# Patient Record
Sex: FEMALE | Race: ASIAN | NOT HISPANIC OR LATINO | Employment: OTHER | URBAN - METROPOLITAN AREA
[De-identification: names, ages, dates, MRNs, and addresses within clinical notes are randomized per-mention and may not be internally consistent; named-entity substitution may affect disease eponyms.]

---

## 2021-03-23 ENCOUNTER — IMMUNIZATION (OUTPATIENT)
Dept: OBSTETRICS AND GYNECOLOGY | Facility: CLINIC | Age: 61
End: 2021-03-23
Payer: MEDICARE

## 2021-03-23 DIAGNOSIS — Z23 NEED FOR VACCINATION: Primary | ICD-10-CM

## 2021-03-23 PROCEDURE — 91300 COVID-19, MRNA, LNP-S, PF, 30 MCG/0.3 ML DOSE VACCINE: CPT | Mod: PBBFAC | Performed by: NURSE PRACTITIONER

## 2021-04-13 ENCOUNTER — IMMUNIZATION (OUTPATIENT)
Dept: OBSTETRICS AND GYNECOLOGY | Facility: CLINIC | Age: 61
End: 2021-04-13
Payer: MEDICARE

## 2021-04-13 DIAGNOSIS — Z23 NEED FOR VACCINATION: Primary | ICD-10-CM

## 2021-04-13 PROCEDURE — 0002A COVID-19, MRNA, LNP-S, PF, 30 MCG/0.3 ML DOSE VACCINE: CPT | Mod: CV19,S$GLB,, | Performed by: FAMILY MEDICINE

## 2021-04-13 PROCEDURE — 91300 COVID-19, MRNA, LNP-S, PF, 30 MCG/0.3 ML DOSE VACCINE: ICD-10-PCS | Mod: S$GLB,,, | Performed by: FAMILY MEDICINE

## 2021-04-13 PROCEDURE — 0002A COVID-19, MRNA, LNP-S, PF, 30 MCG/0.3 ML DOSE VACCINE: ICD-10-PCS | Mod: CV19,S$GLB,, | Performed by: FAMILY MEDICINE

## 2021-04-13 PROCEDURE — 91300 COVID-19, MRNA, LNP-S, PF, 30 MCG/0.3 ML DOSE VACCINE: CPT | Mod: S$GLB,,, | Performed by: FAMILY MEDICINE

## 2021-04-29 ENCOUNTER — HOSPITAL ENCOUNTER (INPATIENT)
Facility: HOSPITAL | Age: 61
LOS: 2 days | Discharge: HOME | DRG: 638 | End: 2021-05-01
Attending: EMERGENCY MEDICINE | Admitting: HOSPITALIST
Payer: MEDICARE

## 2021-04-29 DIAGNOSIS — R50.9 FEVER: ICD-10-CM

## 2021-04-29 DIAGNOSIS — R00.0 TACHYCARDIA: ICD-10-CM

## 2021-04-29 DIAGNOSIS — R07.9 CHEST PAIN: ICD-10-CM

## 2021-04-29 PROBLEM — N17.9 ACUTE RENAL FAILURE: Status: ACTIVE | Noted: 2021-04-29

## 2021-04-29 PROBLEM — E16.2 HYPOGLYCEMIA: Status: ACTIVE | Noted: 2021-04-29

## 2021-04-29 PROBLEM — T38.3X5A: Status: ACTIVE | Noted: 2021-04-29

## 2021-04-29 PROBLEM — I10 ESSENTIAL HYPERTENSION: Status: ACTIVE | Noted: 2021-04-29

## 2021-04-29 LAB
ALBUMIN SERPL BCP-MCNC: 2.8 G/DL (ref 3.5–5.2)
ALP SERPL-CCNC: 80 U/L (ref 55–135)
ALT SERPL W/O P-5'-P-CCNC: 31 U/L (ref 10–44)
ANION GAP SERPL CALC-SCNC: 11 MMOL/L (ref 8–16)
AST SERPL-CCNC: 95 U/L (ref 10–40)
BACTERIA #/AREA URNS HPF: ABNORMAL /HPF
BASOPHILS # BLD AUTO: 0.03 K/UL (ref 0–0.2)
BASOPHILS NFR BLD: 0.3 % (ref 0–1.9)
BILIRUB SERPL-MCNC: 0.4 MG/DL (ref 0.1–1)
BILIRUB UR QL STRIP: NEGATIVE
BUN SERPL-MCNC: 31 MG/DL (ref 8–23)
CALCIUM SERPL-MCNC: 8.9 MG/DL (ref 8.7–10.5)
CHLORIDE SERPL-SCNC: 101 MMOL/L (ref 95–110)
CLARITY UR: ABNORMAL
CO2 SERPL-SCNC: 22 MMOL/L (ref 23–29)
COLOR UR: YELLOW
CREAT SERPL-MCNC: 2.8 MG/DL (ref 0.5–1.4)
CTP QC/QA: YES
DIFFERENTIAL METHOD: ABNORMAL
EOSINOPHIL # BLD AUTO: 0 K/UL (ref 0–0.5)
EOSINOPHIL NFR BLD: 0.1 % (ref 0–8)
ERYTHROCYTE [DISTWIDTH] IN BLOOD BY AUTOMATED COUNT: 14.7 % (ref 11.5–14.5)
EST. GFR  (AFRICAN AMERICAN): 20 ML/MIN/1.73 M^2
EST. GFR  (NON AFRICAN AMERICAN): 17 ML/MIN/1.73 M^2
GLUCOSE SERPL-MCNC: 59 MG/DL (ref 70–110)
GLUCOSE UR QL STRIP: NEGATIVE
HCT VFR BLD AUTO: 24.1 % (ref 37–48.5)
HGB BLD-MCNC: 8.2 G/DL (ref 12–16)
HGB UR QL STRIP: ABNORMAL
HYALINE CASTS #/AREA URNS LPF: 1 /LPF
IMM GRANULOCYTES # BLD AUTO: 0.07 K/UL (ref 0–0.04)
IMM GRANULOCYTES NFR BLD AUTO: 0.7 % (ref 0–0.5)
KETONES UR QL STRIP: NEGATIVE
LACTATE SERPL-SCNC: 1.8 MMOL/L (ref 0.5–2.2)
LEUKOCYTE ESTERASE UR QL STRIP: ABNORMAL
LYMPHOCYTES # BLD AUTO: 0.6 K/UL (ref 1–4.8)
LYMPHOCYTES NFR BLD: 6.2 % (ref 18–48)
MCH RBC QN AUTO: 30.4 PG (ref 27–31)
MCHC RBC AUTO-ENTMCNC: 34 G/DL (ref 32–36)
MCV RBC AUTO: 89 FL (ref 82–98)
MICROSCOPIC COMMENT: ABNORMAL
MONOCYTES # BLD AUTO: 1.2 K/UL (ref 0.3–1)
MONOCYTES NFR BLD: 12.3 % (ref 4–15)
NEUTROPHILS # BLD AUTO: 8.1 K/UL (ref 1.8–7.7)
NEUTROPHILS NFR BLD: 80.4 % (ref 38–73)
NITRITE UR QL STRIP: NEGATIVE
NRBC BLD-RTO: 0 /100 WBC
PH UR STRIP: 7 [PH] (ref 5–8)
PLATELET # BLD AUTO: 404 K/UL (ref 150–450)
PMV BLD AUTO: 8.7 FL (ref 9.2–12.9)
POCT GLUCOSE: 112 MG/DL (ref 70–110)
POCT GLUCOSE: 138 MG/DL (ref 70–110)
POCT GLUCOSE: 233 MG/DL (ref 70–110)
POCT GLUCOSE: 46 MG/DL (ref 70–110)
POCT GLUCOSE: 52 MG/DL (ref 70–110)
POCT GLUCOSE: 59 MG/DL (ref 70–110)
POCT GLUCOSE: 59 MG/DL (ref 70–110)
POCT GLUCOSE: 64 MG/DL (ref 70–110)
POTASSIUM SERPL-SCNC: 3.9 MMOL/L (ref 3.5–5.1)
PROCALCITONIN SERPL IA-MCNC: 1.04 NG/ML
PROT SERPL-MCNC: 7.4 G/DL (ref 6–8.4)
PROT UR QL STRIP: ABNORMAL
RBC # BLD AUTO: 2.7 M/UL (ref 4–5.4)
RBC #/AREA URNS HPF: 0 /HPF (ref 0–4)
SARS-COV-2 RDRP RESP QL NAA+PROBE: NEGATIVE
SODIUM SERPL-SCNC: 134 MMOL/L (ref 136–145)
SP GR UR STRIP: 1 (ref 1–1.03)
SQUAMOUS #/AREA URNS HPF: 10 /HPF
TROPONIN I SERPL DL<=0.01 NG/ML-MCNC: 0.01 NG/ML (ref 0–0.03)
URN SPEC COLLECT METH UR: ABNORMAL
UROBILINOGEN UR STRIP-ACNC: NEGATIVE EU/DL
WBC # BLD AUTO: 10.01 K/UL (ref 3.9–12.7)
WBC #/AREA URNS HPF: 5 /HPF (ref 0–5)

## 2021-04-29 PROCEDURE — U0002 COVID-19 LAB TEST NON-CDC: HCPCS | Performed by: EMERGENCY MEDICINE

## 2021-04-29 PROCEDURE — 83605 ASSAY OF LACTIC ACID: CPT | Performed by: EMERGENCY MEDICINE

## 2021-04-29 PROCEDURE — 96365 THER/PROPH/DIAG IV INF INIT: CPT

## 2021-04-29 PROCEDURE — 63600175 PHARM REV CODE 636 W HCPCS: Performed by: EMERGENCY MEDICINE

## 2021-04-29 PROCEDURE — 96367 TX/PROPH/DG ADDL SEQ IV INF: CPT

## 2021-04-29 PROCEDURE — 85025 COMPLETE CBC W/AUTO DIFF WBC: CPT | Performed by: EMERGENCY MEDICINE

## 2021-04-29 PROCEDURE — 63600175 PHARM REV CODE 636 W HCPCS: Performed by: HOSPITALIST

## 2021-04-29 PROCEDURE — A4216 STERILE WATER/SALINE, 10 ML: HCPCS | Performed by: HOSPITALIST

## 2021-04-29 PROCEDURE — 93010 ELECTROCARDIOGRAM REPORT: CPT | Mod: ,,, | Performed by: INTERNAL MEDICINE

## 2021-04-29 PROCEDURE — 82962 GLUCOSE BLOOD TEST: CPT

## 2021-04-29 PROCEDURE — 96368 THER/DIAG CONCURRENT INF: CPT

## 2021-04-29 PROCEDURE — 84484 ASSAY OF TROPONIN QUANT: CPT | Performed by: EMERGENCY MEDICINE

## 2021-04-29 PROCEDURE — 87040 BLOOD CULTURE FOR BACTERIA: CPT | Mod: 59 | Performed by: EMERGENCY MEDICINE

## 2021-04-29 PROCEDURE — 25000242 PHARM REV CODE 250 ALT 637 W/ HCPCS: Performed by: HOSPITALIST

## 2021-04-29 PROCEDURE — 81000 URINALYSIS NONAUTO W/SCOPE: CPT | Performed by: EMERGENCY MEDICINE

## 2021-04-29 PROCEDURE — 80053 COMPREHEN METABOLIC PANEL: CPT | Performed by: EMERGENCY MEDICINE

## 2021-04-29 PROCEDURE — 93010 EKG 12-LEAD: ICD-10-PCS | Mod: ,,, | Performed by: INTERNAL MEDICINE

## 2021-04-29 PROCEDURE — 21400001 HC TELEMETRY ROOM

## 2021-04-29 PROCEDURE — 84145 PROCALCITONIN (PCT): CPT | Performed by: EMERGENCY MEDICINE

## 2021-04-29 PROCEDURE — 25000003 PHARM REV CODE 250: Performed by: EMERGENCY MEDICINE

## 2021-04-29 PROCEDURE — 96366 THER/PROPH/DIAG IV INF ADDON: CPT

## 2021-04-29 PROCEDURE — 96375 TX/PRO/DX INJ NEW DRUG ADDON: CPT

## 2021-04-29 PROCEDURE — 99285 EMERGENCY DEPT VISIT HI MDM: CPT | Mod: 25

## 2021-04-29 PROCEDURE — 93005 ELECTROCARDIOGRAM TRACING: CPT

## 2021-04-29 PROCEDURE — 25000003 PHARM REV CODE 250: Performed by: HOSPITALIST

## 2021-04-29 RX ORDER — LANOLIN ALCOHOL/MO/W.PET/CERES
800 CREAM (GRAM) TOPICAL
Status: DISCONTINUED | OUTPATIENT
Start: 2021-04-29 | End: 2021-05-01 | Stop reason: HOSPADM

## 2021-04-29 RX ORDER — GLUCAGON 1 MG
1 KIT INJECTION
Status: DISCONTINUED | OUTPATIENT
Start: 2021-04-29 | End: 2021-04-29

## 2021-04-29 RX ORDER — INSULIN ASPART 100 [IU]/ML
0-5 INJECTION, SOLUTION INTRAVENOUS; SUBCUTANEOUS
Status: DISCONTINUED | OUTPATIENT
Start: 2021-04-29 | End: 2021-05-01 | Stop reason: HOSPADM

## 2021-04-29 RX ORDER — ACETAMINOPHEN 325 MG/1
650 TABLET ORAL EVERY 8 HOURS PRN
Status: DISCONTINUED | OUTPATIENT
Start: 2021-04-29 | End: 2021-05-01 | Stop reason: HOSPADM

## 2021-04-29 RX ORDER — FERROUS SULFATE 325(65) MG
325 TABLET, DELAYED RELEASE (ENTERIC COATED) ORAL
Status: ON HOLD | COMMUNITY
End: 2021-07-07 | Stop reason: HOSPADM

## 2021-04-29 RX ORDER — ALLOPURINOL 100 MG/1
100 TABLET ORAL DAILY
COMMUNITY

## 2021-04-29 RX ORDER — TALC
6 POWDER (GRAM) TOPICAL NIGHTLY PRN
Status: DISCONTINUED | OUTPATIENT
Start: 2021-04-29 | End: 2021-05-01 | Stop reason: HOSPADM

## 2021-04-29 RX ORDER — SODIUM CHLORIDE 0.9 % (FLUSH) 0.9 %
3 SYRINGE (ML) INJECTION EVERY 8 HOURS
Status: DISCONTINUED | OUTPATIENT
Start: 2021-04-29 | End: 2021-05-01 | Stop reason: HOSPADM

## 2021-04-29 RX ORDER — SODIUM,POTASSIUM PHOSPHATES 280-250MG
2 POWDER IN PACKET (EA) ORAL
Status: DISCONTINUED | OUTPATIENT
Start: 2021-04-29 | End: 2021-05-01 | Stop reason: HOSPADM

## 2021-04-29 RX ORDER — CLOPIDOGREL BISULFATE 75 MG/1
75 TABLET ORAL DAILY
Status: DISCONTINUED | OUTPATIENT
Start: 2021-04-29 | End: 2021-05-01 | Stop reason: HOSPADM

## 2021-04-29 RX ORDER — IBUPROFEN 200 MG
24 TABLET ORAL
Status: DISCONTINUED | OUTPATIENT
Start: 2021-04-29 | End: 2021-05-01 | Stop reason: HOSPADM

## 2021-04-29 RX ORDER — IBUPROFEN 200 MG
24 TABLET ORAL
Status: DISCONTINUED | OUTPATIENT
Start: 2021-04-29 | End: 2021-04-29

## 2021-04-29 RX ORDER — ATORVASTATIN CALCIUM 80 MG/1
80 TABLET, FILM COATED ORAL DAILY
Status: ON HOLD | COMMUNITY
End: 2021-07-07 | Stop reason: HOSPADM

## 2021-04-29 RX ORDER — METOPROLOL TARTRATE 50 MG/1
50 TABLET ORAL 2 TIMES DAILY
Status: DISCONTINUED | OUTPATIENT
Start: 2021-04-29 | End: 2021-05-01 | Stop reason: HOSPADM

## 2021-04-29 RX ORDER — HYDRALAZINE HYDROCHLORIDE 25 MG/1
50 TABLET, FILM COATED ORAL EVERY 8 HOURS
Status: DISCONTINUED | OUTPATIENT
Start: 2021-04-29 | End: 2021-04-30

## 2021-04-29 RX ORDER — CLONIDINE HYDROCHLORIDE 0.1 MG/1
0.1 TABLET ORAL EVERY 4 HOURS PRN
Status: DISCONTINUED | OUTPATIENT
Start: 2021-04-29 | End: 2021-05-01 | Stop reason: HOSPADM

## 2021-04-29 RX ORDER — FENOFIBRATE 145 MG/1
145 TABLET, FILM COATED ORAL DAILY
Status: ON HOLD | COMMUNITY
End: 2021-07-07 | Stop reason: HOSPADM

## 2021-04-29 RX ORDER — FENOFIBRATE 160 MG/1
160 TABLET ORAL DAILY
Status: DISCONTINUED | OUTPATIENT
Start: 2021-04-29 | End: 2021-05-01 | Stop reason: HOSPADM

## 2021-04-29 RX ORDER — PANTOPRAZOLE SODIUM 40 MG/1
40 TABLET, DELAYED RELEASE ORAL DAILY
Status: DISCONTINUED | OUTPATIENT
Start: 2021-04-29 | End: 2021-05-01 | Stop reason: HOSPADM

## 2021-04-29 RX ORDER — ONDANSETRON 2 MG/ML
8 INJECTION INTRAMUSCULAR; INTRAVENOUS EVERY 6 HOURS PRN
Status: DISCONTINUED | OUTPATIENT
Start: 2021-04-29 | End: 2021-05-01 | Stop reason: HOSPADM

## 2021-04-29 RX ORDER — POLYETHYLENE GLYCOL 3350 17 G/17G
17 POWDER, FOR SOLUTION ORAL DAILY
Status: DISCONTINUED | OUTPATIENT
Start: 2021-04-29 | End: 2021-05-01 | Stop reason: HOSPADM

## 2021-04-29 RX ORDER — VENLAFAXINE 75 MG/1
75 TABLET ORAL 2 TIMES DAILY
COMMUNITY

## 2021-04-29 RX ORDER — ACETAMINOPHEN 325 MG/1
650 TABLET ORAL EVERY 4 HOURS PRN
Status: DISCONTINUED | OUTPATIENT
Start: 2021-04-29 | End: 2021-05-01 | Stop reason: HOSPADM

## 2021-04-29 RX ORDER — LOSARTAN POTASSIUM 50 MG/1
50 TABLET ORAL DAILY
Status: ON HOLD | COMMUNITY
End: 2022-09-27 | Stop reason: HOSPADM

## 2021-04-29 RX ORDER — HEPARIN SODIUM 5000 [USP'U]/ML
5000 INJECTION, SOLUTION INTRAVENOUS; SUBCUTANEOUS EVERY 8 HOURS
Status: DISCONTINUED | OUTPATIENT
Start: 2021-04-29 | End: 2021-05-01 | Stop reason: HOSPADM

## 2021-04-29 RX ORDER — ATORVASTATIN CALCIUM 40 MG/1
80 TABLET, FILM COATED ORAL NIGHTLY
Status: DISCONTINUED | OUTPATIENT
Start: 2021-04-29 | End: 2021-05-01 | Stop reason: HOSPADM

## 2021-04-29 RX ORDER — GLIPIZIDE 5 MG/1
5 TABLET, FILM COATED, EXTENDED RELEASE ORAL
Status: ON HOLD | COMMUNITY
End: 2021-05-01 | Stop reason: HOSPADM

## 2021-04-29 RX ORDER — CLOPIDOGREL BISULFATE 75 MG/1
75 TABLET ORAL DAILY
COMMUNITY

## 2021-04-29 RX ORDER — IBUPROFEN 200 MG
16 TABLET ORAL
Status: DISCONTINUED | OUTPATIENT
Start: 2021-04-29 | End: 2021-04-29

## 2021-04-29 RX ORDER — DEXTROSE 50 % IN WATER (D50W) INTRAVENOUS SYRINGE
25
Status: COMPLETED | OUTPATIENT
Start: 2021-04-29 | End: 2021-04-29

## 2021-04-29 RX ORDER — GLUCAGON 1 MG
1 KIT INJECTION
Status: DISCONTINUED | OUTPATIENT
Start: 2021-04-29 | End: 2021-05-01 | Stop reason: HOSPADM

## 2021-04-29 RX ORDER — VENLAFAXINE 25 MG/1
75 TABLET ORAL 2 TIMES DAILY
Status: DISCONTINUED | OUTPATIENT
Start: 2021-04-29 | End: 2021-04-30

## 2021-04-29 RX ORDER — CHOLECALCIFEROL (VITAMIN D3) 25 MCG
1000 TABLET ORAL DAILY
Status: ON HOLD | COMMUNITY
End: 2022-09-27 | Stop reason: HOSPADM

## 2021-04-29 RX ORDER — DEXTROSE, SODIUM CHLORIDE, SODIUM LACTATE, POTASSIUM CHLORIDE, AND CALCIUM CHLORIDE 5; .6; .31; .03; .02 G/100ML; G/100ML; G/100ML; G/100ML; G/100ML
INJECTION, SOLUTION INTRAVENOUS
Status: COMPLETED | OUTPATIENT
Start: 2021-04-29 | End: 2021-04-29

## 2021-04-29 RX ORDER — AMLODIPINE BESYLATE 5 MG/1
5 TABLET ORAL DAILY
Status: ON HOLD | COMMUNITY
End: 2023-02-05 | Stop reason: HOSPADM

## 2021-04-29 RX ORDER — DEXTROSE MONOHYDRATE AND SODIUM CHLORIDE 5; .9 G/100ML; G/100ML
INJECTION, SOLUTION INTRAVENOUS CONTINUOUS
Status: DISCONTINUED | OUTPATIENT
Start: 2021-04-29 | End: 2021-04-30

## 2021-04-29 RX ORDER — IBUPROFEN 200 MG
16 TABLET ORAL
Status: DISCONTINUED | OUTPATIENT
Start: 2021-04-29 | End: 2021-05-01 | Stop reason: HOSPADM

## 2021-04-29 RX ORDER — DULAGLUTIDE 0.75 MG/.5ML
0.75 INJECTION, SOLUTION SUBCUTANEOUS
Status: ON HOLD | COMMUNITY
End: 2023-02-05 | Stop reason: HOSPADM

## 2021-04-29 RX ORDER — PANTOPRAZOLE SODIUM 20 MG/1
20 TABLET, DELAYED RELEASE ORAL DAILY
COMMUNITY

## 2021-04-29 RX ORDER — METOPROLOL TARTRATE 50 MG/1
50 TABLET ORAL 2 TIMES DAILY
Status: ON HOLD | COMMUNITY
End: 2022-09-27 | Stop reason: HOSPADM

## 2021-04-29 RX ORDER — AMLODIPINE BESYLATE 5 MG/1
10 TABLET ORAL DAILY
Status: DISCONTINUED | OUTPATIENT
Start: 2021-04-29 | End: 2021-04-30

## 2021-04-29 RX ADMIN — DEXTROSE, SODIUM CHLORIDE, SODIUM LACTATE, POTASSIUM CHLORIDE, AND CALCIUM CHLORIDE: 5; .6; .31; .03; .02 INJECTION, SOLUTION INTRAVENOUS at 08:04

## 2021-04-29 RX ADMIN — Medication 3 ML: at 10:04

## 2021-04-29 RX ADMIN — HYDRALAZINE HYDROCHLORIDE 50 MG: 25 TABLET, FILM COATED ORAL at 02:04

## 2021-04-29 RX ADMIN — PIPERACILLIN AND TAZOBACTAM 4.5 G: 4; .5 INJECTION, POWDER, LYOPHILIZED, FOR SOLUTION INTRAVENOUS; PARENTERAL at 09:04

## 2021-04-29 RX ADMIN — HEPARIN SODIUM 5000 UNITS: 5000 INJECTION INTRAVENOUS; SUBCUTANEOUS at 02:04

## 2021-04-29 RX ADMIN — ATORVASTATIN CALCIUM 80 MG: 40 TABLET, FILM COATED ORAL at 09:04

## 2021-04-29 RX ADMIN — DEXTROSE AND SODIUM CHLORIDE: 5; .9 INJECTION, SOLUTION INTRAVENOUS at 11:04

## 2021-04-29 RX ADMIN — HEPARIN SODIUM 5000 UNITS: 5000 INJECTION INTRAVENOUS; SUBCUTANEOUS at 09:04

## 2021-04-29 RX ADMIN — AMLODIPINE BESYLATE 10 MG: 5 TABLET ORAL at 02:04

## 2021-04-29 RX ADMIN — METOPROLOL TARTRATE 50 MG: 50 TABLET, FILM COATED ORAL at 02:04

## 2021-04-29 RX ADMIN — FENOFIBRATE 160 MG: 160 TABLET ORAL at 02:04

## 2021-04-29 RX ADMIN — Medication 16 G: at 04:04

## 2021-04-29 RX ADMIN — Medication 3 ML: at 02:04

## 2021-04-29 RX ADMIN — VANCOMYCIN HYDROCHLORIDE 1250 MG: 1.25 INJECTION, POWDER, LYOPHILIZED, FOR SOLUTION INTRAVENOUS at 09:04

## 2021-04-29 RX ADMIN — POLYETHYLENE GLYCOL 3350 17 G: 17 POWDER, FOR SOLUTION ORAL at 12:04

## 2021-04-29 RX ADMIN — CLOPIDOGREL 75 MG: 75 TABLET, FILM COATED ORAL at 02:04

## 2021-04-29 RX ADMIN — CEFTRIAXONE 1 G: 1 INJECTION, SOLUTION INTRAVENOUS at 12:04

## 2021-04-29 RX ADMIN — SODIUM CHLORIDE 1000 ML: 0.9 INJECTION, SOLUTION INTRAVENOUS at 08:04

## 2021-04-29 RX ADMIN — PANTOPRAZOLE SODIUM 40 MG: 40 TABLET, DELAYED RELEASE ORAL at 02:04

## 2021-04-29 RX ADMIN — METOPROLOL TARTRATE 50 MG: 50 TABLET, FILM COATED ORAL at 09:04

## 2021-04-29 RX ADMIN — HYDRALAZINE HYDROCHLORIDE 50 MG: 25 TABLET, FILM COATED ORAL at 09:04

## 2021-04-29 RX ADMIN — DEXTROSE MONOHYDRATE 25 G: 25 INJECTION, SOLUTION INTRAVENOUS at 10:04

## 2021-04-30 LAB
ANION GAP SERPL CALC-SCNC: 9 MMOL/L (ref 8–16)
BASOPHILS # BLD AUTO: 0.03 K/UL (ref 0–0.2)
BASOPHILS NFR BLD: 0.4 % (ref 0–1.9)
BUN SERPL-MCNC: 28 MG/DL (ref 8–23)
CALCIUM SERPL-MCNC: 7.9 MG/DL (ref 8.7–10.5)
CHLORIDE SERPL-SCNC: 109 MMOL/L (ref 95–110)
CO2 SERPL-SCNC: 20 MMOL/L (ref 23–29)
CREAT SERPL-MCNC: 2.5 MG/DL (ref 0.5–1.4)
DIFFERENTIAL METHOD: ABNORMAL
EOSINOPHIL # BLD AUTO: 0.1 K/UL (ref 0–0.5)
EOSINOPHIL NFR BLD: 1.1 % (ref 0–8)
ERYTHROCYTE [DISTWIDTH] IN BLOOD BY AUTOMATED COUNT: 14.6 % (ref 11.5–14.5)
EST. GFR  (AFRICAN AMERICAN): 23 ML/MIN/1.73 M^2
EST. GFR  (NON AFRICAN AMERICAN): 20 ML/MIN/1.73 M^2
ESTIMATED AVG GLUCOSE: 131 MG/DL (ref 68–131)
GLUCOSE SERPL-MCNC: 110 MG/DL (ref 70–110)
HBA1C MFR BLD: 6.2 % (ref 4–5.6)
HCT VFR BLD AUTO: 21.1 % (ref 37–48.5)
HGB BLD-MCNC: 6.8 G/DL (ref 12–16)
IMM GRANULOCYTES # BLD AUTO: 0.06 K/UL (ref 0–0.04)
IMM GRANULOCYTES NFR BLD AUTO: 0.9 % (ref 0–0.5)
LYMPHOCYTES # BLD AUTO: 0.8 K/UL (ref 1–4.8)
LYMPHOCYTES NFR BLD: 11 % (ref 18–48)
MCH RBC QN AUTO: 29.4 PG (ref 27–31)
MCHC RBC AUTO-ENTMCNC: 32.2 G/DL (ref 32–36)
MCV RBC AUTO: 91 FL (ref 82–98)
MONOCYTES # BLD AUTO: 0.7 K/UL (ref 0.3–1)
MONOCYTES NFR BLD: 9.7 % (ref 4–15)
NEUTROPHILS # BLD AUTO: 5.4 K/UL (ref 1.8–7.7)
NEUTROPHILS NFR BLD: 76.9 % (ref 38–73)
NRBC BLD-RTO: 0 /100 WBC
PLATELET # BLD AUTO: 385 K/UL (ref 150–450)
PMV BLD AUTO: 9 FL (ref 9.2–12.9)
POCT GLUCOSE: 111 MG/DL (ref 70–110)
POCT GLUCOSE: 113 MG/DL (ref 70–110)
POCT GLUCOSE: 189 MG/DL (ref 70–110)
POCT GLUCOSE: 280 MG/DL (ref 70–110)
POCT GLUCOSE: 303 MG/DL (ref 70–110)
POCT GLUCOSE: 92 MG/DL (ref 70–110)
POTASSIUM SERPL-SCNC: 4.3 MMOL/L (ref 3.5–5.1)
RBC # BLD AUTO: 2.31 M/UL (ref 4–5.4)
SODIUM SERPL-SCNC: 138 MMOL/L (ref 136–145)
WBC # BLD AUTO: 7.03 K/UL (ref 3.9–12.7)

## 2021-04-30 PROCEDURE — A4216 STERILE WATER/SALINE, 10 ML: HCPCS | Performed by: HOSPITALIST

## 2021-04-30 PROCEDURE — 97161 PT EVAL LOW COMPLEX 20 MIN: CPT

## 2021-04-30 PROCEDURE — 21400001 HC TELEMETRY ROOM

## 2021-04-30 PROCEDURE — 63600175 PHARM REV CODE 636 W HCPCS: Performed by: HOSPITALIST

## 2021-04-30 PROCEDURE — 85025 COMPLETE CBC W/AUTO DIFF WBC: CPT | Performed by: HOSPITALIST

## 2021-04-30 PROCEDURE — 87086 URINE CULTURE/COLONY COUNT: CPT | Performed by: INTERNAL MEDICINE

## 2021-04-30 PROCEDURE — 36415 COLL VENOUS BLD VENIPUNCTURE: CPT | Performed by: HOSPITALIST

## 2021-04-30 PROCEDURE — 25000003 PHARM REV CODE 250: Performed by: INTERNAL MEDICINE

## 2021-04-30 PROCEDURE — 80048 BASIC METABOLIC PNL TOTAL CA: CPT | Performed by: HOSPITALIST

## 2021-04-30 PROCEDURE — 83036 HEMOGLOBIN GLYCOSYLATED A1C: CPT | Performed by: HOSPITALIST

## 2021-04-30 PROCEDURE — 25000242 PHARM REV CODE 250 ALT 637 W/ HCPCS: Performed by: HOSPITALIST

## 2021-04-30 PROCEDURE — 97165 OT EVAL LOW COMPLEX 30 MIN: CPT

## 2021-04-30 PROCEDURE — 25000003 PHARM REV CODE 250: Performed by: HOSPITALIST

## 2021-04-30 RX ORDER — SODIUM BICARBONATE 325 MG/1
650 TABLET ORAL 2 TIMES DAILY
Status: DISCONTINUED | OUTPATIENT
Start: 2021-04-30 | End: 2021-05-01 | Stop reason: HOSPADM

## 2021-04-30 RX ORDER — VENLAFAXINE 25 MG/1
75 TABLET ORAL DAILY
Status: DISCONTINUED | OUTPATIENT
Start: 2021-04-30 | End: 2021-05-01 | Stop reason: HOSPADM

## 2021-04-30 RX ORDER — VENLAFAXINE 25 MG/1
75 TABLET ORAL DAILY
Status: DISCONTINUED | OUTPATIENT
Start: 2021-05-01 | End: 2021-04-30

## 2021-04-30 RX ORDER — SODIUM CHLORIDE 9 MG/ML
INJECTION, SOLUTION INTRAVENOUS CONTINUOUS
Status: DISCONTINUED | OUTPATIENT
Start: 2021-04-30 | End: 2021-04-30

## 2021-04-30 RX ADMIN — HEPARIN SODIUM 5000 UNITS: 5000 INJECTION INTRAVENOUS; SUBCUTANEOUS at 09:04

## 2021-04-30 RX ADMIN — FENOFIBRATE 160 MG: 160 TABLET ORAL at 11:04

## 2021-04-30 RX ADMIN — ATORVASTATIN CALCIUM 80 MG: 40 TABLET, FILM COATED ORAL at 09:04

## 2021-04-30 RX ADMIN — SODIUM BICARBONATE TAB 325 MG 650 MG: 325 TAB at 09:04

## 2021-04-30 RX ADMIN — SODIUM CHLORIDE: 0.9 INJECTION, SOLUTION INTRAVENOUS at 11:04

## 2021-04-30 RX ADMIN — HYDRALAZINE HYDROCHLORIDE 50 MG: 25 TABLET, FILM COATED ORAL at 06:04

## 2021-04-30 RX ADMIN — CLOPIDOGREL 75 MG: 75 TABLET, FILM COATED ORAL at 11:04

## 2021-04-30 RX ADMIN — Medication 3 ML: at 06:04

## 2021-04-30 RX ADMIN — POLYETHYLENE GLYCOL 3350 17 G: 17 POWDER, FOR SOLUTION ORAL at 11:04

## 2021-04-30 RX ADMIN — HEPARIN SODIUM 5000 UNITS: 5000 INJECTION INTRAVENOUS; SUBCUTANEOUS at 03:04

## 2021-04-30 RX ADMIN — METOPROLOL TARTRATE 50 MG: 50 TABLET, FILM COATED ORAL at 11:04

## 2021-04-30 RX ADMIN — METOPROLOL TARTRATE 50 MG: 50 TABLET, FILM COATED ORAL at 09:04

## 2021-04-30 RX ADMIN — PANTOPRAZOLE SODIUM 40 MG: 40 TABLET, DELAYED RELEASE ORAL at 11:04

## 2021-04-30 RX ADMIN — HEPARIN SODIUM 5000 UNITS: 5000 INJECTION INTRAVENOUS; SUBCUTANEOUS at 06:04

## 2021-04-30 RX ADMIN — DEXTROSE AND SODIUM CHLORIDE: 5; .9 INJECTION, SOLUTION INTRAVENOUS at 02:04

## 2021-04-30 RX ADMIN — Medication 3 ML: at 09:04

## 2021-04-30 RX ADMIN — CEFTRIAXONE 1 G: 1 INJECTION, SOLUTION INTRAVENOUS at 12:04

## 2021-04-30 RX ADMIN — INSULIN ASPART 3 UNITS: 100 INJECTION, SOLUTION INTRAVENOUS; SUBCUTANEOUS at 04:04

## 2021-04-30 RX ADMIN — VENLAFAXINE 75 MG: 25 TABLET ORAL at 11:04

## 2021-04-30 RX ADMIN — Medication 3 ML: at 03:04

## 2021-05-01 VITALS
RESPIRATION RATE: 17 BRPM | WEIGHT: 116 LBS | BODY MASS INDEX: 21.9 KG/M2 | SYSTOLIC BLOOD PRESSURE: 135 MMHG | OXYGEN SATURATION: 96 % | HEART RATE: 118 BPM | TEMPERATURE: 98 F | HEIGHT: 61 IN | DIASTOLIC BLOOD PRESSURE: 63 MMHG

## 2021-05-01 PROBLEM — E16.2 HYPOGLYCEMIA: Status: RESOLVED | Noted: 2021-04-29 | Resolved: 2021-05-01

## 2021-05-01 PROBLEM — R50.9 FEVER: Status: RESOLVED | Noted: 2021-04-29 | Resolved: 2021-05-01

## 2021-05-01 PROBLEM — T38.3X5A: Status: RESOLVED | Noted: 2021-04-29 | Resolved: 2021-05-01

## 2021-05-01 LAB
ANION GAP SERPL CALC-SCNC: 10 MMOL/L (ref 8–16)
BASOPHILS # BLD AUTO: 0.03 K/UL (ref 0–0.2)
BASOPHILS NFR BLD: 0.5 % (ref 0–1.9)
BUN SERPL-MCNC: 32 MG/DL (ref 8–23)
CALCIUM SERPL-MCNC: 8.2 MG/DL (ref 8.7–10.5)
CHLORIDE SERPL-SCNC: 109 MMOL/L (ref 95–110)
CO2 SERPL-SCNC: 19 MMOL/L (ref 23–29)
CREAT SERPL-MCNC: 2.7 MG/DL (ref 0.5–1.4)
DIFFERENTIAL METHOD: ABNORMAL
EOSINOPHIL # BLD AUTO: 0.1 K/UL (ref 0–0.5)
EOSINOPHIL NFR BLD: 1.5 % (ref 0–8)
ERYTHROCYTE [DISTWIDTH] IN BLOOD BY AUTOMATED COUNT: 14.3 % (ref 11.5–14.5)
EST. GFR  (AFRICAN AMERICAN): 21 ML/MIN/1.73 M^2
EST. GFR  (NON AFRICAN AMERICAN): 18 ML/MIN/1.73 M^2
FERRITIN SERPL-MCNC: 596 NG/ML (ref 20–300)
GLUCOSE SERPL-MCNC: 144 MG/DL (ref 70–110)
HCT VFR BLD AUTO: 21.3 % (ref 37–48.5)
HGB BLD-MCNC: 6.8 G/DL (ref 12–16)
IMM GRANULOCYTES # BLD AUTO: 0.06 K/UL (ref 0–0.04)
IMM GRANULOCYTES NFR BLD AUTO: 0.9 % (ref 0–0.5)
IRON SERPL-MCNC: 76 UG/DL (ref 30–160)
LYMPHOCYTES # BLD AUTO: 1 K/UL (ref 1–4.8)
LYMPHOCYTES NFR BLD: 14.6 % (ref 18–48)
MCH RBC QN AUTO: 29.2 PG (ref 27–31)
MCHC RBC AUTO-ENTMCNC: 31.9 G/DL (ref 32–36)
MCV RBC AUTO: 91 FL (ref 82–98)
MONOCYTES # BLD AUTO: 0.6 K/UL (ref 0.3–1)
MONOCYTES NFR BLD: 8.9 % (ref 4–15)
NEUTROPHILS # BLD AUTO: 4.9 K/UL (ref 1.8–7.7)
NEUTROPHILS NFR BLD: 73.6 % (ref 38–73)
NRBC BLD-RTO: 0 /100 WBC
PLATELET # BLD AUTO: 459 K/UL (ref 150–450)
PMV BLD AUTO: 9.1 FL (ref 9.2–12.9)
POCT GLUCOSE: 139 MG/DL (ref 70–110)
POCT GLUCOSE: 215 MG/DL (ref 70–110)
POTASSIUM SERPL-SCNC: 4.1 MMOL/L (ref 3.5–5.1)
RBC # BLD AUTO: 2.33 M/UL (ref 4–5.4)
SATURATED IRON: 23 % (ref 20–50)
SODIUM SERPL-SCNC: 138 MMOL/L (ref 136–145)
TOTAL IRON BINDING CAPACITY: 333 UG/DL (ref 250–450)
TRANSFERRIN SERPL-MCNC: 225 MG/DL (ref 200–375)
WBC # BLD AUTO: 6.65 K/UL (ref 3.9–12.7)

## 2021-05-01 PROCEDURE — 86334 IMMUNOFIX E-PHORESIS SERUM: CPT | Performed by: INTERNAL MEDICINE

## 2021-05-01 PROCEDURE — 83540 ASSAY OF IRON: CPT | Performed by: INTERNAL MEDICINE

## 2021-05-01 PROCEDURE — 25000242 PHARM REV CODE 250 ALT 637 W/ HCPCS: Performed by: HOSPITALIST

## 2021-05-01 PROCEDURE — 82728 ASSAY OF FERRITIN: CPT | Performed by: INTERNAL MEDICINE

## 2021-05-01 PROCEDURE — 80048 BASIC METABOLIC PNL TOTAL CA: CPT | Performed by: HOSPITALIST

## 2021-05-01 PROCEDURE — 84165 PROTEIN E-PHORESIS SERUM: CPT | Performed by: INTERNAL MEDICINE

## 2021-05-01 PROCEDURE — 84165 PROTEIN E-PHORESIS SERUM: CPT | Mod: 26,,, | Performed by: PATHOLOGY

## 2021-05-01 PROCEDURE — 85025 COMPLETE CBC W/AUTO DIFF WBC: CPT | Performed by: HOSPITALIST

## 2021-05-01 PROCEDURE — 25000003 PHARM REV CODE 250: Performed by: HOSPITALIST

## 2021-05-01 PROCEDURE — 84165 PATHOLOGIST INTERPRETATION SPE: ICD-10-PCS | Mod: 26,,, | Performed by: PATHOLOGY

## 2021-05-01 PROCEDURE — 25000003 PHARM REV CODE 250: Performed by: INTERNAL MEDICINE

## 2021-05-01 PROCEDURE — 63600175 PHARM REV CODE 636 W HCPCS: Performed by: HOSPITALIST

## 2021-05-01 PROCEDURE — A4216 STERILE WATER/SALINE, 10 ML: HCPCS | Performed by: HOSPITALIST

## 2021-05-01 PROCEDURE — 86334 PATHOLOGIST INTERPRETATION IFE: ICD-10-PCS | Mod: 26,,, | Performed by: PATHOLOGY

## 2021-05-01 PROCEDURE — 36415 COLL VENOUS BLD VENIPUNCTURE: CPT | Performed by: INTERNAL MEDICINE

## 2021-05-01 PROCEDURE — 86334 IMMUNOFIX E-PHORESIS SERUM: CPT | Mod: 26,,, | Performed by: PATHOLOGY

## 2021-05-01 RX ADMIN — CEFTRIAXONE 1 G: 1 INJECTION, SOLUTION INTRAVENOUS at 12:05

## 2021-05-01 RX ADMIN — INSULIN ASPART 2 UNITS: 100 INJECTION, SOLUTION INTRAVENOUS; SUBCUTANEOUS at 12:05

## 2021-05-01 RX ADMIN — METOPROLOL TARTRATE 50 MG: 50 TABLET, FILM COATED ORAL at 09:05

## 2021-05-01 RX ADMIN — HEPARIN SODIUM 5000 UNITS: 5000 INJECTION INTRAVENOUS; SUBCUTANEOUS at 01:05

## 2021-05-01 RX ADMIN — HEPARIN SODIUM 5000 UNITS: 5000 INJECTION INTRAVENOUS; SUBCUTANEOUS at 06:05

## 2021-05-01 RX ADMIN — FENOFIBRATE 160 MG: 160 TABLET ORAL at 09:05

## 2021-05-01 RX ADMIN — Medication 3 ML: at 01:05

## 2021-05-01 RX ADMIN — CLONIDINE HYDROCHLORIDE 0.1 MG: 0.1 TABLET ORAL at 01:05

## 2021-05-01 RX ADMIN — VENLAFAXINE 75 MG: 25 TABLET ORAL at 09:05

## 2021-05-01 RX ADMIN — PANTOPRAZOLE SODIUM 40 MG: 40 TABLET, DELAYED RELEASE ORAL at 09:05

## 2021-05-01 RX ADMIN — SODIUM BICARBONATE TAB 325 MG 650 MG: 325 TAB at 09:05

## 2021-05-01 RX ADMIN — CLOPIDOGREL 75 MG: 75 TABLET, FILM COATED ORAL at 09:05

## 2021-05-02 LAB — BACTERIA UR CULT: NO GROWTH

## 2021-05-03 ENCOUNTER — PATIENT OUTREACH (OUTPATIENT)
Dept: ADMINISTRATIVE | Facility: CLINIC | Age: 61
End: 2021-05-03

## 2021-05-03 LAB
ALBUMIN SERPL ELPH-MCNC: 2.63 G/DL (ref 3.35–5.55)
ALPHA1 GLOB SERPL ELPH-MCNC: 0.5 G/DL (ref 0.17–0.41)
ALPHA2 GLOB SERPL ELPH-MCNC: 0.81 G/DL (ref 0.43–0.99)
B-GLOBULIN SERPL ELPH-MCNC: 0.87 G/DL (ref 0.5–1.1)
GAMMA GLOB SERPL ELPH-MCNC: 1.1 G/DL (ref 0.67–1.58)
INTERPRETATION SERPL IFE-IMP: NORMAL
PROT SERPL-MCNC: 5.9 G/DL (ref 6–8.4)

## 2021-05-04 LAB
BACTERIA BLD CULT: NORMAL
BACTERIA BLD CULT: NORMAL
PATHOLOGIST INTERPRETATION IFE: NORMAL
PATHOLOGIST INTERPRETATION SPE: NORMAL

## 2021-06-30 ENCOUNTER — HOSPITAL ENCOUNTER (INPATIENT)
Facility: HOSPITAL | Age: 61
LOS: 4 days | Discharge: HOME HEALTH | DRG: 754 | End: 2021-07-07
Attending: EMERGENCY MEDICINE | Admitting: HOSPITALIST
Payer: MEDICARE

## 2021-06-30 DIAGNOSIS — R74.8 ELEVATED LIVER ENZYMES: ICD-10-CM

## 2021-06-30 DIAGNOSIS — R18.8 OTHER ASCITES: Primary | ICD-10-CM

## 2021-06-30 DIAGNOSIS — R53.1 GENERALIZED WEAKNESS: ICD-10-CM

## 2021-06-30 DIAGNOSIS — R19.00 PELVIC MASS: ICD-10-CM

## 2021-06-30 DIAGNOSIS — R74.8 ELEVATED LIPASE: ICD-10-CM

## 2021-06-30 DIAGNOSIS — R79.89 ELEVATED LFTS: ICD-10-CM

## 2021-06-30 DIAGNOSIS — G93.40 ACUTE ENCEPHALOPATHY: ICD-10-CM

## 2021-06-30 DIAGNOSIS — I63.9 CVA (CEREBRAL VASCULAR ACCIDENT): ICD-10-CM

## 2021-06-30 DIAGNOSIS — D64.9 ANEMIA: ICD-10-CM

## 2021-06-30 PROBLEM — N18.4 STAGE 4 CHRONIC KIDNEY DISEASE: Status: ACTIVE | Noted: 2021-06-30

## 2021-06-30 LAB
ALBUMIN SERPL BCP-MCNC: 2.7 G/DL (ref 3.5–5.2)
ALP SERPL-CCNC: 214 U/L (ref 55–135)
ALT SERPL W/O P-5'-P-CCNC: 173 U/L (ref 10–44)
AMMONIA PLAS-SCNC: 33 UMOL/L (ref 10–50)
ANION GAP SERPL CALC-SCNC: 10 MMOL/L (ref 8–16)
APAP SERPL-MCNC: <3 UG/ML (ref 10–20)
AST SERPL-CCNC: 424 U/L (ref 10–40)
BACTERIA #/AREA URNS HPF: NORMAL /HPF
BASOPHILS # BLD AUTO: 0.02 K/UL (ref 0–0.2)
BASOPHILS NFR BLD: 0.3 % (ref 0–1.9)
BILIRUB SERPL-MCNC: 0.7 MG/DL (ref 0.1–1)
BILIRUB UR QL STRIP: NEGATIVE
BNP SERPL-MCNC: 79 PG/ML (ref 0–99)
BUN SERPL-MCNC: 51 MG/DL (ref 8–23)
CALCIUM SERPL-MCNC: 9.1 MG/DL (ref 8.7–10.5)
CHLORIDE SERPL-SCNC: 108 MMOL/L (ref 95–110)
CLARITY UR: ABNORMAL
CO2 SERPL-SCNC: 17 MMOL/L (ref 23–29)
COLOR UR: YELLOW
CREAT SERPL-MCNC: 2.6 MG/DL (ref 0.5–1.4)
CRP SERPL-MCNC: 37 MG/L (ref 0–8.2)
CTP QC/QA: YES
DIFFERENTIAL METHOD: ABNORMAL
EOSINOPHIL # BLD AUTO: 0.1 K/UL (ref 0–0.5)
EOSINOPHIL NFR BLD: 0.6 % (ref 0–8)
ERYTHROCYTE [DISTWIDTH] IN BLOOD BY AUTOMATED COUNT: 15 % (ref 11.5–14.5)
ERYTHROCYTE [SEDIMENTATION RATE] IN BLOOD BY WESTERGREN METHOD: 92 MM/HR (ref 0–20)
EST. GFR  (AFRICAN AMERICAN): 22 ML/MIN/1.73 M^2
EST. GFR  (NON AFRICAN AMERICAN): 19 ML/MIN/1.73 M^2
FERRITIN SERPL-MCNC: 1691 NG/ML (ref 20–300)
GLUCOSE SERPL-MCNC: 162 MG/DL (ref 70–110)
GLUCOSE UR QL STRIP: ABNORMAL
HCT VFR BLD AUTO: 26.9 % (ref 37–48.5)
HGB BLD-MCNC: 8.7 G/DL (ref 12–16)
HGB UR QL STRIP: ABNORMAL
HYALINE CASTS #/AREA URNS LPF: 1 /LPF
IMM GRANULOCYTES # BLD AUTO: 0.06 K/UL (ref 0–0.04)
IMM GRANULOCYTES NFR BLD AUTO: 0.8 % (ref 0–0.5)
IRON SERPL-MCNC: 65 UG/DL (ref 30–160)
KETONES UR QL STRIP: NEGATIVE
LEUKOCYTE ESTERASE UR QL STRIP: NEGATIVE
LIPASE SERPL-CCNC: 505 U/L (ref 4–60)
LYMPHOCYTES # BLD AUTO: 0.7 K/UL (ref 1–4.8)
LYMPHOCYTES NFR BLD: 8.5 % (ref 18–48)
MCH RBC QN AUTO: 29.1 PG (ref 27–31)
MCHC RBC AUTO-ENTMCNC: 32.3 G/DL (ref 32–36)
MCV RBC AUTO: 90 FL (ref 82–98)
MICROSCOPIC COMMENT: NORMAL
MONOCYTES # BLD AUTO: 0.6 K/UL (ref 0.3–1)
MONOCYTES NFR BLD: 8 % (ref 4–15)
NEUTROPHILS # BLD AUTO: 6.4 K/UL (ref 1.8–7.7)
NEUTROPHILS NFR BLD: 81.8 % (ref 38–73)
NITRITE UR QL STRIP: NEGATIVE
NRBC BLD-RTO: 0 /100 WBC
PH UR STRIP: 6 [PH] (ref 5–8)
PLATELET # BLD AUTO: 312 K/UL (ref 150–450)
PMV BLD AUTO: 9.3 FL (ref 9.2–12.9)
POCT GLUCOSE: 138 MG/DL (ref 70–110)
POCT GLUCOSE: 143 MG/DL (ref 70–110)
POCT GLUCOSE: 169 MG/DL (ref 70–110)
POTASSIUM SERPL-SCNC: 4.6 MMOL/L (ref 3.5–5.1)
PROT SERPL-MCNC: 7.6 G/DL (ref 6–8.4)
PROT UR QL STRIP: ABNORMAL
RBC # BLD AUTO: 2.99 M/UL (ref 4–5.4)
RBC #/AREA URNS HPF: 2 /HPF (ref 0–4)
SARS-COV-2 RDRP RESP QL NAA+PROBE: NEGATIVE
SATURATED IRON: 20 % (ref 20–50)
SODIUM SERPL-SCNC: 135 MMOL/L (ref 136–145)
SP GR UR STRIP: 1.01 (ref 1–1.03)
SQUAMOUS #/AREA URNS HPF: 2 /HPF
TOTAL IRON BINDING CAPACITY: 333 UG/DL (ref 250–450)
TRANSFERRIN SERPL-MCNC: 225 MG/DL (ref 200–375)
TROPONIN I SERPL DL<=0.01 NG/ML-MCNC: 0.06 NG/ML (ref 0–0.03)
TSH SERPL DL<=0.005 MIU/L-ACNC: 0.5 UIU/ML (ref 0.4–4)
URN SPEC COLLECT METH UR: ABNORMAL
UROBILINOGEN UR STRIP-ACNC: NEGATIVE EU/DL
WBC # BLD AUTO: 7.78 K/UL (ref 3.9–12.7)
WBC #/AREA URNS HPF: 4 /HPF (ref 0–5)

## 2021-06-30 PROCEDURE — 25000003 PHARM REV CODE 250: Performed by: EMERGENCY MEDICINE

## 2021-06-30 PROCEDURE — 93010 EKG 12-LEAD: ICD-10-PCS | Mod: ,,, | Performed by: INTERNAL MEDICINE

## 2021-06-30 PROCEDURE — 86235 NUCLEAR ANTIGEN ANTIBODY: CPT | Mod: 91 | Performed by: NURSE PRACTITIONER

## 2021-06-30 PROCEDURE — 99291 CRITICAL CARE FIRST HOUR: CPT | Mod: 25

## 2021-06-30 PROCEDURE — 86256 FLUORESCENT ANTIBODY TITER: CPT | Performed by: NURSE PRACTITIONER

## 2021-06-30 PROCEDURE — 96375 TX/PRO/DX INJ NEW DRUG ADDON: CPT

## 2021-06-30 PROCEDURE — 99220 PR INITIAL OBSERVATION CARE,LEVL III: ICD-10-PCS | Mod: ,,, | Performed by: PHYSICIAN ASSISTANT

## 2021-06-30 PROCEDURE — 25000003 PHARM REV CODE 250: Performed by: PHYSICIAN ASSISTANT

## 2021-06-30 PROCEDURE — 93010 ELECTROCARDIOGRAM REPORT: CPT | Mod: ,,, | Performed by: INTERNAL MEDICINE

## 2021-06-30 PROCEDURE — 84443 ASSAY THYROID STIM HORMONE: CPT | Performed by: EMERGENCY MEDICINE

## 2021-06-30 PROCEDURE — 85652 RBC SED RATE AUTOMATED: CPT | Performed by: NURSE PRACTITIONER

## 2021-06-30 PROCEDURE — 82140 ASSAY OF AMMONIA: CPT | Performed by: EMERGENCY MEDICINE

## 2021-06-30 PROCEDURE — 93005 ELECTROCARDIOGRAM TRACING: CPT

## 2021-06-30 PROCEDURE — 96361 HYDRATE IV INFUSION ADD-ON: CPT

## 2021-06-30 PROCEDURE — 82728 ASSAY OF FERRITIN: CPT | Performed by: NURSE PRACTITIONER

## 2021-06-30 PROCEDURE — U0002 COVID-19 LAB TEST NON-CDC: HCPCS | Performed by: EMERGENCY MEDICINE

## 2021-06-30 PROCEDURE — 86038 ANTINUCLEAR ANTIBODIES: CPT | Performed by: NURSE PRACTITIONER

## 2021-06-30 PROCEDURE — 83880 ASSAY OF NATRIURETIC PEPTIDE: CPT | Performed by: EMERGENCY MEDICINE

## 2021-06-30 PROCEDURE — 99223 1ST HOSP IP/OBS HIGH 75: CPT | Mod: ,,, | Performed by: NURSE PRACTITIONER

## 2021-06-30 PROCEDURE — 99204 PR OFFICE/OUTPT VISIT, NEW, LEVL IV, 45-59 MIN: ICD-10-PCS | Mod: ,,, | Performed by: PSYCHIATRY & NEUROLOGY

## 2021-06-30 PROCEDURE — 97165 OT EVAL LOW COMPLEX 30 MIN: CPT

## 2021-06-30 PROCEDURE — 99220 PR INITIAL OBSERVATION CARE,LEVL III: CPT | Mod: ,,, | Performed by: PHYSICIAN ASSISTANT

## 2021-06-30 PROCEDURE — 83690 ASSAY OF LIPASE: CPT | Performed by: EMERGENCY MEDICINE

## 2021-06-30 PROCEDURE — 85025 COMPLETE CBC W/AUTO DIFF WBC: CPT | Performed by: EMERGENCY MEDICINE

## 2021-06-30 PROCEDURE — 83540 ASSAY OF IRON: CPT | Performed by: NURSE PRACTITIONER

## 2021-06-30 PROCEDURE — 86140 C-REACTIVE PROTEIN: CPT | Performed by: NURSE PRACTITIONER

## 2021-06-30 PROCEDURE — 63600175 PHARM REV CODE 636 W HCPCS: Performed by: EMERGENCY MEDICINE

## 2021-06-30 PROCEDURE — G0378 HOSPITAL OBSERVATION PER HR: HCPCS

## 2021-06-30 PROCEDURE — 99204 OFFICE O/P NEW MOD 45 MIN: CPT | Mod: ,,, | Performed by: PSYCHIATRY & NEUROLOGY

## 2021-06-30 PROCEDURE — 80143 DRUG ASSAY ACETAMINOPHEN: CPT | Performed by: EMERGENCY MEDICINE

## 2021-06-30 PROCEDURE — 80074 ACUTE HEPATITIS PANEL: CPT | Performed by: NURSE PRACTITIONER

## 2021-06-30 PROCEDURE — 80053 COMPREHEN METABOLIC PANEL: CPT | Performed by: EMERGENCY MEDICINE

## 2021-06-30 PROCEDURE — C9113 INJ PANTOPRAZOLE SODIUM, VIA: HCPCS | Performed by: EMERGENCY MEDICINE

## 2021-06-30 PROCEDURE — 82784 ASSAY IGA/IGD/IGG/IGM EACH: CPT | Mod: 59 | Performed by: NURSE PRACTITIONER

## 2021-06-30 PROCEDURE — 84484 ASSAY OF TROPONIN QUANT: CPT | Performed by: EMERGENCY MEDICINE

## 2021-06-30 PROCEDURE — 81000 URINALYSIS NONAUTO W/SCOPE: CPT | Performed by: EMERGENCY MEDICINE

## 2021-06-30 PROCEDURE — 92610 EVALUATE SWALLOWING FUNCTION: CPT

## 2021-06-30 PROCEDURE — 97162 PT EVAL MOD COMPLEX 30 MIN: CPT

## 2021-06-30 PROCEDURE — 99223 PR INITIAL HOSPITAL CARE,LEVL III: ICD-10-PCS | Mod: ,,, | Performed by: NURSE PRACTITIONER

## 2021-06-30 RX ORDER — FENOFIBRATE 145 MG/1
145 TABLET, FILM COATED ORAL DAILY
Status: DISCONTINUED | OUTPATIENT
Start: 2021-07-01 | End: 2021-07-01

## 2021-06-30 RX ORDER — VENLAFAXINE 37.5 MG/1
75 TABLET ORAL 2 TIMES DAILY
Status: DISCONTINUED | OUTPATIENT
Start: 2021-06-30 | End: 2021-07-07 | Stop reason: HOSPADM

## 2021-06-30 RX ORDER — CLOPIDOGREL BISULFATE 75 MG/1
75 TABLET ORAL DAILY
Status: DISCONTINUED | OUTPATIENT
Start: 2021-07-01 | End: 2021-07-07 | Stop reason: HOSPADM

## 2021-06-30 RX ORDER — ATORVASTATIN CALCIUM 40 MG/1
80 TABLET, FILM COATED ORAL DAILY
Status: DISCONTINUED | OUTPATIENT
Start: 2021-07-01 | End: 2021-07-01

## 2021-06-30 RX ORDER — LOSARTAN POTASSIUM 25 MG/1
50 TABLET ORAL DAILY
Status: DISCONTINUED | OUTPATIENT
Start: 2021-07-01 | End: 2021-07-06

## 2021-06-30 RX ORDER — GLUCAGON 1 MG
1 KIT INJECTION
Status: DISCONTINUED | OUTPATIENT
Start: 2021-06-30 | End: 2021-07-07 | Stop reason: HOSPADM

## 2021-06-30 RX ORDER — INSULIN ASPART 100 [IU]/ML
0-5 INJECTION, SOLUTION INTRAVENOUS; SUBCUTANEOUS EVERY 6 HOURS PRN
Status: DISCONTINUED | OUTPATIENT
Start: 2021-06-30 | End: 2021-07-07 | Stop reason: HOSPADM

## 2021-06-30 RX ORDER — PANTOPRAZOLE SODIUM 40 MG/10ML
80 INJECTION, POWDER, LYOPHILIZED, FOR SOLUTION INTRAVENOUS
Status: COMPLETED | OUTPATIENT
Start: 2021-06-30 | End: 2021-06-30

## 2021-06-30 RX ORDER — AMLODIPINE BESYLATE 5 MG/1
5 TABLET ORAL DAILY
Status: DISCONTINUED | OUTPATIENT
Start: 2021-07-01 | End: 2021-07-06

## 2021-06-30 RX ORDER — METOPROLOL TARTRATE 50 MG/1
50 TABLET ORAL 2 TIMES DAILY
Status: DISCONTINUED | OUTPATIENT
Start: 2021-06-30 | End: 2021-07-07 | Stop reason: HOSPADM

## 2021-06-30 RX ORDER — ALLOPURINOL 100 MG/1
100 TABLET ORAL DAILY
Status: DISCONTINUED | OUTPATIENT
Start: 2021-07-01 | End: 2021-07-07 | Stop reason: HOSPADM

## 2021-06-30 RX ADMIN — PANTOPRAZOLE SODIUM 80 MG: 40 INJECTION, POWDER, FOR SOLUTION INTRAVENOUS at 10:06

## 2021-06-30 RX ADMIN — METOPROLOL TARTRATE 50 MG: 50 TABLET, FILM COATED ORAL at 08:06

## 2021-06-30 RX ADMIN — SODIUM CHLORIDE 500 ML: 0.9 INJECTION, SOLUTION INTRAVENOUS at 09:06

## 2021-06-30 RX ADMIN — VENLAFAXINE 75 MG: 37.5 TABLET ORAL at 08:06

## 2021-07-01 ENCOUNTER — HOSPITAL ENCOUNTER (OUTPATIENT)
Dept: INTERVENTIONAL RADIOLOGY/VASCULAR | Facility: HOSPITAL | Age: 61
Discharge: HOME | DRG: 754 | End: 2021-07-01
Attending: RADIOLOGY
Payer: MEDICARE

## 2021-07-01 PROBLEM — N18.4 STAGE 4 CHRONIC KIDNEY DISEASE: Chronic | Status: ACTIVE | Noted: 2021-06-30

## 2021-07-01 PROBLEM — N17.9 ACUTE RENAL FAILURE: Status: RESOLVED | Noted: 2021-04-29 | Resolved: 2021-07-01

## 2021-07-01 PROBLEM — R19.00 PELVIC MASS: Status: ACTIVE | Noted: 2021-07-01

## 2021-07-01 LAB
ALBUMIN SERPL BCP-MCNC: 2.4 G/DL (ref 3.5–5.2)
ALP SERPL-CCNC: 203 U/L (ref 55–135)
ALT SERPL W/O P-5'-P-CCNC: 173 U/L (ref 10–44)
ANION GAP SERPL CALC-SCNC: 8 MMOL/L (ref 8–16)
APPEARANCE FLD: NORMAL
AST SERPL-CCNC: 436 U/L (ref 10–40)
BASOPHILS # BLD AUTO: 0.02 K/UL (ref 0–0.2)
BASOPHILS NFR BLD: 0.2 % (ref 0–1.9)
BILIRUB SERPL-MCNC: 0.8 MG/DL (ref 0.1–1)
BODY FLD TYPE: NORMAL
BUN SERPL-MCNC: 49 MG/DL (ref 8–23)
CALCIUM SERPL-MCNC: 9 MG/DL (ref 8.7–10.5)
CHLORIDE SERPL-SCNC: 111 MMOL/L (ref 95–110)
CHOLEST SERPL-MCNC: 97 MG/DL (ref 120–199)
CHOLEST/HDLC SERPL: 5.7 {RATIO} (ref 2–5)
CO2 SERPL-SCNC: 17 MMOL/L (ref 23–29)
COLOR FLD: NORMAL
CREAT SERPL-MCNC: 2.5 MG/DL (ref 0.5–1.4)
DIFFERENTIAL METHOD: ABNORMAL
EOSINOPHIL # BLD AUTO: 0 K/UL (ref 0–0.5)
EOSINOPHIL NFR BLD: 0.5 % (ref 0–8)
ERYTHROCYTE [DISTWIDTH] IN BLOOD BY AUTOMATED COUNT: 15 % (ref 11.5–14.5)
EST. GFR  (AFRICAN AMERICAN): 23 ML/MIN/1.73 M^2
EST. GFR  (NON AFRICAN AMERICAN): 20 ML/MIN/1.73 M^2
ESTIMATED AVG GLUCOSE: 154 MG/DL (ref 68–131)
GLUCOSE SERPL-MCNC: 133 MG/DL (ref 70–110)
GRAM STN SPEC: NORMAL
HAV IGM SERPL QL IA: NEGATIVE
HBA1C MFR BLD: 7 % (ref 4–5.6)
HBV CORE IGM SERPL QL IA: NEGATIVE
HBV SURFACE AG SERPL QL IA: NEGATIVE
HCT VFR BLD AUTO: 26 % (ref 37–48.5)
HCV AB SERPL QL IA: NEGATIVE
HDLC SERPL-MCNC: 17 MG/DL (ref 40–75)
HDLC SERPL: 17.5 % (ref 20–50)
HGB BLD-MCNC: 8.1 G/DL (ref 12–16)
IGA SERPL-MCNC: 430 MG/DL (ref 40–350)
IGG SERPL-MCNC: 1624 MG/DL (ref 650–1600)
IGM SERPL-MCNC: 102 MG/DL (ref 50–300)
IMM GRANULOCYTES # BLD AUTO: 0.06 K/UL (ref 0–0.04)
IMM GRANULOCYTES NFR BLD AUTO: 0.7 % (ref 0–0.5)
LDLC SERPL CALC-MCNC: 43.2 MG/DL (ref 63–159)
LYMPHOCYTES # BLD AUTO: 0.6 K/UL (ref 1–4.8)
LYMPHOCYTES NFR BLD: 7.7 % (ref 18–48)
LYMPHOCYTES NFR FLD MANUAL: 31 %
MAGNESIUM SERPL-MCNC: 2.2 MG/DL (ref 1.6–2.6)
MCH RBC QN AUTO: 29 PG (ref 27–31)
MCHC RBC AUTO-ENTMCNC: 31.2 G/DL (ref 32–36)
MCV RBC AUTO: 93 FL (ref 82–98)
MITOCHONDRIA AB TITR SER IF: NORMAL {TITER}
MONOCYTES # BLD AUTO: 0.8 K/UL (ref 0.3–1)
MONOCYTES NFR BLD: 9.1 % (ref 4–15)
MONOS+MACROS NFR FLD MANUAL: 8 %
NEUTROPHILS # BLD AUTO: 6.8 K/UL (ref 1.8–7.7)
NEUTROPHILS NFR BLD: 81.8 % (ref 38–73)
NEUTROPHILS NFR FLD MANUAL: 61 %
NONHDLC SERPL-MCNC: 80 MG/DL
NRBC BLD-RTO: 0 /100 WBC
PHOSPHATE SERPL-MCNC: 3.5 MG/DL (ref 2.7–4.5)
PLATELET # BLD AUTO: 308 K/UL (ref 150–450)
PMV BLD AUTO: 9.4 FL (ref 9.2–12.9)
POCT GLUCOSE: 142 MG/DL (ref 70–110)
POCT GLUCOSE: 159 MG/DL (ref 70–110)
POCT GLUCOSE: 230 MG/DL (ref 70–110)
POCT GLUCOSE: 239 MG/DL (ref 70–110)
POTASSIUM SERPL-SCNC: 5.3 MMOL/L (ref 3.5–5.1)
PROT SERPL-MCNC: 7.1 G/DL (ref 6–8.4)
RBC # BLD AUTO: 2.79 M/UL (ref 4–5.4)
SMOOTH MUSCLE AB TITR SER IF: NORMAL {TITER}
SODIUM SERPL-SCNC: 136 MMOL/L (ref 136–145)
TRIGL SERPL-MCNC: 184 MG/DL (ref 30–150)
WBC # BLD AUTO: 8.34 K/UL (ref 3.9–12.7)
WBC # FLD: 1972 /CU MM

## 2021-07-01 PROCEDURE — 88305 TISSUE EXAM BY PATHOLOGIST: CPT | Performed by: PATHOLOGY

## 2021-07-01 PROCEDURE — 99233 SBSQ HOSP IP/OBS HIGH 50: CPT | Mod: ,,, | Performed by: NURSE PRACTITIONER

## 2021-07-01 PROCEDURE — 89051 BODY FLUID CELL COUNT: CPT | Performed by: RADIOLOGY

## 2021-07-01 PROCEDURE — 84100 ASSAY OF PHOSPHORUS: CPT | Performed by: PHYSICIAN ASSISTANT

## 2021-07-01 PROCEDURE — 80053 COMPREHEN METABOLIC PANEL: CPT | Performed by: PHYSICIAN ASSISTANT

## 2021-07-01 PROCEDURE — 82607 VITAMIN B-12: CPT | Performed by: PSYCHIATRY & NEUROLOGY

## 2021-07-01 PROCEDURE — 84157 ASSAY OF PROTEIN OTHER: CPT | Performed by: RADIOLOGY

## 2021-07-01 PROCEDURE — 36415 COLL VENOUS BLD VENIPUNCTURE: CPT | Performed by: PHYSICIAN ASSISTANT

## 2021-07-01 PROCEDURE — 49083 IR PARACENTESIS WITH IMAGING: ICD-10-PCS | Mod: ,,, | Performed by: RADIOLOGY

## 2021-07-01 PROCEDURE — 36415 COLL VENOUS BLD VENIPUNCTURE: CPT | Performed by: PSYCHIATRY & NEUROLOGY

## 2021-07-01 PROCEDURE — 88341 PR IHC OR ICC EACH ADD'L SINGLE ANTIBODY  STAINPR: ICD-10-PCS | Mod: 26,,, | Performed by: PATHOLOGY

## 2021-07-01 PROCEDURE — 87075 CULTR BACTERIA EXCEPT BLOOD: CPT | Performed by: RADIOLOGY

## 2021-07-01 PROCEDURE — 88305 TISSUE EXAM BY PATHOLOGIST: CPT | Mod: 26,,, | Performed by: PATHOLOGY

## 2021-07-01 PROCEDURE — 87070 CULTURE OTHR SPECIMN AEROBIC: CPT | Performed by: RADIOLOGY

## 2021-07-01 PROCEDURE — 88112 CYTOPATH CELL ENHANCE TECH: CPT | Performed by: PATHOLOGY

## 2021-07-01 PROCEDURE — 96372 THER/PROPH/DIAG INJ SC/IM: CPT | Mod: 59

## 2021-07-01 PROCEDURE — 84425 ASSAY OF VITAMIN B-1: CPT | Performed by: PSYCHIATRY & NEUROLOGY

## 2021-07-01 PROCEDURE — G0378 HOSPITAL OBSERVATION PER HR: HCPCS

## 2021-07-01 PROCEDURE — 63600175 PHARM REV CODE 636 W HCPCS: Performed by: PHYSICIAN ASSISTANT

## 2021-07-01 PROCEDURE — 87206 SMEAR FLUORESCENT/ACID STAI: CPT | Performed by: RADIOLOGY

## 2021-07-01 PROCEDURE — 85025 COMPLETE CBC W/AUTO DIFF WBC: CPT | Performed by: PHYSICIAN ASSISTANT

## 2021-07-01 PROCEDURE — 82042 OTHER SOURCE ALBUMIN QUAN EA: CPT | Performed by: RADIOLOGY

## 2021-07-01 PROCEDURE — 88342 CHG IMMUNOCYTOCHEMISTRY: ICD-10-PCS | Mod: 26,,, | Performed by: PATHOLOGY

## 2021-07-01 PROCEDURE — 25000003 PHARM REV CODE 250: Performed by: PHYSICIAN ASSISTANT

## 2021-07-01 PROCEDURE — 88305 TISSUE EXAM BY PATHOLOGIST: ICD-10-PCS | Mod: 26,,, | Performed by: PATHOLOGY

## 2021-07-01 PROCEDURE — A7048 VACUUM DRAIN BOTTLE/TUBE KIT: HCPCS

## 2021-07-01 PROCEDURE — 49083 ABD PARACENTESIS W/IMAGING: CPT | Performed by: RADIOLOGY

## 2021-07-01 PROCEDURE — 96361 HYDRATE IV INFUSION ADD-ON: CPT

## 2021-07-01 PROCEDURE — 87116 MYCOBACTERIA CULTURE: CPT | Performed by: RADIOLOGY

## 2021-07-01 PROCEDURE — C1729 CATH, DRAINAGE: HCPCS

## 2021-07-01 PROCEDURE — 97535 SELF CARE MNGMENT TRAINING: CPT

## 2021-07-01 PROCEDURE — 99233 PR SUBSEQUENT HOSPITAL CARE,LEVL III: ICD-10-PCS | Mod: ,,, | Performed by: NURSE PRACTITIONER

## 2021-07-01 PROCEDURE — 88112 CYTOPATH CELL ENHANCE TECH: CPT | Mod: 26,,, | Performed by: PATHOLOGY

## 2021-07-01 PROCEDURE — 83036 HEMOGLOBIN GLYCOSYLATED A1C: CPT | Performed by: PHYSICIAN ASSISTANT

## 2021-07-01 PROCEDURE — 88342 IMHCHEM/IMCYTCHM 1ST ANTB: CPT | Mod: 26,,, | Performed by: PATHOLOGY

## 2021-07-01 PROCEDURE — 87205 SMEAR GRAM STAIN: CPT | Performed by: RADIOLOGY

## 2021-07-01 PROCEDURE — 88342 IMHCHEM/IMCYTCHM 1ST ANTB: CPT | Performed by: PATHOLOGY

## 2021-07-01 PROCEDURE — 80061 LIPID PANEL: CPT | Performed by: PHYSICIAN ASSISTANT

## 2021-07-01 PROCEDURE — 88341 IMHCHEM/IMCYTCHM EA ADD ANTB: CPT | Mod: 26,,, | Performed by: PATHOLOGY

## 2021-07-01 PROCEDURE — 88112 PR  CYTOPATH, CELL ENHANCE TECH: ICD-10-PCS | Mod: 26,,, | Performed by: PATHOLOGY

## 2021-07-01 PROCEDURE — 97116 GAIT TRAINING THERAPY: CPT

## 2021-07-01 PROCEDURE — 83735 ASSAY OF MAGNESIUM: CPT | Performed by: PHYSICIAN ASSISTANT

## 2021-07-01 PROCEDURE — 25000003 PHARM REV CODE 250: Performed by: HOSPITALIST

## 2021-07-01 PROCEDURE — 88341 IMHCHEM/IMCYTCHM EA ADD ANTB: CPT | Mod: 59 | Performed by: PATHOLOGY

## 2021-07-01 RX ORDER — SODIUM CHLORIDE 9 MG/ML
INJECTION, SOLUTION INTRAVENOUS CONTINUOUS
Status: DISCONTINUED | OUTPATIENT
Start: 2021-07-01 | End: 2021-07-04

## 2021-07-01 RX ORDER — TALC
6 POWDER (GRAM) TOPICAL NIGHTLY PRN
Status: DISCONTINUED | OUTPATIENT
Start: 2021-07-01 | End: 2021-07-07 | Stop reason: HOSPADM

## 2021-07-01 RX ADMIN — VENLAFAXINE 75 MG: 37.5 TABLET ORAL at 09:07

## 2021-07-01 RX ADMIN — INSULIN ASPART 2 UNITS: 100 INJECTION, SOLUTION INTRAVENOUS; SUBCUTANEOUS at 05:07

## 2021-07-01 RX ADMIN — LOSARTAN POTASSIUM 50 MG: 25 TABLET, FILM COATED ORAL at 08:07

## 2021-07-01 RX ADMIN — AMLODIPINE BESYLATE 5 MG: 5 TABLET ORAL at 08:07

## 2021-07-01 RX ADMIN — SODIUM CHLORIDE: 0.9 INJECTION, SOLUTION INTRAVENOUS at 02:07

## 2021-07-01 RX ADMIN — VENLAFAXINE 75 MG: 37.5 TABLET ORAL at 08:07

## 2021-07-01 RX ADMIN — CLOPIDOGREL 75 MG: 75 TABLET, FILM COATED ORAL at 08:07

## 2021-07-01 RX ADMIN — ALLOPURINOL 100 MG: 100 TABLET ORAL at 08:07

## 2021-07-01 RX ADMIN — INSULIN ASPART 1 UNITS: 100 INJECTION, SOLUTION INTRAVENOUS; SUBCUTANEOUS at 09:07

## 2021-07-01 RX ADMIN — METOPROLOL TARTRATE 50 MG: 50 TABLET, FILM COATED ORAL at 08:07

## 2021-07-01 RX ADMIN — METOPROLOL TARTRATE 50 MG: 50 TABLET, FILM COATED ORAL at 09:07

## 2021-07-02 LAB
ALBUMIN FLD-MCNC: 2.2 G/DL
ALBUMIN SERPL BCP-MCNC: 2.3 G/DL (ref 3.5–5.2)
ALP SERPL-CCNC: 198 U/L (ref 55–135)
ALT SERPL W/O P-5'-P-CCNC: 166 U/L (ref 10–44)
ANA SER QL IF: NORMAL
ANION GAP SERPL CALC-SCNC: 9 MMOL/L (ref 8–16)
AST SERPL-CCNC: 386 U/L (ref 10–40)
BASOPHILS # BLD AUTO: 0.01 K/UL (ref 0–0.2)
BASOPHILS NFR BLD: 0.1 % (ref 0–1.9)
BILIRUB SERPL-MCNC: 1 MG/DL (ref 0.1–1)
BUN SERPL-MCNC: 49 MG/DL (ref 8–23)
CALCIUM SERPL-MCNC: 8.1 MG/DL (ref 8.7–10.5)
CHLORIDE SERPL-SCNC: 112 MMOL/L (ref 95–110)
CO2 SERPL-SCNC: 13 MMOL/L (ref 23–29)
CREAT SERPL-MCNC: 2.4 MG/DL (ref 0.5–1.4)
DIFFERENTIAL METHOD: ABNORMAL
EOSINOPHIL # BLD AUTO: 0 K/UL (ref 0–0.5)
EOSINOPHIL NFR BLD: 0.4 % (ref 0–8)
ERYTHROCYTE [DISTWIDTH] IN BLOOD BY AUTOMATED COUNT: 15.1 % (ref 11.5–14.5)
EST. GFR  (AFRICAN AMERICAN): 24 ML/MIN/1.73 M^2
EST. GFR  (NON AFRICAN AMERICAN): 21 ML/MIN/1.73 M^2
GLUCOSE SERPL-MCNC: 158 MG/DL (ref 70–110)
HCT VFR BLD AUTO: 25.6 % (ref 37–48.5)
HGB BLD-MCNC: 8 G/DL (ref 12–16)
IMM GRANULOCYTES # BLD AUTO: 0.09 K/UL (ref 0–0.04)
IMM GRANULOCYTES NFR BLD AUTO: 0.8 % (ref 0–0.5)
LYMPHOCYTES # BLD AUTO: 1.1 K/UL (ref 1–4.8)
LYMPHOCYTES NFR BLD: 10.2 % (ref 18–48)
MCH RBC QN AUTO: 29.2 PG (ref 27–31)
MCHC RBC AUTO-ENTMCNC: 31.3 G/DL (ref 32–36)
MCV RBC AUTO: 93 FL (ref 82–98)
MONOCYTES # BLD AUTO: 0.9 K/UL (ref 0.3–1)
MONOCYTES NFR BLD: 8.3 % (ref 4–15)
NEUTROPHILS # BLD AUTO: 8.6 K/UL (ref 1.8–7.7)
NEUTROPHILS NFR BLD: 80.2 % (ref 38–73)
NRBC BLD-RTO: 0 /100 WBC
PLATELET # BLD AUTO: 312 K/UL (ref 150–450)
PMV BLD AUTO: 9.6 FL (ref 9.2–12.9)
POCT GLUCOSE: 153 MG/DL (ref 70–110)
POCT GLUCOSE: 168 MG/DL (ref 70–110)
POCT GLUCOSE: 172 MG/DL (ref 70–110)
POCT GLUCOSE: 172 MG/DL (ref 70–110)
POTASSIUM SERPL-SCNC: 4.7 MMOL/L (ref 3.5–5.1)
PROT FLD-MCNC: 5.4 G/DL
PROT SERPL-MCNC: 6.9 G/DL (ref 6–8.4)
RBC # BLD AUTO: 2.74 M/UL (ref 4–5.4)
SODIUM SERPL-SCNC: 134 MMOL/L (ref 136–145)
SPECIMEN SOURCE: NORMAL
SPECIMEN SOURCE: NORMAL
VIT B12 SERPL-MCNC: 996 PG/ML (ref 210–950)
WBC # BLD AUTO: 10.69 K/UL (ref 3.9–12.7)

## 2021-07-02 PROCEDURE — 99232 PR SUBSEQUENT HOSPITAL CARE,LEVL II: ICD-10-PCS | Mod: ,,, | Performed by: NURSE PRACTITIONER

## 2021-07-02 PROCEDURE — 36415 COLL VENOUS BLD VENIPUNCTURE: CPT | Performed by: PHYSICIAN ASSISTANT

## 2021-07-02 PROCEDURE — 99232 SBSQ HOSP IP/OBS MODERATE 35: CPT | Mod: ,,, | Performed by: NURSE PRACTITIONER

## 2021-07-02 PROCEDURE — 25000003 PHARM REV CODE 250: Performed by: NURSE PRACTITIONER

## 2021-07-02 PROCEDURE — 25000003 PHARM REV CODE 250: Performed by: PHYSICIAN ASSISTANT

## 2021-07-02 PROCEDURE — 80053 COMPREHEN METABOLIC PANEL: CPT | Performed by: PHYSICIAN ASSISTANT

## 2021-07-02 PROCEDURE — 92526 ORAL FUNCTION THERAPY: CPT

## 2021-07-02 PROCEDURE — 25000003 PHARM REV CODE 250: Performed by: HOSPITALIST

## 2021-07-02 PROCEDURE — 97110 THERAPEUTIC EXERCISES: CPT | Mod: CQ

## 2021-07-02 PROCEDURE — G0378 HOSPITAL OBSERVATION PER HR: HCPCS

## 2021-07-02 PROCEDURE — 97530 THERAPEUTIC ACTIVITIES: CPT

## 2021-07-02 PROCEDURE — 85025 COMPLETE CBC W/AUTO DIFF WBC: CPT | Performed by: PHYSICIAN ASSISTANT

## 2021-07-02 PROCEDURE — 63600175 PHARM REV CODE 636 W HCPCS: Performed by: NURSE PRACTITIONER

## 2021-07-02 PROCEDURE — 96365 THER/PROPH/DIAG IV INF INIT: CPT

## 2021-07-02 PROCEDURE — 96361 HYDRATE IV INFUSION ADD-ON: CPT

## 2021-07-02 RX ORDER — POLYETHYLENE GLYCOL 3350 17 G/17G
17 POWDER, FOR SOLUTION ORAL DAILY
Status: DISCONTINUED | OUTPATIENT
Start: 2021-07-02 | End: 2021-07-06

## 2021-07-02 RX ADMIN — VENLAFAXINE 75 MG: 37.5 TABLET ORAL at 09:07

## 2021-07-02 RX ADMIN — LOSARTAN POTASSIUM 50 MG: 25 TABLET, FILM COATED ORAL at 10:07

## 2021-07-02 RX ADMIN — VENLAFAXINE 75 MG: 37.5 TABLET ORAL at 10:07

## 2021-07-02 RX ADMIN — CLOPIDOGREL 75 MG: 75 TABLET, FILM COATED ORAL at 10:07

## 2021-07-02 RX ADMIN — CEFTRIAXONE 2 G: 2 INJECTION, SOLUTION INTRAVENOUS at 10:07

## 2021-07-02 RX ADMIN — METOPROLOL TARTRATE 50 MG: 50 TABLET, FILM COATED ORAL at 09:07

## 2021-07-02 RX ADMIN — SODIUM CHLORIDE: 0.9 INJECTION, SOLUTION INTRAVENOUS at 04:07

## 2021-07-02 RX ADMIN — SODIUM CHLORIDE: 0.9 INJECTION, SOLUTION INTRAVENOUS at 05:07

## 2021-07-02 RX ADMIN — ALLOPURINOL 100 MG: 100 TABLET ORAL at 10:07

## 2021-07-02 RX ADMIN — AMLODIPINE BESYLATE 5 MG: 5 TABLET ORAL at 10:07

## 2021-07-02 RX ADMIN — POLYETHYLENE GLYCOL 3350 17 G: 17 POWDER, FOR SOLUTION ORAL at 10:07

## 2021-07-02 RX ADMIN — METOPROLOL TARTRATE 50 MG: 50 TABLET, FILM COATED ORAL at 10:07

## 2021-07-03 PROBLEM — R74.8 ELEVATED LIVER ENZYMES: Status: ACTIVE | Noted: 2021-07-03

## 2021-07-03 LAB
POCT GLUCOSE: 163 MG/DL (ref 70–110)
POCT GLUCOSE: 185 MG/DL (ref 70–110)
POCT GLUCOSE: 198 MG/DL (ref 70–110)
POCT GLUCOSE: 212 MG/DL (ref 70–110)

## 2021-07-03 PROCEDURE — 96376 TX/PRO/DX INJ SAME DRUG ADON: CPT

## 2021-07-03 PROCEDURE — 63600175 PHARM REV CODE 636 W HCPCS: Performed by: NURSE PRACTITIONER

## 2021-07-03 PROCEDURE — 63600175 PHARM REV CODE 636 W HCPCS: Performed by: PHYSICIAN ASSISTANT

## 2021-07-03 PROCEDURE — 99222 1ST HOSP IP/OBS MODERATE 55: CPT | Mod: ,,, | Performed by: OBSTETRICS & GYNECOLOGY

## 2021-07-03 PROCEDURE — 97110 THERAPEUTIC EXERCISES: CPT | Mod: CQ

## 2021-07-03 PROCEDURE — 99222 PR INITIAL HOSPITAL CARE,LEVL II: ICD-10-PCS | Mod: ,,, | Performed by: OBSTETRICS & GYNECOLOGY

## 2021-07-03 PROCEDURE — 96361 HYDRATE IV INFUSION ADD-ON: CPT

## 2021-07-03 PROCEDURE — 25000003 PHARM REV CODE 250: Performed by: PHYSICIAN ASSISTANT

## 2021-07-03 PROCEDURE — 21400001 HC TELEMETRY ROOM

## 2021-07-03 PROCEDURE — 97530 THERAPEUTIC ACTIVITIES: CPT | Mod: CQ

## 2021-07-03 PROCEDURE — 25000003 PHARM REV CODE 250: Performed by: HOSPITALIST

## 2021-07-03 PROCEDURE — 25000003 PHARM REV CODE 250: Performed by: NURSE PRACTITIONER

## 2021-07-03 RX ADMIN — SODIUM CHLORIDE: 0.9 INJECTION, SOLUTION INTRAVENOUS at 04:07

## 2021-07-03 RX ADMIN — VENLAFAXINE 75 MG: 37.5 TABLET ORAL at 09:07

## 2021-07-03 RX ADMIN — METOPROLOL TARTRATE 50 MG: 50 TABLET, FILM COATED ORAL at 09:07

## 2021-07-03 RX ADMIN — AMLODIPINE BESYLATE 5 MG: 5 TABLET ORAL at 09:07

## 2021-07-03 RX ADMIN — CEFTRIAXONE 2 G: 2 INJECTION, SOLUTION INTRAVENOUS at 09:07

## 2021-07-03 RX ADMIN — POLYETHYLENE GLYCOL 3350 17 G: 17 POWDER, FOR SOLUTION ORAL at 09:07

## 2021-07-03 RX ADMIN — ALLOPURINOL 100 MG: 100 TABLET ORAL at 09:07

## 2021-07-03 RX ADMIN — CLOPIDOGREL 75 MG: 75 TABLET, FILM COATED ORAL at 09:07

## 2021-07-03 RX ADMIN — LOSARTAN POTASSIUM 50 MG: 25 TABLET, FILM COATED ORAL at 09:07

## 2021-07-03 RX ADMIN — INSULIN ASPART 2 UNITS: 100 INJECTION, SOLUTION INTRAVENOUS; SUBCUTANEOUS at 09:07

## 2021-07-04 LAB
ALBUMIN SERPL BCP-MCNC: 2 G/DL (ref 3.5–5.2)
ALP SERPL-CCNC: 172 U/L (ref 55–135)
ALT SERPL W/O P-5'-P-CCNC: 185 U/L (ref 10–44)
ANION GAP SERPL CALC-SCNC: 8 MMOL/L (ref 8–16)
AST SERPL-CCNC: 394 U/L (ref 10–40)
BASOPHILS # BLD AUTO: 0.01 K/UL (ref 0–0.2)
BASOPHILS NFR BLD: 0.1 % (ref 0–1.9)
BILIRUB SERPL-MCNC: 0.6 MG/DL (ref 0.1–1)
BUN SERPL-MCNC: 36 MG/DL (ref 8–23)
CALCIUM SERPL-MCNC: 8 MG/DL (ref 8.7–10.5)
CHLORIDE SERPL-SCNC: 116 MMOL/L (ref 95–110)
CO2 SERPL-SCNC: 13 MMOL/L (ref 23–29)
CREAT SERPL-MCNC: 1.6 MG/DL (ref 0.5–1.4)
DIFFERENTIAL METHOD: ABNORMAL
EOSINOPHIL # BLD AUTO: 0.1 K/UL (ref 0–0.5)
EOSINOPHIL NFR BLD: 1 % (ref 0–8)
ERYTHROCYTE [DISTWIDTH] IN BLOOD BY AUTOMATED COUNT: 15.3 % (ref 11.5–14.5)
EST. GFR  (AFRICAN AMERICAN): 40 ML/MIN/1.73 M^2
EST. GFR  (NON AFRICAN AMERICAN): 34 ML/MIN/1.73 M^2
GLUCOSE SERPL-MCNC: 104 MG/DL (ref 70–110)
HCT VFR BLD AUTO: 21.6 % (ref 37–48.5)
HGB BLD-MCNC: 6.9 G/DL (ref 12–16)
IMM GRANULOCYTES # BLD AUTO: 0.08 K/UL (ref 0–0.04)
IMM GRANULOCYTES NFR BLD AUTO: 1 % (ref 0–0.5)
LYMPHOCYTES # BLD AUTO: 0.6 K/UL (ref 1–4.8)
LYMPHOCYTES NFR BLD: 7.9 % (ref 18–48)
MAGNESIUM SERPL-MCNC: 1.7 MG/DL (ref 1.6–2.6)
MCH RBC QN AUTO: 28.9 PG (ref 27–31)
MCHC RBC AUTO-ENTMCNC: 31.9 G/DL (ref 32–36)
MCV RBC AUTO: 90 FL (ref 82–98)
MONOCYTES # BLD AUTO: 0.5 K/UL (ref 0.3–1)
MONOCYTES NFR BLD: 6.3 % (ref 4–15)
NEUTROPHILS # BLD AUTO: 6.6 K/UL (ref 1.8–7.7)
NEUTROPHILS NFR BLD: 83.7 % (ref 38–73)
NRBC BLD-RTO: 0 /100 WBC
PLATELET # BLD AUTO: 291 K/UL (ref 150–450)
PMV BLD AUTO: 9.3 FL (ref 9.2–12.9)
POCT GLUCOSE: 116 MG/DL (ref 70–110)
POCT GLUCOSE: 134 MG/DL (ref 70–110)
POCT GLUCOSE: 154 MG/DL (ref 70–110)
POCT GLUCOSE: 204 MG/DL (ref 70–110)
POTASSIUM SERPL-SCNC: 3.9 MMOL/L (ref 3.5–5.1)
PROT SERPL-MCNC: 5.9 G/DL (ref 6–8.4)
RBC # BLD AUTO: 2.39 M/UL (ref 4–5.4)
SODIUM SERPL-SCNC: 137 MMOL/L (ref 136–145)
WBC # BLD AUTO: 7.94 K/UL (ref 3.9–12.7)

## 2021-07-04 PROCEDURE — 63600175 PHARM REV CODE 636 W HCPCS: Performed by: NURSE PRACTITIONER

## 2021-07-04 PROCEDURE — 97530 THERAPEUTIC ACTIVITIES: CPT

## 2021-07-04 PROCEDURE — 83735 ASSAY OF MAGNESIUM: CPT | Performed by: NURSE PRACTITIONER

## 2021-07-04 PROCEDURE — 25000003 PHARM REV CODE 250: Performed by: HOSPITALIST

## 2021-07-04 PROCEDURE — 85025 COMPLETE CBC W/AUTO DIFF WBC: CPT | Performed by: NURSE PRACTITIONER

## 2021-07-04 PROCEDURE — 36415 COLL VENOUS BLD VENIPUNCTURE: CPT | Performed by: NURSE PRACTITIONER

## 2021-07-04 PROCEDURE — 25000003 PHARM REV CODE 250: Performed by: INTERNAL MEDICINE

## 2021-07-04 PROCEDURE — 97530 THERAPEUTIC ACTIVITIES: CPT | Mod: CQ

## 2021-07-04 PROCEDURE — 21400001 HC TELEMETRY ROOM

## 2021-07-04 PROCEDURE — 25000003 PHARM REV CODE 250: Performed by: NURSE PRACTITIONER

## 2021-07-04 PROCEDURE — 25000003 PHARM REV CODE 250: Performed by: PHYSICIAN ASSISTANT

## 2021-07-04 PROCEDURE — 80053 COMPREHEN METABOLIC PANEL: CPT | Performed by: NURSE PRACTITIONER

## 2021-07-04 RX ADMIN — SODIUM CHLORIDE: 0.9 INJECTION, SOLUTION INTRAVENOUS at 02:07

## 2021-07-04 RX ADMIN — SODIUM BICARBONATE: 84 INJECTION, SOLUTION INTRAVENOUS at 12:07

## 2021-07-04 RX ADMIN — ALLOPURINOL 100 MG: 100 TABLET ORAL at 09:07

## 2021-07-04 RX ADMIN — CEFTRIAXONE 2 G: 2 INJECTION, SOLUTION INTRAVENOUS at 09:07

## 2021-07-04 RX ADMIN — LOSARTAN POTASSIUM 50 MG: 25 TABLET, FILM COATED ORAL at 09:07

## 2021-07-04 RX ADMIN — VENLAFAXINE 75 MG: 37.5 TABLET ORAL at 09:07

## 2021-07-04 RX ADMIN — INSULIN ASPART 1 UNITS: 100 INJECTION, SOLUTION INTRAVENOUS; SUBCUTANEOUS at 09:07

## 2021-07-04 RX ADMIN — AMLODIPINE BESYLATE 5 MG: 5 TABLET ORAL at 09:07

## 2021-07-04 RX ADMIN — CLOPIDOGREL 75 MG: 75 TABLET, FILM COATED ORAL at 09:07

## 2021-07-04 RX ADMIN — POLYETHYLENE GLYCOL 3350 17 G: 17 POWDER, FOR SOLUTION ORAL at 09:07

## 2021-07-04 RX ADMIN — METOPROLOL TARTRATE 50 MG: 50 TABLET, FILM COATED ORAL at 09:07

## 2021-07-05 LAB
ALBUMIN SERPL BCP-MCNC: 1.8 G/DL (ref 3.5–5.2)
ALP SERPL-CCNC: 178 U/L (ref 55–135)
ALT SERPL W/O P-5'-P-CCNC: 195 U/L (ref 10–44)
ANION GAP SERPL CALC-SCNC: 8 MMOL/L (ref 8–16)
AST SERPL-CCNC: 378 U/L (ref 10–40)
BACTERIA SPEC AEROBE CULT: NO GROWTH
BACTERIA SPEC ANAEROBE CULT: NORMAL
BASOPHILS # BLD AUTO: 0.01 K/UL (ref 0–0.2)
BASOPHILS NFR BLD: 0.1 % (ref 0–1.9)
BILIRUB SERPL-MCNC: 0.5 MG/DL (ref 0.1–1)
BUN SERPL-MCNC: 30 MG/DL (ref 8–23)
CALCIUM SERPL-MCNC: 7.9 MG/DL (ref 8.7–10.5)
CHLORIDE SERPL-SCNC: 111 MMOL/L (ref 95–110)
CO2 SERPL-SCNC: 17 MMOL/L (ref 23–29)
CREAT SERPL-MCNC: 1.5 MG/DL (ref 0.5–1.4)
DIFFERENTIAL METHOD: ABNORMAL
EOSINOPHIL # BLD AUTO: 0.1 K/UL (ref 0–0.5)
EOSINOPHIL NFR BLD: 0.6 % (ref 0–8)
ERYTHROCYTE [DISTWIDTH] IN BLOOD BY AUTOMATED COUNT: 15.6 % (ref 11.5–14.5)
EST. GFR  (AFRICAN AMERICAN): 43 ML/MIN/1.73 M^2
EST. GFR  (NON AFRICAN AMERICAN): 37 ML/MIN/1.73 M^2
GLUCOSE SERPL-MCNC: 187 MG/DL (ref 70–110)
HCT VFR BLD AUTO: 21.7 % (ref 37–48.5)
HGB BLD-MCNC: 6.8 G/DL (ref 12–16)
IMM GRANULOCYTES # BLD AUTO: 0.08 K/UL (ref 0–0.04)
IMM GRANULOCYTES NFR BLD AUTO: 1 % (ref 0–0.5)
LYMPHOCYTES # BLD AUTO: 0.5 K/UL (ref 1–4.8)
LYMPHOCYTES NFR BLD: 6.8 % (ref 18–48)
MAGNESIUM SERPL-MCNC: 1.6 MG/DL (ref 1.6–2.6)
MCH RBC QN AUTO: 28.9 PG (ref 27–31)
MCHC RBC AUTO-ENTMCNC: 31.3 G/DL (ref 32–36)
MCV RBC AUTO: 92 FL (ref 82–98)
MONOCYTES # BLD AUTO: 0.6 K/UL (ref 0.3–1)
MONOCYTES NFR BLD: 8.2 % (ref 4–15)
NEUTROPHILS # BLD AUTO: 6.5 K/UL (ref 1.8–7.7)
NEUTROPHILS NFR BLD: 83.3 % (ref 38–73)
NRBC BLD-RTO: 0 /100 WBC
PLATELET # BLD AUTO: 285 K/UL (ref 150–450)
PMV BLD AUTO: 9.2 FL (ref 9.2–12.9)
POCT GLUCOSE: 212 MG/DL (ref 70–110)
POCT GLUCOSE: 213 MG/DL (ref 70–110)
POCT GLUCOSE: 263 MG/DL (ref 70–110)
POCT GLUCOSE: 291 MG/DL (ref 70–110)
POTASSIUM SERPL-SCNC: 3.6 MMOL/L (ref 3.5–5.1)
PROT SERPL-MCNC: 5.4 G/DL (ref 6–8.4)
RBC # BLD AUTO: 2.35 M/UL (ref 4–5.4)
SODIUM SERPL-SCNC: 136 MMOL/L (ref 136–145)
WBC # BLD AUTO: 7.85 K/UL (ref 3.9–12.7)

## 2021-07-05 PROCEDURE — 85025 COMPLETE CBC W/AUTO DIFF WBC: CPT | Performed by: NURSE PRACTITIONER

## 2021-07-05 PROCEDURE — 80053 COMPREHEN METABOLIC PANEL: CPT | Performed by: NURSE PRACTITIONER

## 2021-07-05 PROCEDURE — 25000003 PHARM REV CODE 250: Performed by: PHYSICIAN ASSISTANT

## 2021-07-05 PROCEDURE — 21400001 HC TELEMETRY ROOM

## 2021-07-05 PROCEDURE — 36415 COLL VENOUS BLD VENIPUNCTURE: CPT | Performed by: NURSE PRACTITIONER

## 2021-07-05 PROCEDURE — 25000003 PHARM REV CODE 250: Performed by: INTERNAL MEDICINE

## 2021-07-05 PROCEDURE — 63600175 PHARM REV CODE 636 W HCPCS: Performed by: NURSE PRACTITIONER

## 2021-07-05 PROCEDURE — 83735 ASSAY OF MAGNESIUM: CPT | Performed by: NURSE PRACTITIONER

## 2021-07-05 RX ADMIN — VENLAFAXINE 75 MG: 37.5 TABLET ORAL at 09:07

## 2021-07-05 RX ADMIN — CEFTRIAXONE 2 G: 2 INJECTION, SOLUTION INTRAVENOUS at 10:07

## 2021-07-05 RX ADMIN — SODIUM BICARBONATE: 84 INJECTION, SOLUTION INTRAVENOUS at 08:07

## 2021-07-05 RX ADMIN — LOSARTAN POTASSIUM 50 MG: 25 TABLET, FILM COATED ORAL at 10:07

## 2021-07-05 RX ADMIN — SODIUM BICARBONATE: 84 INJECTION, SOLUTION INTRAVENOUS at 03:07

## 2021-07-05 RX ADMIN — CLOPIDOGREL 75 MG: 75 TABLET, FILM COATED ORAL at 10:07

## 2021-07-05 RX ADMIN — AMLODIPINE BESYLATE 5 MG: 5 TABLET ORAL at 10:07

## 2021-07-05 RX ADMIN — VENLAFAXINE 75 MG: 37.5 TABLET ORAL at 10:07

## 2021-07-05 RX ADMIN — ALLOPURINOL 100 MG: 100 TABLET ORAL at 10:07

## 2021-07-05 RX ADMIN — INSULIN ASPART 3 UNITS: 100 INJECTION, SOLUTION INTRAVENOUS; SUBCUTANEOUS at 06:07

## 2021-07-05 RX ADMIN — METOPROLOL TARTRATE 50 MG: 50 TABLET, FILM COATED ORAL at 09:07

## 2021-07-05 RX ADMIN — METOPROLOL TARTRATE 50 MG: 50 TABLET, FILM COATED ORAL at 10:07

## 2021-07-06 PROBLEM — R74.8 ELEVATED LIVER ENZYMES: Status: RESOLVED | Noted: 2021-07-03 | Resolved: 2021-07-06

## 2021-07-06 PROBLEM — K65.2 SBP (SPONTANEOUS BACTERIAL PERITONITIS): Status: ACTIVE | Noted: 2021-07-06

## 2021-07-06 PROBLEM — E11.65 TYPE 2 DIABETES MELLITUS WITH HYPERGLYCEMIA: Status: ACTIVE | Noted: 2021-07-06

## 2021-07-06 PROBLEM — R18.8 ASCITES: Status: ACTIVE | Noted: 2021-07-06

## 2021-07-06 PROBLEM — D63.8 ANEMIA OF CHRONIC DISEASE: Status: ACTIVE | Noted: 2021-06-30

## 2021-07-06 LAB
ABO + RH BLD: NORMAL
ALBUMIN SERPL BCP-MCNC: 1.9 G/DL (ref 3.5–5.2)
ALP SERPL-CCNC: 195 U/L (ref 55–135)
ALT SERPL W/O P-5'-P-CCNC: 219 U/L (ref 10–44)
ANION GAP SERPL CALC-SCNC: 10 MMOL/L (ref 8–16)
APPEARANCE FLD: NORMAL
AST SERPL-CCNC: 391 U/L (ref 10–40)
BASOPHILS # BLD AUTO: 0.02 K/UL (ref 0–0.2)
BASOPHILS NFR BLD: 0.3 % (ref 0–1.9)
BILIRUB SERPL-MCNC: 0.5 MG/DL (ref 0.1–1)
BLD GP AB SCN CELLS X3 SERPL QL: NORMAL
BLD PROD TYP BPU: NORMAL
BLOOD UNIT EXPIRATION DATE: NORMAL
BLOOD UNIT TYPE CODE: 6200
BLOOD UNIT TYPE: NORMAL
BODY FLD TYPE: NORMAL
BUN SERPL-MCNC: 28 MG/DL (ref 8–23)
CALCIUM SERPL-MCNC: 8 MG/DL (ref 8.7–10.5)
CHLORIDE SERPL-SCNC: 104 MMOL/L (ref 95–110)
CO2 SERPL-SCNC: 21 MMOL/L (ref 23–29)
CODING SYSTEM: NORMAL
COLOR FLD: NORMAL
CREAT SERPL-MCNC: 1.7 MG/DL (ref 0.5–1.4)
DIFFERENTIAL METHOD: ABNORMAL
DISPENSE STATUS: NORMAL
EOSINOPHIL # BLD AUTO: 0.1 K/UL (ref 0–0.5)
EOSINOPHIL NFR BLD: 1 % (ref 0–8)
ERYTHROCYTE [DISTWIDTH] IN BLOOD BY AUTOMATED COUNT: 15.9 % (ref 11.5–14.5)
EST. GFR  (AFRICAN AMERICAN): 37 ML/MIN/1.73 M^2
EST. GFR  (NON AFRICAN AMERICAN): 32 ML/MIN/1.73 M^2
GLUCOSE SERPL-MCNC: 182 MG/DL (ref 70–110)
HCT VFR BLD AUTO: 21.3 % (ref 37–48.5)
HGB BLD-MCNC: 6.8 G/DL (ref 12–16)
IMM GRANULOCYTES # BLD AUTO: 0.09 K/UL (ref 0–0.04)
IMM GRANULOCYTES NFR BLD AUTO: 1.1 % (ref 0–0.5)
LYMPHOCYTES # BLD AUTO: 0.8 K/UL (ref 1–4.8)
LYMPHOCYTES NFR BLD: 9.5 % (ref 18–48)
LYMPHOCYTES NFR FLD MANUAL: 25 %
MAGNESIUM SERPL-MCNC: 1.6 MG/DL (ref 1.6–2.6)
MCH RBC QN AUTO: 29.2 PG (ref 27–31)
MCHC RBC AUTO-ENTMCNC: 31.9 G/DL (ref 32–36)
MCV RBC AUTO: 91 FL (ref 82–98)
MONOCYTES # BLD AUTO: 0.6 K/UL (ref 0.3–1)
MONOCYTES NFR BLD: 8 % (ref 4–15)
MONOS+MACROS NFR FLD MANUAL: 25 %
NEUTROPHILS # BLD AUTO: 6.4 K/UL (ref 1.8–7.7)
NEUTROPHILS NFR BLD: 80.1 % (ref 38–73)
NEUTROPHILS NFR FLD MANUAL: 50 %
NRBC BLD-RTO: 0 /100 WBC
PLATELET # BLD AUTO: 301 K/UL (ref 150–450)
PMV BLD AUTO: 9.2 FL (ref 9.2–12.9)
POCT GLUCOSE: 181 MG/DL (ref 70–110)
POCT GLUCOSE: 194 MG/DL (ref 70–110)
POCT GLUCOSE: 209 MG/DL (ref 70–110)
POCT GLUCOSE: 221 MG/DL (ref 70–110)
POTASSIUM SERPL-SCNC: 3.6 MMOL/L (ref 3.5–5.1)
PROT SERPL-MCNC: 5.8 G/DL (ref 6–8.4)
RBC # BLD AUTO: 2.33 M/UL (ref 4–5.4)
SODIUM SERPL-SCNC: 135 MMOL/L (ref 136–145)
TRANS ERYTHROCYTES VOL PATIENT: NORMAL ML
WBC # BLD AUTO: 7.92 K/UL (ref 3.9–12.7)
WBC # FLD: 1002 /CU MM

## 2021-07-06 PROCEDURE — C9399 UNCLASSIFIED DRUGS OR BIOLOG: HCPCS | Performed by: HOSPITALIST

## 2021-07-06 PROCEDURE — 63600175 PHARM REV CODE 636 W HCPCS: Performed by: NURSE PRACTITIONER

## 2021-07-06 PROCEDURE — 99232 PR SUBSEQUENT HOSPITAL CARE,LEVL II: ICD-10-PCS | Mod: ,,, | Performed by: NURSE PRACTITIONER

## 2021-07-06 PROCEDURE — 83735 ASSAY OF MAGNESIUM: CPT | Performed by: NURSE PRACTITIONER

## 2021-07-06 PROCEDURE — 36430 TRANSFUSION BLD/BLD COMPNT: CPT

## 2021-07-06 PROCEDURE — 84157 ASSAY OF PROTEIN OTHER: CPT | Performed by: HOSPITALIST

## 2021-07-06 PROCEDURE — 82042 OTHER SOURCE ALBUMIN QUAN EA: CPT | Performed by: HOSPITALIST

## 2021-07-06 PROCEDURE — 99232 SBSQ HOSP IP/OBS MODERATE 35: CPT | Mod: ,,, | Performed by: NURSE PRACTITIONER

## 2021-07-06 PROCEDURE — 21400001 HC TELEMETRY ROOM

## 2021-07-06 PROCEDURE — P9021 RED BLOOD CELLS UNIT: HCPCS | Performed by: HOSPITALIST

## 2021-07-06 PROCEDURE — 88112 CYTOPATH CELL ENHANCE TECH: CPT | Performed by: PATHOLOGY

## 2021-07-06 PROCEDURE — 88112 CYTOPATH CELL ENHANCE TECH: CPT | Mod: 26,,, | Performed by: PATHOLOGY

## 2021-07-06 PROCEDURE — 87075 CULTR BACTERIA EXCEPT BLOOD: CPT | Performed by: NURSE PRACTITIONER

## 2021-07-06 PROCEDURE — 86920 COMPATIBILITY TEST SPIN: CPT | Performed by: HOSPITALIST

## 2021-07-06 PROCEDURE — 85025 COMPLETE CBC W/AUTO DIFF WBC: CPT | Performed by: NURSE PRACTITIONER

## 2021-07-06 PROCEDURE — 88305 TISSUE EXAM BY PATHOLOGIST: CPT | Performed by: PATHOLOGY

## 2021-07-06 PROCEDURE — 86900 BLOOD TYPING SEROLOGIC ABO: CPT | Performed by: HOSPITALIST

## 2021-07-06 PROCEDURE — 36415 COLL VENOUS BLD VENIPUNCTURE: CPT | Performed by: NURSE PRACTITIONER

## 2021-07-06 PROCEDURE — 87070 CULTURE OTHR SPECIMN AEROBIC: CPT | Performed by: NURSE PRACTITIONER

## 2021-07-06 PROCEDURE — 88305 TISSUE EXAM BY PATHOLOGIST: ICD-10-PCS | Mod: 26,,, | Performed by: PATHOLOGY

## 2021-07-06 PROCEDURE — 89051 BODY FLUID CELL COUNT: CPT | Performed by: NURSE PRACTITIONER

## 2021-07-06 PROCEDURE — 25000003 PHARM REV CODE 250: Performed by: PHYSICIAN ASSISTANT

## 2021-07-06 PROCEDURE — 88112 PR  CYTOPATH, CELL ENHANCE TECH: ICD-10-PCS | Mod: 26,,, | Performed by: PATHOLOGY

## 2021-07-06 PROCEDURE — 88305 TISSUE EXAM BY PATHOLOGIST: CPT | Mod: 26,,, | Performed by: PATHOLOGY

## 2021-07-06 PROCEDURE — 80053 COMPREHEN METABOLIC PANEL: CPT | Performed by: NURSE PRACTITIONER

## 2021-07-06 PROCEDURE — 25000003 PHARM REV CODE 250: Performed by: HOSPITALIST

## 2021-07-06 PROCEDURE — 36415 COLL VENOUS BLD VENIPUNCTURE: CPT | Performed by: HOSPITALIST

## 2021-07-06 RX ORDER — HYDROCODONE BITARTRATE AND ACETAMINOPHEN 500; 5 MG/1; MG/1
TABLET ORAL
Status: DISCONTINUED | OUTPATIENT
Start: 2021-07-06 | End: 2021-07-07 | Stop reason: HOSPADM

## 2021-07-06 RX ORDER — AMLODIPINE BESYLATE 5 MG/1
10 TABLET ORAL DAILY
Status: DISCONTINUED | OUTPATIENT
Start: 2021-07-06 | End: 2021-07-07 | Stop reason: HOSPADM

## 2021-07-06 RX ORDER — LOSARTAN POTASSIUM 25 MG/1
50 TABLET ORAL DAILY
Status: DISCONTINUED | OUTPATIENT
Start: 2021-07-07 | End: 2021-07-07 | Stop reason: HOSPADM

## 2021-07-06 RX ORDER — POLYETHYLENE GLYCOL 3350 17 G/17G
17 POWDER, FOR SOLUTION ORAL 2 TIMES DAILY PRN
Status: DISCONTINUED | OUTPATIENT
Start: 2021-07-06 | End: 2021-07-07 | Stop reason: HOSPADM

## 2021-07-06 RX ADMIN — VENLAFAXINE 75 MG: 37.5 TABLET ORAL at 09:07

## 2021-07-06 RX ADMIN — METOPROLOL TARTRATE 50 MG: 50 TABLET, FILM COATED ORAL at 09:07

## 2021-07-06 RX ADMIN — INSULIN DETEMIR 5 UNITS: 100 INJECTION, SOLUTION SUBCUTANEOUS at 09:07

## 2021-07-06 RX ADMIN — AMLODIPINE BESYLATE 10 MG: 5 TABLET ORAL at 09:07

## 2021-07-06 RX ADMIN — CEFTRIAXONE 2 G: 2 INJECTION, SOLUTION INTRAVENOUS at 09:07

## 2021-07-06 RX ADMIN — ALLOPURINOL 100 MG: 100 TABLET ORAL at 09:07

## 2021-07-07 VITALS
WEIGHT: 112.44 LBS | HEIGHT: 59 IN | SYSTOLIC BLOOD PRESSURE: 160 MMHG | DIASTOLIC BLOOD PRESSURE: 84 MMHG | BODY MASS INDEX: 22.67 KG/M2 | TEMPERATURE: 98 F | RESPIRATION RATE: 18 BRPM | HEART RATE: 114 BPM | OXYGEN SATURATION: 96 %

## 2021-07-07 PROBLEM — C80.1 ADENOCARCINOMA: Status: ACTIVE | Noted: 2021-07-07

## 2021-07-07 PROBLEM — R18.0 MALIGNANT ASCITES: Status: ACTIVE | Noted: 2021-07-06

## 2021-07-07 LAB
ALBUMIN FLD-MCNC: 1.6 G/DL
ALBUMIN SERPL BCP-MCNC: 1.8 G/DL (ref 3.5–5.2)
ALP SERPL-CCNC: 169 U/L (ref 55–135)
ALT SERPL W/O P-5'-P-CCNC: 231 U/L (ref 10–44)
ANION GAP SERPL CALC-SCNC: 9 MMOL/L (ref 8–16)
AST SERPL-CCNC: 385 U/L (ref 10–40)
BASOPHILS # BLD AUTO: 0.03 K/UL (ref 0–0.2)
BASOPHILS NFR BLD: 0.3 % (ref 0–1.9)
BILIRUB SERPL-MCNC: 0.7 MG/DL (ref 0.1–1)
BUN SERPL-MCNC: 30 MG/DL (ref 8–23)
CALCIUM SERPL-MCNC: 8.1 MG/DL (ref 8.7–10.5)
CHLORIDE SERPL-SCNC: 106 MMOL/L (ref 95–110)
CO2 SERPL-SCNC: 21 MMOL/L (ref 23–29)
CREAT SERPL-MCNC: 1.6 MG/DL (ref 0.5–1.4)
DIFFERENTIAL METHOD: ABNORMAL
EOSINOPHIL # BLD AUTO: 0.1 K/UL (ref 0–0.5)
EOSINOPHIL NFR BLD: 0.6 % (ref 0–8)
ERYTHROCYTE [DISTWIDTH] IN BLOOD BY AUTOMATED COUNT: 15.3 % (ref 11.5–14.5)
EST. GFR  (AFRICAN AMERICAN): 40 ML/MIN/1.73 M^2
EST. GFR  (NON AFRICAN AMERICAN): 34 ML/MIN/1.73 M^2
FINAL PATHOLOGIC DIAGNOSIS: ABNORMAL
GLUCOSE SERPL-MCNC: 106 MG/DL (ref 70–110)
HCT VFR BLD AUTO: 30.6 % (ref 37–48.5)
HGB BLD-MCNC: 10.1 G/DL (ref 12–16)
IMM GRANULOCYTES # BLD AUTO: 0.11 K/UL (ref 0–0.04)
IMM GRANULOCYTES NFR BLD AUTO: 1.2 % (ref 0–0.5)
LYMPHOCYTES # BLD AUTO: 0.8 K/UL (ref 1–4.8)
LYMPHOCYTES NFR BLD: 8.6 % (ref 18–48)
Lab: ABNORMAL
MAGNESIUM SERPL-MCNC: 1.7 MG/DL (ref 1.6–2.6)
MCH RBC QN AUTO: 29.3 PG (ref 27–31)
MCHC RBC AUTO-ENTMCNC: 33 G/DL (ref 32–36)
MCV RBC AUTO: 89 FL (ref 82–98)
MONOCYTES # BLD AUTO: 0.6 K/UL (ref 0.3–1)
MONOCYTES NFR BLD: 7.2 % (ref 4–15)
NEUTROPHILS # BLD AUTO: 7.3 K/UL (ref 1.8–7.7)
NEUTROPHILS NFR BLD: 82.1 % (ref 38–73)
NRBC BLD-RTO: 0 /100 WBC
PLATELET # BLD AUTO: 267 K/UL (ref 150–450)
PMV BLD AUTO: 9.1 FL (ref 9.2–12.9)
POCT GLUCOSE: 150 MG/DL (ref 70–110)
POCT GLUCOSE: 228 MG/DL (ref 70–110)
POCT GLUCOSE: 73 MG/DL (ref 70–110)
POTASSIUM SERPL-SCNC: 3.6 MMOL/L (ref 3.5–5.1)
PROT FLD-MCNC: 3.9 G/DL
PROT SERPL-MCNC: 5.6 G/DL (ref 6–8.4)
RBC # BLD AUTO: 3.45 M/UL (ref 4–5.4)
SODIUM SERPL-SCNC: 136 MMOL/L (ref 136–145)
SPECIMEN SOURCE: NORMAL
SPECIMEN SOURCE: NORMAL
VIT B1 BLD-MCNC: 31 UG/L (ref 38–122)
WBC # BLD AUTO: 8.91 K/UL (ref 3.9–12.7)

## 2021-07-07 PROCEDURE — 97535 SELF CARE MNGMENT TRAINING: CPT

## 2021-07-07 PROCEDURE — 97110 THERAPEUTIC EXERCISES: CPT | Mod: CQ

## 2021-07-07 PROCEDURE — 36415 COLL VENOUS BLD VENIPUNCTURE: CPT | Performed by: NURSE PRACTITIONER

## 2021-07-07 PROCEDURE — 83735 ASSAY OF MAGNESIUM: CPT | Performed by: NURSE PRACTITIONER

## 2021-07-07 PROCEDURE — 80053 COMPREHEN METABOLIC PANEL: CPT | Performed by: NURSE PRACTITIONER

## 2021-07-07 PROCEDURE — 25000003 PHARM REV CODE 250: Performed by: HOSPITALIST

## 2021-07-07 PROCEDURE — 63600175 PHARM REV CODE 636 W HCPCS: Performed by: NURSE PRACTITIONER

## 2021-07-07 PROCEDURE — 99232 PR SUBSEQUENT HOSPITAL CARE,LEVL II: ICD-10-PCS | Mod: ,,, | Performed by: NURSE PRACTITIONER

## 2021-07-07 PROCEDURE — 25000003 PHARM REV CODE 250: Performed by: PHYSICIAN ASSISTANT

## 2021-07-07 PROCEDURE — 85025 COMPLETE CBC W/AUTO DIFF WBC: CPT | Performed by: NURSE PRACTITIONER

## 2021-07-07 PROCEDURE — 97530 THERAPEUTIC ACTIVITIES: CPT | Mod: CQ

## 2021-07-07 PROCEDURE — 99232 SBSQ HOSP IP/OBS MODERATE 35: CPT | Mod: ,,, | Performed by: NURSE PRACTITIONER

## 2021-07-07 RX ORDER — CIPROFLOXACIN 500 MG/1
500 TABLET ORAL DAILY
Qty: 30 TABLET | Refills: 2 | Status: SHIPPED | OUTPATIENT
Start: 2021-07-08 | End: 2021-10-06

## 2021-07-07 RX ORDER — CIPROFLOXACIN 500 MG/1
500 TABLET ORAL EVERY 24 HOURS
Status: DISCONTINUED | OUTPATIENT
Start: 2021-07-08 | End: 2021-07-07 | Stop reason: HOSPADM

## 2021-07-07 RX ADMIN — AMLODIPINE BESYLATE 10 MG: 5 TABLET ORAL at 08:07

## 2021-07-07 RX ADMIN — METOPROLOL TARTRATE 50 MG: 50 TABLET, FILM COATED ORAL at 08:07

## 2021-07-07 RX ADMIN — CLOPIDOGREL 75 MG: 75 TABLET, FILM COATED ORAL at 08:07

## 2021-07-07 RX ADMIN — CEFTRIAXONE 2 G: 2 INJECTION, SOLUTION INTRAVENOUS at 08:07

## 2021-07-07 RX ADMIN — ALLOPURINOL 100 MG: 100 TABLET ORAL at 08:07

## 2021-07-07 RX ADMIN — INSULIN ASPART 2 UNITS: 100 INJECTION, SOLUTION INTRAVENOUS; SUBCUTANEOUS at 04:07

## 2021-07-07 RX ADMIN — VENLAFAXINE 75 MG: 37.5 TABLET ORAL at 08:07

## 2021-07-07 RX ADMIN — LOSARTAN POTASSIUM 50 MG: 25 TABLET, FILM COATED ORAL at 08:07

## 2021-07-10 LAB
BACTERIA SPEC AEROBE CULT: NO GROWTH
BACTERIA SPEC ANAEROBE CULT: NORMAL

## 2021-07-13 LAB
FINAL PATHOLOGIC DIAGNOSIS: NORMAL
Lab: NORMAL

## 2021-07-27 ENCOUNTER — TELEPHONE (OUTPATIENT)
Dept: GYNECOLOGIC ONCOLOGY | Facility: CLINIC | Age: 61
End: 2021-07-27

## 2021-07-28 ENCOUNTER — OFFICE VISIT (OUTPATIENT)
Dept: OBSTETRICS AND GYNECOLOGY | Facility: CLINIC | Age: 61
End: 2021-07-28
Payer: MEDICARE

## 2021-07-28 VITALS
WEIGHT: 116.94 LBS | DIASTOLIC BLOOD PRESSURE: 76 MMHG | HEIGHT: 59 IN | SYSTOLIC BLOOD PRESSURE: 148 MMHG | BODY MASS INDEX: 23.57 KG/M2

## 2021-07-28 DIAGNOSIS — Z12.4 CERVICAL CANCER SCREENING: ICD-10-CM

## 2021-07-28 DIAGNOSIS — Z91.89 GYN EXAM FOR HIGH-RISK MEDICARE PATIENT: Primary | ICD-10-CM

## 2021-07-28 DIAGNOSIS — Z12.31 SCREENING MAMMOGRAM FOR HIGH-RISK PATIENT: ICD-10-CM

## 2021-07-28 DIAGNOSIS — R19.00 PELVIC MASS: ICD-10-CM

## 2021-07-28 PROCEDURE — 99999 PR PBB SHADOW E&M-EST. PATIENT-LVL III: ICD-10-PCS | Mod: PBBFAC,,, | Performed by: OBSTETRICS & GYNECOLOGY

## 2021-07-28 PROCEDURE — 88175 CYTOPATH C/V AUTO FLUID REDO: CPT | Performed by: OBSTETRICS & GYNECOLOGY

## 2021-07-28 PROCEDURE — G0101 PR CA SCREEN;PELVIC/BREAST EXAM: ICD-10-PCS | Mod: S$PBB,,, | Performed by: OBSTETRICS & GYNECOLOGY

## 2021-07-28 PROCEDURE — 99213 OFFICE O/P EST LOW 20 MIN: CPT | Mod: PBBFAC,25 | Performed by: OBSTETRICS & GYNECOLOGY

## 2021-07-28 PROCEDURE — 87624 HPV HI-RISK TYP POOLED RSLT: CPT | Performed by: OBSTETRICS & GYNECOLOGY

## 2021-07-28 PROCEDURE — G0101 CA SCREEN;PELVIC/BREAST EXAM: HCPCS | Mod: PBBFAC | Performed by: OBSTETRICS & GYNECOLOGY

## 2021-07-28 PROCEDURE — G0101 CA SCREEN;PELVIC/BREAST EXAM: HCPCS | Mod: S$PBB,,, | Performed by: OBSTETRICS & GYNECOLOGY

## 2021-07-28 PROCEDURE — 99999 PR PBB SHADOW E&M-EST. PATIENT-LVL III: CPT | Mod: PBBFAC,,, | Performed by: OBSTETRICS & GYNECOLOGY

## 2021-08-06 ENCOUNTER — HOSPITAL ENCOUNTER (OUTPATIENT)
Dept: RADIOLOGY | Facility: HOSPITAL | Age: 61
Discharge: HOME | End: 2021-08-06
Attending: OBSTETRICS & GYNECOLOGY
Payer: MEDICARE

## 2021-08-06 ENCOUNTER — PATIENT MESSAGE (OUTPATIENT)
Dept: GYNECOLOGIC ONCOLOGY | Facility: CLINIC | Age: 61
End: 2021-08-06

## 2021-08-06 ENCOUNTER — OFFICE VISIT (OUTPATIENT)
Dept: GYNECOLOGIC ONCOLOGY | Facility: CLINIC | Age: 61
End: 2021-08-06
Payer: MEDICARE

## 2021-08-06 VITALS
WEIGHT: 120 LBS | SYSTOLIC BLOOD PRESSURE: 151 MMHG | DIASTOLIC BLOOD PRESSURE: 70 MMHG | HEART RATE: 101 BPM | BODY MASS INDEX: 24.24 KG/M2

## 2021-08-06 DIAGNOSIS — N18.4 STAGE 4 CHRONIC KIDNEY DISEASE: Chronic | ICD-10-CM

## 2021-08-06 DIAGNOSIS — Z79.4 TYPE 2 DIABETES MELLITUS WITH HYPERGLYCEMIA, WITH LONG-TERM CURRENT USE OF INSULIN: ICD-10-CM

## 2021-08-06 DIAGNOSIS — Z12.31 SCREENING MAMMOGRAM FOR HIGH-RISK PATIENT: ICD-10-CM

## 2021-08-06 DIAGNOSIS — R18.0 MALIGNANT ASCITES: Primary | ICD-10-CM

## 2021-08-06 DIAGNOSIS — C54.1 ENDOMETRIAL CANCER: ICD-10-CM

## 2021-08-06 DIAGNOSIS — I10 ESSENTIAL HYPERTENSION: ICD-10-CM

## 2021-08-06 DIAGNOSIS — E11.65 TYPE 2 DIABETES MELLITUS WITH HYPERGLYCEMIA, WITH LONG-TERM CURRENT USE OF INSULIN: ICD-10-CM

## 2021-08-06 LAB
FINAL PATHOLOGIC DIAGNOSIS: NORMAL
Lab: NORMAL

## 2021-08-06 PROCEDURE — 99999 PR PBB SHADOW E&M-EST. PATIENT-LVL III: ICD-10-PCS | Mod: PBBFAC,,, | Performed by: OBSTETRICS & GYNECOLOGY

## 2021-08-06 PROCEDURE — 77067 MAMMO DIGITAL SCREENING BILAT WITH TOMO: ICD-10-PCS | Mod: 26,,, | Performed by: RADIOLOGY

## 2021-08-06 PROCEDURE — 99213 OFFICE O/P EST LOW 20 MIN: CPT | Mod: PBBFAC | Performed by: OBSTETRICS & GYNECOLOGY

## 2021-08-06 PROCEDURE — 77063 MAMMO DIGITAL SCREENING BILAT WITH TOMO: ICD-10-PCS | Mod: 26,,, | Performed by: RADIOLOGY

## 2021-08-06 PROCEDURE — 77063 BREAST TOMOSYNTHESIS BI: CPT | Mod: 26,,, | Performed by: RADIOLOGY

## 2021-08-06 PROCEDURE — 99205 PR OFFICE/OUTPT VISIT, NEW, LEVL V, 60-74 MIN: ICD-10-PCS | Mod: S$PBB,,, | Performed by: OBSTETRICS & GYNECOLOGY

## 2021-08-06 PROCEDURE — 77067 SCR MAMMO BI INCL CAD: CPT | Mod: 26,,, | Performed by: RADIOLOGY

## 2021-08-06 PROCEDURE — 99205 OFFICE O/P NEW HI 60 MIN: CPT | Mod: S$PBB,,, | Performed by: OBSTETRICS & GYNECOLOGY

## 2021-08-06 PROCEDURE — 99999 PR PBB SHADOW E&M-EST. PATIENT-LVL III: CPT | Mod: PBBFAC,,, | Performed by: OBSTETRICS & GYNECOLOGY

## 2021-08-06 PROCEDURE — 77067 SCR MAMMO BI INCL CAD: CPT | Mod: TC

## 2021-08-13 ENCOUNTER — TELEPHONE (OUTPATIENT)
Dept: INTERVENTIONAL RADIOLOGY/VASCULAR | Facility: HOSPITAL | Age: 61
End: 2021-08-13

## 2021-08-16 ENCOUNTER — PATIENT MESSAGE (OUTPATIENT)
Dept: GYNECOLOGIC ONCOLOGY | Facility: CLINIC | Age: 61
End: 2021-08-16

## 2021-08-16 ENCOUNTER — HOSPITAL ENCOUNTER (OUTPATIENT)
Dept: INTERVENTIONAL RADIOLOGY/VASCULAR | Facility: HOSPITAL | Age: 61
Discharge: HOME | End: 2021-08-16
Attending: STUDENT IN AN ORGANIZED HEALTH CARE EDUCATION/TRAINING PROGRAM
Payer: MEDICARE

## 2021-08-16 ENCOUNTER — TELEPHONE (OUTPATIENT)
Dept: GYNECOLOGIC ONCOLOGY | Facility: CLINIC | Age: 61
End: 2021-08-16

## 2021-08-16 VITALS
RESPIRATION RATE: 16 BRPM | TEMPERATURE: 97 F | OXYGEN SATURATION: 100 % | DIASTOLIC BLOOD PRESSURE: 70 MMHG | SYSTOLIC BLOOD PRESSURE: 136 MMHG | HEART RATE: 98 BPM

## 2021-08-16 DIAGNOSIS — C54.1 ENDOMETRIAL CANCER: ICD-10-CM

## 2021-08-16 PROCEDURE — A4550 SURGICAL TRAYS: HCPCS

## 2021-08-16 PROCEDURE — 49083 IR PARACENTESIS WITH IMAGING: ICD-10-PCS | Mod: ,,, | Performed by: RADIOLOGY

## 2021-08-16 PROCEDURE — 25000003 PHARM REV CODE 250: Performed by: RADIOLOGY

## 2021-08-16 PROCEDURE — 49083 ABD PARACENTESIS W/IMAGING: CPT | Performed by: RADIOLOGY

## 2021-08-16 RX ORDER — LIDOCAINE HYDROCHLORIDE 10 MG/ML
INJECTION INFILTRATION; PERINEURAL CODE/TRAUMA/SEDATION MEDICATION
Status: COMPLETED | OUTPATIENT
Start: 2021-08-16 | End: 2021-08-16

## 2021-08-16 RX ADMIN — LIDOCAINE HYDROCHLORIDE 10 ML: 10 INJECTION, SOLUTION INFILTRATION; PERINEURAL at 01:08

## 2021-08-20 LAB
ACID FAST MOD KINY STN SPEC: NORMAL
HPV HR 12 DNA SPEC QL NAA+PROBE: NORMAL
HPV16 AG SPEC QL: NORMAL
HPV18 DNA SPEC QL NAA+PROBE: NORMAL
MYCOBACTERIUM SPEC QL CULT: NORMAL

## 2021-08-23 ENCOUNTER — DOCUMENT SCAN (OUTPATIENT)
Dept: HOME HEALTH SERVICES | Facility: HOSPITAL | Age: 61
End: 2021-08-23
Payer: MEDICARE

## 2021-08-26 ENCOUNTER — LAB VISIT (OUTPATIENT)
Dept: PRIMARY CARE CLINIC | Facility: OTHER | Age: 61
End: 2021-08-26
Attending: INTERNAL MEDICINE
Payer: MEDICARE

## 2021-08-26 DIAGNOSIS — Z20.822 ENCOUNTER FOR LABORATORY TESTING FOR COVID-19 VIRUS: ICD-10-CM

## 2021-08-26 PROCEDURE — U0003 INFECTIOUS AGENT DETECTION BY NUCLEIC ACID (DNA OR RNA); SEVERE ACUTE RESPIRATORY SYNDROME CORONAVIRUS 2 (SARS-COV-2) (CORONAVIRUS DISEASE [COVID-19]), AMPLIFIED PROBE TECHNIQUE, MAKING USE OF HIGH THROUGHPUT TECHNOLOGIES AS DESCRIBED BY CMS-2020-01-R: HCPCS | Performed by: INTERNAL MEDICINE

## 2021-08-27 ENCOUNTER — LAB VISIT (OUTPATIENT)
Dept: LAB | Facility: HOSPITAL | Age: 61
End: 2021-08-27
Attending: STUDENT IN AN ORGANIZED HEALTH CARE EDUCATION/TRAINING PROGRAM
Payer: MEDICARE

## 2021-08-27 ENCOUNTER — PATIENT MESSAGE (OUTPATIENT)
Dept: INFUSION THERAPY | Facility: OTHER | Age: 61
End: 2021-08-27

## 2021-08-27 DIAGNOSIS — C54.1 ENDOMETRIAL CANCER: ICD-10-CM

## 2021-08-27 LAB
ALBUMIN SERPL BCP-MCNC: 2.3 G/DL (ref 3.5–5.2)
ALP SERPL-CCNC: 144 U/L (ref 55–135)
ALT SERPL W/O P-5'-P-CCNC: 5 U/L (ref 10–44)
ANION GAP SERPL CALC-SCNC: 8 MMOL/L (ref 8–16)
AST SERPL-CCNC: 11 U/L (ref 10–40)
BILIRUB SERPL-MCNC: 0.4 MG/DL (ref 0.1–1)
BUN SERPL-MCNC: 36 MG/DL (ref 8–23)
CALCIUM SERPL-MCNC: 8.5 MG/DL (ref 8.7–10.5)
CHLORIDE SERPL-SCNC: 108 MMOL/L (ref 95–110)
CO2 SERPL-SCNC: 17 MMOL/L (ref 23–29)
CREAT SERPL-MCNC: 2.1 MG/DL (ref 0.5–1.4)
ERYTHROCYTE [DISTWIDTH] IN BLOOD BY AUTOMATED COUNT: 15.2 % (ref 11.5–14.5)
EST. GFR  (AFRICAN AMERICAN): 28 ML/MIN/1.73 M^2
EST. GFR  (NON AFRICAN AMERICAN): 25 ML/MIN/1.73 M^2
GLUCOSE SERPL-MCNC: 141 MG/DL (ref 70–110)
HCT VFR BLD AUTO: 23.9 % (ref 37–48.5)
HGB BLD-MCNC: 7.9 G/DL (ref 12–16)
IMM GRANULOCYTES # BLD AUTO: 0.04 K/UL (ref 0–0.04)
MCH RBC QN AUTO: 30.3 PG (ref 27–31)
MCHC RBC AUTO-ENTMCNC: 33.1 G/DL (ref 32–36)
MCV RBC AUTO: 92 FL (ref 82–98)
NEUTROPHILS # BLD AUTO: 4.9 K/UL (ref 1.8–7.7)
PLATELET # BLD AUTO: 299 K/UL (ref 150–450)
PMV BLD AUTO: 8.6 FL (ref 9.2–12.9)
POTASSIUM SERPL-SCNC: 4.9 MMOL/L (ref 3.5–5.1)
PROT SERPL-MCNC: 6.3 G/DL (ref 6–8.4)
RBC # BLD AUTO: 2.61 M/UL (ref 4–5.4)
SODIUM SERPL-SCNC: 133 MMOL/L (ref 136–145)
WBC # BLD AUTO: 6.45 K/UL (ref 3.9–12.7)

## 2021-08-27 PROCEDURE — 85027 COMPLETE CBC AUTOMATED: CPT | Performed by: STUDENT IN AN ORGANIZED HEALTH CARE EDUCATION/TRAINING PROGRAM

## 2021-08-27 PROCEDURE — 36415 COLL VENOUS BLD VENIPUNCTURE: CPT | Performed by: STUDENT IN AN ORGANIZED HEALTH CARE EDUCATION/TRAINING PROGRAM

## 2021-08-27 PROCEDURE — 80053 COMPREHEN METABOLIC PANEL: CPT | Performed by: STUDENT IN AN ORGANIZED HEALTH CARE EDUCATION/TRAINING PROGRAM

## 2021-08-28 LAB
SARS-COV-2 RNA RESP QL NAA+PROBE: NOT DETECTED
SARS-COV-2- CYCLE NUMBER: NORMAL

## 2021-09-06 ENCOUNTER — PATIENT MESSAGE (OUTPATIENT)
Dept: GYNECOLOGIC ONCOLOGY | Facility: CLINIC | Age: 61
End: 2021-09-06

## 2021-09-10 ENCOUNTER — HOSPITAL ENCOUNTER (OUTPATIENT)
Dept: INTERVENTIONAL RADIOLOGY/VASCULAR | Facility: HOSPITAL | Age: 61
Discharge: HOME | End: 2021-09-10
Attending: STUDENT IN AN ORGANIZED HEALTH CARE EDUCATION/TRAINING PROGRAM
Payer: MEDICARE

## 2021-09-10 ENCOUNTER — HOSPITAL ENCOUNTER (OUTPATIENT)
Dept: PREADMISSION TESTING | Facility: HOSPITAL | Age: 61
Discharge: HOME | End: 2021-09-10
Attending: STUDENT IN AN ORGANIZED HEALTH CARE EDUCATION/TRAINING PROGRAM
Payer: MEDICARE

## 2021-09-10 VITALS — SYSTOLIC BLOOD PRESSURE: 115 MMHG | DIASTOLIC BLOOD PRESSURE: 58 MMHG

## 2021-09-10 DIAGNOSIS — C54.1 ENDOMETRIAL CANCER: ICD-10-CM

## 2021-09-10 DIAGNOSIS — R18.0 MALIGNANT ASCITES: Primary | ICD-10-CM

## 2021-09-10 LAB — SARS-COV-2 RDRP RESP QL NAA+PROBE: NEGATIVE

## 2021-09-10 PROCEDURE — C1729 CATH, DRAINAGE: HCPCS

## 2021-09-10 PROCEDURE — U0002 COVID-19 LAB TEST NON-CDC: HCPCS | Performed by: RADIOLOGY

## 2021-09-10 PROCEDURE — 49083 IR PARACENTESIS WITH IMAGING: ICD-10-PCS | Mod: ,,, | Performed by: RADIOLOGY

## 2021-09-10 PROCEDURE — 25000003 PHARM REV CODE 250: Performed by: RADIOLOGY

## 2021-09-10 PROCEDURE — A7048 VACUUM DRAIN BOTTLE/TUBE KIT: HCPCS

## 2021-09-10 PROCEDURE — 49083 ABD PARACENTESIS W/IMAGING: CPT | Performed by: RADIOLOGY

## 2021-09-10 RX ORDER — LIDOCAINE HYDROCHLORIDE 20 MG/ML
INJECTION, SOLUTION INFILTRATION; PERINEURAL CODE/TRAUMA/SEDATION MEDICATION
Status: COMPLETED | OUTPATIENT
Start: 2021-09-10 | End: 2021-09-10

## 2021-09-10 RX ADMIN — LIDOCAINE HYDROCHLORIDE 8 ML: 20 INJECTION, SOLUTION INFILTRATION; PERINEURAL at 02:09

## 2021-09-13 ENCOUNTER — INFUSION (OUTPATIENT)
Dept: INFUSION THERAPY | Facility: OTHER | Age: 61
End: 2021-09-13
Attending: STUDENT IN AN ORGANIZED HEALTH CARE EDUCATION/TRAINING PROGRAM
Payer: MEDICARE

## 2021-09-13 ENCOUNTER — OFFICE VISIT (OUTPATIENT)
Dept: GYNECOLOGIC ONCOLOGY | Facility: CLINIC | Age: 61
End: 2021-09-13
Payer: MEDICARE

## 2021-09-13 VITALS
HEART RATE: 102 BPM | WEIGHT: 104.06 LBS | HEIGHT: 59 IN | TEMPERATURE: 98 F | BODY MASS INDEX: 20.98 KG/M2 | OXYGEN SATURATION: 100 %

## 2021-09-13 VITALS
DIASTOLIC BLOOD PRESSURE: 63 MMHG | BODY MASS INDEX: 21.02 KG/M2 | WEIGHT: 104.06 LBS | SYSTOLIC BLOOD PRESSURE: 121 MMHG | HEART RATE: 107 BPM

## 2021-09-13 DIAGNOSIS — Z51.11 ENCOUNTER FOR ANTINEOPLASTIC CHEMOTHERAPY: ICD-10-CM

## 2021-09-13 DIAGNOSIS — C54.1 ENDOMETRIAL CANCER: Primary | ICD-10-CM

## 2021-09-13 DIAGNOSIS — I10 ESSENTIAL HYPERTENSION: ICD-10-CM

## 2021-09-13 DIAGNOSIS — R18.0 MALIGNANT ASCITES: ICD-10-CM

## 2021-09-13 PROCEDURE — 96413 CHEMO IV INFUSION 1 HR: CPT

## 2021-09-13 PROCEDURE — 96367 TX/PROPH/DG ADDL SEQ IV INF: CPT

## 2021-09-13 PROCEDURE — 96375 TX/PRO/DX INJ NEW DRUG ADDON: CPT

## 2021-09-13 PROCEDURE — 99999 PR PBB SHADOW E&M-EST. PATIENT-LVL III: CPT | Mod: PBBFAC,,, | Performed by: OBSTETRICS & GYNECOLOGY

## 2021-09-13 PROCEDURE — 96361 HYDRATE IV INFUSION ADD-ON: CPT

## 2021-09-13 PROCEDURE — 25000003 PHARM REV CODE 250: Performed by: OBSTETRICS & GYNECOLOGY

## 2021-09-13 PROCEDURE — 99215 PR OFFICE/OUTPT VISIT, EST, LEVL V, 40-54 MIN: ICD-10-PCS | Mod: S$PBB,,, | Performed by: OBSTETRICS & GYNECOLOGY

## 2021-09-13 PROCEDURE — 63600175 PHARM REV CODE 636 W HCPCS: Performed by: OBSTETRICS & GYNECOLOGY

## 2021-09-13 PROCEDURE — 99213 OFFICE O/P EST LOW 20 MIN: CPT | Mod: PBBFAC,25 | Performed by: OBSTETRICS & GYNECOLOGY

## 2021-09-13 PROCEDURE — 99999 PR PBB SHADOW E&M-EST. PATIENT-LVL III: ICD-10-PCS | Mod: PBBFAC,,, | Performed by: OBSTETRICS & GYNECOLOGY

## 2021-09-13 PROCEDURE — 99215 OFFICE O/P EST HI 40 MIN: CPT | Mod: S$PBB,,, | Performed by: OBSTETRICS & GYNECOLOGY

## 2021-09-13 RX ORDER — FAMOTIDINE 10 MG/ML
20 INJECTION INTRAVENOUS
Status: CANCELLED | OUTPATIENT
Start: 2021-09-13

## 2021-09-13 RX ORDER — SODIUM CHLORIDE 0.9 % (FLUSH) 0.9 %
10 SYRINGE (ML) INJECTION
Status: CANCELLED | OUTPATIENT
Start: 2021-09-13

## 2021-09-13 RX ORDER — HEPARIN 100 UNIT/ML
500 SYRINGE INTRAVENOUS
Status: CANCELLED | OUTPATIENT
Start: 2021-09-13

## 2021-09-13 RX ORDER — EPINEPHRINE 0.3 MG/.3ML
0.3 INJECTION SUBCUTANEOUS ONCE AS NEEDED
Status: DISCONTINUED | OUTPATIENT
Start: 2021-09-13 | End: 2021-09-13 | Stop reason: HOSPADM

## 2021-09-13 RX ORDER — SODIUM CHLORIDE 0.9 % (FLUSH) 0.9 %
10 SYRINGE (ML) INJECTION
Status: DISCONTINUED | OUTPATIENT
Start: 2021-09-13 | End: 2021-09-13 | Stop reason: HOSPADM

## 2021-09-13 RX ORDER — FAMOTIDINE 10 MG/ML
20 INJECTION INTRAVENOUS
Status: COMPLETED | OUTPATIENT
Start: 2021-09-13 | End: 2021-09-13

## 2021-09-13 RX ORDER — DIPHENHYDRAMINE HYDROCHLORIDE 50 MG/ML
50 INJECTION INTRAMUSCULAR; INTRAVENOUS ONCE AS NEEDED
Status: DISCONTINUED | OUTPATIENT
Start: 2021-09-13 | End: 2021-09-13 | Stop reason: HOSPADM

## 2021-09-13 RX ORDER — EPINEPHRINE 0.3 MG/.3ML
0.3 INJECTION SUBCUTANEOUS ONCE AS NEEDED
Status: CANCELLED | OUTPATIENT
Start: 2021-09-13

## 2021-09-13 RX ORDER — DIPHENHYDRAMINE HYDROCHLORIDE 50 MG/ML
50 INJECTION INTRAMUSCULAR; INTRAVENOUS ONCE AS NEEDED
Status: CANCELLED | OUTPATIENT
Start: 2021-09-13

## 2021-09-13 RX ORDER — HEPARIN 100 UNIT/ML
500 SYRINGE INTRAVENOUS
Status: DISCONTINUED | OUTPATIENT
Start: 2021-09-13 | End: 2021-09-13 | Stop reason: HOSPADM

## 2021-09-13 RX ADMIN — PALONOSETRON HYDROCHLORIDE: 0.25 INJECTION, SOLUTION INTRAVENOUS at 10:09

## 2021-09-13 RX ADMIN — APREPITANT 130 MG: 130 INJECTION, EMULSION INTRAVENOUS at 10:09

## 2021-09-13 RX ADMIN — FAMOTIDINE 20 MG: 10 INJECTION INTRAVENOUS at 10:09

## 2021-09-13 RX ADMIN — DIPHENHYDRAMINE HYDROCHLORIDE 50 MG: 50 INJECTION, SOLUTION INTRAMUSCULAR; INTRAVENOUS at 10:09

## 2021-09-13 RX ADMIN — SODIUM CHLORIDE 235 MG: 9 INJECTION, SOLUTION INTRAVENOUS at 10:09

## 2021-09-13 RX ADMIN — SODIUM CHLORIDE 500 ML: 0.9 INJECTION, SOLUTION INTRAVENOUS at 11:09

## 2021-09-22 ENCOUNTER — PATIENT MESSAGE (OUTPATIENT)
Dept: GYNECOLOGIC ONCOLOGY | Facility: CLINIC | Age: 61
End: 2021-09-22

## 2021-09-23 ENCOUNTER — EXTERNAL HOME HEALTH (OUTPATIENT)
Dept: HOME HEALTH SERVICES | Facility: HOSPITAL | Age: 61
End: 2021-09-23
Payer: MEDICARE

## 2021-10-01 ENCOUNTER — LAB VISIT (OUTPATIENT)
Dept: LAB | Facility: HOSPITAL | Age: 61
End: 2021-10-01
Attending: OBSTETRICS & GYNECOLOGY
Payer: MEDICARE

## 2021-10-01 DIAGNOSIS — C54.1 ENDOMETRIAL CANCER: ICD-10-CM

## 2021-10-01 LAB
ALBUMIN SERPL BCP-MCNC: 3.2 G/DL (ref 3.5–5.2)
ALP SERPL-CCNC: 309 U/L (ref 55–135)
ALT SERPL W/O P-5'-P-CCNC: 7 U/L (ref 10–44)
ANION GAP SERPL CALC-SCNC: 7 MMOL/L (ref 8–16)
AST SERPL-CCNC: 19 U/L (ref 10–40)
BILIRUB SERPL-MCNC: 0.3 MG/DL (ref 0.1–1)
BUN SERPL-MCNC: 39 MG/DL (ref 8–23)
CALCIUM SERPL-MCNC: 9.7 MG/DL (ref 8.7–10.5)
CHLORIDE SERPL-SCNC: 115 MMOL/L (ref 95–110)
CO2 SERPL-SCNC: 17 MMOL/L (ref 23–29)
CREAT SERPL-MCNC: 1.6 MG/DL (ref 0.5–1.4)
ERYTHROCYTE [DISTWIDTH] IN BLOOD BY AUTOMATED COUNT: 14.6 % (ref 11.5–14.5)
EST. GFR  (AFRICAN AMERICAN): 40 ML/MIN/1.73 M^2
EST. GFR  (NON AFRICAN AMERICAN): 34 ML/MIN/1.73 M^2
GLUCOSE SERPL-MCNC: 140 MG/DL (ref 70–110)
HCT VFR BLD AUTO: 25.8 % (ref 37–48.5)
HGB BLD-MCNC: 8.5 G/DL (ref 12–16)
IMM GRANULOCYTES # BLD AUTO: 0.03 K/UL (ref 0–0.04)
MCH RBC QN AUTO: 30.7 PG (ref 27–31)
MCHC RBC AUTO-ENTMCNC: 32.9 G/DL (ref 32–36)
MCV RBC AUTO: 93 FL (ref 82–98)
NEUTROPHILS # BLD AUTO: 3 K/UL (ref 1.8–7.7)
PLATELET # BLD AUTO: 202 K/UL (ref 150–450)
PMV BLD AUTO: 9.3 FL (ref 9.2–12.9)
POTASSIUM SERPL-SCNC: 5.8 MMOL/L (ref 3.5–5.1)
PROT SERPL-MCNC: 7.5 G/DL (ref 6–8.4)
RBC # BLD AUTO: 2.77 M/UL (ref 4–5.4)
SODIUM SERPL-SCNC: 139 MMOL/L (ref 136–145)
WBC # BLD AUTO: 4.21 K/UL (ref 3.9–12.7)

## 2021-10-01 PROCEDURE — 36415 COLL VENOUS BLD VENIPUNCTURE: CPT | Performed by: STUDENT IN AN ORGANIZED HEALTH CARE EDUCATION/TRAINING PROGRAM

## 2021-10-01 PROCEDURE — 85027 COMPLETE CBC AUTOMATED: CPT | Performed by: STUDENT IN AN ORGANIZED HEALTH CARE EDUCATION/TRAINING PROGRAM

## 2021-10-01 PROCEDURE — 80053 COMPREHEN METABOLIC PANEL: CPT | Performed by: STUDENT IN AN ORGANIZED HEALTH CARE EDUCATION/TRAINING PROGRAM

## 2021-10-04 ENCOUNTER — OFFICE VISIT (OUTPATIENT)
Dept: GYNECOLOGIC ONCOLOGY | Facility: CLINIC | Age: 61
End: 2021-10-04
Payer: MEDICARE

## 2021-10-04 ENCOUNTER — INFUSION (OUTPATIENT)
Dept: INFUSION THERAPY | Facility: OTHER | Age: 61
End: 2021-10-04
Attending: OBSTETRICS & GYNECOLOGY
Payer: MEDICARE

## 2021-10-04 VITALS
HEART RATE: 98 BPM | BODY MASS INDEX: 20.17 KG/M2 | BODY MASS INDEX: 20.22 KG/M2 | WEIGHT: 100.06 LBS | TEMPERATURE: 98 F | HEIGHT: 59 IN | HEART RATE: 97 BPM | OXYGEN SATURATION: 100 % | SYSTOLIC BLOOD PRESSURE: 142 MMHG | RESPIRATION RATE: 20 BRPM | DIASTOLIC BLOOD PRESSURE: 66 MMHG | WEIGHT: 100.06 LBS

## 2021-10-04 DIAGNOSIS — C54.1 ENDOMETRIAL CANCER: Primary | ICD-10-CM

## 2021-10-04 DIAGNOSIS — N18.4 STAGE 4 CHRONIC KIDNEY DISEASE: Chronic | ICD-10-CM

## 2021-10-04 DIAGNOSIS — I10 ESSENTIAL HYPERTENSION: ICD-10-CM

## 2021-10-04 DIAGNOSIS — Z51.11 ENCOUNTER FOR ANTINEOPLASTIC CHEMOTHERAPY: ICD-10-CM

## 2021-10-04 PROCEDURE — 99215 OFFICE O/P EST HI 40 MIN: CPT | Mod: S$PBB,,, | Performed by: OBSTETRICS & GYNECOLOGY

## 2021-10-04 PROCEDURE — 63600175 PHARM REV CODE 636 W HCPCS: Performed by: OBSTETRICS & GYNECOLOGY

## 2021-10-04 PROCEDURE — 99215 PR OFFICE/OUTPT VISIT, EST, LEVL V, 40-54 MIN: ICD-10-PCS | Mod: S$PBB,,, | Performed by: OBSTETRICS & GYNECOLOGY

## 2021-10-04 PROCEDURE — 96361 HYDRATE IV INFUSION ADD-ON: CPT

## 2021-10-04 PROCEDURE — 25000003 PHARM REV CODE 250: Performed by: OBSTETRICS & GYNECOLOGY

## 2021-10-04 PROCEDURE — 99213 OFFICE O/P EST LOW 20 MIN: CPT | Mod: PBBFAC | Performed by: OBSTETRICS & GYNECOLOGY

## 2021-10-04 PROCEDURE — 96375 TX/PRO/DX INJ NEW DRUG ADDON: CPT

## 2021-10-04 PROCEDURE — 96367 TX/PROPH/DG ADDL SEQ IV INF: CPT

## 2021-10-04 PROCEDURE — 99999 PR PBB SHADOW E&M-EST. PATIENT-LVL III: CPT | Mod: PBBFAC,,, | Performed by: OBSTETRICS & GYNECOLOGY

## 2021-10-04 PROCEDURE — 96413 CHEMO IV INFUSION 1 HR: CPT

## 2021-10-04 PROCEDURE — 99999 PR PBB SHADOW E&M-EST. PATIENT-LVL III: ICD-10-PCS | Mod: PBBFAC,,, | Performed by: OBSTETRICS & GYNECOLOGY

## 2021-10-04 RX ORDER — DIPHENHYDRAMINE HYDROCHLORIDE 50 MG/ML
50 INJECTION INTRAMUSCULAR; INTRAVENOUS ONCE AS NEEDED
Status: CANCELLED | OUTPATIENT
Start: 2021-10-04

## 2021-10-04 RX ORDER — FAMOTIDINE 10 MG/ML
20 INJECTION INTRAVENOUS
Status: COMPLETED | OUTPATIENT
Start: 2021-10-04 | End: 2021-10-04

## 2021-10-04 RX ORDER — HEPARIN 100 UNIT/ML
500 SYRINGE INTRAVENOUS
Status: DISCONTINUED | OUTPATIENT
Start: 2021-10-04 | End: 2021-10-04 | Stop reason: HOSPADM

## 2021-10-04 RX ORDER — EPINEPHRINE 0.3 MG/.3ML
0.3 INJECTION SUBCUTANEOUS ONCE AS NEEDED
Status: CANCELLED | OUTPATIENT
Start: 2021-10-04

## 2021-10-04 RX ORDER — SODIUM CHLORIDE 0.9 % (FLUSH) 0.9 %
10 SYRINGE (ML) INJECTION
Status: DISCONTINUED | OUTPATIENT
Start: 2021-10-04 | End: 2021-10-04 | Stop reason: HOSPADM

## 2021-10-04 RX ORDER — HEPARIN 100 UNIT/ML
500 SYRINGE INTRAVENOUS
Status: CANCELLED | OUTPATIENT
Start: 2021-10-04

## 2021-10-04 RX ORDER — SODIUM CHLORIDE 0.9 % (FLUSH) 0.9 %
10 SYRINGE (ML) INJECTION
Status: CANCELLED | OUTPATIENT
Start: 2021-10-04

## 2021-10-04 RX ORDER — EPINEPHRINE 0.3 MG/.3ML
0.3 INJECTION SUBCUTANEOUS ONCE AS NEEDED
Status: DISCONTINUED | OUTPATIENT
Start: 2021-10-04 | End: 2021-10-04 | Stop reason: HOSPADM

## 2021-10-04 RX ORDER — DIPHENHYDRAMINE HYDROCHLORIDE 50 MG/ML
50 INJECTION INTRAMUSCULAR; INTRAVENOUS ONCE AS NEEDED
Status: DISCONTINUED | OUTPATIENT
Start: 2021-10-04 | End: 2021-10-04 | Stop reason: HOSPADM

## 2021-10-04 RX ORDER — FAMOTIDINE 10 MG/ML
20 INJECTION INTRAVENOUS
Status: CANCELLED | OUTPATIENT
Start: 2021-10-04

## 2021-10-04 RX ADMIN — DIPHENHYDRAMINE HYDROCHLORIDE 50 MG: 50 INJECTION, SOLUTION INTRAMUSCULAR; INTRAVENOUS at 10:10

## 2021-10-04 RX ADMIN — FAMOTIDINE 20 MG: 10 INJECTION INTRAVENOUS at 09:10

## 2021-10-04 RX ADMIN — SODIUM CHLORIDE 255 MG: 9 INJECTION, SOLUTION INTRAVENOUS at 10:10

## 2021-10-04 RX ADMIN — APREPITANT 130 MG: 130 INJECTION, EMULSION INTRAVENOUS at 09:10

## 2021-10-04 RX ADMIN — SODIUM CHLORIDE 500 ML: 0.9 INJECTION, SOLUTION INTRAVENOUS at 11:10

## 2021-10-04 RX ADMIN — PALONOSETRON HYDROCHLORIDE 0.25 MG: 0.25 INJECTION, SOLUTION INTRAVENOUS at 10:10

## 2021-10-08 ENCOUNTER — PATIENT MESSAGE (OUTPATIENT)
Dept: GYNECOLOGIC ONCOLOGY | Facility: CLINIC | Age: 61
End: 2021-10-08

## 2021-10-22 ENCOUNTER — DOCUMENTATION ONLY (OUTPATIENT)
Dept: GYNECOLOGIC ONCOLOGY | Facility: CLINIC | Age: 61
End: 2021-10-22

## 2021-10-22 ENCOUNTER — LAB VISIT (OUTPATIENT)
Dept: LAB | Facility: HOSPITAL | Age: 61
End: 2021-10-22
Attending: OBSTETRICS & GYNECOLOGY
Payer: MEDICARE

## 2021-10-22 DIAGNOSIS — C54.1 ENDOMETRIAL CANCER: ICD-10-CM

## 2021-10-22 LAB
ALBUMIN SERPL BCP-MCNC: 3.4 G/DL (ref 3.5–5.2)
ALP SERPL-CCNC: 128 U/L (ref 55–135)
ALT SERPL W/O P-5'-P-CCNC: 10 U/L (ref 10–44)
ANION GAP SERPL CALC-SCNC: 9 MMOL/L (ref 8–16)
AST SERPL-CCNC: 15 U/L (ref 10–40)
BILIRUB SERPL-MCNC: 0.4 MG/DL (ref 0.1–1)
BUN SERPL-MCNC: 26 MG/DL (ref 8–23)
CALCIUM SERPL-MCNC: 9.7 MG/DL (ref 8.7–10.5)
CHLORIDE SERPL-SCNC: 110 MMOL/L (ref 95–110)
CO2 SERPL-SCNC: 22 MMOL/L (ref 23–29)
CREAT SERPL-MCNC: 1.3 MG/DL (ref 0.5–1.4)
ERYTHROCYTE [DISTWIDTH] IN BLOOD BY AUTOMATED COUNT: 16.5 % (ref 11.5–14.5)
EST. GFR  (AFRICAN AMERICAN): 51 ML/MIN/1.73 M^2
EST. GFR  (NON AFRICAN AMERICAN): 44 ML/MIN/1.73 M^2
GLUCOSE SERPL-MCNC: 148 MG/DL (ref 70–110)
HCT VFR BLD AUTO: 19.6 % (ref 37–48.5)
HGB BLD-MCNC: 6.6 G/DL (ref 12–16)
IMM GRANULOCYTES # BLD AUTO: 0.02 K/UL (ref 0–0.04)
MCH RBC QN AUTO: 30.8 PG (ref 27–31)
MCHC RBC AUTO-ENTMCNC: 33.7 G/DL (ref 32–36)
MCV RBC AUTO: 92 FL (ref 82–98)
NEUTROPHILS # BLD AUTO: 3.6 K/UL (ref 1.8–7.7)
PLATELET # BLD AUTO: 128 K/UL (ref 150–450)
PMV BLD AUTO: 9.5 FL (ref 9.2–12.9)
POTASSIUM SERPL-SCNC: 4.1 MMOL/L (ref 3.5–5.1)
PROT SERPL-MCNC: 6.9 G/DL (ref 6–8.4)
RBC # BLD AUTO: 2.14 M/UL (ref 4–5.4)
SODIUM SERPL-SCNC: 141 MMOL/L (ref 136–145)
WBC # BLD AUTO: 4.82 K/UL (ref 3.9–12.7)

## 2021-10-22 PROCEDURE — 80053 COMPREHEN METABOLIC PANEL: CPT | Performed by: STUDENT IN AN ORGANIZED HEALTH CARE EDUCATION/TRAINING PROGRAM

## 2021-10-22 PROCEDURE — 85027 COMPLETE CBC AUTOMATED: CPT | Performed by: STUDENT IN AN ORGANIZED HEALTH CARE EDUCATION/TRAINING PROGRAM

## 2021-10-22 PROCEDURE — 36415 COLL VENOUS BLD VENIPUNCTURE: CPT | Performed by: STUDENT IN AN ORGANIZED HEALTH CARE EDUCATION/TRAINING PROGRAM

## 2021-10-22 RX ORDER — DIPHENHYDRAMINE HCL 25 MG
25 CAPSULE ORAL
Status: CANCELLED | OUTPATIENT
Start: 2021-10-22

## 2021-10-22 RX ORDER — HYDROCODONE BITARTRATE AND ACETAMINOPHEN 500; 5 MG/1; MG/1
TABLET ORAL ONCE
Status: CANCELLED | OUTPATIENT
Start: 2021-10-22 | End: 2021-10-22

## 2021-10-22 RX ORDER — ACETAMINOPHEN 325 MG/1
650 TABLET ORAL
Status: CANCELLED | OUTPATIENT
Start: 2021-10-22

## 2021-10-25 ENCOUNTER — PATIENT MESSAGE (OUTPATIENT)
Dept: INFUSION THERAPY | Facility: HOSPITAL | Age: 61
End: 2021-10-25
Payer: MEDICARE

## 2021-10-25 ENCOUNTER — OFFICE VISIT (OUTPATIENT)
Dept: GYNECOLOGIC ONCOLOGY | Facility: CLINIC | Age: 61
End: 2021-10-25
Payer: MEDICARE

## 2021-10-25 ENCOUNTER — PATIENT MESSAGE (OUTPATIENT)
Dept: GYNECOLOGIC ONCOLOGY | Facility: CLINIC | Age: 61
End: 2021-10-25

## 2021-10-25 VITALS
BODY MASS INDEX: 19.73 KG/M2 | SYSTOLIC BLOOD PRESSURE: 148 MMHG | DIASTOLIC BLOOD PRESSURE: 69 MMHG | WEIGHT: 97.69 LBS | HEART RATE: 87 BPM

## 2021-10-25 DIAGNOSIS — I10 ESSENTIAL HYPERTENSION: ICD-10-CM

## 2021-10-25 DIAGNOSIS — N18.4 STAGE 4 CHRONIC KIDNEY DISEASE: Chronic | ICD-10-CM

## 2021-10-25 DIAGNOSIS — D63.8 ANEMIA OF CHRONIC DISEASE: ICD-10-CM

## 2021-10-25 DIAGNOSIS — C54.1 ENDOMETRIAL CANCER: Primary | ICD-10-CM

## 2021-10-25 DIAGNOSIS — D69.59 THROMBOCYTOPENIA DUE TO DRUGS: ICD-10-CM

## 2021-10-25 DIAGNOSIS — T50.905A THROMBOCYTOPENIA DUE TO DRUGS: ICD-10-CM

## 2021-10-25 DIAGNOSIS — Z51.11 ENCOUNTER FOR ANTINEOPLASTIC CHEMOTHERAPY: ICD-10-CM

## 2021-10-25 PROCEDURE — 99213 OFFICE O/P EST LOW 20 MIN: CPT | Mod: PBBFAC | Performed by: OBSTETRICS & GYNECOLOGY

## 2021-10-25 PROCEDURE — 99999 PR PBB SHADOW E&M-EST. PATIENT-LVL III: CPT | Mod: PBBFAC,,, | Performed by: OBSTETRICS & GYNECOLOGY

## 2021-10-25 PROCEDURE — 99999 PR PBB SHADOW E&M-EST. PATIENT-LVL III: ICD-10-PCS | Mod: PBBFAC,,, | Performed by: OBSTETRICS & GYNECOLOGY

## 2021-10-25 PROCEDURE — 99215 OFFICE O/P EST HI 40 MIN: CPT | Mod: S$PBB,,, | Performed by: OBSTETRICS & GYNECOLOGY

## 2021-10-25 PROCEDURE — 99215 PR OFFICE/OUTPT VISIT, EST, LEVL V, 40-54 MIN: ICD-10-PCS | Mod: S$PBB,,, | Performed by: OBSTETRICS & GYNECOLOGY

## 2021-10-25 PROCEDURE — 86920 COMPATIBILITY TEST SPIN: CPT | Performed by: OBSTETRICS & GYNECOLOGY

## 2021-10-25 RX ORDER — SODIUM BICARBONATE 650 MG/1
1300 TABLET ORAL
COMMUNITY
Start: 2021-10-22 | End: 2022-10-22

## 2021-10-26 ENCOUNTER — INFUSION (OUTPATIENT)
Dept: INFUSION THERAPY | Facility: HOSPITAL | Age: 61
End: 2021-10-26
Attending: OBSTETRICS & GYNECOLOGY
Payer: MEDICARE

## 2021-10-26 VITALS
DIASTOLIC BLOOD PRESSURE: 61 MMHG | RESPIRATION RATE: 16 BRPM | TEMPERATURE: 98 F | HEART RATE: 89 BPM | SYSTOLIC BLOOD PRESSURE: 133 MMHG | OXYGEN SATURATION: 100 %

## 2021-10-26 DIAGNOSIS — C54.1 ENDOMETRIAL CANCER: Primary | ICD-10-CM

## 2021-10-26 LAB
BLD PROD TYP BPU: NORMAL
BLD PROD TYP BPU: NORMAL
BLOOD UNIT EXPIRATION DATE: NORMAL
BLOOD UNIT EXPIRATION DATE: NORMAL
BLOOD UNIT TYPE CODE: 6200
BLOOD UNIT TYPE CODE: 6200
BLOOD UNIT TYPE: NORMAL
BLOOD UNIT TYPE: NORMAL
CODING SYSTEM: NORMAL
CODING SYSTEM: NORMAL
DISPENSE STATUS: NORMAL
DISPENSE STATUS: NORMAL
NUM UNITS TRANS PACKED RBC: NORMAL
NUM UNITS TRANS PACKED RBC: NORMAL

## 2021-10-26 PROCEDURE — 25000003 PHARM REV CODE 250: Performed by: OBSTETRICS & GYNECOLOGY

## 2021-10-26 PROCEDURE — P9040 RBC LEUKOREDUCED IRRADIATED: HCPCS | Performed by: OBSTETRICS & GYNECOLOGY

## 2021-10-26 PROCEDURE — 36430 TRANSFUSION BLD/BLD COMPNT: CPT

## 2021-10-26 RX ORDER — HYDROCODONE BITARTRATE AND ACETAMINOPHEN 500; 5 MG/1; MG/1
TABLET ORAL ONCE
Status: DISCONTINUED | OUTPATIENT
Start: 2021-10-26 | End: 2021-10-26 | Stop reason: HOSPADM

## 2021-10-26 RX ORDER — ACETAMINOPHEN 325 MG/1
650 TABLET ORAL
Status: COMPLETED | OUTPATIENT
Start: 2021-10-26 | End: 2021-10-26

## 2021-10-26 RX ORDER — DIPHENHYDRAMINE HCL 25 MG
25 CAPSULE ORAL
Status: COMPLETED | OUTPATIENT
Start: 2021-10-26 | End: 2021-10-26

## 2021-10-26 RX ADMIN — ACETAMINOPHEN 650 MG: 325 TABLET ORAL at 08:10

## 2021-10-26 RX ADMIN — DIPHENHYDRAMINE HYDROCHLORIDE 25 MG: 25 CAPSULE ORAL at 08:10

## 2021-10-28 ENCOUNTER — LAB VISIT (OUTPATIENT)
Dept: LAB | Facility: HOSPITAL | Age: 61
End: 2021-10-28
Attending: OBSTETRICS & GYNECOLOGY
Payer: MEDICARE

## 2021-10-28 DIAGNOSIS — C54.1 ENDOMETRIAL CANCER: ICD-10-CM

## 2021-10-28 LAB
ALBUMIN SERPL BCP-MCNC: 3.1 G/DL (ref 3.5–5.2)
ALP SERPL-CCNC: 145 U/L (ref 55–135)
ALT SERPL W/O P-5'-P-CCNC: 37 U/L (ref 10–44)
ANION GAP SERPL CALC-SCNC: 9 MMOL/L (ref 8–16)
AST SERPL-CCNC: 41 U/L (ref 10–40)
BILIRUB SERPL-MCNC: 0.4 MG/DL (ref 0.1–1)
BUN SERPL-MCNC: 26 MG/DL (ref 8–23)
CALCIUM SERPL-MCNC: 9.1 MG/DL (ref 8.7–10.5)
CHLORIDE SERPL-SCNC: 109 MMOL/L (ref 95–110)
CO2 SERPL-SCNC: 27 MMOL/L (ref 23–29)
CREAT SERPL-MCNC: 1.6 MG/DL (ref 0.5–1.4)
ERYTHROCYTE [DISTWIDTH] IN BLOOD BY AUTOMATED COUNT: 17.7 % (ref 11.5–14.5)
EST. GFR  (AFRICAN AMERICAN): 40 ML/MIN/1.73 M^2
EST. GFR  (NON AFRICAN AMERICAN): 34 ML/MIN/1.73 M^2
GLUCOSE SERPL-MCNC: 108 MG/DL (ref 70–110)
HCT VFR BLD AUTO: 31.9 % (ref 37–48.5)
HGB BLD-MCNC: 10.6 G/DL (ref 12–16)
IMM GRANULOCYTES # BLD AUTO: 0.02 K/UL (ref 0–0.04)
MCH RBC QN AUTO: 29.9 PG (ref 27–31)
MCHC RBC AUTO-ENTMCNC: 33.2 G/DL (ref 32–36)
MCV RBC AUTO: 90 FL (ref 82–98)
NEUTROPHILS # BLD AUTO: 2 K/UL (ref 1.8–7.7)
PLATELET # BLD AUTO: 224 K/UL (ref 150–450)
PMV BLD AUTO: 8.4 FL (ref 9.2–12.9)
POTASSIUM SERPL-SCNC: 3.8 MMOL/L (ref 3.5–5.1)
PROT SERPL-MCNC: 6.4 G/DL (ref 6–8.4)
RBC # BLD AUTO: 3.54 M/UL (ref 4–5.4)
SODIUM SERPL-SCNC: 145 MMOL/L (ref 136–145)
WBC # BLD AUTO: 3.26 K/UL (ref 3.9–12.7)

## 2021-10-28 PROCEDURE — 85027 COMPLETE CBC AUTOMATED: CPT | Performed by: STUDENT IN AN ORGANIZED HEALTH CARE EDUCATION/TRAINING PROGRAM

## 2021-10-28 PROCEDURE — 80053 COMPREHEN METABOLIC PANEL: CPT | Performed by: STUDENT IN AN ORGANIZED HEALTH CARE EDUCATION/TRAINING PROGRAM

## 2021-10-28 PROCEDURE — 36415 COLL VENOUS BLD VENIPUNCTURE: CPT | Performed by: STUDENT IN AN ORGANIZED HEALTH CARE EDUCATION/TRAINING PROGRAM

## 2021-10-29 ENCOUNTER — INFUSION (OUTPATIENT)
Dept: INFUSION THERAPY | Facility: OTHER | Age: 61
End: 2021-10-29
Attending: OBSTETRICS & GYNECOLOGY
Payer: MEDICARE

## 2021-10-29 VITALS
OXYGEN SATURATION: 100 % | HEART RATE: 85 BPM | BODY MASS INDEX: 21.51 KG/M2 | HEIGHT: 59 IN | RESPIRATION RATE: 18 BRPM | WEIGHT: 106.69 LBS | DIASTOLIC BLOOD PRESSURE: 69 MMHG | SYSTOLIC BLOOD PRESSURE: 150 MMHG | TEMPERATURE: 98 F

## 2021-10-29 DIAGNOSIS — C54.1 ENDOMETRIAL CANCER: Primary | ICD-10-CM

## 2021-10-29 PROCEDURE — 25000003 PHARM REV CODE 250: Performed by: OBSTETRICS & GYNECOLOGY

## 2021-10-29 PROCEDURE — 96367 TX/PROPH/DG ADDL SEQ IV INF: CPT

## 2021-10-29 PROCEDURE — 96375 TX/PRO/DX INJ NEW DRUG ADDON: CPT

## 2021-10-29 PROCEDURE — 96413 CHEMO IV INFUSION 1 HR: CPT

## 2021-10-29 PROCEDURE — 96361 HYDRATE IV INFUSION ADD-ON: CPT

## 2021-10-29 PROCEDURE — 63600175 PHARM REV CODE 636 W HCPCS: Performed by: OBSTETRICS & GYNECOLOGY

## 2021-10-29 RX ORDER — SODIUM CHLORIDE 0.9 % (FLUSH) 0.9 %
10 SYRINGE (ML) INJECTION
Status: DISCONTINUED | OUTPATIENT
Start: 2021-10-29 | End: 2021-10-29 | Stop reason: HOSPADM

## 2021-10-29 RX ORDER — FAMOTIDINE 10 MG/ML
20 INJECTION INTRAVENOUS
Status: COMPLETED | OUTPATIENT
Start: 2021-10-29 | End: 2021-10-29

## 2021-10-29 RX ORDER — HEPARIN 100 UNIT/ML
500 SYRINGE INTRAVENOUS
Status: CANCELLED | OUTPATIENT
Start: 2021-10-29

## 2021-10-29 RX ORDER — HEPARIN 100 UNIT/ML
500 SYRINGE INTRAVENOUS
Status: DISCONTINUED | OUTPATIENT
Start: 2021-10-29 | End: 2021-10-29 | Stop reason: HOSPADM

## 2021-10-29 RX ORDER — DIPHENHYDRAMINE HYDROCHLORIDE 50 MG/ML
50 INJECTION INTRAMUSCULAR; INTRAVENOUS ONCE AS NEEDED
Status: DISCONTINUED | OUTPATIENT
Start: 2021-10-29 | End: 2021-10-29 | Stop reason: HOSPADM

## 2021-10-29 RX ORDER — EPINEPHRINE 0.3 MG/.3ML
0.3 INJECTION SUBCUTANEOUS ONCE AS NEEDED
Status: DISCONTINUED | OUTPATIENT
Start: 2021-10-29 | End: 2021-10-29 | Stop reason: HOSPADM

## 2021-10-29 RX ORDER — FAMOTIDINE 10 MG/ML
20 INJECTION INTRAVENOUS
Status: CANCELLED | OUTPATIENT
Start: 2021-10-29

## 2021-10-29 RX ORDER — EPINEPHRINE 0.3 MG/.3ML
0.3 INJECTION SUBCUTANEOUS ONCE AS NEEDED
Status: CANCELLED | OUTPATIENT
Start: 2021-10-29

## 2021-10-29 RX ORDER — DIPHENHYDRAMINE HYDROCHLORIDE 50 MG/ML
50 INJECTION INTRAMUSCULAR; INTRAVENOUS ONCE AS NEEDED
Status: CANCELLED | OUTPATIENT
Start: 2021-10-29

## 2021-10-29 RX ORDER — SODIUM CHLORIDE 0.9 % (FLUSH) 0.9 %
10 SYRINGE (ML) INJECTION
Status: CANCELLED | OUTPATIENT
Start: 2021-10-29

## 2021-10-29 RX ADMIN — DIPHENHYDRAMINE HYDROCHLORIDE 50 MG: 50 INJECTION, SOLUTION INTRAMUSCULAR; INTRAVENOUS at 10:10

## 2021-10-29 RX ADMIN — FAMOTIDINE 20 MG: 10 INJECTION INTRAVENOUS at 10:10

## 2021-10-29 RX ADMIN — SODIUM CHLORIDE 500 ML: 0.9 INJECTION, SOLUTION INTRAVENOUS at 11:10

## 2021-10-29 RX ADMIN — PALONOSETRON HYDROCHLORIDE 0.25 MG: 0.25 INJECTION, SOLUTION INTRAVENOUS at 10:10

## 2021-10-29 RX ADMIN — SODIUM CHLORIDE 265 MG: 9 INJECTION, SOLUTION INTRAVENOUS at 11:10

## 2021-10-29 RX ADMIN — APREPITANT 130 MG: 130 INJECTION, EMULSION INTRAVENOUS at 10:10

## 2021-11-11 ENCOUNTER — PATIENT MESSAGE (OUTPATIENT)
Dept: GYNECOLOGIC ONCOLOGY | Facility: CLINIC | Age: 61
End: 2021-11-11
Payer: MEDICARE

## 2021-11-19 ENCOUNTER — LAB VISIT (OUTPATIENT)
Dept: LAB | Facility: HOSPITAL | Age: 61
End: 2021-11-19
Attending: OBSTETRICS & GYNECOLOGY
Payer: MEDICARE

## 2021-11-19 DIAGNOSIS — C54.1 ENDOMETRIAL CANCER: ICD-10-CM

## 2021-11-19 LAB
ALBUMIN SERPL BCP-MCNC: 3.5 G/DL (ref 3.5–5.2)
ALP SERPL-CCNC: 218 U/L (ref 55–135)
ALT SERPL W/O P-5'-P-CCNC: 36 U/L (ref 10–44)
ANION GAP SERPL CALC-SCNC: 12 MMOL/L (ref 8–16)
AST SERPL-CCNC: 24 U/L (ref 10–40)
BILIRUB SERPL-MCNC: 0.3 MG/DL (ref 0.1–1)
BUN SERPL-MCNC: 32 MG/DL (ref 8–23)
CALCIUM SERPL-MCNC: 9.5 MG/DL (ref 8.7–10.5)
CHLORIDE SERPL-SCNC: 103 MMOL/L (ref 95–110)
CO2 SERPL-SCNC: 24 MMOL/L (ref 23–29)
CREAT SERPL-MCNC: 1.9 MG/DL (ref 0.5–1.4)
ERYTHROCYTE [DISTWIDTH] IN BLOOD BY AUTOMATED COUNT: 18.3 % (ref 11.5–14.5)
EST. GFR  (AFRICAN AMERICAN): 32 ML/MIN/1.73 M^2
EST. GFR  (NON AFRICAN AMERICAN): 28 ML/MIN/1.73 M^2
GLUCOSE SERPL-MCNC: 130 MG/DL (ref 70–110)
HCT VFR BLD AUTO: 26.3 % (ref 37–48.5)
HGB BLD-MCNC: 8.3 G/DL (ref 12–16)
IMM GRANULOCYTES # BLD AUTO: 0.02 K/UL (ref 0–0.04)
MCH RBC QN AUTO: 30.2 PG (ref 27–31)
MCHC RBC AUTO-ENTMCNC: 31.6 G/DL (ref 32–36)
MCV RBC AUTO: 96 FL (ref 82–98)
NEUTROPHILS # BLD AUTO: 3 K/UL (ref 1.8–7.7)
PLATELET # BLD AUTO: 161 K/UL (ref 150–450)
PMV BLD AUTO: 9.1 FL (ref 9.2–12.9)
POTASSIUM SERPL-SCNC: 3.6 MMOL/L (ref 3.5–5.1)
PROT SERPL-MCNC: 7.3 G/DL (ref 6–8.4)
RBC # BLD AUTO: 2.75 M/UL (ref 4–5.4)
SODIUM SERPL-SCNC: 139 MMOL/L (ref 136–145)
WBC # BLD AUTO: 4.24 K/UL (ref 3.9–12.7)

## 2021-11-19 PROCEDURE — 80053 COMPREHEN METABOLIC PANEL: CPT | Performed by: STUDENT IN AN ORGANIZED HEALTH CARE EDUCATION/TRAINING PROGRAM

## 2021-11-19 PROCEDURE — 36415 COLL VENOUS BLD VENIPUNCTURE: CPT | Performed by: STUDENT IN AN ORGANIZED HEALTH CARE EDUCATION/TRAINING PROGRAM

## 2021-11-19 PROCEDURE — 85027 COMPLETE CBC AUTOMATED: CPT | Performed by: STUDENT IN AN ORGANIZED HEALTH CARE EDUCATION/TRAINING PROGRAM

## 2021-11-22 ENCOUNTER — INFUSION (OUTPATIENT)
Dept: INFUSION THERAPY | Facility: OTHER | Age: 61
End: 2021-11-22
Attending: OBSTETRICS & GYNECOLOGY
Payer: MEDICARE

## 2021-11-22 ENCOUNTER — OFFICE VISIT (OUTPATIENT)
Dept: GYNECOLOGIC ONCOLOGY | Facility: CLINIC | Age: 61
End: 2021-11-22
Payer: MEDICARE

## 2021-11-22 VITALS
HEART RATE: 99 BPM | BODY MASS INDEX: 22.35 KG/M2 | WEIGHT: 110.69 LBS | SYSTOLIC BLOOD PRESSURE: 158 MMHG | DIASTOLIC BLOOD PRESSURE: 70 MMHG

## 2021-11-22 VITALS
OXYGEN SATURATION: 100 % | HEART RATE: 94 BPM | HEIGHT: 59 IN | BODY MASS INDEX: 22.35 KG/M2 | RESPIRATION RATE: 18 BRPM | TEMPERATURE: 99 F

## 2021-11-22 DIAGNOSIS — I10 ESSENTIAL HYPERTENSION: ICD-10-CM

## 2021-11-22 DIAGNOSIS — Z51.11 ENCOUNTER FOR ANTINEOPLASTIC CHEMOTHERAPY: ICD-10-CM

## 2021-11-22 DIAGNOSIS — C54.1 ENDOMETRIAL CANCER: Primary | ICD-10-CM

## 2021-11-22 DIAGNOSIS — R18.0 MALIGNANT ASCITES: ICD-10-CM

## 2021-11-22 DIAGNOSIS — N18.4 STAGE 4 CHRONIC KIDNEY DISEASE: Chronic | ICD-10-CM

## 2021-11-22 PROCEDURE — 63600175 PHARM REV CODE 636 W HCPCS: Performed by: OBSTETRICS & GYNECOLOGY

## 2021-11-22 PROCEDURE — 99215 OFFICE O/P EST HI 40 MIN: CPT | Mod: S$PBB,,, | Performed by: OBSTETRICS & GYNECOLOGY

## 2021-11-22 PROCEDURE — 96361 HYDRATE IV INFUSION ADD-ON: CPT

## 2021-11-22 PROCEDURE — 96375 TX/PRO/DX INJ NEW DRUG ADDON: CPT

## 2021-11-22 PROCEDURE — 96367 TX/PROPH/DG ADDL SEQ IV INF: CPT

## 2021-11-22 PROCEDURE — 99213 OFFICE O/P EST LOW 20 MIN: CPT | Mod: PBBFAC | Performed by: OBSTETRICS & GYNECOLOGY

## 2021-11-22 PROCEDURE — 99999 PR PBB SHADOW E&M-EST. PATIENT-LVL III: CPT | Mod: PBBFAC,,, | Performed by: OBSTETRICS & GYNECOLOGY

## 2021-11-22 PROCEDURE — 25000003 PHARM REV CODE 250: Performed by: OBSTETRICS & GYNECOLOGY

## 2021-11-22 PROCEDURE — 96413 CHEMO IV INFUSION 1 HR: CPT

## 2021-11-22 PROCEDURE — 99999 PR PBB SHADOW E&M-EST. PATIENT-LVL III: ICD-10-PCS | Mod: PBBFAC,,, | Performed by: OBSTETRICS & GYNECOLOGY

## 2021-11-22 PROCEDURE — 99215 PR OFFICE/OUTPT VISIT, EST, LEVL V, 40-54 MIN: ICD-10-PCS | Mod: S$PBB,,, | Performed by: OBSTETRICS & GYNECOLOGY

## 2021-11-22 RX ORDER — SODIUM CHLORIDE 0.9 % (FLUSH) 0.9 %
10 SYRINGE (ML) INJECTION
Status: DISCONTINUED | OUTPATIENT
Start: 2021-11-22 | End: 2021-11-22 | Stop reason: HOSPADM

## 2021-11-22 RX ORDER — FAMOTIDINE 10 MG/ML
20 INJECTION INTRAVENOUS
Status: COMPLETED | OUTPATIENT
Start: 2021-11-22 | End: 2021-11-22

## 2021-11-22 RX ORDER — SODIUM CHLORIDE 0.9 % (FLUSH) 0.9 %
10 SYRINGE (ML) INJECTION
Status: CANCELLED | OUTPATIENT
Start: 2021-11-22

## 2021-11-22 RX ORDER — HEPARIN 100 UNIT/ML
500 SYRINGE INTRAVENOUS
Status: CANCELLED | OUTPATIENT
Start: 2021-11-22

## 2021-11-22 RX ORDER — EPINEPHRINE 0.3 MG/.3ML
0.3 INJECTION SUBCUTANEOUS ONCE AS NEEDED
Status: CANCELLED | OUTPATIENT
Start: 2021-11-22

## 2021-11-22 RX ORDER — HEPARIN 100 UNIT/ML
500 SYRINGE INTRAVENOUS
Status: DISCONTINUED | OUTPATIENT
Start: 2021-11-22 | End: 2021-11-22 | Stop reason: HOSPADM

## 2021-11-22 RX ORDER — DIPHENHYDRAMINE HYDROCHLORIDE 50 MG/ML
50 INJECTION INTRAMUSCULAR; INTRAVENOUS ONCE AS NEEDED
Status: CANCELLED | OUTPATIENT
Start: 2021-11-22

## 2021-11-22 RX ORDER — DIPHENHYDRAMINE HYDROCHLORIDE 50 MG/ML
50 INJECTION INTRAMUSCULAR; INTRAVENOUS ONCE AS NEEDED
Status: DISCONTINUED | OUTPATIENT
Start: 2021-11-22 | End: 2021-11-22 | Stop reason: HOSPADM

## 2021-11-22 RX ORDER — FAMOTIDINE 10 MG/ML
20 INJECTION INTRAVENOUS
Status: CANCELLED | OUTPATIENT
Start: 2021-11-22

## 2021-11-22 RX ORDER — EPINEPHRINE 0.3 MG/.3ML
0.3 INJECTION SUBCUTANEOUS ONCE AS NEEDED
Status: DISCONTINUED | OUTPATIENT
Start: 2021-11-22 | End: 2021-11-22 | Stop reason: HOSPADM

## 2021-11-22 RX ADMIN — APREPITANT 130 MG: 130 INJECTION, EMULSION INTRAVENOUS at 10:11

## 2021-11-22 RX ADMIN — SODIUM CHLORIDE 245 MG: 9 INJECTION, SOLUTION INTRAVENOUS at 12:11

## 2021-11-22 RX ADMIN — DIPHENHYDRAMINE HYDROCHLORIDE 50 MG: 50 INJECTION, SOLUTION INTRAMUSCULAR; INTRAVENOUS at 11:11

## 2021-11-22 RX ADMIN — FAMOTIDINE 20 MG: 10 INJECTION INTRAVENOUS at 10:11

## 2021-11-22 RX ADMIN — SODIUM CHLORIDE 250 ML: 0.9 INJECTION, SOLUTION INTRAVENOUS at 12:11

## 2021-11-22 RX ADMIN — PALONOSETRON HYDROCHLORIDE 0.25 MG: 0.25 INJECTION, SOLUTION INTRAVENOUS at 11:11

## 2021-12-01 ENCOUNTER — HOSPITAL ENCOUNTER (OUTPATIENT)
Dept: INTERVENTIONAL RADIOLOGY/VASCULAR | Facility: HOSPITAL | Age: 61
Discharge: HOME | End: 2021-12-01
Attending: STUDENT IN AN ORGANIZED HEALTH CARE EDUCATION/TRAINING PROGRAM
Payer: MEDICARE

## 2021-12-01 DIAGNOSIS — C54.1 ENDOMETRIAL CANCER: ICD-10-CM

## 2021-12-01 PROCEDURE — 76705 ECHO EXAM OF ABDOMEN: CPT | Mod: TC | Performed by: RADIOLOGY

## 2021-12-01 PROCEDURE — 76705 IR US ABDOMEN LIMITED: ICD-10-PCS | Mod: 26,,, | Performed by: RADIOLOGY

## 2021-12-02 ENCOUNTER — HOSPITAL ENCOUNTER (OUTPATIENT)
Dept: RADIOLOGY | Facility: HOSPITAL | Age: 61
Discharge: HOME | End: 2021-12-02
Attending: OBSTETRICS & GYNECOLOGY
Payer: MEDICARE

## 2021-12-02 DIAGNOSIS — C54.1 ENDOMETRIAL CANCER: ICD-10-CM

## 2021-12-02 PROCEDURE — 74176 CT ABD & PELVIS W/O CONTRAST: CPT | Mod: TC

## 2021-12-02 PROCEDURE — 71250 CT CHEST ABDOMEN PELVIS WITHOUT CONTRAST(XPD): ICD-10-PCS | Mod: 26,,, | Performed by: RADIOLOGY

## 2021-12-02 PROCEDURE — 71250 CT THORAX DX C-: CPT | Mod: TC

## 2021-12-02 PROCEDURE — 25500020 PHARM REV CODE 255: Performed by: OBSTETRICS & GYNECOLOGY

## 2021-12-02 PROCEDURE — 71250 CT THORAX DX C-: CPT | Mod: 26,,, | Performed by: RADIOLOGY

## 2021-12-02 PROCEDURE — 74176 CT ABD & PELVIS W/O CONTRAST: CPT | Mod: 26,,, | Performed by: RADIOLOGY

## 2021-12-02 PROCEDURE — 74176 CT CHEST ABDOMEN PELVIS WITHOUT CONTRAST(XPD): ICD-10-PCS | Mod: 26,,, | Performed by: RADIOLOGY

## 2021-12-02 RX ADMIN — IOHEXOL 15 ML: 300 INJECTION, SOLUTION INTRAVENOUS at 09:12

## 2021-12-03 ENCOUNTER — TELEPHONE (OUTPATIENT)
Dept: GYNECOLOGIC ONCOLOGY | Facility: CLINIC | Age: 61
End: 2021-12-03
Payer: MEDICARE

## 2021-12-10 ENCOUNTER — LAB VISIT (OUTPATIENT)
Dept: LAB | Facility: HOSPITAL | Age: 61
End: 2021-12-10
Attending: STUDENT IN AN ORGANIZED HEALTH CARE EDUCATION/TRAINING PROGRAM
Payer: MEDICARE

## 2021-12-10 DIAGNOSIS — C54.1 ENDOMETRIAL CANCER: ICD-10-CM

## 2021-12-10 LAB
ALBUMIN SERPL BCP-MCNC: 3.5 G/DL (ref 3.5–5.2)
ALP SERPL-CCNC: 167 U/L (ref 55–135)
ALT SERPL W/O P-5'-P-CCNC: 36 U/L (ref 10–44)
ANION GAP SERPL CALC-SCNC: 10 MMOL/L (ref 8–16)
AST SERPL-CCNC: 32 U/L (ref 10–40)
BILIRUB SERPL-MCNC: 0.4 MG/DL (ref 0.1–1)
BUN SERPL-MCNC: 40 MG/DL (ref 8–23)
CALCIUM SERPL-MCNC: 9.2 MG/DL (ref 8.7–10.5)
CHLORIDE SERPL-SCNC: 103 MMOL/L (ref 95–110)
CO2 SERPL-SCNC: 29 MMOL/L (ref 23–29)
CREAT SERPL-MCNC: 2.5 MG/DL (ref 0.5–1.4)
ERYTHROCYTE [DISTWIDTH] IN BLOOD BY AUTOMATED COUNT: 18.8 % (ref 11.5–14.5)
EST. GFR  (AFRICAN AMERICAN): 23 ML/MIN/1.73 M^2
EST. GFR  (NON AFRICAN AMERICAN): 20 ML/MIN/1.73 M^2
GLUCOSE SERPL-MCNC: 128 MG/DL (ref 70–110)
HCT VFR BLD AUTO: 22.5 % (ref 37–48.5)
HGB BLD-MCNC: 7.4 G/DL (ref 12–16)
IMM GRANULOCYTES # BLD AUTO: 0.03 K/UL (ref 0–0.04)
MCH RBC QN AUTO: 32.2 PG (ref 27–31)
MCHC RBC AUTO-ENTMCNC: 32.9 G/DL (ref 32–36)
MCV RBC AUTO: 98 FL (ref 82–98)
NEUTROPHILS # BLD AUTO: 3.8 K/UL (ref 1.8–7.7)
PLATELET # BLD AUTO: 77 K/UL (ref 150–450)
PMV BLD AUTO: 8.4 FL (ref 9.2–12.9)
POTASSIUM SERPL-SCNC: 3.8 MMOL/L (ref 3.5–5.1)
PROT SERPL-MCNC: 7.1 G/DL (ref 6–8.4)
RBC # BLD AUTO: 2.3 M/UL (ref 4–5.4)
SODIUM SERPL-SCNC: 142 MMOL/L (ref 136–145)
WBC # BLD AUTO: 5.5 K/UL (ref 3.9–12.7)

## 2021-12-10 PROCEDURE — 85027 COMPLETE CBC AUTOMATED: CPT | Performed by: STUDENT IN AN ORGANIZED HEALTH CARE EDUCATION/TRAINING PROGRAM

## 2021-12-10 PROCEDURE — 36415 COLL VENOUS BLD VENIPUNCTURE: CPT | Performed by: STUDENT IN AN ORGANIZED HEALTH CARE EDUCATION/TRAINING PROGRAM

## 2021-12-10 PROCEDURE — 80053 COMPREHEN METABOLIC PANEL: CPT | Performed by: STUDENT IN AN ORGANIZED HEALTH CARE EDUCATION/TRAINING PROGRAM

## 2021-12-13 ENCOUNTER — OFFICE VISIT (OUTPATIENT)
Dept: GYNECOLOGIC ONCOLOGY | Facility: CLINIC | Age: 61
End: 2021-12-13
Payer: MEDICARE

## 2021-12-13 ENCOUNTER — INFUSION (OUTPATIENT)
Dept: INFUSION THERAPY | Facility: OTHER | Age: 61
End: 2021-12-13
Attending: OBSTETRICS & GYNECOLOGY
Payer: MEDICARE

## 2021-12-13 VITALS
SYSTOLIC BLOOD PRESSURE: 152 MMHG | WEIGHT: 109.88 LBS | HEART RATE: 85 BPM | DIASTOLIC BLOOD PRESSURE: 70 MMHG | BODY MASS INDEX: 22.2 KG/M2

## 2021-12-13 VITALS — TEMPERATURE: 98 F | OXYGEN SATURATION: 100 % | RESPIRATION RATE: 18 BRPM | HEART RATE: 87 BPM

## 2021-12-13 DIAGNOSIS — T50.905A THROMBOCYTOPENIA DUE TO DRUGS: ICD-10-CM

## 2021-12-13 DIAGNOSIS — Z51.11 ENCOUNTER FOR ANTINEOPLASTIC CHEMOTHERAPY: ICD-10-CM

## 2021-12-13 DIAGNOSIS — D69.59 THROMBOCYTOPENIA DUE TO DRUGS: ICD-10-CM

## 2021-12-13 DIAGNOSIS — R18.0 MALIGNANT ASCITES: ICD-10-CM

## 2021-12-13 DIAGNOSIS — Z51.11 ENCOUNTER FOR ANTINEOPLASTIC CHEMOTHERAPY: Primary | ICD-10-CM

## 2021-12-13 DIAGNOSIS — N18.4 STAGE 4 CHRONIC KIDNEY DISEASE: Chronic | ICD-10-CM

## 2021-12-13 DIAGNOSIS — C54.1 ENDOMETRIAL CANCER: Primary | ICD-10-CM

## 2021-12-13 PROCEDURE — 25000003 PHARM REV CODE 250: Performed by: OBSTETRICS & GYNECOLOGY

## 2021-12-13 PROCEDURE — 96361 HYDRATE IV INFUSION ADD-ON: CPT

## 2021-12-13 PROCEDURE — 63600175 PHARM REV CODE 636 W HCPCS: Mod: JG | Performed by: OBSTETRICS & GYNECOLOGY

## 2021-12-13 PROCEDURE — 99213 OFFICE O/P EST LOW 20 MIN: CPT | Mod: PBBFAC | Performed by: OBSTETRICS & GYNECOLOGY

## 2021-12-13 PROCEDURE — 99999 PR PBB SHADOW E&M-EST. PATIENT-LVL III: CPT | Mod: PBBFAC,,, | Performed by: OBSTETRICS & GYNECOLOGY

## 2021-12-13 PROCEDURE — 96367 TX/PROPH/DG ADDL SEQ IV INF: CPT

## 2021-12-13 PROCEDURE — 99215 PR OFFICE/OUTPT VISIT, EST, LEVL V, 40-54 MIN: ICD-10-PCS | Mod: S$PBB,,, | Performed by: OBSTETRICS & GYNECOLOGY

## 2021-12-13 PROCEDURE — 99215 OFFICE O/P EST HI 40 MIN: CPT | Mod: S$PBB,,, | Performed by: OBSTETRICS & GYNECOLOGY

## 2021-12-13 PROCEDURE — 96413 CHEMO IV INFUSION 1 HR: CPT

## 2021-12-13 PROCEDURE — 96375 TX/PRO/DX INJ NEW DRUG ADDON: CPT

## 2021-12-13 PROCEDURE — 99999 PR PBB SHADOW E&M-EST. PATIENT-LVL III: ICD-10-PCS | Mod: PBBFAC,,, | Performed by: OBSTETRICS & GYNECOLOGY

## 2021-12-13 RX ORDER — FAMOTIDINE 10 MG/ML
20 INJECTION INTRAVENOUS
Status: COMPLETED | OUTPATIENT
Start: 2021-12-13 | End: 2021-12-13

## 2021-12-13 RX ORDER — EPINEPHRINE 0.3 MG/.3ML
0.3 INJECTION SUBCUTANEOUS ONCE AS NEEDED
Status: CANCELLED | OUTPATIENT
Start: 2021-12-13

## 2021-12-13 RX ORDER — ROSUVASTATIN CALCIUM 20 MG/1
TABLET, COATED ORAL
COMMUNITY
Start: 2021-11-11

## 2021-12-13 RX ORDER — SODIUM CHLORIDE 0.9 % (FLUSH) 0.9 %
10 SYRINGE (ML) INJECTION
Status: DISCONTINUED | OUTPATIENT
Start: 2021-12-13 | End: 2021-12-13 | Stop reason: HOSPADM

## 2021-12-13 RX ORDER — ERGOCALCIFEROL 1.25 MG/1
50000 CAPSULE ORAL
Status: ON HOLD | COMMUNITY
Start: 2021-08-09 | End: 2022-09-27 | Stop reason: HOSPADM

## 2021-12-13 RX ORDER — HYDROCODONE BITARTRATE AND ACETAMINOPHEN 500; 5 MG/1; MG/1
TABLET ORAL ONCE
Status: CANCELLED | OUTPATIENT
Start: 2021-12-13 | End: 2021-12-13

## 2021-12-13 RX ORDER — DIPHENHYDRAMINE HYDROCHLORIDE 50 MG/ML
50 INJECTION INTRAMUSCULAR; INTRAVENOUS ONCE AS NEEDED
Status: DISCONTINUED | OUTPATIENT
Start: 2021-12-13 | End: 2021-12-13 | Stop reason: HOSPADM

## 2021-12-13 RX ORDER — EPINEPHRINE 0.3 MG/.3ML
0.3 INJECTION SUBCUTANEOUS ONCE AS NEEDED
Status: DISCONTINUED | OUTPATIENT
Start: 2021-12-13 | End: 2021-12-13 | Stop reason: HOSPADM

## 2021-12-13 RX ORDER — NATEGLINIDE 120 MG/1
TABLET ORAL
Status: ON HOLD | COMMUNITY
Start: 2021-08-09 | End: 2022-09-27 | Stop reason: HOSPADM

## 2021-12-13 RX ORDER — HEPARIN 100 UNIT/ML
500 SYRINGE INTRAVENOUS
Status: CANCELLED | OUTPATIENT
Start: 2021-12-13

## 2021-12-13 RX ORDER — DIPHENHYDRAMINE HYDROCHLORIDE 50 MG/ML
50 INJECTION INTRAMUSCULAR; INTRAVENOUS ONCE AS NEEDED
Status: CANCELLED | OUTPATIENT
Start: 2021-12-13

## 2021-12-13 RX ORDER — SODIUM CHLORIDE 0.9 % (FLUSH) 0.9 %
10 SYRINGE (ML) INJECTION
Status: CANCELLED | OUTPATIENT
Start: 2021-12-13

## 2021-12-13 RX ORDER — HEPARIN 100 UNIT/ML
500 SYRINGE INTRAVENOUS
Status: DISCONTINUED | OUTPATIENT
Start: 2021-12-13 | End: 2021-12-13 | Stop reason: HOSPADM

## 2021-12-13 RX ORDER — FAMOTIDINE 10 MG/ML
20 INJECTION INTRAVENOUS
Status: CANCELLED | OUTPATIENT
Start: 2021-12-13

## 2021-12-13 RX ADMIN — DIPHENHYDRAMINE HYDROCHLORIDE 50 MG: 50 INJECTION, SOLUTION INTRAMUSCULAR; INTRAVENOUS at 11:12

## 2021-12-13 RX ADMIN — SODIUM CHLORIDE 215 MG: 9 INJECTION, SOLUTION INTRAVENOUS at 11:12

## 2021-12-13 RX ADMIN — FAMOTIDINE 20 MG: 10 INJECTION INTRAVENOUS at 10:12

## 2021-12-13 RX ADMIN — SODIUM CHLORIDE 500 ML: 0.9 INJECTION, SOLUTION INTRAVENOUS at 11:12

## 2021-12-13 RX ADMIN — PALONOSETRON HYDROCHLORIDE 0.25 MG: 0.25 INJECTION, SOLUTION INTRAVENOUS at 10:12

## 2021-12-13 RX ADMIN — APREPITANT 130 MG: 130 INJECTION, EMULSION INTRAVENOUS at 10:12

## 2021-12-16 ENCOUNTER — LAB VISIT (OUTPATIENT)
Dept: LAB | Facility: OTHER | Age: 61
End: 2021-12-16
Attending: OBSTETRICS & GYNECOLOGY
Payer: MEDICARE

## 2021-12-16 DIAGNOSIS — C54.1 ENDOMETRIAL CANCER: ICD-10-CM

## 2021-12-16 LAB
ABO + RH BLD: NORMAL
BLD GP AB SCN CELLS X3 SERPL QL: NORMAL

## 2021-12-16 PROCEDURE — 86900 BLOOD TYPING SEROLOGIC ABO: CPT | Performed by: OBSTETRICS & GYNECOLOGY

## 2021-12-16 PROCEDURE — 27201040 HC RC 50 FILTER: Performed by: OBSTETRICS & GYNECOLOGY

## 2021-12-16 PROCEDURE — 36415 COLL VENOUS BLD VENIPUNCTURE: CPT | Performed by: OBSTETRICS & GYNECOLOGY

## 2021-12-16 PROCEDURE — 86920 COMPATIBILITY TEST SPIN: CPT | Performed by: OBSTETRICS & GYNECOLOGY

## 2021-12-17 ENCOUNTER — INFUSION (OUTPATIENT)
Dept: INFUSION THERAPY | Facility: OTHER | Age: 61
End: 2021-12-17
Attending: OBSTETRICS & GYNECOLOGY
Payer: MEDICARE

## 2021-12-17 VITALS
OXYGEN SATURATION: 100 % | HEART RATE: 86 BPM | DIASTOLIC BLOOD PRESSURE: 81 MMHG | TEMPERATURE: 98 F | SYSTOLIC BLOOD PRESSURE: 182 MMHG | RESPIRATION RATE: 16 BRPM

## 2021-12-17 DIAGNOSIS — C54.1 ENDOMETRIAL CANCER: ICD-10-CM

## 2021-12-17 LAB
BLD PROD TYP BPU: NORMAL
BLOOD UNIT EXPIRATION DATE: NORMAL
BLOOD UNIT TYPE CODE: 5100
BLOOD UNIT TYPE: NORMAL
CODING SYSTEM: NORMAL
DISPENSE STATUS: NORMAL
NUM UNITS TRANS PACKED RBC: NORMAL

## 2021-12-17 PROCEDURE — 36430 TRANSFUSION BLD/BLD COMPNT: CPT

## 2021-12-17 PROCEDURE — P9011 BLOOD SPLIT UNIT: HCPCS | Performed by: OBSTETRICS & GYNECOLOGY

## 2021-12-17 PROCEDURE — 25000003 PHARM REV CODE 250: Performed by: OBSTETRICS & GYNECOLOGY

## 2021-12-17 PROCEDURE — 86985 SPLIT BLOOD OR PRODUCTS: CPT | Performed by: OBSTETRICS & GYNECOLOGY

## 2021-12-17 RX ORDER — HYDROCODONE BITARTRATE AND ACETAMINOPHEN 500; 5 MG/1; MG/1
TABLET ORAL ONCE
Status: COMPLETED | OUTPATIENT
Start: 2021-12-17 | End: 2021-12-17

## 2021-12-17 RX ADMIN — SODIUM CHLORIDE: 0.9 INJECTION, SOLUTION INTRAVENOUS at 09:12

## 2021-12-28 ENCOUNTER — IMMUNIZATION (OUTPATIENT)
Dept: OBSTETRICS AND GYNECOLOGY | Facility: CLINIC | Age: 61
End: 2021-12-28
Payer: MEDICARE

## 2021-12-28 DIAGNOSIS — Z23 NEED FOR VACCINATION: Primary | ICD-10-CM

## 2021-12-28 PROCEDURE — 0003A COVID-19, MRNA, LNP-S, PF, 30 MCG/0.3 ML DOSE VACCINE: CPT | Mod: PBBFAC

## 2021-12-28 PROCEDURE — 91300 COVID-19, MRNA, LNP-S, PF, 30 MCG/0.3 ML DOSE VACCINE: CPT | Mod: PBBFAC

## 2021-12-31 ENCOUNTER — LAB VISIT (OUTPATIENT)
Dept: PRIMARY CARE CLINIC | Facility: OTHER | Age: 61
End: 2021-12-31
Attending: INTERNAL MEDICINE
Payer: MEDICARE

## 2021-12-31 ENCOUNTER — PATIENT MESSAGE (OUTPATIENT)
Dept: ADMINISTRATIVE | Facility: OTHER | Age: 61
End: 2021-12-31
Payer: MEDICARE

## 2021-12-31 DIAGNOSIS — Z20.822 ENCOUNTER FOR LABORATORY TESTING FOR COVID-19 VIRUS: ICD-10-CM

## 2021-12-31 PROCEDURE — U0003 INFECTIOUS AGENT DETECTION BY NUCLEIC ACID (DNA OR RNA); SEVERE ACUTE RESPIRATORY SYNDROME CORONAVIRUS 2 (SARS-COV-2) (CORONAVIRUS DISEASE [COVID-19]), AMPLIFIED PROBE TECHNIQUE, MAKING USE OF HIGH THROUGHPUT TECHNOLOGIES AS DESCRIBED BY CMS-2020-01-R: HCPCS | Performed by: INTERNAL MEDICINE

## 2022-01-04 ENCOUNTER — PATIENT MESSAGE (OUTPATIENT)
Dept: GYNECOLOGIC ONCOLOGY | Facility: CLINIC | Age: 62
End: 2022-01-04
Payer: MEDICARE

## 2022-01-04 DIAGNOSIS — U07.1 COVID-19 VIRUS DETECTED: ICD-10-CM

## 2022-01-04 LAB
SARS-COV-2 RNA RESP QL NAA+PROBE: DETECTED
SARS-COV-2- CYCLE NUMBER: 12

## 2022-01-07 ENCOUNTER — LAB VISIT (OUTPATIENT)
Dept: LAB | Facility: HOSPITAL | Age: 62
End: 2022-01-07
Attending: OBSTETRICS & GYNECOLOGY
Payer: MEDICARE

## 2022-01-07 ENCOUNTER — LAB VISIT (OUTPATIENT)
Dept: PRIMARY CARE CLINIC | Facility: CLINIC | Age: 62
End: 2022-01-07
Payer: MEDICARE

## 2022-01-07 DIAGNOSIS — C54.1 ENDOMETRIAL CANCER: ICD-10-CM

## 2022-01-07 DIAGNOSIS — Z20.822 CONTACT WITH AND (SUSPECTED) EXPOSURE TO COVID-19: ICD-10-CM

## 2022-01-07 LAB
ALBUMIN SERPL BCP-MCNC: 3.7 G/DL (ref 3.5–5.2)
ALP SERPL-CCNC: 163 U/L (ref 55–135)
ALT SERPL W/O P-5'-P-CCNC: 67 U/L (ref 10–44)
ANION GAP SERPL CALC-SCNC: 11 MMOL/L (ref 8–16)
AST SERPL-CCNC: 59 U/L (ref 10–40)
BILIRUB SERPL-MCNC: 0.4 MG/DL (ref 0.1–1)
BUN SERPL-MCNC: 30 MG/DL (ref 8–23)
CALCIUM SERPL-MCNC: 9.3 MG/DL (ref 8.7–10.5)
CHLORIDE SERPL-SCNC: 106 MMOL/L (ref 95–110)
CO2 SERPL-SCNC: 24 MMOL/L (ref 23–29)
CREAT SERPL-MCNC: 2.7 MG/DL (ref 0.5–1.4)
CTP QC/QA: YES
ERYTHROCYTE [DISTWIDTH] IN BLOOD BY AUTOMATED COUNT: 18.1 % (ref 11.5–14.5)
EST. GFR  (AFRICAN AMERICAN): 21 ML/MIN/1.73 M^2
EST. GFR  (NON AFRICAN AMERICAN): 18 ML/MIN/1.73 M^2
GLUCOSE SERPL-MCNC: 129 MG/DL (ref 70–110)
HCT VFR BLD AUTO: 27.5 % (ref 37–48.5)
HGB BLD-MCNC: 9 G/DL (ref 12–16)
IMM GRANULOCYTES # BLD AUTO: 0.04 K/UL (ref 0–0.04)
MCH RBC QN AUTO: 32.8 PG (ref 27–31)
MCHC RBC AUTO-ENTMCNC: 32.7 G/DL (ref 32–36)
MCV RBC AUTO: 100 FL (ref 82–98)
NEUTROPHILS # BLD AUTO: 3 K/UL (ref 1.8–7.7)
PLATELET # BLD AUTO: 212 K/UL (ref 150–450)
PMV BLD AUTO: 9 FL (ref 9.2–12.9)
POTASSIUM SERPL-SCNC: 4.1 MMOL/L (ref 3.5–5.1)
PROT SERPL-MCNC: 7.9 G/DL (ref 6–8.4)
RBC # BLD AUTO: 2.74 M/UL (ref 4–5.4)
SARS-COV-2 AG RESP QL IA.RAPID: POSITIVE
SODIUM SERPL-SCNC: 141 MMOL/L (ref 136–145)
WBC # BLD AUTO: 4.6 K/UL (ref 3.9–12.7)

## 2022-01-07 PROCEDURE — 80053 COMPREHEN METABOLIC PANEL: CPT | Performed by: STUDENT IN AN ORGANIZED HEALTH CARE EDUCATION/TRAINING PROGRAM

## 2022-01-07 PROCEDURE — 36415 COLL VENOUS BLD VENIPUNCTURE: CPT | Performed by: STUDENT IN AN ORGANIZED HEALTH CARE EDUCATION/TRAINING PROGRAM

## 2022-01-07 PROCEDURE — 87811 SARS-COV-2 COVID19 W/OPTIC: CPT

## 2022-01-07 PROCEDURE — 85027 COMPLETE CBC AUTOMATED: CPT | Performed by: STUDENT IN AN ORGANIZED HEALTH CARE EDUCATION/TRAINING PROGRAM

## 2022-01-14 ENCOUNTER — LAB VISIT (OUTPATIENT)
Dept: LAB | Facility: HOSPITAL | Age: 62
End: 2022-01-14
Attending: STUDENT IN AN ORGANIZED HEALTH CARE EDUCATION/TRAINING PROGRAM
Payer: MEDICARE

## 2022-01-14 DIAGNOSIS — C54.1 ENDOMETRIAL CANCER: ICD-10-CM

## 2022-01-14 LAB
ALBUMIN SERPL BCP-MCNC: 3.7 G/DL (ref 3.5–5.2)
ALP SERPL-CCNC: 134 U/L (ref 55–135)
ALT SERPL W/O P-5'-P-CCNC: 35 U/L (ref 10–44)
ANION GAP SERPL CALC-SCNC: 10 MMOL/L (ref 8–16)
AST SERPL-CCNC: 30 U/L (ref 10–40)
BILIRUB SERPL-MCNC: 0.3 MG/DL (ref 0.1–1)
BUN SERPL-MCNC: 32 MG/DL (ref 8–23)
CALCIUM SERPL-MCNC: 8.9 MG/DL (ref 8.7–10.5)
CHLORIDE SERPL-SCNC: 105 MMOL/L (ref 95–110)
CO2 SERPL-SCNC: 29 MMOL/L (ref 23–29)
CREAT SERPL-MCNC: 2.8 MG/DL (ref 0.5–1.4)
ERYTHROCYTE [DISTWIDTH] IN BLOOD BY AUTOMATED COUNT: 16.9 % (ref 11.5–14.5)
EST. GFR  (AFRICAN AMERICAN): 20 ML/MIN/1.73 M^2
EST. GFR  (NON AFRICAN AMERICAN): 17 ML/MIN/1.73 M^2
GLUCOSE SERPL-MCNC: 149 MG/DL (ref 70–110)
HCT VFR BLD AUTO: 27 % (ref 37–48.5)
HGB BLD-MCNC: 9.1 G/DL (ref 12–16)
IMM GRANULOCYTES # BLD AUTO: 0.03 K/UL (ref 0–0.04)
MCH RBC QN AUTO: 33.1 PG (ref 27–31)
MCHC RBC AUTO-ENTMCNC: 33.7 G/DL (ref 32–36)
MCV RBC AUTO: 98 FL (ref 82–98)
NEUTROPHILS # BLD AUTO: 3.4 K/UL (ref 1.8–7.7)
PLATELET # BLD AUTO: 300 K/UL (ref 150–450)
PMV BLD AUTO: 8.4 FL (ref 9.2–12.9)
POTASSIUM SERPL-SCNC: 3.7 MMOL/L (ref 3.5–5.1)
PROT SERPL-MCNC: 7.5 G/DL (ref 6–8.4)
RBC # BLD AUTO: 2.75 M/UL (ref 4–5.4)
SODIUM SERPL-SCNC: 144 MMOL/L (ref 136–145)
WBC # BLD AUTO: 5.09 K/UL (ref 3.9–12.7)

## 2022-01-14 PROCEDURE — 85027 COMPLETE CBC AUTOMATED: CPT | Performed by: STUDENT IN AN ORGANIZED HEALTH CARE EDUCATION/TRAINING PROGRAM

## 2022-01-14 PROCEDURE — 36415 COLL VENOUS BLD VENIPUNCTURE: CPT | Performed by: STUDENT IN AN ORGANIZED HEALTH CARE EDUCATION/TRAINING PROGRAM

## 2022-01-14 PROCEDURE — 80053 COMPREHEN METABOLIC PANEL: CPT | Performed by: STUDENT IN AN ORGANIZED HEALTH CARE EDUCATION/TRAINING PROGRAM

## 2022-01-18 ENCOUNTER — INFUSION (OUTPATIENT)
Dept: INFUSION THERAPY | Facility: OTHER | Age: 62
End: 2022-01-18
Attending: OBSTETRICS & GYNECOLOGY
Payer: MEDICARE

## 2022-01-18 VITALS
OXYGEN SATURATION: 99 % | HEIGHT: 59 IN | DIASTOLIC BLOOD PRESSURE: 71 MMHG | TEMPERATURE: 98 F | BODY MASS INDEX: 22.05 KG/M2 | SYSTOLIC BLOOD PRESSURE: 147 MMHG | WEIGHT: 109.38 LBS | RESPIRATION RATE: 17 BRPM | HEART RATE: 101 BPM

## 2022-01-18 DIAGNOSIS — C54.1 ENDOMETRIAL CANCER: Primary | ICD-10-CM

## 2022-01-18 PROCEDURE — 96413 CHEMO IV INFUSION 1 HR: CPT

## 2022-01-18 PROCEDURE — 96361 HYDRATE IV INFUSION ADD-ON: CPT

## 2022-01-18 PROCEDURE — 63600175 PHARM REV CODE 636 W HCPCS: Performed by: OBSTETRICS & GYNECOLOGY

## 2022-01-18 PROCEDURE — 96367 TX/PROPH/DG ADDL SEQ IV INF: CPT

## 2022-01-18 PROCEDURE — 96375 TX/PRO/DX INJ NEW DRUG ADDON: CPT

## 2022-01-18 PROCEDURE — 25000003 PHARM REV CODE 250: Performed by: OBSTETRICS & GYNECOLOGY

## 2022-01-18 RX ORDER — DIPHENHYDRAMINE HYDROCHLORIDE 50 MG/ML
50 INJECTION INTRAMUSCULAR; INTRAVENOUS ONCE AS NEEDED
Status: CANCELLED | OUTPATIENT
Start: 2022-01-18

## 2022-01-18 RX ORDER — HEPARIN 100 UNIT/ML
500 SYRINGE INTRAVENOUS
Status: CANCELLED | OUTPATIENT
Start: 2022-01-18

## 2022-01-18 RX ORDER — FAMOTIDINE 10 MG/ML
20 INJECTION INTRAVENOUS
Status: CANCELLED | OUTPATIENT
Start: 2022-01-18

## 2022-01-18 RX ORDER — SODIUM CHLORIDE 0.9 % (FLUSH) 0.9 %
10 SYRINGE (ML) INJECTION
Status: DISCONTINUED | OUTPATIENT
Start: 2022-01-18 | End: 2022-01-18 | Stop reason: HOSPADM

## 2022-01-18 RX ORDER — EPINEPHRINE 0.3 MG/.3ML
0.3 INJECTION SUBCUTANEOUS ONCE AS NEEDED
Status: CANCELLED | OUTPATIENT
Start: 2022-01-18

## 2022-01-18 RX ORDER — DIPHENHYDRAMINE HYDROCHLORIDE 50 MG/ML
50 INJECTION INTRAMUSCULAR; INTRAVENOUS ONCE AS NEEDED
Status: DISCONTINUED | OUTPATIENT
Start: 2022-01-18 | End: 2022-01-18 | Stop reason: HOSPADM

## 2022-01-18 RX ORDER — SODIUM CHLORIDE 0.9 % (FLUSH) 0.9 %
10 SYRINGE (ML) INJECTION
Status: CANCELLED | OUTPATIENT
Start: 2022-01-18

## 2022-01-18 RX ORDER — HEPARIN 100 UNIT/ML
500 SYRINGE INTRAVENOUS
Status: DISCONTINUED | OUTPATIENT
Start: 2022-01-18 | End: 2022-01-18 | Stop reason: HOSPADM

## 2022-01-18 RX ORDER — FAMOTIDINE 10 MG/ML
20 INJECTION INTRAVENOUS
Status: COMPLETED | OUTPATIENT
Start: 2022-01-18 | End: 2022-01-18

## 2022-01-18 RX ORDER — EPINEPHRINE 0.3 MG/.3ML
0.3 INJECTION SUBCUTANEOUS ONCE AS NEEDED
Status: DISCONTINUED | OUTPATIENT
Start: 2022-01-18 | End: 2022-01-18 | Stop reason: HOSPADM

## 2022-01-18 RX ADMIN — SODIUM CHLORIDE 500 ML: 0.9 INJECTION, SOLUTION INTRAVENOUS at 11:01

## 2022-01-18 RX ADMIN — PALONOSETRON HYDROCHLORIDE 0.25 MG: 0.25 INJECTION, SOLUTION INTRAVENOUS at 09:01

## 2022-01-18 RX ADMIN — APREPITANT 130 MG: 130 INJECTION, EMULSION INTRAVENOUS at 09:01

## 2022-01-18 RX ADMIN — SODIUM CHLORIDE 205 MG: 9 INJECTION, SOLUTION INTRAVENOUS at 10:01

## 2022-01-18 RX ADMIN — FAMOTIDINE 20 MG: 10 INJECTION INTRAVENOUS at 09:01

## 2022-01-18 RX ADMIN — DIPHENHYDRAMINE HYDROCHLORIDE 50 MG: 50 INJECTION, SOLUTION INTRAMUSCULAR; INTRAVENOUS at 09:01

## 2022-01-18 NOTE — PLAN OF CARE
Vital Signs Stable, No apparent distress noted; Pt tolerated __Carbo___ infusion w/o difficulty.  24g piv removed;No bleeding,swelling or drainage noted to the site;coban and gauze applied  Discharge instructions given;Pt verbalizes understanding. Next appointments scheduled  Discharged home per son

## 2022-01-20 ENCOUNTER — PATIENT MESSAGE (OUTPATIENT)
Dept: GYNECOLOGIC ONCOLOGY | Facility: CLINIC | Age: 62
End: 2022-01-20
Payer: MEDICARE

## 2022-01-20 DIAGNOSIS — C54.1 ENDOMETRIAL CANCER: Primary | ICD-10-CM

## 2022-01-28 ENCOUNTER — HOSPITAL ENCOUNTER (OUTPATIENT)
Dept: RADIOLOGY | Facility: HOSPITAL | Age: 62
Discharge: HOME | End: 2022-01-28
Attending: OBSTETRICS & GYNECOLOGY
Payer: MEDICARE

## 2022-01-28 DIAGNOSIS — C54.1 ENDOMETRIAL CANCER: ICD-10-CM

## 2022-01-28 PROCEDURE — 71250 CT THORAX DX C-: CPT | Mod: 26,,, | Performed by: RADIOLOGY

## 2022-01-28 PROCEDURE — 74176 CT CHEST ABDOMEN PELVIS WITHOUT CONTRAST(XPD): ICD-10-PCS | Mod: 26,,, | Performed by: RADIOLOGY

## 2022-01-28 PROCEDURE — 74176 CT ABD & PELVIS W/O CONTRAST: CPT | Mod: TC

## 2022-01-28 PROCEDURE — 74176 CT ABD & PELVIS W/O CONTRAST: CPT | Mod: 26,,, | Performed by: RADIOLOGY

## 2022-01-28 PROCEDURE — 71250 CT CHEST ABDOMEN PELVIS WITHOUT CONTRAST(XPD): ICD-10-PCS | Mod: 26,,, | Performed by: RADIOLOGY

## 2022-02-04 ENCOUNTER — LAB VISIT (OUTPATIENT)
Dept: LAB | Facility: HOSPITAL | Age: 62
End: 2022-02-04
Attending: STUDENT IN AN ORGANIZED HEALTH CARE EDUCATION/TRAINING PROGRAM
Payer: MEDICARE

## 2022-02-04 DIAGNOSIS — C54.1 ENDOMETRIAL CANCER: ICD-10-CM

## 2022-02-04 LAB
ALBUMIN SERPL BCP-MCNC: 3.6 G/DL (ref 3.5–5.2)
ALP SERPL-CCNC: 141 U/L (ref 55–135)
ALT SERPL W/O P-5'-P-CCNC: 123 U/L (ref 10–44)
ANION GAP SERPL CALC-SCNC: 8 MMOL/L (ref 8–16)
AST SERPL-CCNC: 95 U/L (ref 10–40)
BILIRUB SERPL-MCNC: 0.3 MG/DL (ref 0.1–1)
BUN SERPL-MCNC: 38 MG/DL (ref 8–23)
CALCIUM SERPL-MCNC: 9.2 MG/DL (ref 8.7–10.5)
CHLORIDE SERPL-SCNC: 105 MMOL/L (ref 95–110)
CO2 SERPL-SCNC: 27 MMOL/L (ref 23–29)
CREAT SERPL-MCNC: 2.9 MG/DL (ref 0.5–1.4)
ERYTHROCYTE [DISTWIDTH] IN BLOOD BY AUTOMATED COUNT: 15.5 % (ref 11.5–14.5)
EST. GFR  (AFRICAN AMERICAN): 19 ML/MIN/1.73 M^2
EST. GFR  (NON AFRICAN AMERICAN): 17 ML/MIN/1.73 M^2
GLUCOSE SERPL-MCNC: 212 MG/DL (ref 70–110)
HCT VFR BLD AUTO: 25.4 % (ref 37–48.5)
HGB BLD-MCNC: 8.3 G/DL (ref 12–16)
IMM GRANULOCYTES # BLD AUTO: 0.03 K/UL (ref 0–0.04)
MCH RBC QN AUTO: 33.9 PG (ref 27–31)
MCHC RBC AUTO-ENTMCNC: 32.7 G/DL (ref 32–36)
MCV RBC AUTO: 104 FL (ref 82–98)
NEUTROPHILS # BLD AUTO: 3.1 K/UL (ref 1.8–7.7)
PLATELET # BLD AUTO: 109 K/UL (ref 150–450)
PMV BLD AUTO: 9.3 FL (ref 9.2–12.9)
POTASSIUM SERPL-SCNC: 4.4 MMOL/L (ref 3.5–5.1)
PROT SERPL-MCNC: 7.2 G/DL (ref 6–8.4)
RBC # BLD AUTO: 2.45 M/UL (ref 4–5.4)
SODIUM SERPL-SCNC: 140 MMOL/L (ref 136–145)
WBC # BLD AUTO: 4.65 K/UL (ref 3.9–12.7)

## 2022-02-04 PROCEDURE — 36415 COLL VENOUS BLD VENIPUNCTURE: CPT | Performed by: STUDENT IN AN ORGANIZED HEALTH CARE EDUCATION/TRAINING PROGRAM

## 2022-02-04 PROCEDURE — 85027 COMPLETE CBC AUTOMATED: CPT | Performed by: STUDENT IN AN ORGANIZED HEALTH CARE EDUCATION/TRAINING PROGRAM

## 2022-02-04 PROCEDURE — 80053 COMPREHEN METABOLIC PANEL: CPT | Performed by: STUDENT IN AN ORGANIZED HEALTH CARE EDUCATION/TRAINING PROGRAM

## 2022-02-07 ENCOUNTER — OFFICE VISIT (OUTPATIENT)
Dept: GYNECOLOGIC ONCOLOGY | Facility: CLINIC | Age: 62
End: 2022-02-07
Payer: MEDICARE

## 2022-02-07 VITALS
DIASTOLIC BLOOD PRESSURE: 65 MMHG | HEART RATE: 92 BPM | SYSTOLIC BLOOD PRESSURE: 139 MMHG | BODY MASS INDEX: 21.81 KG/M2 | WEIGHT: 108 LBS

## 2022-02-07 DIAGNOSIS — Z51.11 ENCOUNTER FOR ANTINEOPLASTIC CHEMOTHERAPY: ICD-10-CM

## 2022-02-07 DIAGNOSIS — C54.1 ENDOMETRIAL CANCER: Primary | ICD-10-CM

## 2022-02-07 DIAGNOSIS — R18.0 MALIGNANT ASCITES: ICD-10-CM

## 2022-02-07 PROCEDURE — 99215 OFFICE O/P EST HI 40 MIN: CPT | Mod: S$PBB,,, | Performed by: OBSTETRICS & GYNECOLOGY

## 2022-02-07 PROCEDURE — 99213 OFFICE O/P EST LOW 20 MIN: CPT | Mod: PBBFAC | Performed by: OBSTETRICS & GYNECOLOGY

## 2022-02-07 PROCEDURE — 99999 PR PBB SHADOW E&M-EST. PATIENT-LVL III: CPT | Mod: PBBFAC,,, | Performed by: OBSTETRICS & GYNECOLOGY

## 2022-02-07 PROCEDURE — 99999 PR PBB SHADOW E&M-EST. PATIENT-LVL III: ICD-10-PCS | Mod: PBBFAC,,, | Performed by: OBSTETRICS & GYNECOLOGY

## 2022-02-07 PROCEDURE — 99215 PR OFFICE/OUTPT VISIT, EST, LEVL V, 40-54 MIN: ICD-10-PCS | Mod: S$PBB,,, | Performed by: OBSTETRICS & GYNECOLOGY

## 2022-02-07 NOTE — PROGRESS NOTES
Subjective:      Patient ID: Anjali Dorantes is a 61 y.o. female.    Chief Complaint: Follow-up    Treatment History  Stage IV high-grade endometrial cancer  IR paracentesis with cytology supporting above  Carbo monotherapy started 9/13/2021    Follow-up  Associated symptoms include fatigue. Pertinent negatives include no abdominal pain, arthralgias, chest pain, chills, coughing, fever, nausea, numbness, rash, sore throat, vomiting or weakness.     Here today for C6 of treatment.   Had recent CT demonstrating response, especially outside of the pelvis.  Renal and hepatic function have taken a little bit of a hit with recent treatments.  Needs an exam today to determine surgery suitability.   Review of Systems   Constitutional: Positive for fatigue. Negative for activity change, appetite change, chills and fever.   HENT: Negative for hearing loss, mouth sores, nosebleeds, sore throat and tinnitus.    Eyes: Negative for visual disturbance.   Respiratory: Negative for cough, chest tightness, shortness of breath and wheezing.    Cardiovascular: Negative for chest pain and leg swelling.   Gastrointestinal: Positive for abdominal distention. Negative for abdominal pain, blood in stool, constipation, diarrhea, nausea and vomiting.   Genitourinary: Negative for dysuria, flank pain, frequency, hematuria, pelvic pain, vaginal bleeding, vaginal discharge and vaginal pain.   Musculoskeletal: Negative for arthralgias and back pain.   Skin: Negative for rash.   Neurological: Negative for dizziness, seizures, syncope, weakness and numbness.   Hematological: Does not bruise/bleed easily.   Psychiatric/Behavioral: Negative for confusion and sleep disturbance. The patient is not nervous/anxious.        Objective:   Physical Exam:   Constitutional: She is oriented to person, place, and time. She appears well-developed and well-nourished. No distress.    HENT:   Head: Normocephalic and atraumatic.    Eyes: No scleral icterus.      Cardiovascular: Normal rate and intact distal pulses.  Exam reveals no cyanosis and no edema.     Pulmonary/Chest: Effort normal. No respiratory distress. She exhibits no tenderness.        Abdominal: Soft. She exhibits no distension, no fluid wave and no mass. There is no abdominal tenderness. There is no rigidity, no rebound and no guarding. No hernia.             Musculoskeletal: Normal range of motion and moves all extremeties. No edema.      Lymphadenopathy:     She has no cervical adenopathy.    Neurological: She is alert and oriented to person, place, and time.    Skin: Skin is warm. No rash noted. No cyanosis or erythema.    Psychiatric: She has a normal mood and affect. Thought content normal.       Assessment:     1. Endometrial cancer    2. Encounter for antineoplastic chemotherapy    3. Malignant ascites        Plan:       Will hold off on C6 as has become resectable.  Needs to see renal for optimization prior to surgery.  Plan Xlap/CIRILO/BSO/OIP in next 5-6 weeks.  The risks, benefits, and indications of the procedure were discussed with the patient and her grandson.  These included bleeding, infection, damage to surrounding tissues, and possible death.  She voiced understanding, all questions were answered and consents were signed.

## 2022-02-21 ENCOUNTER — PATIENT MESSAGE (OUTPATIENT)
Dept: GYNECOLOGIC ONCOLOGY | Facility: CLINIC | Age: 62
End: 2022-02-21
Payer: MEDICARE

## 2022-03-10 ENCOUNTER — PATIENT MESSAGE (OUTPATIENT)
Dept: GYNECOLOGIC ONCOLOGY | Facility: CLINIC | Age: 62
End: 2022-03-10
Payer: MEDICARE

## 2022-03-15 ENCOUNTER — TELEPHONE (OUTPATIENT)
Dept: GYNECOLOGIC ONCOLOGY | Facility: CLINIC | Age: 62
End: 2022-03-15
Payer: MEDICARE

## 2022-03-15 DIAGNOSIS — C54.1 ENDOMETRIAL CANCER: Primary | ICD-10-CM

## 2022-03-17 ENCOUNTER — PATIENT MESSAGE (OUTPATIENT)
Dept: SURGERY | Facility: HOSPITAL | Age: 62
End: 2022-03-17
Payer: MEDICARE

## 2022-03-18 ENCOUNTER — TELEPHONE (OUTPATIENT)
Dept: PREADMISSION TESTING | Facility: HOSPITAL | Age: 62
End: 2022-03-18
Payer: MEDICARE

## 2022-03-18 NOTE — TELEPHONE ENCOUNTER
----- Message from Marleny Garcia RN sent at 3/17/2022 11:34 AM CDT -----  Surgery date: 3/30/2022  PreOp appt: N/A    Please call Pt and schedule the following preop appts:    POC  Lab  EKG    Thank you!  Marleny

## 2022-03-23 ENCOUNTER — ANESTHESIA EVENT (OUTPATIENT)
Dept: SURGERY | Facility: HOSPITAL | Age: 62
DRG: 737 | End: 2022-03-23
Payer: MEDICARE

## 2022-03-23 ENCOUNTER — HOSPITAL ENCOUNTER (OUTPATIENT)
Dept: PREADMISSION TESTING | Facility: HOSPITAL | Age: 62
Discharge: HOME | End: 2022-03-23
Attending: OBSTETRICS & GYNECOLOGY
Payer: MEDICARE

## 2022-03-23 ENCOUNTER — HOSPITAL ENCOUNTER (OUTPATIENT)
Dept: CARDIOLOGY | Facility: CLINIC | Age: 62
Discharge: HOME | End: 2022-03-23
Payer: MEDICARE

## 2022-03-23 VITALS
HEIGHT: 60 IN | OXYGEN SATURATION: 100 % | HEART RATE: 99 BPM | SYSTOLIC BLOOD PRESSURE: 137 MMHG | BODY MASS INDEX: 22.78 KG/M2 | TEMPERATURE: 98 F | WEIGHT: 116 LBS | RESPIRATION RATE: 18 BRPM | DIASTOLIC BLOOD PRESSURE: 71 MMHG

## 2022-03-23 DIAGNOSIS — Z01.818 PREOPERATIVE TESTING: ICD-10-CM

## 2022-03-23 PROCEDURE — 93005 ELECTROCARDIOGRAM TRACING: CPT | Mod: PBBFAC | Performed by: INTERNAL MEDICINE

## 2022-03-23 PROCEDURE — 86920 COMPATIBILITY TEST SPIN: CPT | Performed by: OBSTETRICS & GYNECOLOGY

## 2022-03-23 PROCEDURE — 93010 ELECTROCARDIOGRAM REPORT: CPT | Mod: S$PBB,,, | Performed by: INTERNAL MEDICINE

## 2022-03-23 PROCEDURE — 93010 EKG 12-LEAD: ICD-10-PCS | Mod: S$PBB,,, | Performed by: INTERNAL MEDICINE

## 2022-03-23 PROCEDURE — 86920 COMPATIBILITY TEST SPIN: CPT | Performed by: STUDENT IN AN ORGANIZED HEALTH CARE EDUCATION/TRAINING PROGRAM

## 2022-03-23 NOTE — PRE-PROCEDURE INSTRUCTIONS
Spoke to patient's son, Willie, regarding Plavix hold instructions per PCP Dr. Tasia Carrasco.  Instructed to hold Plavix one week prior to surgery.  Verbalized understanding.

## 2022-03-23 NOTE — DISCHARGE INSTRUCTIONS
Your surgery has been scheduled for:_________3/30/2022_________________________________    You should report to:  ____[facility], located on the [place] side of the first floor of the           [facility] ([phone])  __X__[facility], located on the [facility] side of the            Second floor of the [facility] ([phone])  ____3rd Floor [facility] located on the [facility] side of the [facility] [phone]  ____[facility]: located at [address] [facility] [place] [address]. [address].     Please Note   Tell your doctor if you take Aspirin, products containing Aspirin, herbal medications  or blood thinners, such as Coumadin, Ticlid, or Plavix.  (Consult your provider regarding holding or stopping before surgery).  Arrange for someone to drive you home following surgery.  You will not be allowed to leave the surgical facility alone or drive yourself home following sedation and anesthesia.    Before Surgery  Stop taking all herbal medications, vitamins, and supplements 7 days prior to surgery  No Motrin/Advil (Ibuprofen) 7 days before surgery  No Aleve (Naproxen) 7 days before surgery  Stop Taking Asprin, products containing Asprin _N/A____days before surgery  Stop taking blood thinners___PER PCP____days before surgery  No Goody's/BC  Powder 7 days before surgery  Refrain from drinking alcoholic beverages for 24hours before and after surgery  Stop or limit smoking ___7______days before surgery  You may take Tylenol for pain    Night before Surgery  Do not eat or drink after midnight  Take a shower or bath (shower is recommended).  Bathe with Hibiclens soap or an antibacterial soap from the neck down.  If not supplied by your surgeon, hibiclens soap will need to be purchased over the counter in pharmacy.  Rinse soap off thoroughly.  Shampoo your hair with your regular shampoo    The  Day of Surgery  Take another bath or shower with hibiclens or any antibacterial soap, to reduce the chance of infection.  Take heart and blood pressure medications with a small sip of water, as advised by the perioperative team.  Do not take fluid pills  You may brush your teeth and rinse your mouth, but do not swall any additional water.   Do not apply perfumes, powder, body lotions or deodorant on the day of surgery.  Nail polish should be removed.  Do not wear makeup or moisturizer  Wear comfortable clothes, such as a button front shirt and loose fitting pants.  Leave all jewelry, including body piercings, and valuables at home.    Bring any devices you will neeed after surgery such as crutches or canes.  If you have sleep apnea, please bring your CPAP machine  In the event that your physical condition changes including the onset of a cold or respiratory illness, or if you have to delay or cancel your surgery, please notify your surgeon.       Anesthesia: General Anesthesia     You are watched continuously during your procedure by your anesthesia provider.     Youre due to have surgery. During surgery, youll be given medicine called anesthesia or anesthetic. This will keep you comfortable and pain-free. Your anesthesia provider will use general anesthesia.  What is general anesthesia?  General anesthesia puts you into a state like deep sleep. It goes into the bloodstream (IV anesthetics), into the lungs (gas anesthetics), or both. You feel nothing during the procedure. You will not remember it. During the procedure, the anesthesia provider monitors you continuously. He or she checks your heart rate and rhythm, blood pressure, breathing, and blood oxygen.  IV anesthetics. IV anesthetics are given through an IV line in your arm. Theyre often given first. This is so you are asleep before a gas anesthetic is started. Some kinds of IV anesthetics relieve pain. Others relax you. Your doctor will decide which kind is  best in your case.  Gas anesthetics. Gas anesthetics are breathed into the lungs. They are often used to keep you asleep. They can be given through a facemask or a tube placed in your larynx or trachea (breathing tube).  If you have a facemask, your anesthesia provider will most likely place it over your nose and mouth while youre still awake. Youll breathe oxygen through the mask as your IV anesthetic is started. Gas anesthetic may be added through the mask.  If you have a tube in the larynx or trachea, it will be inserted into your throat after youre asleep.  Anesthesia tools and medicines  You will likely have:  IV anesthetics. These are put into an IV line into your bloodstream.  Gas anesthetics. You breathe these anesthetics into your lungs, where they pass into your bloodstream.  Pulse oximeter. This is a small clip that is attached to the end of your finger. This measures your blood oxygen level.  Electrocardiography leads (electrodes). These are small sticky pads that are placed on your chest. They record your heart rate and rhythm.  Blood pressure cuff. This reads your blood pressure.  Risks and possible complications  General anesthesia has some risks. These include:  Breathing problems  Nausea and vomiting  Sore throat or hoarseness (usually temporary)  Allergic reaction to the anesthetic  Irregular heartbeat (rare)  Cardiac arrest (rare)   Anesthesia safety  Follow all instructions you are given for how long not to eat or drink before your procedure.  Be sure your doctor knows what medicines and drugs you take. This includes over-the-counter medicines, herbs, supplements, alcohol or other drugs. You will be asked when those were last taken.  Have an adult family member or friend drive you home after the procedure.  For the first 24 hours after your surgery:  Do not drive or use heavy equipment.  Do not make important decisions or sign legal documents. If important decisions or signing legal documents  is necessary during the first 24 hours after surgery, have a trusted family member or spouse act on your behalf.  Avoid alcohol.  Have a responsible adult stay with you. He or she can watch for problems and help keep you safe.  Date Last Reviewed: 12/1/2016  © 8383-2412 [employer]. [address] [place] [address], [place] [address]. All rights reserved. This information is not intended as a substitute for professional medical care. Always follow your healthcare professional's instructions.

## 2022-03-29 ENCOUNTER — PATIENT MESSAGE (OUTPATIENT)
Dept: GYNECOLOGIC ONCOLOGY | Facility: CLINIC | Age: 62
End: 2022-03-29
Payer: MEDICARE

## 2022-03-29 ENCOUNTER — TELEPHONE (OUTPATIENT)
Dept: GYNECOLOGIC ONCOLOGY | Facility: CLINIC | Age: 62
End: 2022-03-29
Payer: MEDICARE

## 2022-03-30 ENCOUNTER — HOSPITAL ENCOUNTER (INPATIENT)
Facility: HOSPITAL | Age: 62
LOS: 2 days | Discharge: HOME | DRG: 737 | End: 2022-04-01
Attending: OBSTETRICS & GYNECOLOGY | Admitting: OBSTETRICS & GYNECOLOGY
Payer: MEDICARE

## 2022-03-30 ENCOUNTER — ANESTHESIA (OUTPATIENT)
Dept: SURGERY | Facility: HOSPITAL | Age: 62
DRG: 737 | End: 2022-03-30
Payer: MEDICARE

## 2022-03-30 DIAGNOSIS — Z90.710 S/P TAH (TOTAL ABDOMINAL HYSTERECTOMY): ICD-10-CM

## 2022-03-30 DIAGNOSIS — C54.1 ENDOMETRIAL CANCER: ICD-10-CM

## 2022-03-30 DIAGNOSIS — E11.65 TYPE 2 DIABETES MELLITUS WITH HYPERGLYCEMIA, WITH LONG-TERM CURRENT USE OF INSULIN: ICD-10-CM

## 2022-03-30 DIAGNOSIS — Z79.4 TYPE 2 DIABETES MELLITUS WITH HYPERGLYCEMIA, WITH LONG-TERM CURRENT USE OF INSULIN: ICD-10-CM

## 2022-03-30 DIAGNOSIS — Z01.818 PREOPERATIVE TESTING: Primary | ICD-10-CM

## 2022-03-30 DIAGNOSIS — E87.5 HYPERKALEMIA: ICD-10-CM

## 2022-03-30 LAB
ALLENS TEST: ABNORMAL
ANION GAP SERPL CALC-SCNC: 13 MMOL/L (ref 8–16)
ANISOCYTOSIS BLD QL SMEAR: SLIGHT
APTT BLDCRRT: 23.8 SEC (ref 21–32)
APTT BLDCRRT: 24.6 SEC (ref 21–32)
APTT BLDCRRT: 28.4 SEC (ref 21–32)
BASOPHILS # BLD AUTO: 0.03 K/UL (ref 0–0.2)
BASOPHILS # BLD AUTO: 0.03 K/UL (ref 0–0.2)
BASOPHILS # BLD AUTO: 0.04 K/UL (ref 0–0.2)
BASOPHILS NFR BLD: 0.2 % (ref 0–1.9)
BASOPHILS NFR BLD: 0.3 % (ref 0–1.9)
BASOPHILS NFR BLD: 0.4 % (ref 0–1.9)
BLD PROD TYP BPU: NORMAL
BLD PROD TYP BPU: NORMAL
BLOOD UNIT EXPIRATION DATE: NORMAL
BLOOD UNIT EXPIRATION DATE: NORMAL
BLOOD UNIT TYPE CODE: 6200
BLOOD UNIT TYPE CODE: 6200
BLOOD UNIT TYPE: NORMAL
BLOOD UNIT TYPE: NORMAL
BUN SERPL-MCNC: 37 MG/DL (ref 8–23)
BURR CELLS BLD QL SMEAR: ABNORMAL
CALCIUM SERPL-MCNC: 8.4 MG/DL (ref 8.7–10.5)
CHLORIDE SERPL-SCNC: 108 MMOL/L (ref 95–110)
CO2 SERPL-SCNC: 15 MMOL/L (ref 23–29)
CODING SYSTEM: NORMAL
CODING SYSTEM: NORMAL
CREAT SERPL-MCNC: 2.1 MG/DL (ref 0.5–1.4)
DELSYS: ABNORMAL
DIFFERENTIAL METHOD: ABNORMAL
DISPENSE STATUS: NORMAL
DISPENSE STATUS: NORMAL
EOSINOPHIL # BLD AUTO: 0 K/UL (ref 0–0.5)
EOSINOPHIL # BLD AUTO: 0.1 K/UL (ref 0–0.5)
EOSINOPHIL # BLD AUTO: 0.3 K/UL (ref 0–0.5)
EOSINOPHIL NFR BLD: 0.2 % (ref 0–8)
EOSINOPHIL NFR BLD: 0.8 % (ref 0–8)
EOSINOPHIL NFR BLD: 4.1 % (ref 0–8)
ERYTHROCYTE [DISTWIDTH] IN BLOOD BY AUTOMATED COUNT: 12.7 % (ref 11.5–14.5)
ERYTHROCYTE [DISTWIDTH] IN BLOOD BY AUTOMATED COUNT: 19.4 % (ref 11.5–14.5)
ERYTHROCYTE [DISTWIDTH] IN BLOOD BY AUTOMATED COUNT: 20.1 % (ref 11.5–14.5)
ERYTHROCYTE [SEDIMENTATION RATE] IN BLOOD BY WESTERGREN METHOD: 10 MM/H
EST. GFR  (AFRICAN AMERICAN): 28.3 ML/MIN/1.73 M^2
EST. GFR  (NON AFRICAN AMERICAN): 24.5 ML/MIN/1.73 M^2
FIBRINOGEN PPP-MCNC: 179 MG/DL (ref 182–400)
FIBRINOGEN PPP-MCNC: 231 MG/DL (ref 182–400)
FIBRINOGEN PPP-MCNC: 245 MG/DL (ref 182–400)
FIO2: 21
GLUCOSE SERPL-MCNC: 263 MG/DL (ref 70–110)
GLUCOSE SERPL-MCNC: 276 MG/DL (ref 70–110)
GLUCOSE SERPL-MCNC: 322 MG/DL (ref 70–110)
GLUCOSE SERPL-MCNC: 425 MG/DL (ref 70–110)
HCO3 UR-SCNC: 17.6 MMOL/L (ref 24–28)
HCO3 UR-SCNC: 17.8 MMOL/L (ref 24–28)
HCO3 UR-SCNC: 19.7 MMOL/L (ref 24–28)
HCO3 UR-SCNC: 20.1 MMOL/L (ref 24–28)
HCT VFR BLD AUTO: 29.9 % (ref 37–48.5)
HCT VFR BLD AUTO: 31 % (ref 37–48.5)
HCT VFR BLD AUTO: 35 % (ref 37–48.5)
HCT VFR BLD CALC: 18 %PCV (ref 36–54)
HCT VFR BLD CALC: 25 %PCV (ref 36–54)
HCT VFR BLD CALC: 30 %PCV (ref 36–54)
HGB BLD-MCNC: 10.3 G/DL (ref 12–16)
HGB BLD-MCNC: 10.3 G/DL (ref 12–16)
HGB BLD-MCNC: 11.4 G/DL (ref 12–16)
HYPOCHROMIA BLD QL SMEAR: ABNORMAL
IMM GRANULOCYTES # BLD AUTO: 0.02 K/UL (ref 0–0.04)
IMM GRANULOCYTES # BLD AUTO: 0.12 K/UL (ref 0–0.04)
IMM GRANULOCYTES # BLD AUTO: 0.17 K/UL (ref 0–0.04)
IMM GRANULOCYTES NFR BLD AUTO: 0.3 % (ref 0–0.5)
IMM GRANULOCYTES NFR BLD AUTO: 0.9 % (ref 0–0.5)
IMM GRANULOCYTES NFR BLD AUTO: 1 % (ref 0–0.5)
INR PPP: 1.1 (ref 0.8–1.2)
INR PPP: 1.1 (ref 0.8–1.2)
INR PPP: 1.2 (ref 0.8–1.2)
LDH SERPL L TO P-CCNC: 2.82 MMOL/L (ref 0.36–1.25)
LYMPHOCYTES # BLD AUTO: 1.2 K/UL (ref 1–4.8)
LYMPHOCYTES # BLD AUTO: 1.2 K/UL (ref 1–4.8)
LYMPHOCYTES # BLD AUTO: 1.5 K/UL (ref 1–4.8)
LYMPHOCYTES NFR BLD: 11.1 % (ref 18–48)
LYMPHOCYTES NFR BLD: 18 % (ref 18–48)
LYMPHOCYTES NFR BLD: 6.9 % (ref 18–48)
MCH RBC QN AUTO: 28.5 PG (ref 27–31)
MCH RBC QN AUTO: 28.9 PG (ref 27–31)
MCH RBC QN AUTO: 33.1 PG (ref 27–31)
MCHC RBC AUTO-ENTMCNC: 32.6 G/DL (ref 32–36)
MCHC RBC AUTO-ENTMCNC: 33.2 G/DL (ref 32–36)
MCHC RBC AUTO-ENTMCNC: 34.4 G/DL (ref 32–36)
MCV RBC AUTO: 100 FL (ref 82–98)
MCV RBC AUTO: 83 FL (ref 82–98)
MCV RBC AUTO: 89 FL (ref 82–98)
MODE: ABNORMAL
MONOCYTES # BLD AUTO: 0.6 K/UL (ref 0.3–1)
MONOCYTES # BLD AUTO: 0.8 K/UL (ref 0.3–1)
MONOCYTES # BLD AUTO: 1.4 K/UL (ref 0.3–1)
MONOCYTES NFR BLD: 5.7 % (ref 4–15)
MONOCYTES NFR BLD: 8.1 % (ref 4–15)
MONOCYTES NFR BLD: 9.2 % (ref 4–15)
NEUTROPHILS # BLD AUTO: 10.6 K/UL (ref 1.8–7.7)
NEUTROPHILS # BLD AUTO: 14 K/UL (ref 1.8–7.7)
NEUTROPHILS # BLD AUTO: 4.7 K/UL (ref 1.8–7.7)
NEUTROPHILS NFR BLD: 68 % (ref 38–73)
NEUTROPHILS NFR BLD: 81.2 % (ref 38–73)
NEUTROPHILS NFR BLD: 83.6 % (ref 38–73)
NRBC BLD-RTO: 0 /100 WBC
NUM UNITS TRANS PACKED RBC: NORMAL
NUM UNITS TRANS PACKED RBC: NORMAL
OVALOCYTES BLD QL SMEAR: ABNORMAL
PCO2 BLDA: 39.3 MMHG (ref 35–45)
PCO2 BLDA: 40.3 MMHG (ref 35–45)
PCO2 BLDA: 40.7 MMHG (ref 35–45)
PCO2 BLDA: 44.2 MMHG (ref 35–45)
PH SMN: 7.25 [PH] (ref 7.35–7.45)
PH SMN: 7.25 [PH] (ref 7.35–7.45)
PH SMN: 7.27 [PH] (ref 7.35–7.45)
PH SMN: 7.31 [PH] (ref 7.35–7.45)
PLATELET # BLD AUTO: 108 K/UL (ref 150–450)
PLATELET # BLD AUTO: 110 K/UL (ref 150–450)
PLATELET # BLD AUTO: 178 K/UL (ref 150–450)
PLATELET BLD QL SMEAR: ABNORMAL
PMV BLD AUTO: 8.9 FL (ref 9.2–12.9)
PMV BLD AUTO: 9.2 FL (ref 9.2–12.9)
PMV BLD AUTO: 9.6 FL (ref 9.2–12.9)
PO2 BLDA: 199 MMHG (ref 80–100)
PO2 BLDA: 307 MMHG (ref 80–100)
PO2 BLDA: 311 MMHG (ref 80–100)
PO2 BLDA: 93 MMHG (ref 80–100)
POC BE: -10 MMOL/L
POC BE: -7 MMOL/L
POC BE: -7 MMOL/L
POC BE: -9 MMOL/L
POC IONIZED CALCIUM: 0.86 MMOL/L (ref 1.06–1.42)
POC IONIZED CALCIUM: 1.2 MMOL/L (ref 1.06–1.42)
POC IONIZED CALCIUM: 1.25 MMOL/L (ref 1.06–1.42)
POC SATURATED O2: 100 % (ref 95–100)
POC SATURATED O2: 96 % (ref 95–100)
POC TCO2: 19 MMOL/L (ref 23–27)
POC TCO2: 19 MMOL/L (ref 23–27)
POC TCO2: 21 MMOL/L (ref 23–27)
POC TCO2: 21 MMOL/L (ref 23–27)
POCT GLUCOSE: 218 MG/DL (ref 70–110)
POCT GLUCOSE: 222 MG/DL (ref 70–110)
POCT GLUCOSE: 222 MG/DL (ref 70–110)
POCT GLUCOSE: 223 MG/DL (ref 70–110)
POCT GLUCOSE: 228 MG/DL (ref 70–110)
POCT GLUCOSE: 232 MG/DL (ref 70–110)
POCT GLUCOSE: 245 MG/DL (ref 70–110)
POCT GLUCOSE: 249 MG/DL (ref 70–110)
POCT GLUCOSE: 316 MG/DL (ref 70–110)
POCT GLUCOSE: 365 MG/DL (ref 70–110)
POCT GLUCOSE: 400 MG/DL (ref 70–110)
POCT GLUCOSE: 413 MG/DL (ref 70–110)
POCT GLUCOSE: 485 MG/DL (ref 70–110)
POIKILOCYTOSIS BLD QL SMEAR: SLIGHT
POLYCHROMASIA BLD QL SMEAR: ABNORMAL
POTASSIUM BLD-SCNC: 4.4 MMOL/L (ref 3.5–5.1)
POTASSIUM BLD-SCNC: 5.1 MMOL/L (ref 3.5–5.1)
POTASSIUM BLD-SCNC: 5.4 MMOL/L (ref 3.5–5.1)
POTASSIUM SERPL-SCNC: 5.3 MMOL/L (ref 3.5–5.1)
POTASSIUM SERPL-SCNC: 5.3 MMOL/L (ref 3.5–5.1)
PROTHROMBIN TIME: 10.9 SEC (ref 9–12.5)
PROTHROMBIN TIME: 11.7 SEC (ref 9–12.5)
PROTHROMBIN TIME: 12.4 SEC (ref 9–12.5)
RBC # BLD AUTO: 3.11 M/UL (ref 4–5.4)
RBC # BLD AUTO: 3.61 M/UL (ref 4–5.4)
RBC # BLD AUTO: 3.95 M/UL (ref 4–5.4)
SAMPLE: ABNORMAL
SITE: ABNORMAL
SODIUM BLD-SCNC: 137 MMOL/L (ref 136–145)
SODIUM BLD-SCNC: 138 MMOL/L (ref 136–145)
SODIUM BLD-SCNC: 141 MMOL/L (ref 136–145)
SODIUM SERPL-SCNC: 136 MMOL/L (ref 136–145)
SP02: 100
SPHEROCYTES BLD QL SMEAR: ABNORMAL
WBC # BLD AUTO: 13.08 K/UL (ref 3.9–12.7)
WBC # BLD AUTO: 16.76 K/UL (ref 3.9–12.7)
WBC # BLD AUTO: 6.85 K/UL (ref 3.9–12.7)

## 2022-03-30 PROCEDURE — 27201423 OPTIME MED/SURG SUP & DEVICES STERILE SUPPLY: Performed by: OBSTETRICS & GYNECOLOGY

## 2022-03-30 PROCEDURE — 88309 PR  SURG PATH,LEVEL VI: ICD-10-PCS | Mod: 26,,, | Performed by: PATHOLOGY

## 2022-03-30 PROCEDURE — 88341 IMHCHEM/IMCYTCHM EA ADD ANTB: CPT | Mod: 26,,, | Performed by: PATHOLOGY

## 2022-03-30 PROCEDURE — 25000003 PHARM REV CODE 250: Performed by: STUDENT IN AN ORGANIZED HEALTH CARE EDUCATION/TRAINING PROGRAM

## 2022-03-30 PROCEDURE — 88342 IMHCHEM/IMCYTCHM 1ST ANTB: CPT | Mod: 26,,, | Performed by: PATHOLOGY

## 2022-03-30 PROCEDURE — 82803 BLOOD GASES ANY COMBINATION: CPT

## 2022-03-30 PROCEDURE — 76937 US GUIDE VASCULAR ACCESS: CPT | Performed by: STUDENT IN AN ORGANIZED HEALTH CARE EDUCATION/TRAINING PROGRAM

## 2022-03-30 PROCEDURE — 25000003 PHARM REV CODE 250: Performed by: OBSTETRICS & GYNECOLOGY

## 2022-03-30 PROCEDURE — 76937 CENTRAL LINE: ICD-10-PCS | Mod: 26,,, | Performed by: ANESTHESIOLOGY

## 2022-03-30 PROCEDURE — 85384 FIBRINOGEN ACTIVITY: CPT | Mod: 91 | Performed by: STUDENT IN AN ORGANIZED HEALTH CARE EDUCATION/TRAINING PROGRAM

## 2022-03-30 PROCEDURE — 36000709 HC OR TIME LEV III EA ADD 15 MIN: Performed by: OBSTETRICS & GYNECOLOGY

## 2022-03-30 PROCEDURE — 36556 INSERT NON-TUNNEL CV CATH: CPT | Mod: 59,,, | Performed by: ANESTHESIOLOGY

## 2022-03-30 PROCEDURE — 36620 INSERTION CATHETER ARTERY: CPT | Mod: 59,,, | Performed by: ANESTHESIOLOGY

## 2022-03-30 PROCEDURE — 85730 THROMBOPLASTIN TIME PARTIAL: CPT | Mod: 91 | Performed by: STUDENT IN AN ORGANIZED HEALTH CARE EDUCATION/TRAINING PROGRAM

## 2022-03-30 PROCEDURE — 63600175 PHARM REV CODE 636 W HCPCS: Performed by: OBSTETRICS & GYNECOLOGY

## 2022-03-30 PROCEDURE — 82962 GLUCOSE BLOOD TEST: CPT | Performed by: OBSTETRICS & GYNECOLOGY

## 2022-03-30 PROCEDURE — 37000009 HC ANESTHESIA EA ADD 15 MINS: Performed by: OBSTETRICS & GYNECOLOGY

## 2022-03-30 PROCEDURE — 63600175 PHARM REV CODE 636 W HCPCS: Performed by: NURSE ANESTHETIST, CERTIFIED REGISTERED

## 2022-03-30 PROCEDURE — 36556 CENTRAL LINE: ICD-10-PCS | Mod: 59,,, | Performed by: ANESTHESIOLOGY

## 2022-03-30 PROCEDURE — 37000008 HC ANESTHESIA 1ST 15 MINUTES: Performed by: OBSTETRICS & GYNECOLOGY

## 2022-03-30 PROCEDURE — 88305 TISSUE EXAM BY PATHOLOGIST: CPT | Performed by: PATHOLOGY

## 2022-03-30 PROCEDURE — 85610 PROTHROMBIN TIME: CPT | Mod: 91 | Performed by: STUDENT IN AN ORGANIZED HEALTH CARE EDUCATION/TRAINING PROGRAM

## 2022-03-30 PROCEDURE — 99900035 HC TECH TIME PER 15 MIN (STAT)

## 2022-03-30 PROCEDURE — D9220A PRA ANESTHESIA: Mod: CRNA,,, | Performed by: NURSE ANESTHETIST, CERTIFIED REGISTERED

## 2022-03-30 PROCEDURE — 88307 PR  SURG PATH,LEVEL V: ICD-10-PCS | Mod: 26,,, | Performed by: PATHOLOGY

## 2022-03-30 PROCEDURE — P9040 RBC LEUKOREDUCED IRRADIATED: HCPCS | Performed by: OBSTETRICS & GYNECOLOGY

## 2022-03-30 PROCEDURE — 88342 CHG IMMUNOCYTOCHEMISTRY: ICD-10-PCS | Mod: 26,,, | Performed by: PATHOLOGY

## 2022-03-30 PROCEDURE — 85384 FIBRINOGEN ACTIVITY: CPT | Performed by: OBSTETRICS & GYNECOLOGY

## 2022-03-30 PROCEDURE — 88341 IMHCHEM/IMCYTCHM EA ADD ANTB: CPT | Performed by: PATHOLOGY

## 2022-03-30 PROCEDURE — 58953 TAH RAD DISSECT FOR DEBULK: CPT | Mod: 82,22,GC, | Performed by: OBSTETRICS & GYNECOLOGY

## 2022-03-30 PROCEDURE — 85730 THROMBOPLASTIN TIME PARTIAL: CPT | Performed by: OBSTETRICS & GYNECOLOGY

## 2022-03-30 PROCEDURE — 71000039 HC RECOVERY, EACH ADD'L HOUR: Performed by: OBSTETRICS & GYNECOLOGY

## 2022-03-30 PROCEDURE — 88307 TISSUE EXAM BY PATHOLOGIST: CPT | Performed by: PATHOLOGY

## 2022-03-30 PROCEDURE — D9220A PRA ANESTHESIA: ICD-10-PCS | Mod: CRNA,,, | Performed by: NURSE ANESTHETIST, CERTIFIED REGISTERED

## 2022-03-30 PROCEDURE — 85610 PROTHROMBIN TIME: CPT | Performed by: OBSTETRICS & GYNECOLOGY

## 2022-03-30 PROCEDURE — 36000708 HC OR TIME LEV III 1ST 15 MIN: Performed by: OBSTETRICS & GYNECOLOGY

## 2022-03-30 PROCEDURE — D9220A PRA ANESTHESIA: ICD-10-PCS | Mod: ANES,,, | Performed by: ANESTHESIOLOGY

## 2022-03-30 PROCEDURE — 88342 IMHCHEM/IMCYTCHM 1ST ANTB: CPT | Performed by: PATHOLOGY

## 2022-03-30 PROCEDURE — 88309 TISSUE EXAM BY PATHOLOGIST: CPT | Performed by: PATHOLOGY

## 2022-03-30 PROCEDURE — 85025 COMPLETE CBC W/AUTO DIFF WBC: CPT | Mod: 91 | Performed by: STUDENT IN AN ORGANIZED HEALTH CARE EDUCATION/TRAINING PROGRAM

## 2022-03-30 PROCEDURE — 88307 TISSUE EXAM BY PATHOLOGIST: CPT | Mod: 26,,, | Performed by: PATHOLOGY

## 2022-03-30 PROCEDURE — 37799 UNLISTED PX VASCULAR SURGERY: CPT

## 2022-03-30 PROCEDURE — 36620 ARTERIAL: ICD-10-PCS | Mod: 59,,, | Performed by: ANESTHESIOLOGY

## 2022-03-30 PROCEDURE — 88341 PR IHC OR ICC EACH ADD'L SINGLE ANTIBODY  STAINPR: ICD-10-PCS | Mod: 26,,, | Performed by: PATHOLOGY

## 2022-03-30 PROCEDURE — 85025 COMPLETE CBC W/AUTO DIFF WBC: CPT | Performed by: OBSTETRICS & GYNECOLOGY

## 2022-03-30 PROCEDURE — 20600001 HC STEP DOWN PRIVATE ROOM

## 2022-03-30 PROCEDURE — 58953 PR BIL SALP-OOPH W/OMENTECT,TAH,RAD DISSECT: ICD-10-PCS | Mod: 82,22,GC, | Performed by: OBSTETRICS & GYNECOLOGY

## 2022-03-30 PROCEDURE — D9220A PRA ANESTHESIA: Mod: ANES,,, | Performed by: ANESTHESIOLOGY

## 2022-03-30 PROCEDURE — 25000003 PHARM REV CODE 250

## 2022-03-30 PROCEDURE — 25000003 PHARM REV CODE 250: Performed by: NURSE ANESTHETIST, CERTIFIED REGISTERED

## 2022-03-30 PROCEDURE — 58953 PR BIL SALP-OOPH W/OMENTECT,TAH,RAD DISSECT: ICD-10-PCS | Mod: 22,GC,, | Performed by: OBSTETRICS & GYNECOLOGY

## 2022-03-30 PROCEDURE — 94799 UNLISTED PULMONARY SVC/PX: CPT

## 2022-03-30 PROCEDURE — 58953 TAH RAD DISSECT FOR DEBULK: CPT | Mod: 22,GC,, | Performed by: OBSTETRICS & GYNECOLOGY

## 2022-03-30 PROCEDURE — P9040 RBC LEUKOREDUCED IRRADIATED: HCPCS | Performed by: STUDENT IN AN ORGANIZED HEALTH CARE EDUCATION/TRAINING PROGRAM

## 2022-03-30 PROCEDURE — 63600175 PHARM REV CODE 636 W HCPCS: Performed by: STUDENT IN AN ORGANIZED HEALTH CARE EDUCATION/TRAINING PROGRAM

## 2022-03-30 PROCEDURE — 80048 BASIC METABOLIC PNL TOTAL CA: CPT | Performed by: STUDENT IN AN ORGANIZED HEALTH CARE EDUCATION/TRAINING PROGRAM

## 2022-03-30 PROCEDURE — 71000033 HC RECOVERY, INTIAL HOUR: Performed by: OBSTETRICS & GYNECOLOGY

## 2022-03-30 PROCEDURE — 76937 US GUIDE VASCULAR ACCESS: CPT | Mod: 26,,, | Performed by: ANESTHESIOLOGY

## 2022-03-30 PROCEDURE — 88309 TISSUE EXAM BY PATHOLOGIST: CPT | Mod: 26,,, | Performed by: PATHOLOGY

## 2022-03-30 PROCEDURE — 94761 N-INVAS EAR/PLS OXIMETRY MLT: CPT

## 2022-03-30 RX ORDER — ONDANSETRON 2 MG/ML
INJECTION INTRAMUSCULAR; INTRAVENOUS
Status: DISCONTINUED | OUTPATIENT
Start: 2022-03-30 | End: 2022-03-30

## 2022-03-30 RX ORDER — HYDROCODONE BITARTRATE AND ACETAMINOPHEN 500; 5 MG/1; MG/1
TABLET ORAL
Status: DISCONTINUED | OUTPATIENT
Start: 2022-03-30 | End: 2022-03-30

## 2022-03-30 RX ORDER — LIDOCAINE HYDROCHLORIDE 10 MG/ML
1 INJECTION, SOLUTION EPIDURAL; INFILTRATION; INTRACAUDAL; PERINEURAL ONCE
Status: COMPLETED | OUTPATIENT
Start: 2022-03-30 | End: 2022-03-30

## 2022-03-30 RX ORDER — LIDOCAINE HYDROCHLORIDE 20 MG/ML
INJECTION, SOLUTION EPIDURAL; INFILTRATION; INTRACAUDAL; PERINEURAL
Status: DISCONTINUED | OUTPATIENT
Start: 2022-03-30 | End: 2022-03-30

## 2022-03-30 RX ORDER — PHENYLEPHRINE HCL IN 0.9% NACL 1 MG/10 ML
SYRINGE (ML) INTRAVENOUS
Status: COMPLETED
Start: 2022-03-30 | End: 2022-03-30

## 2022-03-30 RX ORDER — ATROPA BELLADONNA AND OPIUM 16.2; 6 MG/1; MG/1
60 SUPPOSITORY RECTAL EVERY 6 HOURS PRN
Status: DISCONTINUED | OUTPATIENT
Start: 2022-03-30 | End: 2022-04-01 | Stop reason: HOSPADM

## 2022-03-30 RX ORDER — SENNOSIDES 8.6 MG/1
8.6 TABLET ORAL 2 TIMES DAILY
Status: DISCONTINUED | OUTPATIENT
Start: 2022-03-30 | End: 2022-04-01 | Stop reason: HOSPADM

## 2022-03-30 RX ORDER — PHENYLEPHRINE HYDROCHLORIDE 10 MG/ML
INJECTION INTRAVENOUS
Status: DISCONTINUED | OUTPATIENT
Start: 2022-03-30 | End: 2022-03-30

## 2022-03-30 RX ORDER — ADHESIVE BANDAGE
30 BANDAGE TOPICAL 2 TIMES DAILY
Status: DISCONTINUED | OUTPATIENT
Start: 2022-03-30 | End: 2022-03-30

## 2022-03-30 RX ORDER — ROCURONIUM BROMIDE 10 MG/ML
INJECTION, SOLUTION INTRAVENOUS
Status: DISCONTINUED | OUTPATIENT
Start: 2022-03-30 | End: 2022-03-30

## 2022-03-30 RX ORDER — PROPOFOL 10 MG/ML
VIAL (ML) INTRAVENOUS
Status: DISCONTINUED | OUTPATIENT
Start: 2022-03-30 | End: 2022-03-30

## 2022-03-30 RX ORDER — FENTANYL CITRATE 50 UG/ML
INJECTION, SOLUTION INTRAMUSCULAR; INTRAVENOUS
Status: DISCONTINUED | OUTPATIENT
Start: 2022-03-30 | End: 2022-03-30

## 2022-03-30 RX ORDER — HYDROMORPHONE HYDROCHLORIDE 1 MG/ML
0.2 INJECTION, SOLUTION INTRAMUSCULAR; INTRAVENOUS; SUBCUTANEOUS EVERY 5 MIN PRN
Status: DISCONTINUED | OUTPATIENT
Start: 2022-03-30 | End: 2022-03-30 | Stop reason: HOSPADM

## 2022-03-30 RX ORDER — CEFAZOLIN SODIUM/WATER 2 G/20 ML
2 SYRINGE (ML) INTRAVENOUS
Status: COMPLETED | OUTPATIENT
Start: 2022-03-30 | End: 2022-03-30

## 2022-03-30 RX ORDER — HEPARIN SODIUM 5000 [USP'U]/ML
5000 INJECTION, SOLUTION INTRAVENOUS; SUBCUTANEOUS EVERY 8 HOURS
Status: DISCONTINUED | OUTPATIENT
Start: 2022-03-30 | End: 2022-03-30

## 2022-03-30 RX ORDER — DOCUSATE SODIUM 100 MG/1
100 CAPSULE, LIQUID FILLED ORAL 2 TIMES DAILY
Status: DISCONTINUED | OUTPATIENT
Start: 2022-03-30 | End: 2022-04-01 | Stop reason: HOSPADM

## 2022-03-30 RX ORDER — ACETAMINOPHEN 500 MG
1000 TABLET ORAL EVERY 6 HOURS
Status: DISCONTINUED | OUTPATIENT
Start: 2022-03-30 | End: 2022-04-01 | Stop reason: HOSPADM

## 2022-03-30 RX ORDER — KETAMINE HCL IN 0.9 % NACL 50 MG/5 ML
SYRINGE (ML) INTRAVENOUS
Status: DISCONTINUED | OUTPATIENT
Start: 2022-03-30 | End: 2022-03-30

## 2022-03-30 RX ORDER — SODIUM CHLORIDE, SODIUM LACTATE, POTASSIUM CHLORIDE, CALCIUM CHLORIDE 600; 310; 30; 20 MG/100ML; MG/100ML; MG/100ML; MG/100ML
INJECTION, SOLUTION INTRAVENOUS CONTINUOUS
Status: DISCONTINUED | OUTPATIENT
Start: 2022-03-30 | End: 2022-04-01

## 2022-03-30 RX ORDER — DEXAMETHASONE SODIUM PHOSPHATE 4 MG/ML
INJECTION, SOLUTION INTRA-ARTICULAR; INTRALESIONAL; INTRAMUSCULAR; INTRAVENOUS; SOFT TISSUE
Status: DISCONTINUED | OUTPATIENT
Start: 2022-03-30 | End: 2022-03-30

## 2022-03-30 RX ORDER — GLUCAGON 1 MG
1 KIT INJECTION
Status: DISCONTINUED | OUTPATIENT
Start: 2022-03-30 | End: 2022-03-31

## 2022-03-30 RX ORDER — PANTOPRAZOLE SODIUM 20 MG/1
20 TABLET, DELAYED RELEASE ORAL DAILY
Status: DISCONTINUED | OUTPATIENT
Start: 2022-03-30 | End: 2022-03-30

## 2022-03-30 RX ORDER — VENLAFAXINE 75 MG/1
75 TABLET ORAL 2 TIMES DAILY
Status: DISCONTINUED | OUTPATIENT
Start: 2022-03-30 | End: 2022-03-30

## 2022-03-30 RX ORDER — INSULIN ASPART 100 [IU]/ML
1-10 INJECTION, SOLUTION INTRAVENOUS; SUBCUTANEOUS EVERY 6 HOURS PRN
Status: DISCONTINUED | OUTPATIENT
Start: 2022-03-30 | End: 2022-04-01 | Stop reason: HOSPADM

## 2022-03-30 RX ORDER — PHENYLEPHRINE HCL IN 0.9% NACL 1 MG/10 ML
100 SYRINGE (ML) INTRAVENOUS ONCE
Status: COMPLETED | OUTPATIENT
Start: 2022-03-30 | End: 2022-03-30

## 2022-03-30 RX ORDER — ROSUVASTATIN CALCIUM 20 MG/1
20 TABLET, COATED ORAL NIGHTLY
Status: DISCONTINUED | OUTPATIENT
Start: 2022-03-30 | End: 2022-03-30

## 2022-03-30 RX ORDER — ATORVASTATIN CALCIUM 20 MG/1
80 TABLET, FILM COATED ORAL DAILY
Status: DISCONTINUED | OUTPATIENT
Start: 2022-03-31 | End: 2022-03-30

## 2022-03-30 RX ORDER — MIDAZOLAM HYDROCHLORIDE 1 MG/ML
INJECTION, SOLUTION INTRAMUSCULAR; INTRAVENOUS
Status: DISCONTINUED | OUTPATIENT
Start: 2022-03-30 | End: 2022-03-30

## 2022-03-30 RX ORDER — NALOXONE HCL 0.4 MG/ML
0.02 VIAL (ML) INJECTION
Status: DISCONTINUED | OUTPATIENT
Start: 2022-03-30 | End: 2022-04-01 | Stop reason: HOSPADM

## 2022-03-30 RX ORDER — TALC
6 POWDER (GRAM) TOPICAL NIGHTLY PRN
Status: DISCONTINUED | OUTPATIENT
Start: 2022-03-30 | End: 2022-04-01 | Stop reason: HOSPADM

## 2022-03-30 RX ORDER — ONDANSETRON 2 MG/ML
4 INJECTION INTRAMUSCULAR; INTRAVENOUS ONCE AS NEEDED
Status: DISCONTINUED | OUTPATIENT
Start: 2022-03-30 | End: 2022-03-30 | Stop reason: HOSPADM

## 2022-03-30 RX ORDER — METOPROLOL TARTRATE 50 MG/1
50 TABLET ORAL 2 TIMES DAILY
Status: DISCONTINUED | OUTPATIENT
Start: 2022-03-30 | End: 2022-03-30

## 2022-03-30 RX ORDER — ONDANSETRON 2 MG/ML
4 INJECTION INTRAMUSCULAR; INTRAVENOUS EVERY 6 HOURS PRN
Status: DISCONTINUED | OUTPATIENT
Start: 2022-03-30 | End: 2022-04-01 | Stop reason: HOSPADM

## 2022-03-30 RX ORDER — ACETAMINOPHEN 10 MG/ML
INJECTION, SOLUTION INTRAVENOUS
Status: DISCONTINUED | OUTPATIENT
Start: 2022-03-30 | End: 2022-03-30

## 2022-03-30 RX ORDER — MUPIROCIN 20 MG/G
OINTMENT TOPICAL
Status: DISCONTINUED | OUTPATIENT
Start: 2022-03-30 | End: 2022-03-30

## 2022-03-30 RX ORDER — HYDROMORPHONE HCL IN 0.9% NACL 6 MG/30 ML
PATIENT CONTROLLED ANALGESIA SYRINGE INTRAVENOUS CONTINUOUS
Status: DISCONTINUED | OUTPATIENT
Start: 2022-03-30 | End: 2022-03-31

## 2022-03-30 RX ORDER — NALOXONE HCL 0.4 MG/ML
0.02 VIAL (ML) INJECTION
Status: DISCONTINUED | OUTPATIENT
Start: 2022-03-30 | End: 2022-03-30

## 2022-03-30 RX ORDER — FENTANYL CITRATE 50 UG/ML
25 INJECTION, SOLUTION INTRAMUSCULAR; INTRAVENOUS EVERY 5 MIN PRN
Status: DISCONTINUED | OUTPATIENT
Start: 2022-03-30 | End: 2022-03-30 | Stop reason: HOSPADM

## 2022-03-30 RX ORDER — PROCHLORPERAZINE EDISYLATE 5 MG/ML
5 INJECTION INTRAMUSCULAR; INTRAVENOUS EVERY 6 HOURS PRN
Status: DISCONTINUED | OUTPATIENT
Start: 2022-03-30 | End: 2022-04-01 | Stop reason: HOSPADM

## 2022-03-30 RX ORDER — VENLAFAXINE HYDROCHLORIDE 75 MG/1
75 CAPSULE, EXTENDED RELEASE ORAL DAILY
Status: DISCONTINUED | OUTPATIENT
Start: 2022-03-30 | End: 2022-03-30

## 2022-03-30 RX ADMIN — ACETAMINOPHEN 1000 MG: 500 TABLET ORAL at 04:03

## 2022-03-30 RX ADMIN — ONDANSETRON HYDROCHLORIDE 4 MG: 2 INJECTION INTRAMUSCULAR; INTRAVENOUS at 10:03

## 2022-03-30 RX ADMIN — Medication 20 MG: at 08:03

## 2022-03-30 RX ADMIN — Medication 100 MCG: at 01:03

## 2022-03-30 RX ADMIN — LIDOCAINE HYDROCHLORIDE 100 MG: 20 INJECTION, SOLUTION EPIDURAL; INFILTRATION; INTRACAUDAL; PERINEURAL at 08:03

## 2022-03-30 RX ADMIN — SODIUM CHLORIDE 1000 ML: 0.9 INJECTION, SOLUTION INTRAVENOUS at 03:03

## 2022-03-30 RX ADMIN — PROPOFOL 70 MG: 10 INJECTION, EMULSION INTRAVENOUS at 08:03

## 2022-03-30 RX ADMIN — CALCIUM CHLORIDE 1 G: 100 INJECTION, SOLUTION INTRAVENOUS at 09:03

## 2022-03-30 RX ADMIN — LIDOCAINE HYDROCHLORIDE 10 MG: 10 INJECTION, SOLUTION EPIDURAL; INFILTRATION; INTRACAUDAL at 06:03

## 2022-03-30 RX ADMIN — SODIUM CHLORIDE 1000 ML: 0.9 INJECTION, SOLUTION INTRAVENOUS at 01:03

## 2022-03-30 RX ADMIN — INSULIN ASPART 10 UNITS: 100 INJECTION, SOLUTION INTRAVENOUS; SUBCUTANEOUS at 11:03

## 2022-03-30 RX ADMIN — ROCURONIUM BROMIDE 20 MG: 10 INJECTION, SOLUTION INTRAVENOUS at 09:03

## 2022-03-30 RX ADMIN — ACETAMINOPHEN 1000 MG: 10 INJECTION, SOLUTION INTRAVENOUS at 08:03

## 2022-03-30 RX ADMIN — DEXAMETHASONE SODIUM PHOSPHATE 4 MG: 4 INJECTION, SOLUTION INTRAMUSCULAR; INTRAVENOUS at 08:03

## 2022-03-30 RX ADMIN — MUPIROCIN: 20 OINTMENT TOPICAL at 07:03

## 2022-03-30 RX ADMIN — Medication 2 G: at 08:03

## 2022-03-30 RX ADMIN — SODIUM CHLORIDE, SODIUM GLUCONATE, SODIUM ACETATE, POTASSIUM CHLORIDE, MAGNESIUM CHLORIDE, SODIUM PHOSPHATE, DIBASIC, AND POTASSIUM PHOSPHATE: .53; .5; .37; .037; .03; .012; .00082 INJECTION, SOLUTION INTRAVENOUS at 08:03

## 2022-03-30 RX ADMIN — SODIUM CHLORIDE: 0.9 INJECTION, SOLUTION INTRAVENOUS at 07:03

## 2022-03-30 RX ADMIN — PHENYLEPHRINE HYDROCHLORIDE 100 MCG: 10 INJECTION INTRAVENOUS at 08:03

## 2022-03-30 RX ADMIN — ROCURONIUM BROMIDE 50 MG: 10 INJECTION, SOLUTION INTRAVENOUS at 08:03

## 2022-03-30 RX ADMIN — INSULIN HUMAN 2.5 UNITS/HR: 1 INJECTION, SOLUTION INTRAVENOUS at 12:03

## 2022-03-30 RX ADMIN — SODIUM CHLORIDE, SODIUM LACTATE, POTASSIUM CHLORIDE, AND CALCIUM CHLORIDE: .6; .31; .03; .02 INJECTION, SOLUTION INTRAVENOUS at 11:03

## 2022-03-30 RX ADMIN — FENTANYL CITRATE 50 MCG: 50 INJECTION, SOLUTION INTRAMUSCULAR; INTRAVENOUS at 08:03

## 2022-03-30 RX ADMIN — SUGAMMADEX 196 MG: 100 INJECTION, SOLUTION INTRAVENOUS at 10:03

## 2022-03-30 RX ADMIN — PHENYLEPHRINE HYDROCHLORIDE 150 MCG: 10 INJECTION INTRAVENOUS at 08:03

## 2022-03-30 RX ADMIN — SODIUM CHLORIDE: 0.9 INJECTION, SOLUTION INTRAVENOUS at 11:03

## 2022-03-30 RX ADMIN — PHENYLEPHRINE HYDROCHLORIDE 200 MCG: 10 INJECTION INTRAVENOUS at 09:03

## 2022-03-30 RX ADMIN — MIDAZOLAM HYDROCHLORIDE 1 MG: 1 INJECTION, SOLUTION INTRAMUSCULAR; INTRAVENOUS at 07:03

## 2022-03-30 RX ADMIN — FENTANYL CITRATE 150 MCG: 50 INJECTION, SOLUTION INTRAMUSCULAR; INTRAVENOUS at 08:03

## 2022-03-30 RX ADMIN — SODIUM CHLORIDE 0.5 MCG/KG/MIN: 9 INJECTION, SOLUTION INTRAVENOUS at 09:03

## 2022-03-30 RX ADMIN — Medication: at 11:03

## 2022-03-30 RX ADMIN — SODIUM CHLORIDE, SODIUM GLUCONATE, SODIUM ACETATE, POTASSIUM CHLORIDE, MAGNESIUM CHLORIDE, SODIUM PHOSPHATE, DIBASIC, AND POTASSIUM PHOSPHATE: .53; .5; .37; .037; .03; .012; .00082 INJECTION, SOLUTION INTRAVENOUS at 09:03

## 2022-03-30 NOTE — CARE UPDATE
Called by PACU RN, Lamar, as patient is hypotensive to 79/40s and blood glucose 470s. Intraop blood loss was 1800 mL s/p 2U PRBC and 3L IVF. BP in PACU has been fluid responsive, but hypotension recurs after fluid boluses. IV insulin gtt initiated. Patient is comfortably asleep with benign abdominal exam and no drains. Oliguric. Labs significant for metabolic acidosis and intravascular depletion with third spacing. Lactate normal. Gyn-onc team and SICU team are aware. Will continue to resuscitate with IVF.    Ariana Ruvalcaba MD  Anesthesiology Resident - PGY2  [facility]

## 2022-03-30 NOTE — CARE UPDATE
MD to bedside for post operative evaluation. Patient laying quietly with son at bedside. She has been sleeping per son's report since surgery however she does not seem uncomfortable or in pain.     Temp:  [97 °F (36.1 °C)-98.4 °F (36.9 °C)] 97 °F (36.1 °C)  Pulse:  [] 79  Resp:  [0-20] 16  SpO2:  [99 %-100 %] 100 %  BP: ()/(56-89) 86/56  Arterial Line BP: ()/(52-62) 106/58    Physical Exam  Vitals reviewed.   Constitutional:       General: She is not in acute distress.     Comments: Patient currently sleeping    HENT:      Head: Normocephalic.   Cardiovascular:      Rate and Rhythm: Normal rate and regular rhythm.   Pulmonary:      Effort: Pulmonary effort is normal. No respiratory distress.   Abdominal:      General: Abdomen is flat. There is no distension.      Tenderness: There is no abdominal tenderness.      Comments: Supra/infraumbilical midline pressure bandage which appears clean/dry/intact. Abdomen soft, non tender, non distended.    Genitourinary:     Comments: No bleeding on ABD pad   Musculoskeletal:         General: Normal range of motion.   Skin:     General: Skin is warm and dry.       Assessment/Plan:   - POD#0 s/p CIRILO/BSO/omentectomy   - Abdominal exam as above  - Agree with fluid boluses to maintain MAP >65 per anesthesia   - She has received 750 cc bolus so far   - s/p 2600 mL of crystaloids intra op, in addition to 3 units pRBC    - Low threshold for additional transfusion as necessary    - Post operative labs, pending   - Will appreciate SICU assistance if patient requires pressors for blood pressure control   - Agree with insulin gtt for blood glucose control as POCT glucose 400s   - Monitor strict I/O's (currently 50 ccs of clear fluid total in the post operative period)  - Serial abdominal examinations to be performed    Isha Gallagher MD  OB/GYN  PGY-4

## 2022-03-30 NOTE — ANESTHESIA PROCEDURE NOTES
Intubation    Date/Time: 3/30/2022 8:16 AM  Performed by: Suzy Mckeon CRNA  Authorized by: Zach Brewster MD     Intubation:     Induction:  Intravenous    Intubated:  Postinduction    Mask Ventilation:  Easy with oral airway    Attempts:  1    Attempted By:  Student (APOLINAR Rangel)    Method of Intubation:  Direct    Blade:  Nunez 2    Laryngeal View Grade: Grade IIA - cords partially seen      Difficult Airway Encountered?: No      Complications:  None    Airway Device:  Oral endotracheal tube    Airway Device Size:  7.0    Style/Cuff Inflation:  Cuffed (inflated to minimal occlusive pressure)    Inflation Amount (mL):  7    Tube secured:  21    Secured at:  The teeth    Placement Verified By:  Capnometry    Complicating Factors:  Anterior larynx and small mouth    Findings Post-Intubation:  BS equal bilateral and atraumatic/condition of teeth unchanged

## 2022-03-30 NOTE — PLAN OF CARE
Chart reviewed. Pre op nursing care and surgery checklist complete. Son at bedside. Patient belongings given to family. Blood extension form sent to blood bank. CBC sent to lab. Blood consent outdated, awaiting anesthesia consent and H&P.

## 2022-03-30 NOTE — H&P
Updated H&P:    The patient has been examined and the H&P has been reviewed:  I concur with the findings and no changes have occurred since H&P was written.    Anesthesia/Surgery risks, benefits and alternative options discussed and understood by patient/family.    Isha Gallagher MD  OB/GYN  PGY-4    _________________________________    Patient ID: Anjali Dorantes is a 61 y.o. female.     Chief Complaint: Follow-up     Treatment History  Stage IV high-grade endometrial cancer  IR paracentesis with cytology supporting above  Carbo monotherapy started 9/13/2021     Follow-up  Associated symptoms include fatigue. Pertinent negatives include no abdominal pain, arthralgias, chest pain, chills, coughing, fever, nausea, numbness, rash, sore throat, vomiting or weakness.      Here today for C6 of treatment.  Had recent CT demonstrating response, especially outside of the pelvis.  Renal and hepatic function have taken a little bit of a hit with recent treatments.  Needs an exam today to determine surgery suitability.     Review of Systems   Constitutional: Positive for fatigue. Negative for activity change, appetite change, chills and fever.   HENT: Negative for hearing loss, mouth sores, nosebleeds, sore throat and tinnitus.    Eyes: Negative for visual disturbance.   Respiratory: Negative for cough, chest tightness, shortness of breath and wheezing.    Cardiovascular: Negative for chest pain and leg swelling.   Gastrointestinal: Positive for abdominal distention. Negative for abdominal pain, blood in stool, constipation, diarrhea, nausea and vomiting.   Genitourinary: Negative for dysuria, flank pain, frequency, hematuria, pelvic pain, vaginal bleeding, vaginal discharge and vaginal pain.   Musculoskeletal: Negative for arthralgias and back pain.   Skin: Negative for rash.   Neurological: Negative for dizziness, seizures, syncope, weakness and numbness.   Hematological: Does not bruise/bleed easily.    Psychiatric/Behavioral: Negative for confusion and sleep disturbance. The patient is not nervous/anxious.          Objective:   Physical Exam:   Constitutional: She is oriented to person, place, and time. She appears well-developed and well-nourished. No distress.    HENT:   Head: Normocephalic and atraumatic.    Eyes: No scleral icterus.     Cardiovascular: Normal rate and intact distal pulses.  Exam reveals no cyanosis and no edema.     Pulmonary/Chest: Effort normal. No respiratory distress. She exhibits no tenderness.         Abdominal: Soft. She exhibits no distension, no fluid wave and no mass. There is no abdominal tenderness. There is no rigidity, no rebound and no guarding. No hernia.             Musculoskeletal: Normal range of motion and moves all extremeties. No edema.      Lymphadenopathy:     She has no cervical adenopathy.    Neurological: She is alert and oriented to person, place, and time.    Skin: Skin is warm. No rash noted. No cyanosis or erythema.    Psychiatric: She has a normal mood and affect. Thought content normal.         Assessment:      1. Endometrial cancer    2. Encounter for antineoplastic chemotherapy    3. Malignant ascites          Plan:       Will hold off on C6 as has become resectable.  Needs to see renal for optimization prior to surgery.  Plan Xlap/CIRILO/BSO/OIP in next 5-6 weeks.  The risks, benefits, and indications of the procedure were discussed with the patient and her grandson.  These included bleeding, infection, damage to surrounding tissues, and possible death.  She voiced understanding, all questions were answered and consents were signed.

## 2022-03-30 NOTE — TRANSFER OF CARE
Anesthesia Transfer of Care Note    Patient: Anjali Vieirah Jayna    Procedure(s) Performed: Procedure(s) (LRB):  HYSTERECTOMY, TOTAL, ABDOMINAL, WITH BILATERAL SALPINGO-OOPHORECTOMY (N/A)  LAPAROTOMY, EXPLORATORY (N/A)  OMENTECTOMY (N/A)    Patient location: PACU    Anesthesia Type: general    Transport from OR: Transported from OR on room air with adequate spontaneous ventilation    Post pain: adequate analgesia    Post assessment: no apparent anesthetic complications    Post vital signs: stable    Level of consciousness: sedated    Nausea/Vomiting: no nausea/vomiting    Complications: none    Transfer of care protocol was followed      Last vitals:   Visit Vitals  BP (!) 168/79 (BP Location: Left arm, Patient Position: Lying)   Pulse 95   Temp 36.9 °C (98.4 °F) (Oral)   Resp 18   Ht 5' (1.524 m)   Wt 49 kg (108 lb)   SpO2 100%   Breastfeeding No   BMI 21.09 kg/m²

## 2022-03-30 NOTE — ANESTHESIA PREPROCEDURE EVALUATION
03/30/2022  Anjali Dorantes is a 62 y.o., female.      Pre-op Assessment          Review of Systems  Anesthesia Hx:  No problems with previous Anesthesia    Cardiovascular:   Hypertension Denies CAD.     Functional Capacity Can you climb two flights of stairs? ==> Yes    Pulmonary:   Denies Asthma.  Denies Sleep Apnea.    Renal/:   Chronic Renal Disease, CRI    Hepatic/GI:   Denies PUD. Denies GERD. Denies Liver Disease.    Neurological:   CVA, residual symptoms Denies Seizures.       Mild left sided weakness       Endocrine:   Diabetes Denies Hypothyroidism.        Physical Exam  General: Alert    Airway:  Mallampati: IV / III  Mouth Opening: < 3 cm  TM Distance: Normal  Tongue: Normal  Neck ROM: Normal ROM    Dental:  Intact        Anesthesia Plan  Type of Anesthesia, risks & benefits discussed:    Anesthesia Type: Gen ETT  Intra-op Monitoring Plan: Standard ASA Monitors  Post Op Pain Control Plan: multimodal analgesia and IV/PO Opioids PRN  Induction:  IV  Airway Plan: Direct  Informed Consent: Informed consent signed with the Patient and all parties understand the risks and agree with anesthesia plan.  All questions answered.   ASA Score: 3    Ready For Surgery From Anesthesia Perspective.     .

## 2022-03-30 NOTE — OPERATIVE NOTE ADDENDUM
Certification of Assistant at Surgery       Surgery Date: 3/30/2022     Participating Surgeons:  Surgeon(s) and Role:     * Reuben Lopez MD - Primary     * Puma Marino MD - Assisting     * Isha Gallagher MD - Resident - Assisting    Procedures:  Procedure(s) (LRB):  HYSTERECTOMY, TOTAL, ABDOMINAL, WITH BILATERAL SALPINGO-OOPHORECTOMY (N/A)  LAPAROTOMY, EXPLORATORY (N/A)  OMENTECTOMY (N/A)    Assistant Surgeon's Certification of Necessity:  I understand that section 1842 (b) (6) (d) of the Social Security Act generally prohibits Medicare Part B reasonable charge payment for the services of assistants at surgery in teaching hospitals when qualified residents are available to furnish such services. I certify that the services for which payment is claimed were medically necessary, and that no qualified resident was available to perform the services. I further understand that these services are subject to post-payment review by the Medicare carrier.      Puma Marino MD    03/30/2022  10:56 AM

## 2022-03-30 NOTE — PROGRESS NOTES
Dr. Ruvalcaba called and notified of hypotension (MAP 64). Per order, pt. bolused 250 cc LR (bolus via pump )

## 2022-03-30 NOTE — ANESTHESIA PROCEDURE NOTES
Arterial    Diagnosis: Endometrial cancer  Doctor requesting consult: John    Patient location during procedure: done in OR  Procedure start time: 3/30/2022 9:40 AM  Timeout: 3/30/2022 9:40 AM  Procedure end time: 3/30/2022 9:45 AM    Staffing  Authorizing Provider: Zach Brewster MD  Performing Provider: Suzy Mckeon CRNA    Anesthesiologist was present at the time of the procedure.    Preanesthetic Checklist  Completed: patient identified, IV checked, site marked, risks and benefits discussed, surgical consent, monitors and equipment checked, pre-op evaluation, timeout performed and anesthesia consent givenArterial  Skin Prep: chlorhexidine gluconate  Local Infiltration: none  Orientation: left  Location: radial    Catheter Size: 20 G  Catheter placement by Ultrasound guidance. Heme positive aspiration all ports.   Vessel Caliber: medium, patent, compressibility normal  Vascular Doppler:  not done  Needle advanced into vessel with real time Ultrasound guidance.  Guidewire confirmed in vessel.Insertion Attempts: 2  Assessment  Dressing: secured with tape and tegaderm  Patient: Tolerated well

## 2022-03-31 PROBLEM — F32.A DEPRESSION: Status: ACTIVE | Noted: 2022-03-31

## 2022-03-31 LAB
ANION GAP SERPL CALC-SCNC: 10 MMOL/L (ref 8–16)
ANION GAP SERPL CALC-SCNC: 12 MMOL/L (ref 8–16)
BASOPHILS # BLD AUTO: 0.02 K/UL (ref 0–0.2)
BASOPHILS NFR BLD: 0.1 % (ref 0–1.9)
BUN SERPL-MCNC: 41 MG/DL (ref 8–23)
BUN SERPL-MCNC: 44 MG/DL (ref 8–23)
CALCIUM SERPL-MCNC: 9.4 MG/DL (ref 8.7–10.5)
CALCIUM SERPL-MCNC: 9.5 MG/DL (ref 8.7–10.5)
CHLORIDE SERPL-SCNC: 108 MMOL/L (ref 95–110)
CHLORIDE SERPL-SCNC: 109 MMOL/L (ref 95–110)
CO2 SERPL-SCNC: 20 MMOL/L (ref 23–29)
CO2 SERPL-SCNC: 23 MMOL/L (ref 23–29)
CREAT SERPL-MCNC: 2.4 MG/DL (ref 0.5–1.4)
CREAT SERPL-MCNC: 2.6 MG/DL (ref 0.5–1.4)
DIFFERENTIAL METHOD: ABNORMAL
EOSINOPHIL # BLD AUTO: 0 K/UL (ref 0–0.5)
EOSINOPHIL NFR BLD: 0 % (ref 0–8)
ERYTHROCYTE [DISTWIDTH] IN BLOOD BY AUTOMATED COUNT: 21 % (ref 11.5–14.5)
EST. GFR  (AFRICAN AMERICAN): 21.8 ML/MIN/1.73 M^2
EST. GFR  (AFRICAN AMERICAN): 24 ML/MIN/1.73 M^2
EST. GFR  (NON AFRICAN AMERICAN): 18.9 ML/MIN/1.73 M^2
EST. GFR  (NON AFRICAN AMERICAN): 20.9 ML/MIN/1.73 M^2
GLUCOSE SERPL-MCNC: 161 MG/DL (ref 70–110)
GLUCOSE SERPL-MCNC: 167 MG/DL (ref 70–110)
HCT VFR BLD AUTO: 25.9 % (ref 37–48.5)
HGB BLD-MCNC: 8.7 G/DL (ref 12–16)
IMM GRANULOCYTES # BLD AUTO: 0.1 K/UL (ref 0–0.04)
IMM GRANULOCYTES NFR BLD AUTO: 0.7 % (ref 0–0.5)
LYMPHOCYTES # BLD AUTO: 1.1 K/UL (ref 1–4.8)
LYMPHOCYTES NFR BLD: 8.1 % (ref 18–48)
MAGNESIUM SERPL-MCNC: 1.8 MG/DL (ref 1.6–2.6)
MCH RBC QN AUTO: 28.2 PG (ref 27–31)
MCHC RBC AUTO-ENTMCNC: 33.6 G/DL (ref 32–36)
MCV RBC AUTO: 84 FL (ref 82–98)
MONOCYTES # BLD AUTO: 1.5 K/UL (ref 0.3–1)
MONOCYTES NFR BLD: 11.2 % (ref 4–15)
NEUTROPHILS # BLD AUTO: 10.9 K/UL (ref 1.8–7.7)
NEUTROPHILS NFR BLD: 79.9 % (ref 38–73)
NRBC BLD-RTO: 0 /100 WBC
PHOSPHATE SERPL-MCNC: 4.4 MG/DL (ref 2.7–4.5)
PLATELET # BLD AUTO: 120 K/UL (ref 150–450)
PMV BLD AUTO: 9.8 FL (ref 9.2–12.9)
POCT GLUCOSE: 122 MG/DL (ref 70–110)
POCT GLUCOSE: 130 MG/DL (ref 70–110)
POCT GLUCOSE: 134 MG/DL (ref 70–110)
POCT GLUCOSE: 142 MG/DL (ref 70–110)
POCT GLUCOSE: 171 MG/DL (ref 70–110)
POCT GLUCOSE: 174 MG/DL (ref 70–110)
POCT GLUCOSE: 175 MG/DL (ref 70–110)
POCT GLUCOSE: 178 MG/DL (ref 70–110)
POCT GLUCOSE: 179 MG/DL (ref 70–110)
POCT GLUCOSE: 179 MG/DL (ref 70–110)
POCT GLUCOSE: 189 MG/DL (ref 70–110)
POCT GLUCOSE: 191 MG/DL (ref 70–110)
POCT GLUCOSE: 200 MG/DL (ref 70–110)
POCT GLUCOSE: 225 MG/DL (ref 70–110)
POCT GLUCOSE: 226 MG/DL (ref 70–110)
POCT GLUCOSE: 231 MG/DL (ref 70–110)
POTASSIUM SERPL-SCNC: 5.4 MMOL/L (ref 3.5–5.1)
POTASSIUM SERPL-SCNC: 5.7 MMOL/L (ref 3.5–5.1)
RBC # BLD AUTO: 3.08 M/UL (ref 4–5.4)
SODIUM SERPL-SCNC: 140 MMOL/L (ref 136–145)
SODIUM SERPL-SCNC: 142 MMOL/L (ref 136–145)
WBC # BLD AUTO: 13.66 K/UL (ref 3.9–12.7)

## 2022-03-31 PROCEDURE — 93010 EKG 12-LEAD: ICD-10-PCS | Mod: ,,, | Performed by: INTERNAL MEDICINE

## 2022-03-31 PROCEDURE — 80048 BASIC METABOLIC PNL TOTAL CA: CPT | Performed by: STUDENT IN AN ORGANIZED HEALTH CARE EDUCATION/TRAINING PROGRAM

## 2022-03-31 PROCEDURE — 20600001 HC STEP DOWN PRIVATE ROOM

## 2022-03-31 PROCEDURE — 80048 BASIC METABOLIC PNL TOTAL CA: CPT | Mod: 91 | Performed by: STUDENT IN AN ORGANIZED HEALTH CARE EDUCATION/TRAINING PROGRAM

## 2022-03-31 PROCEDURE — 63600175 PHARM REV CODE 636 W HCPCS: Performed by: STUDENT IN AN ORGANIZED HEALTH CARE EDUCATION/TRAINING PROGRAM

## 2022-03-31 PROCEDURE — 84100 ASSAY OF PHOSPHORUS: CPT | Performed by: STUDENT IN AN ORGANIZED HEALTH CARE EDUCATION/TRAINING PROGRAM

## 2022-03-31 PROCEDURE — 25000003 PHARM REV CODE 250: Performed by: STUDENT IN AN ORGANIZED HEALTH CARE EDUCATION/TRAINING PROGRAM

## 2022-03-31 PROCEDURE — 85025 COMPLETE CBC W/AUTO DIFF WBC: CPT | Performed by: STUDENT IN AN ORGANIZED HEALTH CARE EDUCATION/TRAINING PROGRAM

## 2022-03-31 PROCEDURE — 83735 ASSAY OF MAGNESIUM: CPT | Performed by: STUDENT IN AN ORGANIZED HEALTH CARE EDUCATION/TRAINING PROGRAM

## 2022-03-31 PROCEDURE — 93010 ELECTROCARDIOGRAM REPORT: CPT | Mod: ,,, | Performed by: INTERNAL MEDICINE

## 2022-03-31 PROCEDURE — 93005 ELECTROCARDIOGRAM TRACING: CPT

## 2022-03-31 PROCEDURE — 36415 COLL VENOUS BLD VENIPUNCTURE: CPT | Performed by: STUDENT IN AN ORGANIZED HEALTH CARE EDUCATION/TRAINING PROGRAM

## 2022-03-31 RX ORDER — GLUCAGON 1 MG
1 KIT INJECTION
Status: DISCONTINUED | OUTPATIENT
Start: 2022-03-31 | End: 2022-04-01 | Stop reason: HOSPADM

## 2022-03-31 RX ORDER — OXYCODONE HYDROCHLORIDE 5 MG/1
5 TABLET ORAL EVERY 6 HOURS PRN
Status: DISCONTINUED | OUTPATIENT
Start: 2022-03-31 | End: 2022-04-01 | Stop reason: HOSPADM

## 2022-03-31 RX ORDER — IBUPROFEN 200 MG
16 TABLET ORAL
Status: DISCONTINUED | OUTPATIENT
Start: 2022-03-31 | End: 2022-04-01 | Stop reason: HOSPADM

## 2022-03-31 RX ORDER — HYDROMORPHONE HYDROCHLORIDE 1 MG/ML
0.2 INJECTION, SOLUTION INTRAMUSCULAR; INTRAVENOUS; SUBCUTANEOUS EVERY 6 HOURS PRN
Status: DISCONTINUED | OUTPATIENT
Start: 2022-03-31 | End: 2022-04-01 | Stop reason: HOSPADM

## 2022-03-31 RX ORDER — CLOPIDOGREL BISULFATE 75 MG/1
75 TABLET ORAL DAILY
Status: DISCONTINUED | OUTPATIENT
Start: 2022-04-01 | End: 2022-04-01 | Stop reason: HOSPADM

## 2022-03-31 RX ORDER — OXYCODONE HYDROCHLORIDE 10 MG/1
10 TABLET ORAL EVERY 6 HOURS PRN
Status: DISCONTINUED | OUTPATIENT
Start: 2022-03-31 | End: 2022-04-01 | Stop reason: HOSPADM

## 2022-03-31 RX ORDER — HEPARIN SODIUM 5000 [USP'U]/ML
5000 INJECTION, SOLUTION INTRAVENOUS; SUBCUTANEOUS EVERY 12 HOURS
Status: DISCONTINUED | OUTPATIENT
Start: 2022-03-31 | End: 2022-03-31

## 2022-03-31 RX ORDER — IBUPROFEN 200 MG
24 TABLET ORAL
Status: DISCONTINUED | OUTPATIENT
Start: 2022-03-31 | End: 2022-04-01 | Stop reason: HOSPADM

## 2022-03-31 RX ADMIN — INSULIN ASPART 4 UNITS: 100 INJECTION, SOLUTION INTRAVENOUS; SUBCUTANEOUS at 08:03

## 2022-03-31 RX ADMIN — ACETAMINOPHEN 1000 MG: 500 TABLET ORAL at 04:03

## 2022-03-31 RX ADMIN — ACETAMINOPHEN 1000 MG: 500 TABLET ORAL at 06:03

## 2022-03-31 RX ADMIN — ACETAMINOPHEN 1000 MG: 500 TABLET ORAL at 12:03

## 2022-03-31 RX ADMIN — SENNOSIDES 8.6 MG: 8.6 TABLET ORAL at 09:03

## 2022-03-31 RX ADMIN — ONDANSETRON 4 MG: 2 INJECTION INTRAMUSCULAR; INTRAVENOUS at 08:03

## 2022-03-31 RX ADMIN — DOCUSATE SODIUM 100 MG: 100 CAPSULE ORAL at 09:03

## 2022-03-31 NOTE — ASSESSMENT & PLAN NOTE
- Postop D#1 s/p CIRILO/BSO/omentectomy   - continue routine postop care.   - continue pain control with scheduled tylenol, oxy IR and dilaudid BTP   - continue regular diet. IVFs (LR @ 40cc/hr with saline lock once tolerating PO).  - IS at bedside  - Vaginal packing/hernández in place. Will discontinue hernández in am with passive VT after AM labs return    - UOP adequate overnight   - F/u H/H in the AM, yet pending   - DVT ppx: encourage ambulation, TEDs/SCDs, will hold off on anticoagulation for now

## 2022-03-31 NOTE — PLAN OF CARE
SICU consulted for hypotension after undergoing TAHBSO and omentectomy for endometrial cancer. Per gyn onc resident, patient had an EBL of 1800ml for which the patient received 3u PRBCs and crystalloids. While in PACU, she remained hypotensive and received 100mcg of phenylephrine and 2L of NS. Her last bolus was a few hours ago as well as when she was given phenylephrine. On exam, she is resting and has no complaints of pain. Her abdomen is soft with no significant tenderness. Her dressings are dry. Lyn has clear urine in it. Her heart rate is in mid-80s and her MAPs on the cuff are in the mid-80s. The a-line correlates with cuff pressures. Her oxygen saturations are in the high 90s on room air. Her most recent hemoglobin was in adequate range. I discussed with the gyn onc resident that it seems as if her pressures have improved with resuscitation and there is no acute need for transfer to the ICU. If anything changes in the interim, we would be more than happy to have the patient transferred to the unit for closer monitoring. I discussed this also with her son, Mr. Willie Amezcua, who was at bedside during my evaluation.      José Lees MD  General Surgery and Surgical Critical Care  [facility]

## 2022-03-31 NOTE — ASSESSMENT & PLAN NOTE
- Stable  - Per chart review, baseline Cr appears to be around 1.3-2   - Immediate post operative Cr 2.1, morning labs pending   - Not on chronic dialysis but will consider if becomes clinically necessary  - Avoid nephrotoxic agents   - Monitor urine output closely

## 2022-03-31 NOTE — HPI
Anjali Dorantes is a 62 y.o. [id number] who presented for scheduled ex lap/CIRILO/BSO/omentectomy secondary to Stage 4 high grade endometrial vs. ovarian cancer now admitted for routine post-operative care.

## 2022-03-31 NOTE — ANESTHESIA POSTPROCEDURE EVALUATION
Anesthesia Post Evaluation    Patient: Anjali Dorantes    Procedure(s) Performed: Procedure(s) (LRB):  HYSTERECTOMY, TOTAL, ABDOMINAL, WITH BILATERAL SALPINGO-OOPHORECTOMY (N/A)  LAPAROTOMY, EXPLORATORY (N/A)  OMENTECTOMY (N/A)    Final Anesthesia Type: general      Patient location during evaluation: PACU  Patient participation: Yes- Able to Participate  Level of consciousness: awake  Post-procedure vital signs: reviewed and stable  Pain management: adequate  Airway patency: patent    PONV status at discharge: No PONV  Anesthetic complications: no      Cardiovascular status: blood pressure returned to baseline  Respiratory status: unassisted  Hydration status: euvolemic  Follow-up not needed.          Vitals Value Taken Time   /66 03/31/22 1110   Temp 37.2 °C (98.9 °F) 03/31/22 1110   Pulse 99 03/31/22 1110   Resp 18 03/31/22 1110   SpO2 100 % 03/31/22 1110         Event Time   Out of Recovery 23:30:00         Pain/Kaden Score: Pain Rating Prior to Med Admin: 5 (3/31/2022 12:32 PM)  Pain Rating Post Med Admin: 4 (3/31/2022  1:20 PM)  Kaden Score: 9 (3/30/2022  8:00 PM)

## 2022-03-31 NOTE — ASSESSMENT & PLAN NOTE
- Asymptomatic  - Patient was hypotensive in PACU with MAPs in the 60s however responsive to fluid boluses and anabella per anesthesia; there was also initially question of blood pressure cuff reading versus arterial line readings which were discrepant   - BP: ()/(48-89) 141/67  - Home norvasc held  - Monitor closely

## 2022-03-31 NOTE — ASSESSMENT & PLAN NOTE
- Asymptomatic however patient has not ambulated  - Pre operative H/h: 8/25>3 units>11/35>10/30     - EBL approximately 1800   - AM labs pending, will transfuse if necessary   - Monitor urine output closely

## 2022-03-31 NOTE — PROGRESS NOTES
[facility]  Gynecologic Oncology  Progress Note      Patient Name: Anjali Dorantes  MRN: [id number]  Admission Date: 3/30/2022  Hospital Length of Stay: 1 days  Attending Provider: Reuben Lopez MD  Primary Care Provider: Tasia Carrasco MD  Principal Problem: S/P CIRILO (total abdominal hysterectomy)    Follow-up For: Procedure(s) (LRB):  HYSTERECTOMY, TOTAL, ABDOMINAL, WITH BILATERAL SALPINGO-OOPHORECTOMY (N/A)  LAPAROTOMY, EXPLORATORY (N/A)  OMENTECTOMY (N/A)  Post-Operative Day: 1 Day Post-Op  Subjective:      History of Present Illness:  Anjali Dorantes is a 62 y.o. [id number] who presented for scheduled ex lap/CIRILO/BSO/omentectomy secondary to Stage 4 high grade endometrial cancer now admitted for routine post operative care     Hospital Course:  2022 - NAEON. Evaluation by SICU attending at PACU who deemed patient stable enough to be admitted to floor. Central line was placed prior to transfer as patient required multiple IVs with poor access. Otherwise, she has been able to tolerate liquid PO intake without issue. Pain well controlled. Has not ambulated. Not passing flatus, no Bms. No vaginal bleeding. Lyn in place, UOP 0.58 cc/kg/hr overnight      Interval History:   History provided for by son who accompanies patient at bedside.    She reports mild pain adequately controlled by scheduled analgesics- she did not require any PRN medications overnight. She has not yet taken PO (has tolerated sips of water), but denies nausea and vomiting. Is not hungry this morning. She denies flatus. Urinary catheter in place. Patient has not yet ambulated.    Scheduled Meds:   acetaminophen  1,000 mg Oral Q6H    docusate sodium  100 mg Oral BID    senna  8.6 mg Oral BID     Continuous Infusions:   hydromorphone in 0.9 % NaCl 6 mg/30 ml      insulin regular 1 units/mL infusion orderable (DKA) 3.2 Units/hr (22 0147)    lactated ringers 40 mL/hr at 22 1345     PRN Meds:dextrose 10%, dextrose 10%,  dextrose 10%, glucagon (human recombinant), HYDROmorphone, insulin aspart U-100, melatonin, naloxone, ondansetron, opium-belladonna 16.2-60 mg, oxyCODONE, oxyCODONE, prochlorperazine    Review of patient's allergies indicates:   Allergen Reactions    Glipizide Other (See Comments)       Objective:     Vital Signs (Most Recent):  Temp: 98.8 °F (37.1 °C) (03/31/22 0357)  Pulse: 107 (03/31/22 0357)  Resp: 16 (03/31/22 0357)  BP: (!) 141/67 (03/31/22 0357)  SpO2: 98 % (03/31/22 0357)   Vital Signs (24h Range):  Temp:  [97 °F (36.1 °C)-98.8 °F (37.1 °C)] 98.8 °F (37.1 °C)  Pulse:  [] 107  Resp:  [0-21] 16  SpO2:  [97 %-100 %] 98 %  BP: ()/(48-89) 141/67  Arterial Line BP: ()/(52-77) 137/77     Weight: 49 kg (108 lb)  Body mass index is 21.09 kg/m².    Intake/Output - Last 3 Shifts         03/29 0700  03/30 0659 03/30 0700  03/31 0659    I.V. (mL/kg)  834.6 (17)    Blood  1250    IV Piggyback  5200    Total Intake(mL/kg)  7284.6 (148.7)    Urine (mL/kg/hr)  870 (0.7)    Blood  1800    Total Output  2670    Net  +4614.6          Stool Occurrence  0 x               Physical Exam:   Constitutional: She is oriented to person, place, and time. She appears well-developed and well-nourished. No distress.    HENT:   Head: Normocephalic and atraumatic.    Eyes: Conjunctivae and EOM are normal.    Neck: No thyromegaly present. R IJ in place with occlusive dressing which appears clean/dy/intact   Cardiovascular:  Normal rate and regular rhythm.             Pulmonary/Chest: Effort normal. No respiratory distress.        Abdominal: Soft. She exhibits no distension. Abdominal incision: Midline supra/infraumbilical vertical incision with pressure dressing overlying; appears clean/dry/intact.There is no abdominal tenderness.   Hypoactive bowel sounds in all 4 quadrants      Genitourinary:    Genitourinary Comments: Lyn in place, draining approximately 300 ccs of clear urine              Musculoskeletal: Normal range  of motion and moves all extremeties. No tenderness or edema.      Comments: TEDs/SCDs on        Neurological: She is alert and oriented to person, place, and time.    Skin: Skin is warm and dry. No rash noted.    Psychiatric: She has a normal mood and affect. Her behavior is normal.     Lines/Drains/Airways       Central Venous Catheter Line  Duration             Percutaneous Central Line Insertion/Assessment - Triple Lumen  03/30/22 2045 right internal jugular <1 day              Drain  Duration                  Urethral Catheter 03/30/22 0818 Non-latex 16 Fr. <1 day                  Laboratory: AM labs pending     UOP: 0.58 cc/kg/hr over past 11 hours     Assessment/Plan:     * s/p CIRILO/BSO/omentectomy on 03/30/22  - Postop D#1 s/p CIRILO/BSO/omentectomy   - continue routine postop care.   - continue pain control with scheduled tylenol, oxy IR and dilaudid BTP   - continue regular diet. IVFs (LR @ 40cc/hr with saline lock once tolerating PO).  - IS at bedside  - Vaginal packing/hernández in place. Will discontinue hernández in am with passive VT after AM labs return    - UOP adequate overnight   - F/u H/H in the AM, yet pending   - DVT ppx: encourage ambulation, TEDs/SCDs, will hold off on anticoagulation for now     Depression  - Stable  - Continue home venlafaxine     Type 2 diabetes mellitus with hyperglycemia  - Asymptomatic  - Patient was started on insulin gtt in PACU due to BG in 450s  - Since then, BG have been in the 170s-420s   - Recent A1c 6.8% (03/2022), patient not on any home medications  - Will consider discontinuation of insulin gtt later today after consistently <150s     Anemia of chronic disease  - Asymptomatic however patient has not ambulated  - Pre operative H/h: 8/25>3 units>11/35>10/30     - EBL approximately 1800   - AM labs pending, will transfuse if necessary   - Monitor urine output closely       Stage 4 chronic kidney disease  - Stable  - Per chart review, baseline Cr appears to be around  1.3-2   - Immediate post operative Cr 2.1, morning labs pending   - Not on chronic dialysis but will consider if becomes clinically necessary  - Avoid nephrotoxic agents   - Monitor urine output closely     Essential hypertension  - Asymptomatic  - Patient was hypotensive in PACU with MAPs in the 60s however responsive to fluid boluses and anabella per anesthesia; there was also initially question of blood pressure cuff reading versus arterial line readings which were discrepant   - BP: ()/(48-89) 141/67  - Home norvasc held  - Monitor closely           VTE Risk Mitigation (From admission, onward)         Ordered     IP VTE HIGH RISK PATIENT  Once         03/30/22 1101                Was hernández catheter removed? No: pending morning labs     Isha Gallagher MD  Gynecologic Oncology  [facility]

## 2022-03-31 NOTE — ASSESSMENT & PLAN NOTE
- Asymptomatic  - Patient was started on insulin gtt in PACU due to BG in 450s  - Since then, BG have been in the 170s-420s   - Recent A1c 6.8% (03/2022), patient not on any home medications  - Will consider discontinuation of insulin gtt later today after consistently <150s

## 2022-03-31 NOTE — ANESTHESIA PROCEDURE NOTES
Central Line    Diagnosis: endometerial cancer  Patient location during procedure: PACU  Timeout: 3/30/2022 5:00 PM  Procedure end time: 3/30/2022 5:25 PM    Staffing  Authorizing Provider: Srini Aleman MD  Performing Provider: Campbell Reynolds MD    Staffing  Performed: resident/CRNA   Anesthesiologist: Srini Aleman MD  Resident/CRNA: Campbell Reynolds MD  Anesthesiologist was present at the time of the procedure.  Preanesthetic Checklist  Completed: patient identified, IV checked, site marked, risks and benefits discussed, surgical consent, monitors and equipment checked, pre-op evaluation, timeout performed and anesthesia consent given  Indication   Indication: vascular access, med administration     Anesthesia   general anesthesia    Central Line   Skin Prep: skin prepped with Betadine, skin prep agent completely dried prior to procedure  Sterile Barriers Followed: Yes    All five maximal barriers used- gloves, gown, cap, mask, and large sterile sheet    hand hygiene performed prior to central venous catheter insertion  Location: right internal jugular.   Catheter type: triple lumen  Catheter Size: 7 Fr  Inserted Catheter Length: 13 cm  Ultrasound: vascular probe with ultrasound   Vessel Caliber: small, patent, compressibility poor  Needle advanced into vessel with real time Ultrasound guidance.  Guidewire confirmed in vessel.  Image recorded and saved.  sterile gel and probe cover used in ultrasound-guided central venous catheter insertion   Manometry: Venous cannualation confirmed by visual estimation of blood vessel pressure using manometry.  Insertion Attempts: 1   Securement:line sutured    Post-Procedure    Adverse Events:none      Guidewire Guidewire removed intact. Guidewire removed intact, verified with nurse.

## 2022-03-31 NOTE — NURSING TRANSFER
Nursing Transfer Note      3/30/2022         Transfer To: 1034    Transfer via bed    Transfer with IV pole     Transported by PACU RN's    Medicines sent: Insulin infusion    Any special needs or follow-up needed: none    Chart send with patient: Yes    Notified: son    Patient reassessed at: 03/30 @ 2226    Upon arrival to floor: The patient was transferred to the floor per bed. Insulin infusion signed off with by MULU Quintana.

## 2022-03-31 NOTE — PLAN OF CARE
POC reviewed with patient and children. Lyn removed this morning per MD's orders, patient has voided post removal. Insulin drip discontinued per orders. Vital signs stable within shift. No acute events, will continue to monitor.

## 2022-03-31 NOTE — HOSPITAL COURSE
03/31/2022 - POD#1 s/p ex lap/CIRILO/BSO/omentectomy. Surgery complicated by EBL 1800 cc. NAEON. Evaluation by SICU attending at PACU who deemed patient stable enough to be admitted to floor. Central line was placed prior to transfer as patient required multiple IVs with poor access. Otherwise, she has been able to tolerate liquid PO intake without issue. Pain well controlled. Has not ambulated. Not passing flatus, no Bms. No vaginal bleeding. Lyn in place, UOP 0.58 cc/kg/hr overnight. K+ elevated at 5.7, other electrolytes WNL, EKG WNL.  04/01/2022 - POD#2, NAEON. Patient meeting all post-operative milestones. Pain well controlled. Voiding independently and passing flatus. Tolerating diet with no N/V. Ambulating with minimal assistance to restroom. UOP adequate. Cr improved to 2.4 on AM BMP, K+ normalized. Started on oral iron therapy for post-operative anemia. Central line removed per nursing staff.    The patient strongly desired discharge on POD#2. Prior to discharge patient was able to void, ambulate, pass flatus, tolerate p.o. and pain was well controlled with p.o. meds. Patient was given routine post-operative instructions for which patient voiced understanding. Patient was subsequently discharged home.

## 2022-03-31 NOTE — PROGRESS NOTES
Report given to Madhav HARDWICK.   Pt. IV dislodged in R arm. Fluids and PCA stopped. Anesthesia at bedside to obtain IV access. Pt. Son still at bedside.   Of note pt. Urine output since arrival to PACU is 325.

## 2022-04-01 VITALS
SYSTOLIC BLOOD PRESSURE: 161 MMHG | HEART RATE: 103 BPM | RESPIRATION RATE: 18 BRPM | DIASTOLIC BLOOD PRESSURE: 71 MMHG | OXYGEN SATURATION: 94 % | HEIGHT: 60 IN | WEIGHT: 108 LBS | TEMPERATURE: 99 F | BODY MASS INDEX: 21.2 KG/M2

## 2022-04-01 PROBLEM — Z86.73 HISTORY OF CVA (CEREBROVASCULAR ACCIDENT): Status: ACTIVE | Noted: 2022-04-01

## 2022-04-01 PROBLEM — E87.5 HYPERKALEMIA: Status: ACTIVE | Noted: 2022-04-01

## 2022-04-01 LAB
ANION GAP SERPL CALC-SCNC: 10 MMOL/L (ref 8–16)
BLD PROD TYP BPU: NORMAL
BLD PROD TYP BPU: NORMAL
BLOOD UNIT EXPIRATION DATE: NORMAL
BLOOD UNIT EXPIRATION DATE: NORMAL
BLOOD UNIT TYPE CODE: 6200
BLOOD UNIT TYPE CODE: 6200
BLOOD UNIT TYPE: NORMAL
BLOOD UNIT TYPE: NORMAL
BUN SERPL-MCNC: 44 MG/DL (ref 8–23)
CALCIUM SERPL-MCNC: 8.5 MG/DL (ref 8.7–10.5)
CHLORIDE SERPL-SCNC: 108 MMOL/L (ref 95–110)
CO2 SERPL-SCNC: 24 MMOL/L (ref 23–29)
CODING SYSTEM: NORMAL
CODING SYSTEM: NORMAL
CREAT SERPL-MCNC: 2.4 MG/DL (ref 0.5–1.4)
DISPENSE STATUS: NORMAL
DISPENSE STATUS: NORMAL
EST. GFR  (AFRICAN AMERICAN): 24 ML/MIN/1.73 M^2
EST. GFR  (NON AFRICAN AMERICAN): 20.9 ML/MIN/1.73 M^2
GLUCOSE SERPL-MCNC: 160 MG/DL (ref 70–110)
NUM UNITS TRANS PACKED RBC: NORMAL
NUM UNITS TRANS PACKED RBC: NORMAL
POCT GLUCOSE: 147 MG/DL (ref 70–110)
POCT GLUCOSE: 150 MG/DL (ref 70–110)
POCT GLUCOSE: 162 MG/DL (ref 70–110)
POTASSIUM SERPL-SCNC: 4.5 MMOL/L (ref 3.5–5.1)
SODIUM SERPL-SCNC: 142 MMOL/L (ref 136–145)

## 2022-04-01 PROCEDURE — 94761 N-INVAS EAR/PLS OXIMETRY MLT: CPT

## 2022-04-01 PROCEDURE — 80048 BASIC METABOLIC PNL TOTAL CA: CPT | Performed by: STUDENT IN AN ORGANIZED HEALTH CARE EDUCATION/TRAINING PROGRAM

## 2022-04-01 PROCEDURE — 25000003 PHARM REV CODE 250: Performed by: STUDENT IN AN ORGANIZED HEALTH CARE EDUCATION/TRAINING PROGRAM

## 2022-04-01 PROCEDURE — 99900035 HC TECH TIME PER 15 MIN (STAT)

## 2022-04-01 PROCEDURE — 94799 UNLISTED PULMONARY SVC/PX: CPT

## 2022-04-01 RX ORDER — SENNOSIDES 8.6 MG/1
1 TABLET ORAL 2 TIMES DAILY PRN
Qty: 60 TABLET | Refills: 1 | Status: ON HOLD | OUTPATIENT
Start: 2022-04-01 | End: 2022-09-27 | Stop reason: HOSPADM

## 2022-04-01 RX ORDER — OXYCODONE HYDROCHLORIDE 5 MG/1
5 TABLET ORAL EVERY 6 HOURS PRN
Qty: 20 TABLET | Refills: 0 | Status: ON HOLD | OUTPATIENT
Start: 2022-04-01 | End: 2022-09-27 | Stop reason: HOSPADM

## 2022-04-01 RX ORDER — ACETAMINOPHEN 325 MG/1
650 TABLET ORAL EVERY 6 HOURS PRN
Qty: 60 TABLET | Refills: 1 | Status: SHIPPED | OUTPATIENT
Start: 2022-04-01

## 2022-04-01 RX ORDER — FERROUS SULFATE 325(65) MG
325 TABLET, DELAYED RELEASE (ENTERIC COATED) ORAL DAILY
Qty: 60 TABLET | Refills: 2 | Status: ON HOLD | OUTPATIENT
Start: 2022-04-01 | End: 2023-02-05 | Stop reason: HOSPADM

## 2022-04-01 RX ADMIN — DOCUSATE SODIUM 100 MG: 100 CAPSULE ORAL at 09:04

## 2022-04-01 RX ADMIN — CLOPIDOGREL 75 MG: 75 TABLET, FILM COATED ORAL at 09:04

## 2022-04-01 RX ADMIN — ACETAMINOPHEN 1000 MG: 500 TABLET ORAL at 05:04

## 2022-04-01 RX ADMIN — SENNOSIDES 8.6 MG: 8.6 TABLET ORAL at 09:04

## 2022-04-01 RX ADMIN — ACETAMINOPHEN 1000 MG: 500 TABLET ORAL at 12:04

## 2022-04-01 NOTE — ASSESSMENT & PLAN NOTE
- Pre operative H/h: 8/25>3 units>11/35>10/30>9/26  - EBL approximately 1800 cc  - Asx for anemia after ambulation  - Adequate UOP

## 2022-04-01 NOTE — ASSESSMENT & PLAN NOTE
- Postop D#2 s/p CIRILO/BSO/omentectomy   - Continue routine post-operative care  - Continue pain control with scheduled tylenol, oxy IR and dilaudid BTP   - Continue renal diet, will d/c IVF given tolerating well  - IS at bedside  - Voiding independently, UOP adequate  - H/h stable   - DVT ppx: encourage ambulation, TEDs/SCDs, will restart patient's home plavix this AM, would not recommend extended DVT prophylaxis on D/C given patient's poor baseline renal function and functional status following chemotherapy

## 2022-04-01 NOTE — ASSESSMENT & PLAN NOTE
- Asymptomatic  - Recent A1c 6.8% (03/2022), patient on Trulicity at home  - Patient started on insulin gtt in PACU due to BG in 450s  - Insulin gtt discontinued on 3/31  - BG range overnight 150-225  - SSI PRN, required 4U overnight  - Check BG Q6H, goal range 140-180

## 2022-04-01 NOTE — SUBJECTIVE & OBJECTIVE
Interval History:   History provided for by patient's son who accompanies patient at bedside.    She reports mild pain adequately controlled by scheduled Tylenol overnight, did not require any PRN medications overnight. Rates pain at 5/10 this AM. Tolerating regular diet, denies nausea and vomiting. Passing flatus but has not yet had a BM. Ambulating to the restroom with minimal assistance from family. Desires discharge home today if able.    Scheduled Meds:   acetaminophen  1,000 mg Oral Q6H    clopidogreL  75 mg Oral Daily    docusate sodium  100 mg Oral BID    senna  8.6 mg Oral BID     Continuous Infusions:   lactated ringers 40 mL/hr at 03/30/22 1345     PRN Meds:dextrose 10%, dextrose 10%, glucagon (human recombinant), glucose, glucose, HYDROmorphone, insulin aspart U-100, melatonin, naloxone, ondansetron, opium-belladonna 16.2-60 mg, oxyCODONE, oxyCODONE, prochlorperazine    Review of patient's allergies indicates:   Allergen Reactions    Glipizide Other (See Comments)       Objective:     Vital Signs (Most Recent):  Temp: 98.3 °F (36.8 °C) (04/01/22 0741)  Pulse: (!) 112 (04/01/22 0741)  Resp: 18 (04/01/22 0741)  BP: 139/65 (04/01/22 0741)  SpO2: 97 % (04/01/22 0741)   Vital Signs (24h Range):  Temp:  [98.3 °F (36.8 °C)-99.5 °F (37.5 °C)] 98.3 °F (36.8 °C)  Pulse:  [] 112  Resp:  [16-18] 18  SpO2:  [95 %-100 %] 97 %  BP: (131-157)/(58-76) 139/65     Weight: 49 kg (108 lb)  Body mass index is 21.09 kg/m².    Intake/Output - Last 3 Shifts         03/30 0700  03/31 0659 03/31 0700 04/01 0659 04/01 0700 04/02 0659    P.O. 100 200     I.V. (mL/kg) 1063.6 (21.7) 360.4 (7.4)     Blood 1250      IV Piggyback 5200      Total Intake(mL/kg) 7613.6 (155.4) 560.4 (11.4)     Urine (mL/kg/hr) 1170 (1) 1950 (1.7)     Stool 0      Blood 1800      Total Output 2970 1950     Net +4643.6 -1389.6            Stool Occurrence 0 x                 Physical Exam:   Constitutional: She is oriented to person, place, and time.  She appears well-developed and well-nourished. No distress.    HENT:   Head: Normocephalic and atraumatic.    Eyes: Conjunctivae and EOM are normal.    Neck: No thyromegaly present.    Cardiovascular:  Normal rate and regular rhythm.             Pulmonary/Chest: Effort normal. No respiratory distress.        Abdominal: Soft. She exhibits no distension. Abdominal incision: Midline vertical incision with pressure dressing overlying, removed this AM; incision clean/dry/intact with steri-strips in place with minimal dried blood present, no erythema or drainage present.There is abdominal tenderness (Minimal, appropriate post-operative TTP on palpation). There is no rebound and no guarding.             Musculoskeletal: Normal range of motion and moves all extremeties. No tenderness or edema.      Comments: TEDs/SCDs on and functioning       Neurological: She is alert and oriented to person, place, and time.    Skin: Skin is warm and dry. No rash noted.    Psychiatric: She has a normal mood and affect. Her behavior is normal.     Lines/Drains/Airways       Central Venous Catheter Line  Duration             Percutaneous Central Line Insertion/Assessment - Triple Lumen  03/30/22 2045 right internal jugular 1 day                  Laboratory:  Recent Labs   Lab 03/30/22  1158 03/30/22  1427 03/30/22  1633 03/31/22  0443   WBC 13.08*  --  16.76* 13.66*   HGB 11.4*  --  10.3* 8.7*   HCT 35.0* 30* 29.9* 25.9*   MCV 89  --  83 84   *  --  108* 120*      Recent Labs   Lab 03/30/22  1158 03/31/22  0443 03/31/22  1504    140 142   K 5.3*  5.3* 5.4* 5.7*    108 109   CO2 15* 20* 23   BUN 37* 41* 44*   CREATININE 2.1* 2.4* 2.6*   * 161* 167*   MG  --   --  1.8   PHOS  --   --  4.4        UOP: 2.04  cc/kg/hr over past 12 hours

## 2022-04-01 NOTE — ASSESSMENT & PLAN NOTE
- Per chart review, baseline Cr appears to be around 1.3-2   - Immediate post operative Cr 2.1 > 2.6 on POD#1   - AM Cr pending  - Not on chronic dialysis but will consider if becomes clinically necessary  - Avoid nephrotoxic agents   - Monitor urine output closely, excellent overnight at 2.04 cc/kg/hr

## 2022-04-01 NOTE — ASSESSMENT & PLAN NOTE
- Asymptomatic  - Patient was hypotensive in PACU with MAPs in the 60s however responsive to fluid boluses and anabella per anesthesia; there was also initially question of blood pressure cuff reading versus arterial line readings which were discrepant   - BP: (131-157)/(58-76) 139/65  - Home norvasc held during admission

## 2022-04-01 NOTE — DISCHARGE SUMMARY
[facility]  Gynecologic Oncology  Discharge Summary    Patient Name: Anjali Dorantes  MRN: [id number]  Admission Date: 3/30/2022  Hospital Length of Stay: 2 days  Discharge Date and Time:  2022 4:06 PM  Attending Physician: Reuben Lopez MD   Discharging Provider: Geri Nolan MD  Primary Care Provider: Tasia Carrasco MD    HPI:   Anjali Dorantes is a 62 y.o. [id number] who presented for scheduled ex lap/CIRILO/BSO/omentectomy secondary to Stage 4 high grade endometrial vs. ovarian cancer now admitted for routine post-operative care.      Hospital Course:  2022 - POD#1 s/p ex lap/CIRILO/BSO/omentectomy. Surgery complicated by EBL 1800 cc. NAEON. Evaluation by SICU attending at PACU who deemed patient stable enough to be admitted to floor. Central line was placed prior to transfer as patient required multiple IVs with poor access. Otherwise, she has been able to tolerate liquid PO intake without issue. Pain well controlled. Has not ambulated. Not passing flatus, no Bms. No vaginal bleeding. Lyn in place, UOP 0.58 cc/kg/hr overnight. K+ elevated at 5.7, other electrolytes WNL, EKG WNL.  2022 - POD#2, NAEON. Patient meeting all post-operative milestones. Pain well controlled. Voiding independently and passing flatus. Tolerating diet with no N/V. Ambulating with minimal assistance to restroom. UOP adequate. Cr improved to 2.4 on AM BMP, K+ normalized. Started on oral iron therapy for post-operative anemia. Central line removed per nursing staff.    The patient strongly desired discharge on POD#2. Prior to discharge patient was able to void, ambulate, pass flatus, tolerate p.o. and pain was well controlled with p.o. meds. Patient was given routine post-operative instructions for which patient voiced understanding. Patient was subsequently discharged home.      Goals of Care Treatment Preferences:  Code Status: Full Code      Procedure(s) (LRB):  HYSTERECTOMY, TOTAL, ABDOMINAL, WITH BILATERAL  SALPINGO-OOPHORECTOMY (N/A)  LAPAROTOMY, EXPLORATORY (N/A)  OMENTECTOMY (N/A)       Significant Diagnostic Studies:  Recent Labs   Lab 03/30/22  1158 03/30/22  1427 03/30/22  1633 03/31/22  0443   WBC 13.08*  --  16.76* 13.66*   HGB 11.4*  --  10.3* 8.7*   HCT 35.0* 30* 29.9* 25.9*   MCV 89  --  83 84   *  --  108* 120*      Recent Labs   Lab 03/31/22  0443 03/31/22  1504 04/01/22  0744    142 142   K 5.4* 5.7* 4.5    109 108   CO2 20* 23 24   BUN 41* 44* 44*   CREATININE 2.4* 2.6* 2.4*   * 167* 160*   MG  --  1.8  --    PHOS  --  4.4  --           Pending Diagnostic Studies:     Procedure Component Value Units Date/Time    Specimen to Pathology, Surgery Gynecology and Obstetrics [[id number]] Collected: 03/30/22 1033    Order Status: Sent Lab Status: In process Updated: 03/30/22 1254        Final Active Diagnoses:    Diagnosis Date Noted POA    PRINCIPAL PROBLEM:  s/p CIRILO/BSO/omentectomy on 03/30/22 [Z90.710] 03/30/2022 No    History of CVA (cerebrovascular accident) [Z86.73] 04/01/2022 Not Applicable    Hyperkalemia [E87.5] 04/01/2022 No    Depression [F32.A] 03/31/2022 Yes    Type 2 diabetes mellitus with hyperglycemia [E11.65] 07/06/2021 Yes    Stage 4 chronic kidney disease [N18.4] 06/30/2021 Yes     Chronic    Anemia of chronic disease [D63.8] 06/30/2021 Yes    Essential hypertension [I10] 04/29/2021 Yes      Problems Resolved During this Admission:        Does this patient meet criteria for extended DVT prophylaxis? Would not recommend extended DVT prophylaxis on D/C given patient's poor baseline renal function and functional status following chemotherapy    Discharged Condition: good    Disposition: Home or Self Care    Follow Up:   Follow-up Information     Tasia Carrasco MD Follow up on 4/11/2022.    Specialty: Internal Medicine  Why: F/U appointment 11:00 AM  Contact information:  [address] [facility] [place] [address] [phone]             Reuben Lopez MD.  Schedule an appointment as soon as possible for a visit in 4 week(s).    Specialties: Gynecologic Oncology, Gynecology, Oncology  Why: Post-operative Visit  Contact information:  [address]  [facility] [id number] [phone]                       Patient Instructions:      Hemoglobin A1C   Standing Status: Future Number of Occurrences: 1 Standing Exp. Date: 05/16/23     Diet general     Lifting restrictions   Order Comments: No lifting greater than 15 pounds for six weeks.     Wound care routine (specify)   Order Comments: WOUND CARE: You may wash the wound with mild soap and water. You may shower at any time but should avoid immersing any abdominal incisions in water for at least two weeks after surgery or until the wound is completely healed. If given, please shower with Hibiclens soap until bottle is completely finished. Keep your wound clean and dry.  You should observe your incision for signs of infection which include redness, warmth, drainage or fever. Your steri-strips should fall off within 14 days of surgery, if not please remove them gently.     Call MD for:  temperature >100.4     Call MD for:  persistent nausea and vomiting     Call MD for:  severe uncontrolled pain     Call MD for:  difficulty breathing, headache or visual disturbances     Call MD for:  redness, tenderness, or signs of infection (pain, swelling, redness, odor or green/yellow discharge around incision site)     Call MD for:  hives     Call MD for:   Order Comments: Inability to void,urine is ketchup colored or you have large clots, vaginal bleeding is heavier than a period.    VAGINAL DISCHARGE: You may develop a vaginal discharge and intermittent vaginal spotting after surgery and up to 6 weeks postoperatively.  The discharge may have an odor and may change in color but it is normal.  This is due to dissolving stiches.  Contact your surgical team if you develop vaginal or vulvar irritation along with a discharge.  Also contact  your surgical team if you have vaginal discharge that smells like urine or stool.    CONSTIPATION REMEDIES: Patients are often constipated after surgery or with use of oral narcotic medicine. You should continue to take the stool softener, Senokot-S during the next six weeks, and consume adequate amounts of water.  If you have not had a bowel movement for 3 days after dismissal, or are uncomfortable and unable to pass stool, please try one or all of the following measures:  1.  Milk of Magnesia - 30 cc by mouth every 12 hours   2.  Dulcolax suppository - One suppository per rectum every 4-6 hours   3.  Metamucil, Fibercon or other bulk former - use as directed  4.  Fleets Enema      PAIN MEDICATIONS:   Take your pain medications as instructed. It is best to take pain medications before your pain becomes severe. This will allow you to take less medication yet have better pain relief. For the first 2 or 3 days it may be helpful to take your pain medications on a regular schedule (e.g. every 4 to 6 hours). This will help you to keep your pain under better control. You should then begin to take fewer medications each day until you no longer need them. Do not take pain medication on an empty stomach. This may lead to nausea and vomiting.     Call MD for:  persistent dizziness or light-headedness     Call MD for:  extreme fatigue     EKG 12-lead   Standing Status: Future Number of Occurrences: 1 Standing Exp. Date: 03/17/23     Type & Screen   Standing Status: Future Number of Occurrences: 1 Standing Exp. Date: 05/16/23     Activity as tolerated   Order Comments: PELVIC REST:  No douching, tampons, or intercourse for 8 weeks.    DRIVING:  No driving while on narcotics. Driving may be resumed initially with a competent passenger one to two weeks after surgery if no longer taking narcotics.     EXERCISE:  For six weeks your exercise should be limited to walking. You may walk as far as you wish, as long as you increase your  level of exertion gradually and avoid slippery surfaces. You may climb stairs as needed to get around, but should not use stair climbing for exercise.     Shower on day dressing removed (No bath)   Order Comments: You may shower at any time but should avoid immersing any abdominal incisions in water for at least 2 weeks after surgery or until the wound is completely healed.  If given, please shower with Hibaclens soap until bottle is completely finished.     Medications:  Reconciled Home Medications:      Medication List      START taking these medications    acetaminophen 325 MG tablet  Commonly known as: TYLENOL  Take 2 tablets (650 mg total) by mouth every 6 (six) hours as needed for Pain (Take every 6 hours for 5-7 days and then as needed).     ferrous sulfate 325 (65 FE) MG EC tablet  Take 1 tablet (325 mg total) by mouth once daily.     oxyCODONE 5 MG immediate release tablet  Commonly known as: ROXICODONE  Take 1 tablet (5 mg total) by mouth every 6 (six) hours as needed for Pain.     senna 8.6 mg tablet  Commonly known as: SENOKOT  Take 1 tablet by mouth 2 (two) times daily as needed for Constipation.        CONTINUE taking these medications    allopurinoL 100 MG tablet  Commonly known as: ZYLOPRIM  Take 100 mg by mouth once daily.     amLODIPine 5 MG tablet  Commonly known as: NORVASC  Take 5 mg by mouth once daily.     clopidogreL 75 mg tablet  Commonly known as: PLAVIX  Take 75 mg by mouth once daily.     ergocalciferol 50,000 unit Cap  Commonly known as: ERGOCALCIFEROL  Take 50,000 Units by mouth every 7 days.     losartan 50 MG tablet  Commonly known as: COZAAR  Take 50 mg by mouth once daily.     metoprolol tartrate 50 MG tablet  Commonly known as: LOPRESSOR  Take 50 mg by mouth 2 (two) times daily.     nateglinide 120 MG tablet  Commonly known as: STARLIX  Take 1 tablet by mouth 3 times daily FOR DIABETES     pantoprazole 20 MG tablet  Commonly known as: PROTONIX  Take 20 mg by mouth once daily.      rosuvastatin 20 MG tablet  Commonly known as: CRESTOR  take ONE tablet every night at bedtime (for cholesterol)     sodium bicarbonate 650 MG tablet  Take 1,300 mg by mouth.     TRULICITY 0.75 mg/0.5 mL pen injector  Generic drug: dulaglutide  Inject 0.75 mg into the skin every 7 days.     venlafaxine 75 MG tablet  Commonly known as: EFFEXOR  Take 75 mg by mouth 2 (two) times daily.     vitamin D 1000 units Tab  Commonly known as: VITAMIN D3  Take 1,000 Units by mouth once daily.            Geri Nolan MD   PGY-3  Gynecologic Oncology  [facility]

## 2022-04-01 NOTE — NURSING
Patient adequate for discharge  - AVS printed and reviewed with son who will be caretaker of patient when discharged  - IJ removed, catheter tip intact no complications  - MD assessed patient in person prior to discharge. MD aware of slight tachycardia and approved patient for discharge  - All meds delivered to bedside  - d/c to home with son and daughter driving   - no further questions at this time.  
Pt alert and orientated x4, vital signs stable.  Contacted on call Dr lopez high potassium level, EKG ordered and done, nothing abnormal noted and no further orders re potassium level received.  Asked to recheck blood glucose at 2200 as previous level was elevated but no coverage given, BS was 225 and 4 units insulin given as ordered. Blood sugars rechecked at midnight and 0600 per MD request, both stable and within parameters.  No complaints of pain voiced, scheduled tylenol given. Son at bedside over night  
Received pt from PACU, settled into room and vitals taken and stable.  SCD's applied per order.  PIV dislodged during transport awaiting ok to use central line.  Call from PACU aneathnatist has ok'd central line use. Blood sugar checked and Insulin infusion reconnected.  Blood sugars stable over night and infusion maintained with 1 hourly blood sugar checks.  Dilaudid PCA had be discontinued in PACU due to low blood pressure and no usage.  Scheduled tylenol given, no c/o pain voiced, pain score 2.  Discussed with Dr on call re PRN meds for pain and discontinuing order for PCA, same done.  Also asked about if hernández should be discontinued due to vaginal packing. Advised by on call Dr to not remove hernández until she had discussed with the team and she would contact us with decision, awaiting call.  
danger to others; mental illness expected to respond to inpatient care

## 2022-04-01 NOTE — PROGRESS NOTES
[facility]  Gynecologic Oncology  Progress Note    Patient Name: Anjali Dorantes  MRN: [id number]  Admission Date: 3/30/2022  Hospital Length of Stay: 2 days  Attending Provider: Reuben Lopez MD  Primary Care Provider: Tasia Carrasco MD  Principal Problem: S/P CIRILO (total abdominal hysterectomy)    Follow-up For: Procedure(s) (LRB):  HYSTERECTOMY, TOTAL, ABDOMINAL, WITH BILATERAL SALPINGO-OOPHORECTOMY (N/A)  LAPAROTOMY, EXPLORATORY (N/A)  OMENTECTOMY (N/A)  Post-Operative Day: 2 Days Post-Op  Subjective:      History of Present Illness:  Anjali Dorantes is a 62 y.o. [id number] who presented for scheduled ex lap/CIRILO/BSO/omentectomy secondary to Stage 4 high grade endometrial vs. ovarian cancer now admitted for routine post-operative care.      Hospital Course:  2022 - NAEON. Evaluation by SICU attending at PACU who deemed patient stable enough to be admitted to floor. Central line was placed prior to transfer as patient required multiple IVs with poor access. Otherwise, she has been able to tolerate liquid PO intake without issue. Pain well controlled. Has not ambulated. Not passing flatus, no Bms. No vaginal bleeding. Lyn in place, UOP 0.58 cc/kg/hr overnight. K+ elevated at 5.7, other electrolytes WNL, EKG WNL.  2022 - POD#2, NAEON. Patient meeting all post-operative milestones. Pain well controlled. Voiding independently and passing flatus. Tolerating diet with no N/V. Ambulating with minimal assistance to restroom. UOP adequate. AM BMP pending. Patient desires discharge home today if able.      Interval History:   History provided for by patient's son who accompanies patient at bedside.    She reports mild pain adequately controlled by scheduled Tylenol overnight, did not require any PRN medications overnight. Rates pain at 5/10 this AM. Tolerating regular diet, denies nausea and vomiting. Passing flatus but has not yet had a BM. Ambulating to the restroom with minimal assistance from family.  Desires discharge home today if able.    Scheduled Meds:   acetaminophen  1,000 mg Oral Q6H    clopidogreL  75 mg Oral Daily    docusate sodium  100 mg Oral BID    senna  8.6 mg Oral BID     Continuous Infusions:   lactated ringers 40 mL/hr at 03/30/22 1345     PRN Meds:dextrose 10%, dextrose 10%, glucagon (human recombinant), glucose, glucose, HYDROmorphone, insulin aspart U-100, melatonin, naloxone, ondansetron, opium-belladonna 16.2-60 mg, oxyCODONE, oxyCODONE, prochlorperazine    Review of patient's allergies indicates:   Allergen Reactions    Glipizide Other (See Comments)       Objective:     Vital Signs (Most Recent):  Temp: 98.3 °F (36.8 °C) (04/01/22 0741)  Pulse: (!) 112 (04/01/22 0741)  Resp: 18 (04/01/22 0741)  BP: 139/65 (04/01/22 0741)  SpO2: 97 % (04/01/22 0741)   Vital Signs (24h Range):  Temp:  [98.3 °F (36.8 °C)-99.5 °F (37.5 °C)] 98.3 °F (36.8 °C)  Pulse:  [] 112  Resp:  [16-18] 18  SpO2:  [95 %-100 %] 97 %  BP: (131-157)/(58-76) 139/65     Weight: 49 kg (108 lb)  Body mass index is 21.09 kg/m².    Intake/Output - Last 3 Shifts         03/30 0700 03/31 0659 03/31 0700 04/01 0659 04/01 0700 04/02 0659    P.O. 100 200     I.V. (mL/kg) 1063.6 (21.7) 360.4 (7.4)     Blood 1250      IV Piggyback 5200      Total Intake(mL/kg) 7613.6 (155.4) 560.4 (11.4)     Urine (mL/kg/hr) 1170 (1) 1950 (1.7)     Stool 0      Blood 1800      Total Output 2970 1950     Net +4643.6 -1389.6            Stool Occurrence 0 x                 Physical Exam:   Constitutional: She is oriented to person, place, and time. She appears well-developed and well-nourished. No distress.    HENT:   Head: Normocephalic and atraumatic.    Eyes: Conjunctivae and EOM are normal.    Neck: No thyromegaly present.    Cardiovascular:  Normal rate and regular rhythm.             Pulmonary/Chest: Effort normal. No respiratory distress.        Abdominal: Soft. She exhibits no distension. Abdominal incision: Midline vertical incision  with pressure dressing overlying, removed this AM; incision clean/dry/intact with steri-strips in place with minimal dried blood present, no erythema or drainage present.There is abdominal tenderness (Minimal, appropriate post-operative TTP on palpation). There is no rebound and no guarding.             Musculoskeletal: Normal range of motion and moves all extremeties. No tenderness or edema.      Comments: TEDs/SCDs on and functioning       Neurological: She is alert and oriented to person, place, and time.    Skin: Skin is warm and dry. No rash noted.    Psychiatric: She has a normal mood and affect. Her behavior is normal.     Lines/Drains/Airways       Central Venous Catheter Line  Duration             Percutaneous Central Line Insertion/Assessment - Triple Lumen  03/30/22 2045 right internal jugular 1 day                  Laboratory:  Recent Labs   Lab 03/30/22  1158 03/30/22  1427 03/30/22  1633 03/31/22  0443   WBC 13.08*  --  16.76* 13.66*   HGB 11.4*  --  10.3* 8.7*   HCT 35.0* 30* 29.9* 25.9*   MCV 89  --  83 84   *  --  108* 120*      Recent Labs   Lab 03/30/22  1158 03/31/22  0443 03/31/22  1504    140 142   K 5.3*  5.3* 5.4* 5.7*    108 109   CO2 15* 20* 23   BUN 37* 41* 44*   CREATININE 2.1* 2.4* 2.6*   * 161* 167*   MG  --   --  1.8   PHOS  --   --  4.4        UOP: 2.04  cc/kg/hr over past 12 hours    Assessment/Plan:     * s/p CIRILO/BSO/omentectomy on 03/30/22  - Postop D#2 s/p CIRILO/BSO/omentectomy   - Continue routine post-operative care  - Continue pain control with scheduled tylenol, oxy IR and dilaudid BTP   - Continue renal diet, will d/c IVF given tolerating well  - IS at bedside  - Voiding independently, UOP adequate  - H/h stable   - DVT ppx: encourage ambulation, TEDs/SCDs, will restart patient's home plavix this AM, would not recommend extended DVT prophylaxis on D/C given patient's poor baseline renal function and functional status following  chemotherapy    Hyperkalemia  - K+ 5.3 > 5.7 on 3/31  - EKG WNL, sinus tachycardia noted with  BPM  - AM BMP pending    History of CVA (cerebrovascular accident)  - Will restart patient's home plavix this AM    Depression  - Mood stable  - Continue home venlafaxine     Type 2 diabetes mellitus with hyperglycemia  - Asymptomatic  - Recent A1c 6.8% (03/2022), patient on Trulicity at home  - Patient started on insulin gtt in PACU due to BG in 450s  - Insulin gtt discontinued on 3/31  - BG range overnight 150-225  - SSI PRN, required 4U overnight  - Check BG Q6H, goal range 140-180    Anemia of chronic disease  - Pre operative H/h: 8/25>3 units>11/35>10/30>9/26  - EBL approximately 1800 cc  - Asx for anemia after ambulation  - Adequate UOP    Stage 4 chronic kidney disease  - Per chart review, baseline Cr appears to be around 1.3-2   - Immediate post operative Cr 2.1 > 2.6 on POD#1   - AM Cr pending  - Not on chronic dialysis but will consider if becomes clinically necessary  - Avoid nephrotoxic agents   - Monitor urine output closely, excellent overnight at 2.04 cc/kg/hr    Essential hypertension  - Asymptomatic  - Patient was hypotensive in PACU with MAPs in the 60s however responsive to fluid boluses and anabella per anesthesia; there was also initially question of blood pressure cuff reading versus arterial line readings which were discrepant   - BP: (131-157)/(58-76) 139/65  - Home norvasc held during admission        VTE Risk Mitigation (From admission, onward)         Ordered     IP VTE HIGH RISK PATIENT  Once         03/30/22 1101                Was hernández catheter removed? Yes      Geri Nolan MD   PGY-3  Gynecologic Oncology  [facility]

## 2022-04-02 NOTE — OP NOTE
DATE OF PROCEDURE:  3/30/2022     SURGEON:  Reuben Lopez M.D.     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     ESTIMATED BLOOD LOSS:  1800 mL with 3U PRBC replaced     ANESTHESIA:  General.     INTRAOPERATIVE FINDINGS:  Obliterated pelvic with treated cancer and residual right and left ovarian masses.  Malignant adhesions of the right ovary to the posterior uterus, right pelvic sidewall and sigmoid colon.  At the conclusion of the procedure she had been optimally resected.     PROCEDURE IN DETAIL:  After informed consent was obtained, the patient was taken   to the Operating Suite.  General anesthesia was administered.  Once this was   felt to be adequate, she was placed in dorsal lithotomy position.  The abdomen   and pelvis were prepped and draped in the usual sterile fashion, and a Lyn   catheter was placed to gravity drainage.  A vertical midline incision was   performed up and around the umbilicus, and this was carried down the fascia with   monopolar cautery.  The fascia was incised, and the peritoneum was identified   and entered sharply.  The peritoneal defect was then extended, and the above-stated   findings were noted.  The Bookwalter   self-retaining retractor was placed, and the bowel was packed out of the   operative field.  The colon and bladder were adhered to the uterus and cautery was used to carefully dissect these free.  The right ovarian mass was also carefully dissected from the posterior uterus and sigmoid. Bilateral uterine cornua were grasped with Kocher clamps.  The right round ligament was   transected, and the anterior and posterior  leaflets of broad ligament were   opened.  The pararectal space on the right side was opened and the ureter was   Identified. The ureter was dissected free and a vessel loop placed.   A window was made beneath the infindibulopelvic ligament and it was clamped, cut and doubly ligated.  Because of the adhesions to the colon, the uteroovarian was clamped and sacrificed leaving the ovary for later removal.  Attention was turned to the left side, and the left round ligament was transected in an identical manner to the right side, and the pararectal space was developed.  The ureter   was identified, infundibulopelvic ligament isolated, clamped, cut, doubly   ligated.  Anteriorly, the vesicouterine peritoneum was incised, and the bladder   was mobilized inferiorly.  The uterus was elevated, and the bilateral uterine   vessels were skeletonized of their peritoneal attachments.  Curved clamps were   placed at the level of the internal cervical os, and these pedicles were secured   with #1 chromic suture. The RV septum was entered as it was necessary to do this so that hysterectomy could be completed.  Serial straight clamps were then placed down the cardinal ligament and all pedicles secured with #1 chromic suture.  Once the level of the external cervical os had been reached, curved clamps were placed across the vagina and the uterus was   amputated.  Bhumika transfixion sutures were used to secure the vaginal angles,   and the vaginal cuff was closed with interrupted figure-of-eight sutures.  Due to the inflammatory nature and desmoplastic response of the right ovary, there was significant blood loss from the mass. This was ultimately removed with the Ligasure device.  Multiple small vascular bleeders were ligated with 3-0 Vicryl suture, and Surgiflo and fibrillar was placed.  Once   the posterior cul-de-sac and the vagina were noted to be hemostatic, packs were   placed in the pelvis, and attention was turned to the upper  abdominal portion of   the procedure.  At this point in time, a complete infracolic omentectomy was performed by   mobilizing the transverse colon and isolating the branches of the gastroepiploic   and omental vessels.  These pedicles were secured with the LigaSure device.  The pelvis was inspected and additional fibrillar   was applied.  Good hemostasis was then noted.  All pedicles were inspected and   noted to be hemostatic.  At this point in time, the Bookwalter self-retaining   retractor and laparotomy sponges were removed.  Once counts were correct x 2, the   fascia was grasped with Kocher clamps and closed in running nonlocking fashion   using PDS suture.  Subcutaneous tissue was made hemostatic with electrocautery,   and the skin was closed with subcuticular running 4-0 Monocryl suture.    Steri-Strips were applied, and the patient was awoken and taken to Recovery Room   in stable condition.  Of note, I was present for and performed all key aspects   of the procedure.  Dr. Marino's expertise was needed as there was no qualified resident available.

## 2022-04-04 NOTE — PHYSICIAN QUERY
PT Name: Anjali Dorantes  MR #: [id number]    DOCUMENTATION CLARIFICATION      CDS/: BRAXTON Moreno,RNC-MNN         Contact information:[email or web address]    This form is a permanent document in the medical record.      Query Date: April 4, 2022    By submitting this query, we are merely seeking further clarification of documentation. Please utilize your independent clinical judgment when addressing the question(s) below.    The Medical Record contains the following:   Indicators  Supporting Clinical Findings Location in Medical Record   X Anemia documented post-operative anemia Discharge Summary 4/1   X H&H Hgb=10.3-->11.4-->10.3-->8.  7  Hct=31.0-->35.0-->29.9-->25.9 LAB 3/30-3/31    BP                    HR      GI bleeding documented     X Acute bleeding (Non GI site) Surgery complicated by EBL 1800 cc Discharge Summary 4/1   X Transfusion(s) 3U PRBC replaced Op note 4/2   X Acute/Chronic illness s/p ex lap/CIRILO/BSO/omentectomy    Stage 4 high grade endometrial vs. ovarian cancer  Discharge Summary 4/1   X Treatments Started on oral iron therapy Discharge Summary 4/1    Other       Provider, please specify diagnosis or diagnoses associated with above clinical findings.   [   ] Acute blood loss anemia    [  x ] Acute blood loss anemia expected post-operatively    [   ] Other Hematological Diagnosis (please specify): _________________   [   ] Clinically Undetermined              Please document in your progress notes daily for the duration of treatment, until resolved, and include in your discharge summary.    Form No. [id number]

## 2022-04-18 LAB
FINAL PATHOLOGIC DIAGNOSIS: NORMAL
GROSS: NORMAL
Lab: NORMAL
SUPPLEMENTAL DIAGNOSIS: NORMAL

## 2022-04-19 NOTE — PHYSICIAN QUERY
PT Name: Anjali Dorantes  MR #: [id number]    DOCUMENTATION CLARIFICATION     CDS/: BRAXTON Moreno,RNC-MNN      Contact information:[email or web address]  This form is a permanent document in the medical record.     Query Date: April 19, 2022    By submitting this query, we are merely seeking further clarification of documentation.  Please utilize your independent clinical judgment when addressing the question(s) below.    The medical record contains the following:  Pathology Findings Location in Medical Record   1. Uterus, cervix, left fallopian tube and ovary; total hysterectomy and left   salpingo-oophorectomy:   - Uterus with benign endometrial polyp and leiomyolipoma      2. Right fallopian tube and ovary, salpingo-oophorectomy:   - High grade serous carcinoma   - Pathologic stage: ypT1c2   - FIGO stage: 1C2   - HER2 MICHELLE pending   - See synoptic report3.      ADDITIONAL FINDINGS   - Endometriosis involving posterior uterine surface    PROCEDURES:  Exploratory laparotomy, total abdominal hysterectomy, bilateral   salpingo-oophorectomy, radical mass resection, omentectomy, bilateral ureterolysis    INTRAOPERATIVE FINDINGS:  Obliterated pelvic with treated cancer and residual right and left ovarian masses.  Malignant adhesions of the right ovary to the posterior uterus, right pelvic sidewall and sigmoid colon.  At the conclusion of the procedure she had been optimally resected Pathology report 3/30                              Op note 4/2       Please clarify:  [  x] Pathology findings noted above are ruled in/confirmed as diagnoses   [  ] Pathology findings noted above are not confirmed as diagnoses   [  ] Pathology findings noted above are incidental   [  ] Other diagnosis (please specify): ___________   [  ] Clinically Undetermined       Please document in your progress notes daily for the duration of treatment until resolved and include in your discharge summary.    Form No. [id number]

## 2022-04-29 ENCOUNTER — OFFICE VISIT (OUTPATIENT)
Dept: GYNECOLOGIC ONCOLOGY | Facility: CLINIC | Age: 62
End: 2022-04-29
Payer: MEDICARE

## 2022-04-29 VITALS
HEART RATE: 84 BPM | BODY MASS INDEX: 22.46 KG/M2 | SYSTOLIC BLOOD PRESSURE: 147 MMHG | DIASTOLIC BLOOD PRESSURE: 68 MMHG | WEIGHT: 115 LBS

## 2022-04-29 DIAGNOSIS — C56.3 OVARIAN CANCER, BILATERAL: Primary | ICD-10-CM

## 2022-04-29 PROCEDURE — 99999 PR PBB SHADOW E&M-EST. PATIENT-LVL III: ICD-10-PCS | Mod: PBBFAC,,, | Performed by: OBSTETRICS & GYNECOLOGY

## 2022-04-29 PROCEDURE — 99214 PR OFFICE/OUTPT VISIT, EST, LEVL IV, 30-39 MIN: ICD-10-PCS | Mod: 24,S$PBB,, | Performed by: OBSTETRICS & GYNECOLOGY

## 2022-04-29 PROCEDURE — 99999 PR PBB SHADOW E&M-EST. PATIENT-LVL III: CPT | Mod: PBBFAC,,, | Performed by: OBSTETRICS & GYNECOLOGY

## 2022-04-29 PROCEDURE — 99213 OFFICE O/P EST LOW 20 MIN: CPT | Mod: PBBFAC | Performed by: OBSTETRICS & GYNECOLOGY

## 2022-04-29 PROCEDURE — 99214 OFFICE O/P EST MOD 30 MIN: CPT | Mod: 24,S$PBB,, | Performed by: OBSTETRICS & GYNECOLOGY

## 2022-04-29 NOTE — PROGRESS NOTES
Subjective:      Patient ID: Anjali Dorantes is a 62 y.o. female.    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 Pertinent negatives include no abdominal pain, arthralgias, chest pain, chills, coughing, fever, nausea, numbness, rash, sore throat, vomiting or weakness.     Here today for post-op check and treatment planning.  Has done well since surgery.  Reports no issues.  Has normal bladder and bowel function, denies F/C/N/V.  No VB.  Ready to restart treatment.  Findings of surgery again reviewed with her.   Review of Systems   Constitutional: Positive for fatigue. Negative for activity change, appetite change, chills and fever.   HENT: Negative for hearing loss, mouth sores, nosebleeds, sore throat and tinnitus.    Eyes: Negative for visual disturbance.   Respiratory: Negative for cough, chest tightness, shortness of breath and wheezing.    Cardiovascular: Negative for chest pain and leg swelling.   Gastrointestinal: Positive for abdominal distention. Negative for abdominal pain, blood in stool, constipation, diarrhea, nausea and vomiting.   Genitourinary: Negative for dysuria, flank pain, frequency, hematuria, pelvic pain, vaginal bleeding, vaginal discharge and vaginal pain.   Musculoskeletal: Negative for arthralgias and back pain.   Skin: Negative for rash.   Neurological: Negative for dizziness, seizures, syncope, weakness and numbness.   Hematological: Does not bruise/bleed easily.   Psychiatric/Behavioral: Negative for confusion and sleep disturbance. The patient is not  nervous/anxious.        Objective:   Physical Exam:   Constitutional: She is oriented to person, place, and time. She appears well-developed and well-nourished. No distress.    HENT:   Head: Normocephalic and atraumatic.    Eyes: No scleral icterus.     Cardiovascular: Normal rate and intact distal pulses.  Exam reveals no cyanosis and no edema.     Pulmonary/Chest: Effort normal. No respiratory distress. She exhibits no tenderness.        Abdominal: Soft. She exhibits no distension, no fluid wave and no mass. There is no abdominal tenderness. There is no rigidity, no rebound and no guarding. No hernia.   Incision healing well         Genitourinary:    Vagina and rectum normal.      Pelvic exam was performed with patient supine.   Labial bartholins normal.There is no rash, tenderness or lesion on the right labia. There is no rash, tenderness or lesion on the left labia. Right adnexum displays no mass, no tenderness and no fullness. Left adnexum displays no mass, no tenderness and no fullness. Vaginal cuff normal.  No  no vaginal discharge, bleeding or unspecified prolapse of vaginal walls in the vagina. Cervix is absent.Uterus is absent.           Musculoskeletal: Normal range of motion and moves all extremeties. No edema.      Lymphadenopathy:     She has no cervical adenopathy.    Neurological: She is alert and oriented to person, place, and time.    Skin: Skin is warm. No rash noted. No cyanosis or erythema.    Psychiatric: She has a normal mood and affect. Thought content normal.       Assessment:     1. Ovarian cancer, bilateral        Plan:       Long discussion with her and her family member regarding her surgery findings, and final path revealing an ovarian primary.  Considering how well she tolerated surgery, will add Taxol to her Carboplatin for her additional 3 cycles of treatment.  In addition, will send for [facility] NGS testing.  Germline testing performed today.  The R/B/I of chemotherapy were explained  to the patient including alopecia, N/V, BM suppression, fatigue, need for transfusion and peripheral neuropathy.  The patient voiced understanding, all questions were answered and consents were signed.

## 2022-05-10 ENCOUNTER — TELEPHONE (OUTPATIENT)
Dept: HEMATOLOGY/ONCOLOGY | Facility: CLINIC | Age: 62
End: 2022-05-10
Payer: MEDICARE

## 2022-05-10 DIAGNOSIS — C56.9 MALIGNANT NEOPLASM OF OVARY, UNSPECIFIED LATERALITY: Primary | ICD-10-CM

## 2022-05-16 ENCOUNTER — PATIENT MESSAGE (OUTPATIENT)
Dept: ADMINISTRATIVE | Facility: OTHER | Age: 62
End: 2022-05-16
Payer: MEDICARE

## 2022-06-06 ENCOUNTER — PATIENT MESSAGE (OUTPATIENT)
Dept: GYNECOLOGIC ONCOLOGY | Facility: CLINIC | Age: 62
End: 2022-06-06
Payer: MEDICARE

## 2022-06-06 ENCOUNTER — PATIENT MESSAGE (OUTPATIENT)
Dept: ADMINISTRATIVE | Facility: OTHER | Age: 62
End: 2022-06-06
Payer: MEDICARE

## 2022-06-06 DIAGNOSIS — C56.2 MALIGNANT NEOPLASM OF LEFT OVARY: ICD-10-CM

## 2022-06-06 DIAGNOSIS — Z51.11 ENCOUNTER FOR ANTINEOPLASTIC CHEMOTHERAPY: Primary | ICD-10-CM

## 2022-06-10 ENCOUNTER — TELEPHONE (OUTPATIENT)
Dept: GYNECOLOGIC ONCOLOGY | Facility: CLINIC | Age: 62
End: 2022-06-10
Payer: MEDICARE

## 2022-06-10 ENCOUNTER — HOSPITAL ENCOUNTER (EMERGENCY)
Facility: HOSPITAL | Age: 62
Discharge: HOME | End: 2022-06-10
Attending: EMERGENCY MEDICINE
Payer: MEDICARE

## 2022-06-10 VITALS
DIASTOLIC BLOOD PRESSURE: 58 MMHG | HEIGHT: 60 IN | RESPIRATION RATE: 15 BRPM | SYSTOLIC BLOOD PRESSURE: 129 MMHG | WEIGHT: 115 LBS | BODY MASS INDEX: 22.58 KG/M2 | TEMPERATURE: 99 F | HEART RATE: 80 BPM | OXYGEN SATURATION: 100 %

## 2022-06-10 DIAGNOSIS — N28.9 RENAL INSUFFICIENCY: ICD-10-CM

## 2022-06-10 LAB
BACTERIA #/AREA URNS HPF: NORMAL /HPF
BILIRUB UR QL STRIP: NEGATIVE
CLARITY UR: CLEAR
COLOR UR: COLORLESS
GLUCOSE UR QL STRIP: ABNORMAL
HGB UR QL STRIP: NEGATIVE
HYALINE CASTS #/AREA URNS LPF: 0 /LPF
KETONES UR QL STRIP: NEGATIVE
LEUKOCYTE ESTERASE UR QL STRIP: NEGATIVE
MICROSCOPIC COMMENT: NORMAL
NITRITE UR QL STRIP: NEGATIVE
PH UR STRIP: 8 [PH] (ref 5–8)
PROT UR QL STRIP: ABNORMAL
RBC #/AREA URNS HPF: 3 /HPF (ref 0–4)
SP GR UR STRIP: 1.01 (ref 1–1.03)
SQUAMOUS #/AREA URNS HPF: 2 /HPF
URN SPEC COLLECT METH UR: ABNORMAL
UROBILINOGEN UR STRIP-ACNC: NEGATIVE EU/DL
WBC #/AREA URNS HPF: 1 /HPF (ref 0–5)

## 2022-06-10 PROCEDURE — 99284 EMERGENCY DEPT VISIT MOD MDM: CPT | Mod: 25

## 2022-06-10 PROCEDURE — 96360 HYDRATION IV INFUSION INIT: CPT

## 2022-06-10 PROCEDURE — 25000003 PHARM REV CODE 250: Performed by: EMERGENCY MEDICINE

## 2022-06-10 PROCEDURE — 81000 URINALYSIS NONAUTO W/SCOPE: CPT | Performed by: EMERGENCY MEDICINE

## 2022-06-10 RX ORDER — SODIUM CHLORIDE 9 MG/ML
500 INJECTION, SOLUTION INTRAVENOUS
Status: COMPLETED | OUTPATIENT
Start: 2022-06-10 | End: 2022-06-10

## 2022-06-10 RX ADMIN — SODIUM CHLORIDE 500 ML: 0.9 INJECTION, SOLUTION INTRAVENOUS at 05:06

## 2022-06-10 NOTE — ED PROVIDER NOTES
Encounter Date: 6/10/2022    SCRIBE #1 NOTE: IBaltazar, am scribing for, and in the presence of, Kae Quarles MD.       History     Chief Complaint   Patient presents with    Sent by MD      Patient c/o abnormal labs (kidney function), patient sent to ED by nephrologist due to abnormal kidney function. Patient has no complaints and not in distress at triage. Nephrologist requesting renal workup.   Hx of Uterine cancer,  sx to remove tumor (03/30/22)     Anjali Dorantes is a 62 y.o. female with a PMHx of ovarian cancer, endometrial cancer, CKD, DM, and HTN who presents to the Emergency Department for evaluation of abnormal labs that were drawn earlier today. Patient explains that she was scheduled to resume chemotherapy, but was instructed by her Nephrologist to come to the ED for a renal workup secondary to her abnormal labs. She states her Nephrologist is Dr. Belinda Cervantes. Patient denies any complaints at this time.    The history is provided by the patient.     Review of patient's allergies indicates:   Allergen Reactions    Glipizide Other (See Comments)     Past Medical History:   Diagnosis Date    AMS (altered mental status) 06/30/2021    Anemia in CKD (chronic kidney disease)     CKD (chronic kidney disease)     Diabetes mellitus     Encounter for blood transfusion     Hypercholesterolemia     Hypertension     Stroke 2015    TIA    Uterine cancer 2021    Endometrial Cancer     Past Surgical History:   Procedure Laterality Date    ARTERIOGRAM, CEREBRAL ARTERIES  02/12/2014    and 3/18/2014    OMENTECTOMY N/A 3/30/2022    Procedure: OMENTECTOMY;  Surgeon: Reuben Lopez MD;  Location: [facility] OR [address] [place] [address];  Service: OB/GYN;  Laterality: N/A;    TOTAL ABDOMINAL HYSTERECTOMY W/ BILATERAL SALPINGOOPHORECTOMY N/A 3/30/2022    Procedure: HYSTERECTOMY, TOTAL, ABDOMINAL, WITH BILATERAL SALPINGO-OOPHORECTOMY;  Surgeon: Reuben Lopez MD;  Location: [facility] OR [address] [place] [address];  Service: OB/GYN;  Laterality: N/A;      No family history on file.  Social History     Tobacco Use    Smoking status: Never Smoker    Smokeless tobacco: Never Used   Substance Use Topics    Alcohol use: No    Drug use: No     Review of Systems   Constitutional: Negative for fever.   HENT: Negative for sore throat.    Eyes: Negative for pain.   Respiratory: Negative for shortness of breath.    Cardiovascular: Negative for chest pain.   Gastrointestinal: Negative for abdominal pain and nausea.   Genitourinary: Negative for dysuria.   Musculoskeletal: Negative for back pain.   Skin: Negative for rash.   Neurological: Negative for weakness.       Physical Exam     Initial Vitals [06/10/22 1456]   BP Pulse Resp Temp SpO2   (!) 119/58 95 16 98.1 °F (36.7 °C) 98 %      MAP       --         Physical Exam    Nursing note and vitals reviewed.  Constitutional: She is not diaphoretic. No distress.   HENT:   Head: Normocephalic and atraumatic.   Mouth/Throat: Oropharynx is clear and moist.   Eyes: Conjunctivae and EOM are normal. No scleral icterus.   Neck: Neck supple. No tracheal deviation present.   Normal range of motion.  Cardiovascular: Normal rate, regular rhythm and intact distal pulses.   Pulmonary/Chest: Breath sounds normal. No stridor. No respiratory distress.   Abdominal: Abdomen is soft. She exhibits no distension. There is no abdominal tenderness.   Musculoskeletal:         General: No tenderness or edema. Normal range of motion.      Cervical back: Normal range of motion and neck supple.     Neurological: She is alert. She has normal strength. No cranial nerve deficit or sensory deficit. GCS score is 15. GCS eye subscore is 4. GCS verbal subscore is 5. GCS motor subscore is 6.   Skin: Skin is warm and dry.   Psychiatric: She has a normal mood and affect.         ED Course   Procedures  Labs Reviewed   URINALYSIS, REFLEX TO URINE CULTURE - Abnormal; Notable for the following components:       Result Value    Color, UA Colorless (*)     Protein,  UA 1+ (*)     Glucose, UA 1+ (*)     All other components within normal limits    Narrative:     Specimen Source->Urine   URINALYSIS MICROSCOPIC    Narrative:     Specimen Source->Urine          Imaging Results          US Retroperitoneal Complete (Final result)  Result time 06/10/22 17:40:44   Procedure changed from US Abdomen Pelvis Doppler Study Limited     Final result by Lexy Lin MD (06/10/22 17:40:44)                 Impression:      Elevated bilateral renal indices.  Findings may suggest medical renal disease.      Electronically signed by: Lexy Lin  Date:    06/10/2022  Time:    17:40             Narrative:    EXAMINATION:  ULTRASOUND RETROPERITONEAL COMPLETE    CLINICAL HISTORY:  Disorder of kidney and ureter, unspecifiedRenal insufficiency;    TECHNIQUE:  Real-time ultrasound of the retroperitoneum was performed, including the urinary bladder and kidneys.    FINDINGS:  Right kidney measures 8.7 cm.  The resistive index is 0.77.  There is no hydronephrosis.    Left kidney measures 9.8 cm.  The resistive index is 0.8.  There is no hydronephrosis.    The bladder demonstrates bilateral ureteral jets.                                 Medications   0.9%  NaCl infusion (0 mLs Intravenous Stopped 6/10/22 1900)     Medical Decision Making:   History:   Old Medical Records: I decided to obtain old medical records.  Differential Diagnosis:   Differential diagnosis includes, but is not limited to: renal failure, chemotherapy side effect, dehydration.  Clinical Tests:   Lab Tests: Ordered and Reviewed  Radiological Study: Reviewed and Ordered  ED Management:  Vitals within acceptable ranges.  Patient is not toxic appearing and does not have current medical complaint.  Labs from this morning reviewed.  Potassium is within normal ranges.  Patient is not hypoxic, tachypneic or in respiratory distress.  Patient is not appear to require emergent dialysis.    .Consult: I have spoken with Dr. Ramos from the  Nephrology service regarding the patient's presentation and study results.  At this time, the recommendation is patient can have renal workup as an outpatient.  To facilitate renal workup can check urinalysis and renal ultrasound.  Dr. Ramos will see patient in nephrology clinic on Monday.        Urinalysis does not suggest UTI.  Renal ultrasound suggests medical renal disease.  Patient given IV hydration in the emergency department.  She remains clinically stable.  She is fit for discharge to follow up with Nephrology.  Patient and family member at the bedside counseled on supportive care, appropriate medication usage, concerning symptoms for which to return to ER and the importance of follow up. Understanding and agreement with treatment plan was expressed.   This chart was completed using dictation software, as a result there may be some transcription errors.           Scribe Attestation:   Scribe #1: I performed the above scribed service and the documentation accurately describes the services I performed. I attest to the accuracy of the note.                 Clinical Impression:   Final diagnoses:  [N28.9] Renal insufficiency          ED Disposition Condition    Discharge Stable        ED Prescriptions     None        Follow-up Information     Follow up With Specialties Details Why Contact Info    Mica Ramos MD Nephrology Go on 6/13/2022 Call in the morning for appointment time [address] [place] [address] [id number]  [facility] [address]  [phone]           I, Kae Quarles , personally performed the services described in this documentation. All medical record entries made by the scribe were at my direction and in my presence. I have reviewed the chart and agree that the record reflects my personal performance and is accurate and complete.     Kae Quarles MD  06/10/22 1947

## 2022-06-10 NOTE — ED NOTES
Patient presents to ED reports was sent for evaluation of abnormal creatinine level. Patient asymptomatic. Last urinated 30 minutes PTA, was clear urine per patient.

## 2022-06-11 NOTE — DISCHARGE INSTRUCTIONS
Drink adequate fluids to remain hydrated.  Continue your medications as you have been prescribed.  Follow-up with Dr. Ramos of Nephrology on Monday.  Return to the emergency department for any new, worsening or significantly concerning symptoms.    Thank you for coming to our Emergency Department today. It is important to remember that some problems are difficult to diagnose and may not be found during your first visit. Be sure to follow up with your primary care doctor and review any labs/imaging that was performed with them. If you do not have a primary care doctor, you may contact the one listed on your discharge paperwork or you may also call the [facility] Appointment Desk at [phone] to schedule an appointment with one.     All medications may potentially have side effects and it is impossible to predict which medications may give you side effects. If you feel that you are having a negative effect of any medication you should immediately stop taking them and seek medical attention.    Return to the ER with any questions/concerns, new/concerning symptoms, worsening or failure to improve. Do not drive or make any important decisions for 24 hours if you have received any pain medications, sedatives or mood altering drugs during your ER visit.

## 2022-06-17 ENCOUNTER — LAB VISIT (OUTPATIENT)
Dept: LAB | Facility: HOSPITAL | Age: 62
End: 2022-06-17
Attending: STUDENT IN AN ORGANIZED HEALTH CARE EDUCATION/TRAINING PROGRAM
Payer: MEDICARE

## 2022-06-17 DIAGNOSIS — Z51.11 ENCOUNTER FOR ANTINEOPLASTIC CHEMOTHERAPY: ICD-10-CM

## 2022-06-17 LAB
ALBUMIN SERPL BCP-MCNC: 3.8 G/DL (ref 3.5–5.2)
ALP SERPL-CCNC: 106 U/L (ref 55–135)
ALT SERPL W/O P-5'-P-CCNC: 44 U/L (ref 10–44)
ANION GAP SERPL CALC-SCNC: 11 MMOL/L (ref 8–16)
AST SERPL-CCNC: 30 U/L (ref 10–40)
BILIRUB SERPL-MCNC: 0.3 MG/DL (ref 0.1–1)
BUN SERPL-MCNC: 40 MG/DL (ref 8–23)
CALCIUM SERPL-MCNC: 10.1 MG/DL (ref 8.7–10.5)
CHLORIDE SERPL-SCNC: 102 MMOL/L (ref 95–110)
CO2 SERPL-SCNC: 26 MMOL/L (ref 23–29)
CREAT SERPL-MCNC: 3.2 MG/DL (ref 0.5–1.4)
ERYTHROCYTE [DISTWIDTH] IN BLOOD BY AUTOMATED COUNT: 13.5 % (ref 11.5–14.5)
EST. GFR  (AFRICAN AMERICAN): 17 ML/MIN/1.73 M^2
EST. GFR  (NON AFRICAN AMERICAN): 15 ML/MIN/1.73 M^2
GLUCOSE SERPL-MCNC: 231 MG/DL (ref 70–110)
HCT VFR BLD AUTO: 30.1 % (ref 37–48.5)
HGB BLD-MCNC: 9.8 G/DL (ref 12–16)
IMM GRANULOCYTES # BLD AUTO: 0.03 K/UL (ref 0–0.04)
MCH RBC QN AUTO: 31.4 PG (ref 27–31)
MCHC RBC AUTO-ENTMCNC: 32.6 G/DL (ref 32–36)
MCV RBC AUTO: 97 FL (ref 82–98)
NEUTROPHILS # BLD AUTO: 4.2 K/UL (ref 1.8–7.7)
PLATELET # BLD AUTO: 173 K/UL (ref 150–450)
PMV BLD AUTO: 8.6 FL (ref 9.2–12.9)
POTASSIUM SERPL-SCNC: 4.9 MMOL/L (ref 3.5–5.1)
PROT SERPL-MCNC: 7.9 G/DL (ref 6–8.4)
RBC # BLD AUTO: 3.12 M/UL (ref 4–5.4)
SODIUM SERPL-SCNC: 139 MMOL/L (ref 136–145)
WBC # BLD AUTO: 6.7 K/UL (ref 3.9–12.7)

## 2022-06-17 PROCEDURE — 85027 COMPLETE CBC AUTOMATED: CPT | Performed by: OBSTETRICS & GYNECOLOGY

## 2022-06-17 PROCEDURE — 36415 COLL VENOUS BLD VENIPUNCTURE: CPT | Performed by: OBSTETRICS & GYNECOLOGY

## 2022-06-17 PROCEDURE — 80053 COMPREHEN METABOLIC PANEL: CPT | Performed by: OBSTETRICS & GYNECOLOGY

## 2022-06-17 RX ORDER — FAMOTIDINE 10 MG/ML
20 INJECTION INTRAVENOUS
Status: CANCELLED | OUTPATIENT
Start: 2022-06-17

## 2022-06-17 RX ORDER — EPINEPHRINE 0.3 MG/.3ML
0.3 INJECTION SUBCUTANEOUS ONCE AS NEEDED
Status: CANCELLED | OUTPATIENT
Start: 2022-06-17

## 2022-06-17 RX ORDER — SODIUM CHLORIDE 0.9 % (FLUSH) 0.9 %
10 SYRINGE (ML) INJECTION
Status: CANCELLED | OUTPATIENT
Start: 2022-06-17

## 2022-06-17 RX ORDER — HEPARIN 100 UNIT/ML
500 SYRINGE INTRAVENOUS
Status: CANCELLED | OUTPATIENT
Start: 2022-06-17

## 2022-06-17 RX ORDER — DIPHENHYDRAMINE HYDROCHLORIDE 50 MG/ML
50 INJECTION INTRAMUSCULAR; INTRAVENOUS ONCE AS NEEDED
Status: CANCELLED | OUTPATIENT
Start: 2022-06-17

## 2022-06-30 ENCOUNTER — PATIENT MESSAGE (OUTPATIENT)
Dept: GYNECOLOGIC ONCOLOGY | Facility: CLINIC | Age: 62
End: 2022-06-30
Payer: MEDICARE

## 2022-08-05 ENCOUNTER — PATIENT MESSAGE (OUTPATIENT)
Dept: GYNECOLOGIC ONCOLOGY | Facility: CLINIC | Age: 62
End: 2022-08-05
Payer: MEDICARE

## 2022-09-21 ENCOUNTER — HOSPITAL ENCOUNTER (INPATIENT)
Facility: HOSPITAL | Age: 62
LOS: 6 days | Discharge: HOME | DRG: 314 | End: 2022-09-27
Attending: STUDENT IN AN ORGANIZED HEALTH CARE EDUCATION/TRAINING PROGRAM | Admitting: STUDENT IN AN ORGANIZED HEALTH CARE EDUCATION/TRAINING PROGRAM
Payer: MEDICARE

## 2022-09-21 DIAGNOSIS — E87.6 HYPOKALEMIA DUE TO EXCESSIVE GASTROINTESTINAL LOSS OF POTASSIUM: ICD-10-CM

## 2022-09-21 DIAGNOSIS — E86.0 DEHYDRATION: ICD-10-CM

## 2022-09-21 DIAGNOSIS — R94.31 PROLONGED Q-T INTERVAL ON ECG: Primary | ICD-10-CM

## 2022-09-21 DIAGNOSIS — R78.81 BACTEREMIA: ICD-10-CM

## 2022-09-21 DIAGNOSIS — A41.9 SEPSIS: ICD-10-CM

## 2022-09-21 DIAGNOSIS — R65.10 SIRS (SYSTEMIC INFLAMMATORY RESPONSE SYNDROME): ICD-10-CM

## 2022-09-21 DIAGNOSIS — R11.2 NON-INTRACTABLE VOMITING WITH NAUSEA, UNSPECIFIED VOMITING TYPE: ICD-10-CM

## 2022-09-21 PROBLEM — N18.6 ESRD (END STAGE RENAL DISEASE): Status: ACTIVE | Noted: 2022-09-21

## 2022-09-21 PROBLEM — E11.9 TYPE 2 DIABETES MELLITUS, WITHOUT LONG-TERM CURRENT USE OF INSULIN: Status: ACTIVE | Noted: 2022-09-21

## 2022-09-21 PROBLEM — R53.1 WEAKNESS: Status: ACTIVE | Noted: 2022-09-21

## 2022-09-21 PROBLEM — Z71.89 ADVANCED CARE PLANNING/COUNSELING DISCUSSION: Status: ACTIVE | Noted: 2022-09-21

## 2022-09-21 LAB
ALBUMIN SERPL BCP-MCNC: 3.8 G/DL (ref 3.5–5.2)
ALLENS TEST: ABNORMAL
ALLENS TEST: ABNORMAL
ALP SERPL-CCNC: 174 U/L (ref 55–135)
ALT SERPL W/O P-5'-P-CCNC: 16 U/L (ref 10–44)
ANION GAP SERPL CALC-SCNC: 18 MMOL/L (ref 8–16)
AST SERPL-CCNC: 17 U/L (ref 10–40)
B-OH-BUTYR BLD STRIP-SCNC: 0.9 MMOL/L (ref 0–0.5)
BACTERIA #/AREA URNS HPF: ABNORMAL /HPF
BASOPHILS # BLD AUTO: 0.01 K/UL (ref 0–0.2)
BASOPHILS NFR BLD: 0.1 % (ref 0–1.9)
BILIRUB SERPL-MCNC: 1.1 MG/DL (ref 0.1–1)
BILIRUB UR QL STRIP: NEGATIVE
BUN SERPL-MCNC: 23 MG/DL (ref 8–23)
CALCIUM SERPL-MCNC: 9.4 MG/DL (ref 8.7–10.5)
CHLORIDE SERPL-SCNC: 95 MMOL/L (ref 95–110)
CLARITY UR: ABNORMAL
CO2 SERPL-SCNC: 25 MMOL/L (ref 23–29)
COLOR UR: YELLOW
CREAT SERPL-MCNC: 3.3 MG/DL (ref 0.5–1.4)
CTP QC/QA: YES
CTP QC/QA: YES
DELSYS: ABNORMAL
DELSYS: ABNORMAL
DIFFERENTIAL METHOD: ABNORMAL
EOSINOPHIL # BLD AUTO: 0 K/UL (ref 0–0.5)
EOSINOPHIL NFR BLD: 0 % (ref 0–8)
ERYTHROCYTE [DISTWIDTH] IN BLOOD BY AUTOMATED COUNT: 14.1 % (ref 11.5–14.5)
EST. GFR  (NO RACE VARIABLE): 15 ML/MIN/1.73 M^2
ESTIMATED AVG GLUCOSE: 180 MG/DL (ref 68–131)
FOLATE SERPL-MCNC: 5.6 NG/ML (ref 4–24)
GLUCOSE SERPL-MCNC: 419 MG/DL (ref 70–110)
GLUCOSE UR QL STRIP: ABNORMAL
HBA1C MFR BLD: 7.9 % (ref 4–5.6)
HCO3 UR-SCNC: 27.8 MMOL/L (ref 24–28)
HCT VFR BLD AUTO: 31.7 % (ref 37–48.5)
HGB BLD-MCNC: 10.6 G/DL (ref 12–16)
HGB UR QL STRIP: ABNORMAL
HIV 1+2 AB+HIV1 P24 AG SERPL QL IA: NORMAL
HYALINE CASTS #/AREA URNS LPF: 0 /LPF
IMM GRANULOCYTES # BLD AUTO: 0.09 K/UL (ref 0–0.04)
IMM GRANULOCYTES NFR BLD AUTO: 0.9 % (ref 0–0.5)
IRON SERPL-MCNC: 14 UG/DL (ref 30–160)
KETONES UR QL STRIP: NEGATIVE
LACTATE SERPL-SCNC: 1.7 MMOL/L (ref 0.5–2.2)
LACTATE SERPL-SCNC: 3.2 MMOL/L (ref 0.5–2.2)
LDH SERPL L TO P-CCNC: 3.22 MMOL/L (ref 0.5–2.2)
LEUKOCYTE ESTERASE UR QL STRIP: ABNORMAL
LYMPHOCYTES # BLD AUTO: 0.3 K/UL (ref 1–4.8)
LYMPHOCYTES NFR BLD: 3.3 % (ref 18–48)
MAGNESIUM SERPL-MCNC: 1.6 MG/DL (ref 1.6–2.6)
MCH RBC QN AUTO: 30.4 PG (ref 27–31)
MCHC RBC AUTO-ENTMCNC: 33.4 G/DL (ref 32–36)
MCV RBC AUTO: 91 FL (ref 82–98)
MICROSCOPIC COMMENT: ABNORMAL
MODE: ABNORMAL
MODE: ABNORMAL
MONOCYTES # BLD AUTO: 0.7 K/UL (ref 0.3–1)
MONOCYTES NFR BLD: 6.5 % (ref 4–15)
NEUTROPHILS # BLD AUTO: 9.3 K/UL (ref 1.8–7.7)
NEUTROPHILS NFR BLD: 89.2 % (ref 38–73)
NITRITE UR QL STRIP: POSITIVE
NRBC BLD-RTO: 0 /100 WBC
PCO2 BLDA: 35 MMHG (ref 35–45)
PH SMN: 7.51 [PH] (ref 7.35–7.45)
PH UR STRIP: 8 [PH] (ref 5–8)
PLATELET # BLD AUTO: 164 K/UL (ref 150–450)
PMV BLD AUTO: 9.2 FL (ref 9.2–12.9)
PO2 BLDA: 36 MMHG (ref 40–60)
POC BE: 5 MMOL/L
POC MOLECULAR INFLUENZA A AGN: NEGATIVE
POC MOLECULAR INFLUENZA B AGN: NEGATIVE
POC SATURATED O2: 76 % (ref 95–100)
POC TCO2: 29 MMOL/L (ref 24–29)
POCT GLUCOSE: 294 MG/DL (ref 70–110)
POCT GLUCOSE: 303 MG/DL (ref 70–110)
POCT GLUCOSE: 396 MG/DL (ref 70–110)
POCT GLUCOSE: 399 MG/DL (ref 70–110)
POCT GLUCOSE: 400 MG/DL (ref 70–110)
POTASSIUM SERPL-SCNC: 2.5 MMOL/L (ref 3.5–5.1)
PROCALCITONIN SERPL IA-MCNC: 5.75 NG/ML
PROT SERPL-MCNC: 8.3 G/DL (ref 6–8.4)
PROT UR QL STRIP: ABNORMAL
RBC # BLD AUTO: 3.49 M/UL (ref 4–5.4)
RBC #/AREA URNS HPF: 23 /HPF (ref 0–4)
SAMPLE: ABNORMAL
SAMPLE: ABNORMAL
SARS-COV-2 RDRP RESP QL NAA+PROBE: NEGATIVE
SATURATED IRON: 6 % (ref 20–50)
SITE: ABNORMAL
SITE: ABNORMAL
SODIUM SERPL-SCNC: 138 MMOL/L (ref 136–145)
SP GR UR STRIP: 1.01 (ref 1–1.03)
SP02: 95
SP02: 96
SQUAMOUS #/AREA URNS HPF: 8 /HPF
TOTAL IRON BINDING CAPACITY: 235 UG/DL (ref 250–450)
TRANSFERRIN SERPL-MCNC: 159 MG/DL (ref 200–375)
URN SPEC COLLECT METH UR: ABNORMAL
UROBILINOGEN UR STRIP-ACNC: NEGATIVE EU/DL
VIT B12 SERPL-MCNC: 302 PG/ML (ref 210–950)
WBC # BLD AUTO: 10.4 K/UL (ref 3.9–12.7)
WBC #/AREA URNS HPF: >100 /HPF (ref 0–5)
WBC CLUMPS URNS QL MICRO: ABNORMAL
YEAST URNS QL MICRO: ABNORMAL

## 2022-09-21 PROCEDURE — 84466 ASSAY OF TRANSFERRIN: CPT | Performed by: INTERNAL MEDICINE

## 2022-09-21 PROCEDURE — 87070 CULTURE OTHR SPECIMN AEROBIC: CPT | Performed by: STUDENT IN AN ORGANIZED HEALTH CARE EDUCATION/TRAINING PROGRAM

## 2022-09-21 PROCEDURE — 83605 ASSAY OF LACTIC ACID: CPT | Mod: 91 | Performed by: STUDENT IN AN ORGANIZED HEALTH CARE EDUCATION/TRAINING PROGRAM

## 2022-09-21 PROCEDURE — 81000 URINALYSIS NONAUTO W/SCOPE: CPT | Performed by: STUDENT IN AN ORGANIZED HEALTH CARE EDUCATION/TRAINING PROGRAM

## 2022-09-21 PROCEDURE — 87077 CULTURE AEROBIC IDENTIFY: CPT | Mod: 59 | Performed by: STUDENT IN AN ORGANIZED HEALTH CARE EDUCATION/TRAINING PROGRAM

## 2022-09-21 PROCEDURE — 63600175 PHARM REV CODE 636 W HCPCS: Performed by: STUDENT IN AN ORGANIZED HEALTH CARE EDUCATION/TRAINING PROGRAM

## 2022-09-21 PROCEDURE — 99285 EMERGENCY DEPT VISIT HI MDM: CPT | Mod: 25

## 2022-09-21 PROCEDURE — 82746 ASSAY OF FOLIC ACID SERUM: CPT | Performed by: INTERNAL MEDICINE

## 2022-09-21 PROCEDURE — 87389 HIV-1 AG W/HIV-1&-2 AB AG IA: CPT | Performed by: INTERNAL MEDICINE

## 2022-09-21 PROCEDURE — 83605 ASSAY OF LACTIC ACID: CPT

## 2022-09-21 PROCEDURE — 85025 COMPLETE CBC W/AUTO DIFF WBC: CPT | Performed by: STUDENT IN AN ORGANIZED HEALTH CARE EDUCATION/TRAINING PROGRAM

## 2022-09-21 PROCEDURE — 93010 EKG 12-LEAD: ICD-10-PCS | Mod: ,,, | Performed by: INTERNAL MEDICINE

## 2022-09-21 PROCEDURE — 25000003 PHARM REV CODE 250: Performed by: STUDENT IN AN ORGANIZED HEALTH CARE EDUCATION/TRAINING PROGRAM

## 2022-09-21 PROCEDURE — 82803 BLOOD GASES ANY COMBINATION: CPT

## 2022-09-21 PROCEDURE — 87088 URINE BACTERIA CULTURE: CPT | Performed by: STUDENT IN AN ORGANIZED HEALTH CARE EDUCATION/TRAINING PROGRAM

## 2022-09-21 PROCEDURE — U0002 COVID-19 LAB TEST NON-CDC: HCPCS | Performed by: STUDENT IN AN ORGANIZED HEALTH CARE EDUCATION/TRAINING PROGRAM

## 2022-09-21 PROCEDURE — 87502 INFLUENZA DNA AMP PROBE: CPT

## 2022-09-21 PROCEDURE — 83735 ASSAY OF MAGNESIUM: CPT | Performed by: STUDENT IN AN ORGANIZED HEALTH CARE EDUCATION/TRAINING PROGRAM

## 2022-09-21 PROCEDURE — 87086 URINE CULTURE/COLONY COUNT: CPT | Performed by: STUDENT IN AN ORGANIZED HEALTH CARE EDUCATION/TRAINING PROGRAM

## 2022-09-21 PROCEDURE — 96374 THER/PROPH/DIAG INJ IV PUSH: CPT

## 2022-09-21 PROCEDURE — 82607 VITAMIN B-12: CPT | Performed by: INTERNAL MEDICINE

## 2022-09-21 PROCEDURE — 93005 ELECTROCARDIOGRAM TRACING: CPT

## 2022-09-21 PROCEDURE — 87186 SC STD MICRODIL/AGAR DIL: CPT | Performed by: STUDENT IN AN ORGANIZED HEALTH CARE EDUCATION/TRAINING PROGRAM

## 2022-09-21 PROCEDURE — 99900035 HC TECH TIME PER 15 MIN (STAT)

## 2022-09-21 PROCEDURE — 84145 PROCALCITONIN (PCT): CPT | Performed by: STUDENT IN AN ORGANIZED HEALTH CARE EDUCATION/TRAINING PROGRAM

## 2022-09-21 PROCEDURE — 11000001 HC ACUTE MED/SURG PRIVATE ROOM

## 2022-09-21 PROCEDURE — 93010 ELECTROCARDIOGRAM REPORT: CPT | Mod: ,,, | Performed by: INTERNAL MEDICINE

## 2022-09-21 PROCEDURE — 83036 HEMOGLOBIN GLYCOSYLATED A1C: CPT | Performed by: STUDENT IN AN ORGANIZED HEALTH CARE EDUCATION/TRAINING PROGRAM

## 2022-09-21 PROCEDURE — 82962 GLUCOSE BLOOD TEST: CPT

## 2022-09-21 PROCEDURE — 87040 BLOOD CULTURE FOR BACTERIA: CPT | Performed by: STUDENT IN AN ORGANIZED HEALTH CARE EDUCATION/TRAINING PROGRAM

## 2022-09-21 PROCEDURE — 93010 ELECTROCARDIOGRAM REPORT: CPT | Mod: 76,,, | Performed by: INTERNAL MEDICINE

## 2022-09-21 PROCEDURE — 25000003 PHARM REV CODE 250: Performed by: INTERNAL MEDICINE

## 2022-09-21 PROCEDURE — 96361 HYDRATE IV INFUSION ADD-ON: CPT | Mod: 59

## 2022-09-21 PROCEDURE — 63600175 PHARM REV CODE 636 W HCPCS: Performed by: INTERNAL MEDICINE

## 2022-09-21 PROCEDURE — 82010 KETONE BODYS QUAN: CPT | Performed by: STUDENT IN AN ORGANIZED HEALTH CARE EDUCATION/TRAINING PROGRAM

## 2022-09-21 PROCEDURE — 80053 COMPREHEN METABOLIC PANEL: CPT | Performed by: STUDENT IN AN ORGANIZED HEALTH CARE EDUCATION/TRAINING PROGRAM

## 2022-09-21 RX ORDER — POTASSIUM CHLORIDE 7.45 MG/ML
10 INJECTION INTRAVENOUS ONCE
Status: COMPLETED | OUTPATIENT
Start: 2022-09-21 | End: 2022-09-21

## 2022-09-21 RX ORDER — IBUPROFEN 200 MG
16 TABLET ORAL
Status: DISCONTINUED | OUTPATIENT
Start: 2022-09-21 | End: 2022-09-27 | Stop reason: HOSPADM

## 2022-09-21 RX ORDER — METOPROLOL SUCCINATE 50 MG/1
50 TABLET, EXTENDED RELEASE ORAL DAILY
Status: DISCONTINUED | OUTPATIENT
Start: 2022-09-21 | End: 2022-09-27 | Stop reason: HOSPADM

## 2022-09-21 RX ORDER — ONDANSETRON 2 MG/ML
4 INJECTION INTRAMUSCULAR; INTRAVENOUS
Status: DISCONTINUED | OUTPATIENT
Start: 2022-09-21 | End: 2022-09-21

## 2022-09-21 RX ORDER — PROCHLORPERAZINE MALEATE 5 MG
10 TABLET ORAL
Status: COMPLETED | OUTPATIENT
Start: 2022-09-21 | End: 2022-09-21

## 2022-09-21 RX ORDER — MAGNESIUM SULFATE HEPTAHYDRATE 40 MG/ML
2 INJECTION, SOLUTION INTRAVENOUS ONCE
Status: COMPLETED | OUTPATIENT
Start: 2022-09-21 | End: 2022-09-21

## 2022-09-21 RX ORDER — DIPHENHYDRAMINE HYDROCHLORIDE 50 MG/ML
25 INJECTION INTRAMUSCULAR; INTRAVENOUS
Status: COMPLETED | OUTPATIENT
Start: 2022-09-21 | End: 2022-09-21

## 2022-09-21 RX ORDER — GLUCAGON 1 MG
1 KIT INJECTION
Status: DISCONTINUED | OUTPATIENT
Start: 2022-09-21 | End: 2022-09-27 | Stop reason: HOSPADM

## 2022-09-21 RX ORDER — SODIUM CHLORIDE AND POTASSIUM CHLORIDE 150; 900 MG/100ML; MG/100ML
INJECTION, SOLUTION INTRAVENOUS CONTINUOUS
Status: DISCONTINUED | OUTPATIENT
Start: 2022-09-21 | End: 2022-09-23

## 2022-09-21 RX ORDER — METOPROLOL SUCCINATE 50 MG/1
50 TABLET, EXTENDED RELEASE ORAL DAILY
Status: ON HOLD | COMMUNITY
End: 2023-02-05 | Stop reason: HOSPADM

## 2022-09-21 RX ORDER — INSULIN ASPART 100 [IU]/ML
0-5 INJECTION, SOLUTION INTRAVENOUS; SUBCUTANEOUS
Status: DISCONTINUED | OUTPATIENT
Start: 2022-09-21 | End: 2022-09-27 | Stop reason: HOSPADM

## 2022-09-21 RX ORDER — LANOLIN ALCOHOL/MO/W.PET/CERES
1 CREAM (GRAM) TOPICAL DAILY
Refills: 2 | Status: DISCONTINUED | OUTPATIENT
Start: 2022-09-21 | End: 2022-09-27 | Stop reason: HOSPADM

## 2022-09-21 RX ORDER — CEFEPIME HYDROCHLORIDE 1 G/50ML
1 INJECTION, SOLUTION INTRAVENOUS
Status: COMPLETED | OUTPATIENT
Start: 2022-09-21 | End: 2022-09-21

## 2022-09-21 RX ORDER — IBUPROFEN 200 MG
24 TABLET ORAL
Status: DISCONTINUED | OUTPATIENT
Start: 2022-09-21 | End: 2022-09-27 | Stop reason: HOSPADM

## 2022-09-21 RX ORDER — SODIUM BICARBONATE 325 MG/1
1300 TABLET ORAL DAILY
Status: DISCONTINUED | OUTPATIENT
Start: 2022-09-21 | End: 2022-09-27 | Stop reason: HOSPADM

## 2022-09-21 RX ORDER — AMLODIPINE BESYLATE 5 MG/1
5 TABLET ORAL DAILY
Status: DISCONTINUED | OUTPATIENT
Start: 2022-09-21 | End: 2022-09-27 | Stop reason: HOSPADM

## 2022-09-21 RX ORDER — CLOPIDOGREL BISULFATE 75 MG/1
75 TABLET ORAL DAILY
Status: DISCONTINUED | OUTPATIENT
Start: 2022-09-21 | End: 2022-09-23

## 2022-09-21 RX ORDER — ACETAMINOPHEN 325 MG/1
650 TABLET ORAL EVERY 6 HOURS PRN
Status: DISCONTINUED | OUTPATIENT
Start: 2022-09-21 | End: 2022-09-27 | Stop reason: HOSPADM

## 2022-09-21 RX ORDER — ATORVASTATIN CALCIUM 40 MG/1
40 TABLET, FILM COATED ORAL NIGHTLY
Status: DISCONTINUED | OUTPATIENT
Start: 2022-09-21 | End: 2022-09-27 | Stop reason: HOSPADM

## 2022-09-21 RX ADMIN — SODIUM BICARBONATE 1300 MG: 325 TABLET ORAL at 11:09

## 2022-09-21 RX ADMIN — INSULIN ASPART 5 UNITS: 100 INJECTION, SOLUTION INTRAVENOUS; SUBCUTANEOUS at 11:09

## 2022-09-21 RX ADMIN — ATORVASTATIN CALCIUM 40 MG: 40 TABLET, FILM COATED ORAL at 08:09

## 2022-09-21 RX ADMIN — SODIUM CHLORIDE, SODIUM LACTATE, POTASSIUM CHLORIDE, AND CALCIUM CHLORIDE 500 ML: .6; .31; .03; .02 INJECTION, SOLUTION INTRAVENOUS at 04:09

## 2022-09-21 RX ADMIN — POTASSIUM CHLORIDE 10 MEQ: 7.46 INJECTION, SOLUTION INTRAVENOUS at 07:09

## 2022-09-21 RX ADMIN — VANCOMYCIN HYDROCHLORIDE 750 MG: 750 INJECTION, POWDER, LYOPHILIZED, FOR SOLUTION INTRAVENOUS at 06:09

## 2022-09-21 RX ADMIN — FERROUS SULFATE TAB 325 MG (65 MG ELEMENTAL FE) 1 EACH: 325 (65 FE) TAB at 11:09

## 2022-09-21 RX ADMIN — INSULIN ASPART 1 UNITS: 100 INJECTION, SOLUTION INTRAVENOUS; SUBCUTANEOUS at 10:09

## 2022-09-21 RX ADMIN — PROCHLORPERAZINE MALEATE 10 MG: 5 TABLET ORAL at 05:09

## 2022-09-21 RX ADMIN — POTASSIUM BICARBONATE 50 MEQ: 977.5 TABLET, EFFERVESCENT ORAL at 05:09

## 2022-09-21 RX ADMIN — METOPROLOL SUCCINATE 50 MG: 50 TABLET, EXTENDED RELEASE ORAL at 11:09

## 2022-09-21 RX ADMIN — AMLODIPINE BESYLATE 5 MG: 5 TABLET ORAL at 11:09

## 2022-09-21 RX ADMIN — ACETAMINOPHEN 650 MG: 325 TABLET ORAL at 01:09

## 2022-09-21 RX ADMIN — SODIUM CHLORIDE AND POTASSIUM CHLORIDE: 9; 1.49 INJECTION, SOLUTION INTRAVENOUS at 09:09

## 2022-09-21 RX ADMIN — PIPERACILLIN AND TAZOBACTAM 4.5 G: 4; .5 INJECTION, POWDER, LYOPHILIZED, FOR SOLUTION INTRAVENOUS; PARENTERAL at 08:09

## 2022-09-21 RX ADMIN — DIPHENHYDRAMINE HYDROCHLORIDE 25 MG: 50 INJECTION, SOLUTION INTRAMUSCULAR; INTRAVENOUS at 05:09

## 2022-09-21 RX ADMIN — MAGNESIUM SULFATE HEPTAHYDRATE 2 G: 40 INJECTION, SOLUTION INTRAVENOUS at 06:09

## 2022-09-21 RX ADMIN — CEFEPIME HYDROCHLORIDE 1 G: 1 INJECTION, SOLUTION INTRAVENOUS at 05:09

## 2022-09-21 RX ADMIN — SODIUM CHLORIDE AND POTASSIUM CHLORIDE: 9; 1.49 INJECTION, SOLUTION INTRAVENOUS at 08:09

## 2022-09-21 RX ADMIN — PIPERACILLIN AND TAZOBACTAM 4.5 G: 4; .5 INJECTION, POWDER, LYOPHILIZED, FOR SOLUTION INTRAVENOUS; PARENTERAL at 07:09

## 2022-09-21 NOTE — ED PROVIDER NOTES
Encounter Date: 9/21/2022    SCRIBE #1 NOTE: I, Marcela Bowling, am scribing for, and in the presence of,  Rosas Melton MD. I have scribed the following portions of the note - Other sections scribed: HPI, ROS, PE.     History     Chief Complaint   Patient presents with    Fever    Weakness    Nausea    Vomiting    Chills     Pt presents to  ED c/o fever, last temp at home was 104.  Pt also c/o weakness, chills, n/v.  Denies cp, sob, diarrhea, ha,dizziness,numbness or tingling.  Son reports giving Tylenol around 0100 this morning.  Hx of HTN, DM, endometrial cancer and takes dialysis MWF.  Blood glucose in triage 400.      63 yo F with PMHx of HTN, DM, ESRD on HD (MWF), endometrial cancer, presents to the ED with multiple complaints including fever and generalized weakness, for the last day. Per family member, she had Tmax of 104 F prior to arrival in the ED today. Also with multiple complaints of persistent, severe generalized weakness, fatigue, nausea, vomiting. Patient is on Trulicity. No other modifying factors. Denies cough, shortness of breath, chest pain, abdominal pain, headache, syncope, neck pain, or other associated symptoms. Unsure when her last bowel movement occurred. No recent sick contacts.     Family member acted as  at bedside. Patient's preferred language is Qatari but family states she speaks and understands some English.     The history is provided by the patient. The history is limited by the condition of the patient.   Review of patient's allergies indicates:   Allergen Reactions    Glipizide Other (See Comments)     Past Medical History:   Diagnosis Date    AMS (altered mental status) 06/30/2021    Anemia in CKD (chronic kidney disease)     CKD (chronic kidney disease)     Diabetes mellitus     Encounter for blood transfusion     Hypercholesterolemia     Hypertension     Stroke 2015    TIA    Uterine cancer 2021    Endometrial Cancer     Past Surgical History:   Procedure  Laterality Date    ARTERIOGRAM, CEREBRAL ARTERIES  02/12/2014    and 3/18/2014    OMENTECTOMY N/A 3/30/2022    Procedure: OMENTECTOMY;  Surgeon: Reuben Lopez MD;  Location: [address];  Service: OB/GYN;  Laterality: N/A;    TOTAL ABDOMINAL HYSTERECTOMY W/ BILATERAL SALPINGOOPHORECTOMY N/A 3/30/2022    Procedure: HYSTERECTOMY, TOTAL, ABDOMINAL, WITH BILATERAL SALPINGO-OOPHORECTOMY;  Surgeon: Reuben Lopez MD;  Location: [address];  Service: OB/GYN;  Laterality: N/A;     No family history on file.  Social History     Tobacco Use    Smoking status: Never    Smokeless tobacco: Never   Substance Use Topics    Alcohol use: No    Drug use: No     Review of Systems   Constitutional:  Positive for fatigue and fever. Negative for chills.   HENT:  Negative for facial swelling and sore throat.    Eyes:  Negative for visual disturbance.   Respiratory:  Negative for cough and shortness of breath.    Cardiovascular:  Negative for chest pain and palpitations.   Gastrointestinal:  Positive for nausea and vomiting. Negative for abdominal pain.   Genitourinary:  Negative for dysuria and hematuria.   Musculoskeletal:  Negative for back pain.   Skin:  Negative for rash.   Neurological:  Positive for weakness (generalized). Negative for syncope and headaches.   Hematological:  Does not bruise/bleed easily.   Psychiatric/Behavioral: Negative.       Physical Exam     Initial Vitals [09/21/22 0354]   BP Pulse Resp Temp SpO2   (!) 154/72 (!) 125 19 (!) 103.1 °F (39.5 °C) 98 %      MAP       --         Physical Exam    Nursing note and vitals reviewed.  Constitutional: She appears well-developed and well-nourished. She is not diaphoretic. No distress.   Appears generally weak.    HENT:   Head: Normocephalic and atraumatic.   Right Ear: External ear normal.   Left Ear: External ear normal.   Nose: Nose normal.   Eyes: Conjunctivae are normal. No scleral icterus.   Neck: Neck supple. No tracheal deviation present.   Normal range  of motion.  Cardiovascular:  Regular rhythm and normal heart sounds.           Tachycardic. Good peripheral pulses.    Pulmonary/Chest: Breath sounds normal. No respiratory distress.   Abdominal: Abdomen is soft. Bowel sounds are normal. There is no abdominal tenderness.   Musculoskeletal:      Cervical back: Normal range of motion and neck supple.     Neurological: She is alert and oriented to person, place, and time.   Skin: Skin is warm and dry.   Psychiatric: She has a normal mood and affect. Thought content normal.       ED Course   Procedures  Labs Reviewed   POCT GLUCOSE - Abnormal; Notable for the following components:       Result Value    POCT Glucose 400 (*)     All other components within normal limits   CULTURE, BLOOD   CULTURE, BLOOD   CBC W/ AUTO DIFFERENTIAL   COMPREHENSIVE METABOLIC PANEL   LACTIC ACID, PLASMA   URINALYSIS, REFLEX TO URINE CULTURE   PROCALCITONIN   MAGNESIUM   SARS-COV-2 RDRP GENE   POCT INFLUENZA A/B MOLECULAR          Imaging Results    None          Medications   lactated ringers bolus 1,674 mL (500 mLs Intravenous New Bag 9/21/22 7581)     Medical Decision Making:   History:   Old Medical Records: I decided to obtain old medical records.  Independently Interpreted Test(s):   I have ordered and independently interpreted EKG Reading(s) - see prior notes  Clinical Tests:   Lab Tests: Ordered and Reviewed  Radiological Study: Ordered and Reviewed  Medical Tests: Ordered and Reviewed        Scribe Attestation:   Scribe #1: I performed the above scribed service and the documentation accurately describes the services I performed. I attest to the accuracy of the note.      ED Course as of 10/01/22 2117   Wed Sep 21, 2022   0525 X-Ray Chest AP Portable  Impression:     There is no radiographic evidence for acute intrathoracic process.    [CC]   0534 Patient presenting with fever, SIRS criteria, nausea, vomiting, hypokalemia with EKG changes.  .  Replacing potassium.  Normal low  magnesium.  Ordered 2 g.  No obvious source of infection.  No abdominal pain, no chest pain, no pneumonia on chest x-ray.  No leukocytosis.  Pro ever elevated in the setting of ESRD.  I covered empirically.  May be viral. [CC]   6781 Hyperglycemic.  Patient will need insulin once potassium improves.  She is not acidemic.  Beta hydroxybutyrate only mildly elevated.  Mild anion gap.  I do not believe this to be DKA.  Lactic 3.2.  Procalcitonin elevated but in the setting of ESRD.  I have consulted Hospital Medicine for evaluation. [CC]      ED Course User Index  [CC] Rosas Melton MD                 Clinical Impression:   Final diagnoses:  [R65.10] SIRS (systemic inflammatory response syndrome)       I, Rosas Melton MD, personally performed the services described in this documentation. All medical record entries made by the scribe were at my direction and in my presence. I have reviewed the chart and agree that the record reflects my personal performance and is accurate and complete.          Rosas Melton MD  10/01/22 9832

## 2022-09-21 NOTE — ASSESSMENT & PLAN NOTE
-Patient s/p CIRILO/BSO/omentectomy secondary to Stage 4 high grade endometrial cancer on 03/30/2022  -Previously on chemo, but was unable to resume due to renal function   -Follows with oncology outpatient

## 2022-09-21 NOTE — ASSESSMENT & PLAN NOTE
-EKG in the ED showed QTc prolonged at 605ms, with accelerated junctional rhythm  -Mg 1.6 on admit, was replaced with Mg IV 2gm  -Repeat EKG showed QTc of 496ms  -Chart reviewed, EKG from OSH in 07/2022 showed QTc was 492 at that time   -Suspect medication induced. Pt chronically on Efflexor 75mg daily and protonix  -Will hold home efflexor and protonix at this time.   -Monitor on telemetry

## 2022-09-21 NOTE — ASSESSMENT & PLAN NOTE
-Recent diagnosis, THDC recently placed on 07/27/2022  -First dialysis session initiated in 08/2022, on MWF  -Nephrology consulted for HD

## 2022-09-21 NOTE — ASSESSMENT & PLAN NOTE
"Advance Care Planning     Date: 09/21/2022    Code Status  I engaged the the family in a conversation about the patient's preferences for care  at the very end of life. The patient wishes to have  CPR and other invasive treatments performed when her heart and/or breathing stops at this time. Son states patient has not filled out advanced directive because she wants to hold off on further conversation about it "until the time comes".  I communicated to the family that at this time, the patient wishes align with full code status. Son and patient in agreement.  I spent a total of 18 minutes engaging the patient in this advance care planning discussion.        Code status: FULL code     "

## 2022-09-21 NOTE — ED NOTES
Assumed care over pt. Pt AAO X 4. NSR on tele,rate 100. Maintains airway. Skin hot,dry. Generalized swelling. Resp even and unlabored. Family at bedside

## 2022-09-21 NOTE — PHARMACY MED REC
"Admission Medication History     The home medication history was taken by Amber Chavez CPhT.      You may go to "Admission" then "Reconcile Home Medications" tabs to review and/or act upon these items.     The home medication list has been updated by the Pharmacy department.   Please read ALL comments highlighted in yellow.   Please address this information as you see fit.    Feel free to contact us if you have any questions or require assistance.        Medications listed below were obtained from: Patient/family and Analytic software- [employer]  (Not in a hospital admission)      Potential issues to be addressed PRIOR TO DISCHARGE  Patient reported not taking the following medications: (ergocalciferol 48173 unit cap; ferrous sulfate 325 mg tab; roxicodone 5 mg IR; senokot 8.6 mg tablet; vitamin d3 1000 units). These medications remain on the home medication list. Please address accordingly.     Patient was interviewed using  #[id number].  Son was at the bedside with medication list to support the history of medications.      Amber Chavez CPhT.  [phone]                  .        "

## 2022-09-21 NOTE — CONSULTS
62 y o f with hx of htn dm ckd on chronic hd TTS at [facility] who developed a temp of 104 yesterday. Tx herself at home with some improvement but temp returned and was brought in to the hospital by her family.    Denies cough sore throat n-v-d-d dysuria or pain over her R IJ cath    Denies episodes of hypotension    Last HD was yesterday     Denies CNS ENT CP GI  RHEUM OR DERM SX  Past Medical History:   Diagnosis Date    AMS (altered mental status) 06/30/2021    Anemia in CKD (chronic kidney disease)     CKD (chronic kidney disease)     Diabetes mellitus     Encounter for blood transfusion     Hypercholesterolemia     Hypertension     Stroke 2015    TIA    Uterine cancer 2021    Endometrial Cancer     Past Surgical History:   Procedure Laterality Date    ARTERIOGRAM, CEREBRAL ARTERIES  02/12/2014    and 3/18/2014    HYSTERECTOMY      OMENTECTOMY N/A 03/30/2022    Procedure: OMENTECTOMY;  Surgeon: Reuben Lopez MD;  Location: [facility] OR [address] [place] [address];  Service: OB/GYN;  Laterality: N/A;    TOTAL ABDOMINAL HYSTERECTOMY W/ BILATERAL SALPINGOOPHORECTOMY N/A 03/30/2022    Procedure: HYSTERECTOMY, TOTAL, ABDOMINAL, WITH BILATERAL SALPINGO-OOPHORECTOMY;  Surgeon: Reuben Lopez MD;  Location: [facility] OR [address] [place] [address];  Service: OB/GYN;  Laterality: N/A;     Review of patient's allergies indicates:   Allergen Reactions    Glipizide Other (See Comments)     History reviewed. No pertinent family history.  Social History     Socioeconomic History    Marital status:    Tobacco Use    Smoking status: Never    Smokeless tobacco: Never   Substance and Sexual Activity    Alcohol use: No    Drug use: No    Sexual activity: Not Currently     Partners: Male     History reviewed. No pertinent family history.    Social History     Socioeconomic History    Marital status:    Tobacco Use    Smoking status: Never    Smokeless tobacco: Never   Substance and Sexual Activity    Alcohol use: No    Drug use: No    Sexual activity: Not  Currently     Partners: Male         Current Facility-Administered Medications   Medication    0.9 % NaCl with KCl 20 mEq infusion    acetaminophen tablet 650 mg    amLODIPine tablet 5 mg    atorvastatin tablet 40 mg    clopidogreL tablet 75 mg    dextrose 10% bolus 125 mL    dextrose 10% bolus 250 mL    ferrous sulfate tablet 1 each    glucagon (human recombinant) injection 1 mg    glucose chewable tablet 16 g    glucose chewable tablet 24 g    insulin aspart U-100 pen 0-5 Units    metoprolol succinate (TOPROL-XL) 24 hr tablet 50 mg    piperacillin-tazobactam 4.5 g in dextrose 5 % 100 mL IVPB (ready to mix system)    sodium bicarbonate tablet 1,300 mg    vancomycin - pharmacy to dose     Current Outpatient Medications   Medication Sig    acetaminophen (TYLENOL) 325 MG tablet Take 2 tablets (650 mg total) by mouth every 6 (six) hours as needed for Pain (Take every 6 hours for 5-7 days and then as needed).    allopurinoL (ZYLOPRIM) 100 MG tablet Take 100 mg by mouth once daily.    amLODIPine (NORVASC) 5 MG tablet Take 5 mg by mouth once daily.    clopidogreL (PLAVIX) 75 mg tablet Take 75 mg by mouth once daily.    dulaglutide (TRULICITY) 0.75 mg/0.5 mL pen injector Inject 0.75 mg into the skin every 7 days.    metoprolol succinate (TOPROL-XL) 50 MG 24 hr tablet Take 50 mg by mouth once daily.    nateglinide (STARLIX) 120 MG tablet Take 1 tablet by mouth 3 times daily FOR DIABETES    sodium bicarbonate 650 MG tablet Take 1,300 mg by mouth.    venlafaxine (EFFEXOR) 75 MG tablet Take 75 mg by mouth 2 (two) times daily.     ergocalciferol (ERGOCALCIFEROL) 50,000 unit Cap Take 50,000 Units by mouth every 7 days.    ferrous sulfate 325 (65 FE) MG EC tablet Take 1 tablet (325 mg total) by mouth once daily.    losartan (COZAAR) 50 MG tablet Take 50 mg by mouth once daily.    metoprolol tartrate (LOPRESSOR) 50 MG tablet Take 50 mg by mouth 2 (two) times daily.    oxyCODONE (ROXICODONE) 5 MG immediate release tablet Take 1  tablet (5 mg total) by mouth every 6 (six) hours as needed for Pain. (Patient not taking: Reported on 4/29/2022)    pantoprazole (PROTONIX) 20 MG tablet Take 20 mg by mouth once daily.     rosuvastatin (CRESTOR) 20 MG tablet take ONE tablet every night at bedtime (for cholesterol)    senna (SENOKOT) 8.6 mg tablet Take 1 tablet by mouth 2 (two) times daily as needed for Constipation. (Patient not taking: Reported on 9/21/2022)    vitamin D (VITAMIN D3) 1000 units Tab Take 1,000 Units by mouth once daily.        LABS    Recent Results (from the past 24 hour(s))   POCT glucose    Collection Time: 09/21/22  3:48 AM   Result Value Ref Range    POCT Glucose 400 (H) 70 - 110 mg/dL   Blood culture x two cultures. Draw prior to antibiotics.    Collection Time: 09/21/22  4:22 AM    Specimen: Peripheral, Forearm, Right; Blood   Result Value Ref Range    Blood Culture, Routine No Growth to date    Blood culture x two cultures. Draw prior to antibiotics.    Collection Time: 09/21/22  4:22 AM    Specimen: Peripheral, Antecubital, Left; Blood   Result Value Ref Range    Blood Culture, Routine No Growth to date    CBC auto differential    Collection Time: 09/21/22  4:22 AM   Result Value Ref Range    WBC 10.40 3.90 - 12.70 K/uL    RBC 3.49 (L) 4.00 - 5.40 M/uL    Hemoglobin 10.6 (L) 12.0 - 16.0 g/dL    Hematocrit 31.7 (L) 37.0 - 48.5 %    MCV 91 82 - 98 fL    MCH 30.4 27.0 - 31.0 pg    MCHC 33.4 32.0 - 36.0 g/dL    RDW 14.1 11.5 - 14.5 %    Platelets 164 150 - 450 K/uL    MPV 9.2 9.2 - 12.9 fL    Immature Granulocytes 0.9 (H) 0.0 - 0.5 %    Gran # (ANC) 9.3 (H) 1.8 - 7.7 K/uL    Immature Grans (Abs) 0.09 (H) 0.00 - 0.04 K/uL    Lymph # 0.3 (L) 1.0 - 4.8 K/uL    Mono # 0.7 0.3 - 1.0 K/uL    Eos # 0.0 0.0 - 0.5 K/uL    Baso # 0.01 0.00 - 0.20 K/uL    nRBC 0 0 /100 WBC    Gran % 89.2 (H) 38.0 - 73.0 %    Lymph % 3.3 (L) 18.0 - 48.0 %    Mono % 6.5 4.0 - 15.0 %    Eosinophil % 0.0 0.0 - 8.0 %    Basophil % 0.1 0.0 - 1.9 %     Differential Method Automated    Comprehensive metabolic panel    Collection Time: 09/21/22  4:22 AM   Result Value Ref Range    Sodium 138 136 - 145 mmol/L    Potassium 2.5 (LL) 3.5 - 5.1 mmol/L    Chloride 95 95 - 110 mmol/L    CO2 25 23 - 29 mmol/L    Glucose 419 (H) 70 - 110 mg/dL    BUN 23 8 - 23 mg/dL    Creatinine 3.3 (H) 0.5 - 1.4 mg/dL    Calcium 9.4 8.7 - 10.5 mg/dL    Total Protein 8.3 6.0 - 8.4 g/dL    Albumin 3.8 3.5 - 5.2 g/dL    Total Bilirubin 1.1 (H) 0.1 - 1.0 mg/dL    Alkaline Phosphatase 174 (H) 55 - 135 U/L    AST 17 10 - 40 U/L    ALT 16 10 - 44 U/L    Anion Gap 18 (H) 8 - 16 mmol/L    eGFR 15 (A) >60 mL/min/1.73 m^2   Lactic acid, plasma #1    Collection Time: 09/21/22  4:22 AM   Result Value Ref Range    Lactate (Lactic Acid) 3.2 (H) 0.5 - 2.2 mmol/L   Magnesium    Collection Time: 09/21/22  4:22 AM   Result Value Ref Range    Magnesium 1.6 1.6 - 2.6 mg/dL   ISTAT Lactate    Collection Time: 09/21/22  4:26 AM   Result Value Ref Range    POC Lactate 3.22 (H) 0.5 - 2.2 mmol/L    Sample VENOUS     Site Other     Allens Test N/A     DelSys Room Air     Mode SPONT     Sp02 96    Procalcitonin    Collection Time: 09/21/22  4:31 AM   Result Value Ref Range    Procalcitonin 5.75 (H) <0.25 ng/mL   Beta - Hydroxybutyrate, Serum    Collection Time: 09/21/22  4:31 AM   Result Value Ref Range    Beta-Hydroxybutyrate 0.9 (H) 0.0 - 0.5 mmol/L   ISTAT PROCEDURE    Collection Time: 09/21/22  4:31 AM   Result Value Ref Range    POC PH 7.508 (H) 7.35 - 7.45    POC PCO2 35.0 35 - 45 mmHg    POC PO2 36 (L) 40 - 60 mmHg    POC HCO3 27.8 24 - 28 mmol/L    POC BE 5 -2 to 2 mmol/L    POC SATURATED O2 76 (L) 95 - 100 %    POC TCO2 29 24 - 29 mmol/L    Sample VENOUS     Site Other     Allens Test N/A     DelSys Room Air     Mode SPONT     Sp02 95    POCT COVID-19 Rapid Screening    Collection Time: 09/21/22  4:32 AM   Result Value Ref Range    POC Rapid COVID Negative Negative     Acceptable Yes    POCT  Influenza A/B Molecular    Collection Time: 09/21/22  4:32 AM   Result Value Ref Range    POC Molecular Influenza A Ag Negative Negative, Not Reported    POC Molecular Influenza B Ag Negative Negative, Not Reported     Acceptable Yes    POCT glucose    Collection Time: 09/21/22  4:38 AM   Result Value Ref Range    POCT Glucose 399 (H) 70 - 110 mg/dL   Vitamin B12    Collection Time: 09/21/22  6:14 AM   Result Value Ref Range    Vitamin B-12 302 210 - 950 pg/mL   Folate    Collection Time: 09/21/22  6:14 AM   Result Value Ref Range    Folate 5.6 4.0 - 24.0 ng/mL   Iron and TIBC    Collection Time: 09/21/22  6:14 AM   Result Value Ref Range    Iron 14 (L) 30 - 160 ug/dL    Transferrin 159 (L) 200 - 375 mg/dL    TIBC 235 (L) 250 - 450 ug/dL    Saturated Iron 6 (L) 20 - 50 %   HIV 1/2 Ag/Ab (4th Gen)    Collection Time: 09/21/22  6:14 AM   Result Value Ref Range    HIV 1/2 Ag/Ab Non-reactive Non-reactive   Lactic acid, plasma #2    Collection Time: 09/21/22  7:45 AM   Result Value Ref Range    Lactate (Lactic Acid) 1.7 0.5 - 2.2 mmol/L   POCT glucose    Collection Time: 09/21/22 10:36 AM   Result Value Ref Range    POCT Glucose 396 (H) 70 - 110 mg/dL   Urinalysis, Reflex to Urine Culture Urine, Clean Catch    Collection Time: 09/21/22 11:01 AM    Specimen: Urine   Result Value Ref Range    Specimen UA Urine, Clean Catch     Color, UA Yellow Yellow, Straw, Lia    Appearance, UA Hazy (A) Clear    pH, UA 8.0 5.0 - 8.0    Specific Gravity, UA 1.010 1.005 - 1.030    Protein, UA 3+ (A) Negative    Glucose, UA 4+ (A) Negative    Ketones, UA Negative Negative    Bilirubin (UA) Negative Negative    Occult Blood UA 2+ (A) Negative    Nitrite, UA Positive (A) Negative    Urobilinogen, UA Negative <2.0 EU/dL    Leukocytes, UA 3+ (A) Negative   Urinalysis Microscopic    Collection Time: 09/21/22 11:01 AM   Result Value Ref Range    RBC, UA 23 (H) 0 - 4 /hpf    WBC, UA >100 (H) 0 - 5 /hpf    WBC Clumps, UA Many (A)  None-Rare    Bacteria Moderate (A) None-Occ /hpf    Yeast, UA None None    Squam Epithel, UA 8 /hpf    Hyaline Casts, UA 0 0-1/lpf /lpf    Microscopic Comment SEE COMMENT    ]    I/O last 3 completed shifts:  In: 557.8 [IV Piggyback:557.8]  Out: -     Vitals:    09/21/22 0716 09/21/22 1114 09/21/22 1115 09/21/22 1301   BP: (!) 130/58  (!) 159/72 (!) 150/69   Pulse: 97  106 100   Resp: (!) 24 (!) 28  (!) 27   Temp:   100.3 °F (37.9 °C) (!) 103.1 °F (39.5 °C)   TempSrc:   Oral Oral   SpO2: 96%  99% 96%   Weight:       Height:           No Jvd, Thyromegaly or Lymphadenopathy  Lungs: Fairly clear anteriorly and laterally  Cor: RRR no G or rubs  Abd: Soft benign good bowel sounds non tender  Ext: No E C C    A)    Fever of ? Source would not be surprised if is her HD cath as it has been in place for @ 2 -3 mo   Has lots of wbc in her urine ?? UTI   ESRD very well dialyzed  Anuric as per pt  Hypomag and hypok  DM  2nd hyperpth  Hypoalbuminemic     P)    Cultures and antibiotx   Renal Diet  Home meds  Protect access  HD in am   EPO prn   Binders prn   Adjust all meds to the degree of renal fx  Close follow up I/O and weights  Maintain Hydration

## 2022-09-21 NOTE — PLAN OF CARE
Problem: Adult Inpatient Plan of Care  Goal: Plan of Care Review  9/21/2022 1623 by Amanda Perez RN  Outcome: Ongoing, Progressing  9/21/2022 1623 by Amanda Perez RN  Outcome: Ongoing, Progressing  Goal: Patient-Specific Goal (Individualized)  9/21/2022 1623 by Amanda Perez RN  Outcome: Ongoing, Progressing  9/21/2022 1623 by Amanda Perez RN  Outcome: Ongoing, Progressing  Goal: Absence of Hospital-Acquired Illness or Injury  9/21/2022 1623 by Amanda Perez RN  Outcome: Ongoing, Progressing  9/21/2022 1623 by Amanda Perez RN  Outcome: Ongoing, Progressing  Goal: Optimal Comfort and Wellbeing  9/21/2022 1623 by Amanda Perez RN  Outcome: Ongoing, Progressing  9/21/2022 1623 by Amanda Perez RN  Outcome: Ongoing, Progressing  Goal: Readiness for Transition of Care  9/21/2022 1623 by Amanda Perez RN  Outcome: Ongoing, Progressing  9/21/2022 1623 by Amanda Perez RN  Outcome: Ongoing, Progressing     Problem: Diabetes Comorbidity  Goal: Blood Glucose Level Within Targeted Range  9/21/2022 1623 by Amanda Perez RN  Outcome: Ongoing, Progressing  9/21/2022 1623 by Amanda Perez RN  Outcome: Ongoing, Progressing     Problem: Adjustment to Illness (Sepsis/Septic Shock)  Goal: Optimal Coping  9/21/2022 1623 by Amanda Perez RN  Outcome: Ongoing, Progressing  9/21/2022 1623 by Amanda Perez RN  Outcome: Ongoing, Progressing     Problem: Bleeding (Sepsis/Septic Shock)  Goal: Absence of Bleeding  9/21/2022 1623 by Amanda Perez RN  Outcome: Ongoing, Progressing  9/21/2022 1623 by Amanda Perez RN  Outcome: Ongoing, Progressing     Problem: Glycemic Control Impaired (Sepsis/Septic Shock)  Goal: Blood Glucose Level Within Desired Range  9/21/2022 1623 by Amanda Perez RN  Outcome: Ongoing, Progressing  9/21/2022 1623 by Amanda Perez RN  Outcome: Ongoing, Progressing     Problem: Infection Progression (Sepsis/Septic Shock)  Goal: Absence of Infection  Signs and Symptoms  9/21/2022 1623 by Amanda Perez RN  Outcome: Ongoing, Progressing  9/21/2022 1623 by Amanda Perez RN  Outcome: Ongoing, Progressing     Problem: Nutrition Impaired (Sepsis/Septic Shock)  Goal: Optimal Nutrition Intake  9/21/2022 1623 by Amanda Perez RN  Outcome: Ongoing, Progressing  9/21/2022 1623 by Amanda Perez RN  Outcome: Ongoing, Progressing     Problem: Impaired Wound Healing  Goal: Optimal Wound Healing  9/21/2022 1623 by Amanda Perez RN  Outcome: Ongoing, Progressing  9/21/2022 1623 by Amanda Perez RN  Outcome: Ongoing, Progressing

## 2022-09-21 NOTE — H&P
[facility] [place] [facility]  [facility] Medicine  History & Physical    Patient Name: Anjali Dorantes  MRN: [id number]  Patient Class: IP- Inpatient  Admission Date: 9/21/2022  Attending Physician: Marcela Amezcua DO   Primary Care Provider: Tasia Carrasco MD         Patient information was obtained from patient and ER records.     Subjective:     Principal Problem:Sepsis    Chief Complaint:   Chief Complaint   Patient presents with    Fever    Weakness    Nausea    Vomiting    Chills     Pt presents to  ED c/o fever, last temp at home was 104.  Pt also c/o weakness, chills, n/v.  Denies cp, sob, diarrhea, ha,dizziness,numbness or tingling.  Son reports giving Tylenol around 0100 this morning.  Hx of HTN, DM, endometrial cancer and takes dialysis MWF.  Blood glucose in triage 400.         HPI: 62 year female with history of ESRD on HD (on MWF), CVA (2014), DM, HTN, uterine cancer (s/p CIRILO on 03/30/2022) presented to the ED with complaints of nausea, vomiting, and fever for 2 days.  Tmax at home was 104F Son is bedside. Admits feeling weak overall.  She denies any chest pain, shortness breath, cough, difficulty breathing, abdominal pain, dysuria, hematuria, or any vaginal bleeding.  Denies any new food.  Denies any new medication, recent travel, or any sick contacts.  Admits PCP recently started indomethacin for gout, but patient has not taken it yet.  Patient recently tunneled cath placed in July 2022 and was initiated on dialysis in August 2022.  Has been tolerating her dialysis sessions without difficulty.  Prior to that, patient was hospitalized briefly for her hysterectomy in March 2022.    In the ED, patient was febrile and tachycardic.  Labs without leukocytosis, but it does show anemia, hypokalemia, and lactic acidosis.  Mg level 1.6. EKG noted to have prolonged QTC 605ms.  Chest x-ray with no acute process.  Patient received magnesium, potassium, Zosyn, and vancomycin in the ED.  Patient admitted to hospital  medicine for further evaluation.      Past Medical History:   Diagnosis Date    AMS (altered mental status) 06/30/2021    Anemia in CKD (chronic kidney disease)     CKD (chronic kidney disease)     Diabetes mellitus     Encounter for blood transfusion     Hypercholesterolemia     Hypertension     Stroke 2015    TIA    Uterine cancer 2021    Endometrial Cancer       Past Surgical History:   Procedure Laterality Date    ARTERIOGRAM, CEREBRAL ARTERIES  02/12/2014    and 3/18/2014    HYSTERECTOMY      OMENTECTOMY N/A 03/30/2022    Procedure: OMENTECTOMY;  Surgeon: Reuben Lopez MD;  Location: [facility] OR [address] [place] [address];  Service: OB/GYN;  Laterality: N/A;    TOTAL ABDOMINAL HYSTERECTOMY W/ BILATERAL SALPINGOOPHORECTOMY N/A 03/30/2022    Procedure: HYSTERECTOMY, TOTAL, ABDOMINAL, WITH BILATERAL SALPINGO-OOPHORECTOMY;  Surgeon: Reuben Lopez MD;  Location: [facility] OR [address] [place] [address];  Service: OB/GYN;  Laterality: N/A;       Review of patient's allergies indicates:   Allergen Reactions    Glipizide Other (See Comments)       No current facility-administered medications on file prior to encounter.     Current Outpatient Medications on File Prior to Encounter   Medication Sig    acetaminophen (TYLENOL) 325 MG tablet Take 2 tablets (650 mg total) by mouth every 6 (six) hours as needed for Pain (Take every 6 hours for 5-7 days and then as needed).    allopurinoL (ZYLOPRIM) 100 MG tablet Take 100 mg by mouth once daily.    amLODIPine (NORVASC) 5 MG tablet Take 5 mg by mouth once daily.    clopidogreL (PLAVIX) 75 mg tablet Take 75 mg by mouth once daily.    dulaglutide (TRULICITY) 0.75 mg/0.5 mL pen injector Inject 0.75 mg into the skin every 7 days.    metoprolol succinate (TOPROL-XL) 50 MG 24 hr tablet Take 50 mg by mouth once daily.    nateglinide (STARLIX) 120 MG tablet Take 1 tablet by mouth 3 times daily FOR DIABETES    sodium bicarbonate 650 MG tablet Take 1,300 mg by mouth.    venlafaxine (EFFEXOR) 75 MG tablet Take  75 mg by mouth 2 (two) times daily.     ergocalciferol (ERGOCALCIFEROL) 50,000 unit Cap Take 50,000 Units by mouth every 7 days.    ferrous sulfate 325 (65 FE) MG EC tablet Take 1 tablet (325 mg total) by mouth once daily.    losartan (COZAAR) 50 MG tablet Take 50 mg by mouth once daily.    metoprolol tartrate (LOPRESSOR) 50 MG tablet Take 50 mg by mouth 2 (two) times daily.    oxyCODONE (ROXICODONE) 5 MG immediate release tablet Take 1 tablet (5 mg total) by mouth every 6 (six) hours as needed for Pain. (Patient not taking: Reported on 4/29/2022)    pantoprazole (PROTONIX) 20 MG tablet Take 20 mg by mouth once daily.     rosuvastatin (CRESTOR) 20 MG tablet take ONE tablet every night at bedtime (for cholesterol)    senna (SENOKOT) 8.6 mg tablet Take 1 tablet by mouth 2 (two) times daily as needed for Constipation. (Patient not taking: Reported on 9/21/2022)    vitamin D (VITAMIN D3) 1000 units Tab Take 1,000 Units by mouth once daily.      Family History    None       Tobacco Use    Smoking status: Never    Smokeless tobacco: Never   Substance and Sexual Activity    Alcohol use: No    Drug use: No    Sexual activity: Not Currently     Partners: Male     Review of Systems   Constitutional:  Positive for activity change, chills, fatigue and fever. Negative for appetite change.   HENT:  Negative for congestion, sinus pressure and trouble swallowing.    Eyes:  Negative for photophobia and visual disturbance.   Respiratory:  Negative for cough, chest tightness and shortness of breath.    Cardiovascular:  Negative for chest pain, palpitations and leg swelling.   Gastrointestinal:  Positive for nausea and vomiting. Negative for abdominal distention, abdominal pain, blood in stool and diarrhea.   Endocrine: Negative for polyuria.   Genitourinary:  Negative for difficulty urinating, dysuria and hematuria.   Musculoskeletal:  Negative for arthralgias, back pain and joint swelling.   Allergic/Immunologic:  Positive for immunocompromised state.   Neurological:  Positive for weakness. Negative for dizziness, light-headedness and headaches.   Psychiatric/Behavioral:  Negative for confusion and hallucinations.    Objective:     Vital Signs (Most Recent):  Temp: 100.1 °F (37.8 °C) (09/21/22 0647)  Pulse: 97 (09/21/22 0716)  Resp: (!) 24 (09/21/22 0716)  BP: (!) 130/58 (09/21/22 0716)  SpO2: 96 % (09/21/22 0716)   Vital Signs (24h Range):  Temp:  [100.1 °F (37.8 °C)-103.1 °F (39.5 °C)] 100.1 °F (37.8 °C)  Pulse:  [] 97  Resp:  [13-24] 24  SpO2:  [96 %-99 %] 96 %  BP: (129-154)/(58-72) 130/58     Weight: 55.8 kg (123 lb)  Body mass index is 24.02 kg/m².    Physical Exam  Vitals and nursing note reviewed.   Constitutional:       General: She is not in acute distress.     Appearance: She is ill-appearing. She is not toxic-appearing or diaphoretic.      Comments: Elderly female, laying in bed. Appears fatigue    HENT:      Right Ear: External ear normal.      Left Ear: External ear normal.      Nose: Nose normal.      Mouth/Throat:      Mouth: Mucous membranes are dry.   Eyes:      Extraocular Movements: Extraocular movements intact.      Conjunctiva/sclera: Conjunctivae normal.   Cardiovascular:      Rate and Rhythm: Normal rate and regular rhythm.      Heart sounds: No murmur heard.    No gallop.   Pulmonary:      Effort: Pulmonary effort is normal. No respiratory distress.      Breath sounds: Normal breath sounds. No wheezing or rales.   Abdominal:      General: There is no distension.      Palpations: Abdomen is soft.      Tenderness: There is no abdominal tenderness. There is no right CVA tenderness, left CVA tenderness or guarding.   Musculoskeletal:         General: No swelling or tenderness.      Cervical back: Normal range of motion.      Right lower leg: No edema.      Left lower leg: No edema.   Skin:     General: Skin is warm and dry.      Comments: +THDC on right upper chest wall.  Catheter appears to be  intact.  No bleeding, erythema, drainage from catheter site.  No overlying signs of infection.  No tenderness to palpation near THDC. Site looks clean    Neurological:      General: No focal deficit present.      Mental Status: She is alert and oriented to person, place, and time.      Motor: Weakness (generalized weakness) present.   Psychiatric:         Mood and Affect: Mood normal.         Behavior: Behavior normal.         Thought Content: Thought content normal.           Significant Labs: All pertinent labs within the past 24 hours have been reviewed.  CBC:   Recent Labs   Lab 09/21/22 0422   WBC 10.40   HGB 10.6*   HCT 31.7*        CMP:   Recent Labs   Lab 09/21/22 0422      K 2.5*   CL 95   CO2 25   *   BUN 23   CREATININE 3.3*   CALCIUM 9.4   PROT 8.3   ALBUMIN 3.8   BILITOT 1.1*   ALKPHOS 174*   AST 17   ALT 16   ANIONGAP 18*       Lactic Acid:   Recent Labs   Lab 09/21/22 0422 09/21/22  0745   LACTATE 3.2* 1.7     Magnesium:   Recent Labs   Lab 09/21/22 0422   MG 1.6         Significant Imaging: I have reviewed all pertinent imaging results/findings within the past 24 hours.    Imaging Results              X-Ray Chest AP Portable (Final result)  Result time 09/21/22 04:47:08      Final result by Tj Melendez MD (09/21/22 04:47:08)                   Impression:      There is no radiographic evidence for acute intrathoracic process.      Electronically signed by: Tj Melendez  Date:    09/21/2022  Time:    04:47               Narrative:    EXAMINATION:  XR CHEST AP PORTABLE    CLINICAL HISTORY:  Sepsis;    TECHNIQUE:  Single frontal view of the chest was performed.    COMPARISON:  Chest radiograph March 30, 2022    FINDINGS:  Single portable chest view is submitted.  Right-sided central venous catheter noted, distal tip at the expected location of the SVC.  The cardiomediastinal silhouette appears stable.  Mild aortic atherosclerotic change noted.    There is no evidence for  confluent infiltrate or consolidation, significant pleural effusion or pneumothorax.  Mild curvature of the spine noted.  The osseous structures appear intact.                                        Assessment/Plan:     * Sepsis  This patient does have evidence of infective focus  My overall impression is sepsis. Vital signs were reviewed and noted in progress note.  Antibiotics given-   Antibiotics (From admission, onward)    Start     Stop Route Frequency Ordered    09/21/22 0700  piperacillin-tazobactam 4.5 g in dextrose 5 % 100 mL IVPB (ready to mix system)  (Sepsis Treatment Panel)         -- IV Every 12 hours (non-standard times) 09/21/22 0547    09/21/22 0643  vancomycin - pharmacy to dose  (Sepsis Treatment Panel)        See Hyperspace for full Linked Orders Report.    -- IV pharmacy to manage frequency 09/21/22 0544        Cultures were taken-   Microbiology Results (last 7 days)     Procedure Component Value Units Date/Time    Aerobic culture (Specify Source) **CANNOT BE ORDERED AS STAT** [[id number]] Collected: 09/21/22 0439    Order Status: Sent Specimen: Skin from Chest, Right Updated: 09/21/22 0444    Blood culture x two cultures. Draw prior to antibiotics. [[id number]] Collected: 09/21/22 0422    Order Status: Sent Specimen: Blood from Peripheral, Antecubital, Left Updated: 09/21/22 0429    Blood culture x two cultures. Draw prior to antibiotics. [[id number]] Collected: 09/21/22 0422    Order Status: Sent Specimen: Blood from Peripheral, Forearm, Right Updated: 09/21/22 0429    Influenza A & B by Molecular [[id number]]     Order Status: Canceled Specimen: Nasopharyngeal Swab         Latest lactate reviewed, they are-  Recent Labs   Lab 09/21/22 0422 09/21/22  0745   LACTATE 3.2* 1.7       Organ dysfunction: none  Source- suspect viral gastroenteritis at this time    Source control Achieved by- antibiotics    - Presents febrile, tachycardic, with elevated LA and complaints of N/V and weakness  - LA 3.4 on  admission, repeat LA now 1.7  - Pt with no abdominal pain or tenderness, low suspicion for SBP  - No leukocytosis, procal elevated (however in setting of ESRD, low yield). Flu and COVID negative   - CXR with no acute process  - UA pending  - Blood cx pending  - Pt with recent THDC placement in 07/2022, culture of THDC pending   - Continue broad spectrum abx at this time:  Day 1 of vancomycin, day 1 of Zosyn      Prolonged Q-T interval on ECG  -EKG in the ED showed QTc prolonged at 605ms, with accelerated junctional rhythm  -Mg 1.6 on admit, was replaced with Mg IV 2gm  -Repeat EKG showed QTc of 496ms  -Chart reviewed, EKG from OSH in 07/2022 showed QTc was 492 at that time   -Suspect medication induced. Pt chronically on Efflexor 75mg daily and protonix  -Will hold home efflexor and protonix at this time.   -Monitor on telemetry       Hypokalemia  -K 2.5 on admission  -Replaced in the ED  -Continue IVF with NS with KCL  -Repeat BMP in AM       Essential hypertension  -BP mildly elevated on admit  -Home meds:  Norvasc 5 mg daily, metoprolol 50 mg BID, losartan 50 mg daily  -resume home Norvasc and metoprolol.  Hold losartan at this time   -continue to monitor blood pressure closely.    -If elevated, then will consider resuming home losartan      Type 2 diabetes mellitus, without long-term current use of insulin  A1c:   Lab Results   Component Value Date    HGBA1C 6.8 (H) 03/23/2022     Repeat A1C pending   Hold home metformin and trulicity   Meds: SSI PRN to maintain goal 140-180  ADA diet, accuchecks ACHS, hypoglycemic protocol      ESRD (end stage renal disease)  -Recent diagnosis, THDC recently placed on 07/27/2022  -First dialysis session initiated in 08/2022, on MWF  -Nephrology consulted for HD    Anemia of chronic disease  -Stable, Hgb appears near baseline  -No signs of active bleeding  -Continue oral iron supplement   -Monitor       Depression  Patient has persistent depression which is mild and is currently  "controlled. Will hold anti-depressant medications. We will not consult psychiatry at this time. Patient does not display psychosis at this time. Continue to monitor closely and adjust plan of care as needed.    -Patient takes home Effexor 75mg daily   -Will hold at this time given prolonged QTc on admission  -Monitor closely       Endometrial cancer  -Patient s/p CIRILO/BSO/omentectomy secondary to Stage 4 high grade endometrial cancer on 03/30/2022  -Previously on chemo, but was unable to resume due to renal function   -Follows with oncology outpatient      History of CVA (cerebrovascular accident)  -Hx of CVA in 2014  -Continue plavix and statin at this time       Weakness  -PT/OT consulted       Advanced care planning/counseling discussion  Advance Care Planning     Date: 09/21/2022    Code Status  I engaged the the family in a conversation about the patient's preferences for care  at the very end of life. The patient wishes to have  CPR and other invasive treatments performed when her heart and/or breathing stops at this time. Son states patient has not filled out advanced directive because she wants to hold off on further conversation about it "until the time comes".  I communicated to the family that at this time, the patient wishes align with full code status. Son and patient in agreement.  I spent a total of 18 minutes engaging the patient in this advance care planning discussion.       Code status: FULL code         VTE Risk Mitigation (From admission, onward)    None             Maricarmen-Harini Amezcua DO  Department of Hospital Medicine   [facility] - [place] - Emergency Dept  "

## 2022-09-21 NOTE — PLAN OF CARE
[facility] Emergency Dept  Initial Discharge Assessment       Primary Care Provider: Tasia Carrasco MD    Admission Diagnosis: Prolonged Q-T interval on ECG [R94.31]    Admission Date: 9/21/2022  Expected Discharge Date:     Discharge Barriers Identified: None    Payor: MEDICARE / Plan: MEDICARE PART A & B / Product Type: Government /     Extended Emergency Contact Information  Primary Emergency Contact: Willie Amezcua  Mobile Phone: [phone]  Relation: Son  Preferred language: English   needed? No  Secondary Emergency Contact: Bibi Amezcua   [place]  Mobile Phone: [phone]  Relation: Daughter    Discharge Plan A: Home with family  Discharge Plan B: Home      [facility] - [place], [place] - [address]  [address]  [place] [address]  Phone: [phone] Fax: [phone]      Initial Assessment (most recent)       Adult Discharge Assessment - 09/21/22 1248          Discharge Assessment    Assessment Type Discharge Planning Assessment     Confirmed/corrected address, phone number and insurance Yes     Confirmed Demographics Correct on Facesheet     Source of Information family;health record   son:  Willie Dorantes  [phone]    If unable to respond/provide information was family/caregiver contacted? Yes     Contact Name/Number Willie Dorantes  [phone]     Reason For Admission Prolonged Q-T interval     Lives With spouse;child(chandrakant), adult     Facility Arrived From: home     Do you expect to return to your current living situation? Yes     Do you have help at home or someone to help you manage your care at home? Yes     Who are your caregiver(s) and their phone number(s)? spouse; daughter (Bibi Amezcua  [phone])     Prior to hospitilization cognitive status: Unable to Assess     Current cognitive status: Unable to Assess   patient asleep    Walking or Climbing Stairs Difficulty ambulation difficulty, requires equipment     Mobility Management RW     Equipment Currently Used at Home  walker, rolling     Readmission within 30 days? No     Patient currently being followed by outpatient case management? No     Do you currently have service(s) that help you manage your care at home? No     Do you take prescription medications? Yes     Do you have prescription coverage? Yes     Coverage Medicaid     Do you have any problems affording any of your prescribed medications? No     Is the patient taking medications as prescribed? yes     Who is going to help you get home at discharge? family     How do you get to doctors appointments? family or friend will provide     Are you on dialysis? Yes     Dialysis Name and Scheduled days [facility]     Do you take coumadin? No   Plavix    Discharge Plan A Home with family     Discharge Plan B Home     DME Needed Upon Discharge  none     Discharge Plan discussed with: Adult children     Discharge Barriers Identified None        Relationship/Environment    Name(s) of Who Lives With Patient spouse, son, daughter (Bibi Amezcua)                 Discharge planning assessment completed with assistance from son (Willie) at the bedside.  Patient from home with family.  Independent with use of assistive device.  Patient receives dialysis @ [facility] on Mon Wed Fri.  Patient's son would like to explore transferring to [facility].

## 2022-09-21 NOTE — ASSESSMENT & PLAN NOTE
A1c:   Lab Results   Component Value Date    HGBA1C 6.8 (H) 03/23/2022     Repeat A1C pending   Hold home metformin and trulicity   Meds: SSI PRN to maintain goal 140-180  ADA diet, accuchecks ACHS, hypoglycemic protocol

## 2022-09-21 NOTE — ASSESSMENT & PLAN NOTE
Patient has persistent depression which is mild and is currently controlled. Will hold anti-depressant medications. We will not consult psychiatry at this time. Patient does not display psychosis at this time. Continue to monitor closely and adjust plan of care as needed.    -Patient takes home Effexor 75mg daily   -Will hold at this time given prolonged QTc on admission  -Monitor closely

## 2022-09-21 NOTE — ASSESSMENT & PLAN NOTE
-BP mildly elevated on admit  -Home meds:  Norvasc 5 mg daily, metoprolol 50 mg BID, losartan 50 mg daily  -resume home Norvasc and metoprolol.  Hold losartan at this time   -continue to monitor blood pressure closely.    -If elevated, then will consider resuming home losartan

## 2022-09-21 NOTE — PROGRESS NOTES
Pharmacokinetic Initial Assessment: IV Vancomycin    Assessment/Plan:    Initiate intravenous vancomycin with loading dose of 750 mg once with subsequent doses when random concentrations are less than 20 mcg/mL  Desired empiric serum trough concentration is 10 to 20 mcg/mL  Draw vancomycin random level on 9/22/22 at 06:00.  Pharmacy will continue to follow and monitor vancomycin.      Please contact pharmacy at extension [phone] with any questions regarding this assessment.     Thank you for the consult,   Sharifa Dorantes       Patient brief summary:  Anjali Dorantes is a 62 y.o. female initiated on antimicrobial therapy with IV Vancomycin for treatment of suspected  sepsis of unknown source.    Drug Allergies:   Review of patient's allergies indicates:   Allergen Reactions    Glipizide Other (See Comments)       Actual Body Weight:   55.8 kg    Renal Function:   Estimated Creatinine Clearance: 13.7 mL/min (A) (based on SCr of 3.3 mg/dL (H)).,     Dialysis Method (if applicable):  N/A    CBC (last 72 hours):  Recent Labs   Lab Result Units 09/21/22  0422   WBC K/uL 10.40   Hemoglobin g/dL 10.6*   Hematocrit % 31.7*   Platelets K/uL 164   Gran % % 89.2*   Lymph % % 3.3*   Mono % % 6.5   Eosinophil % % 0.0   Basophil % % 0.1   Differential Method  Automated       Metabolic Panel (last 72 hours):  Recent Labs   Lab Result Units 09/21/22  0422   Sodium mmol/L 138   Potassium mmol/L 2.5*   Chloride mmol/L 95   CO2 mmol/L 25   Glucose mg/dL 419*   BUN mg/dL 23   Creatinine mg/dL 3.3*   Albumin g/dL 3.8   Total Bilirubin mg/dL 1.1*   Alkaline Phosphatase U/L 174*   AST U/L 17   ALT U/L 16   Magnesium mg/dL 1.6       Drug levels (last 3 results):  No results for input(s): VANCOMYCINRA, VANCORANDOM, VANCOMYCINPE, VANCOPEAK, VANCOMYCINTR, VANCOTROUGH in the last 72 hours.    Microbiologic Results:  Microbiology Results (last 7 days)       Procedure Component Value Units Date/Time    Aerobic culture (Specify Source) **CANNOT  BE ORDERED AS STAT** [[id number]] Collected: 09/21/22 0439    Order Status: Sent Specimen: Skin from Chest, Right Updated: 09/21/22 0444    Blood culture x two cultures. Draw prior to antibiotics. [[id number]] Collected: 09/21/22 0422    Order Status: Sent Specimen: Blood from Peripheral, Antecubital, Left Updated: 09/21/22 0429    Blood culture x two cultures. Draw prior to antibiotics. [[id number]] Collected: 09/21/22 0422    Order Status: Sent Specimen: Blood from Peripheral, Forearm, Right Updated: 09/21/22 0429    Influenza A & B by Molecular [[id number]]     Order Status: Canceled Specimen: Nasopharyngeal Swab

## 2022-09-21 NOTE — ASSESSMENT & PLAN NOTE
-Stable, Hgb appears near baseline  -No signs of active bleeding  -Continue oral iron supplement   -Monitor

## 2022-09-21 NOTE — HPI
62 year female with history of ESRD on HD (on MWF), CVA (2014), DM, HTN, uterine cancer (s/p CIRILO on 03/30/2022) presented to the ED with complaints of nausea, vomiting, and fever for 2 days.  Tmax at home was 104F Son is bedside. Admits feeling weak overall.  She denies any chest pain, shortness breath, cough, difficulty breathing, abdominal pain, dysuria, hematuria, or any vaginal bleeding.  Denies any new food.  Denies any new medication, recent travel, or any sick contacts.  Admits PCP recently started indomethacin for gout, but patient has not taken it yet.  Patient recently tunneled cath placed in July 2022 and was initiated on dialysis in August 2022.  Has been tolerating her dialysis sessions without difficulty.  Prior to that, patient was hospitalized briefly for her hysterectomy in March 2022.    In the ED, patient was febrile and tachycardic.  Labs without leukocytosis, but it does show anemia, hypokalemia, and lactic acidosis.  Mg level 1.6. EKG noted to have prolonged QTC 605ms.  Chest x-ray with no acute process.  Patient received magnesium, potassium, Zosyn, and vancomycin in the ED.  Patient admitted to hospital medicine for further evaluation.

## 2022-09-21 NOTE — ASSESSMENT & PLAN NOTE
This patient does have evidence of infective focus  My overall impression is sepsis. Vital signs were reviewed and noted in progress note.  Antibiotics given-   Antibiotics (From admission, onward)    Start     Stop Route Frequency Ordered    09/21/22 0700  piperacillin-tazobactam 4.5 g in dextrose 5 % 100 mL IVPB (ready to mix system)  (Sepsis Treatment Panel)         -- IV Every 12 hours (non-standard times) 09/21/22 0547    09/21/22 0643  vancomycin - pharmacy to dose  (Sepsis Treatment Panel)        See Hyperspace for full Linked Orders Report.    -- IV pharmacy to manage frequency 09/21/22 0544        Cultures were taken-   Microbiology Results (last 7 days)     Procedure Component Value Units Date/Time    Aerobic culture (Specify Source) **CANNOT BE ORDERED AS STAT** [[id number]] Collected: 09/21/22 0439    Order Status: Sent Specimen: Skin from Chest, Right Updated: 09/21/22 0444    Blood culture x two cultures. Draw prior to antibiotics. [[id number]] Collected: 09/21/22 0422    Order Status: Sent Specimen: Blood from Peripheral, Antecubital, Left Updated: 09/21/22 0429    Blood culture x two cultures. Draw prior to antibiotics. [[id number]] Collected: 09/21/22 0422    Order Status: Sent Specimen: Blood from Peripheral, Forearm, Right Updated: 09/21/22 0429    Influenza A & B by Molecular [[id number]]     Order Status: Canceled Specimen: Nasopharyngeal Swab         Latest lactate reviewed, they are-  Recent Labs   Lab 09/21/22 0422 09/21/22  0745   LACTATE 3.2* 1.7       Organ dysfunction: none  Source- suspect viral gastroenteritis at this time    Source control Achieved by- antibiotics    - Presents febrile, tachycardic, with elevated LA and complaints of N/V and weakness  - LA 3.4 on admission, repeat LA now 1.7  - Pt with no abdominal pain or tenderness, low suspicion for SBP  - No leukocytosis, procal elevated (however in setting of ESRD, low yield). Flu and COVID negative   - CXR with no acute process  -  UA pending  - Blood cx pending  - Pt with recent THDC placement in 07/2022, culture of THDC pending   - Continue broad spectrum abx at this time:  Day 1 of vancomycin, day 1 of Zosyn

## 2022-09-22 PROBLEM — R78.81 BACTEREMIA: Status: ACTIVE | Noted: 2022-09-22

## 2022-09-22 LAB
ANION GAP SERPL CALC-SCNC: 10 MMOL/L (ref 8–16)
ASCENDING AORTA: 2.75 CM
AV PEAK GRADIENT: 6 MMHG
AV VELOCITY RATIO: 0.87
BASOPHILS # BLD AUTO: 0.01 K/UL (ref 0–0.2)
BASOPHILS NFR BLD: 0.2 % (ref 0–1.9)
BSA FOR ECHO PROCEDURE: 1.55 M2
BUN SERPL-MCNC: 30 MG/DL (ref 8–23)
CALCIUM SERPL-MCNC: 8.6 MG/DL (ref 8.7–10.5)
CHLORIDE SERPL-SCNC: 105 MMOL/L (ref 95–110)
CO2 SERPL-SCNC: 27 MMOL/L (ref 23–29)
CREAT SERPL-MCNC: 3.3 MG/DL (ref 0.5–1.4)
CV ECHO LV RWT: 0.53 CM
DIFFERENTIAL METHOD: ABNORMAL
DOP CALC AO PEAK VEL: 1.19 M/S
DOP CALC LVOT AREA: 3.2 CM2
DOP CALC LVOT DIAMETER: 2.01 CM
DOP CALC LVOT PEAK VEL: 1.03 M/S
DOP CALC LVOT STROKE VOLUME: 75.48 CM3
DOP CALCLVOT PEAK VEL VTI: 23.8 CM
E WAVE DECELERATION TIME: 157.05 MSEC
E/A RATIO: 1.13
E/E' RATIO: 15.64 M/S
ECHO LV POSTERIOR WALL: 1.04 CM (ref 0.6–1.1)
EJECTION FRACTION: 55 %
EOSINOPHIL # BLD AUTO: 0 K/UL (ref 0–0.5)
EOSINOPHIL NFR BLD: 0 % (ref 0–8)
ERYTHROCYTE [DISTWIDTH] IN BLOOD BY AUTOMATED COUNT: 14.2 % (ref 11.5–14.5)
EST. GFR  (NO RACE VARIABLE): 15 ML/MIN/1.73 M^2
FRACTIONAL SHORTENING: 27 % (ref 28–44)
GLUCOSE SERPL-MCNC: 183 MG/DL (ref 70–110)
HCT VFR BLD AUTO: 24.9 % (ref 37–48.5)
HGB BLD-MCNC: 8 G/DL (ref 12–16)
IMM GRANULOCYTES # BLD AUTO: 0.02 K/UL (ref 0–0.04)
IMM GRANULOCYTES NFR BLD AUTO: 0.4 % (ref 0–0.5)
INR PPP: 1 (ref 0.8–1.2)
INTERVENTRICULAR SEPTUM: 1.19 CM (ref 0.6–1.1)
IVC DIAMETER: 1.57 CM
IVRT: 83.04 MSEC
LA MAJOR: 4.64 CM
LA MINOR: 5.16 CM
LA WIDTH: 4.4 CM
LEFT ATRIUM SIZE: 3.52 CM
LEFT ATRIUM VOLUME INDEX: 42 ML/M2
LEFT ATRIUM VOLUME: 64.33 CM3
LEFT INTERNAL DIMENSION IN SYSTOLE: 2.83 CM (ref 2.1–4)
LEFT VENTRICLE DIASTOLIC VOLUME INDEX: 42.99 ML/M2
LEFT VENTRICLE DIASTOLIC VOLUME: 65.78 ML
LEFT VENTRICLE MASS INDEX: 93 G/M2
LEFT VENTRICLE SYSTOLIC VOLUME INDEX: 19.8 ML/M2
LEFT VENTRICLE SYSTOLIC VOLUME: 30.29 ML
LEFT VENTRICULAR INTERNAL DIMENSION IN DIASTOLE: 3.9 CM (ref 3.5–6)
LEFT VENTRICULAR MASS: 142.89 G
LV LATERAL E/E' RATIO: 12.29 M/S
LV SEPTAL E/E' RATIO: 21.5 M/S
LVOT MG: 2.68 MMHG
LVOT MV: 0.79 CM/S
LYMPHOCYTES # BLD AUTO: 0.4 K/UL (ref 1–4.8)
LYMPHOCYTES NFR BLD: 7.6 % (ref 18–48)
MAGNESIUM SERPL-MCNC: 2.2 MG/DL (ref 1.6–2.6)
MCH RBC QN AUTO: 29.6 PG (ref 27–31)
MCHC RBC AUTO-ENTMCNC: 32.1 G/DL (ref 32–36)
MCV RBC AUTO: 92 FL (ref 82–98)
MONOCYTES # BLD AUTO: 0.4 K/UL (ref 0.3–1)
MONOCYTES NFR BLD: 8.5 % (ref 4–15)
MV PEAK A VEL: 0.76 M/S
MV PEAK E VEL: 0.86 M/S
MV STENOSIS PRESSURE HALF TIME: 45.54 MS
MV VALVE AREA P 1/2 METHOD: 4.83 CM2
NEUTROPHILS # BLD AUTO: 4 K/UL (ref 1.8–7.7)
NEUTROPHILS NFR BLD: 83.3 % (ref 38–73)
NRBC BLD-RTO: 0 /100 WBC
PHOSPHATE SERPL-MCNC: 2.1 MG/DL (ref 2.7–4.5)
PISA TR MAX VEL: 2.08 M/S
PLATELET # BLD AUTO: 123 K/UL (ref 150–450)
PMV BLD AUTO: 9.5 FL (ref 9.2–12.9)
POCT GLUCOSE: 185 MG/DL (ref 70–110)
POCT GLUCOSE: 189 MG/DL (ref 70–110)
POCT GLUCOSE: 198 MG/DL (ref 70–110)
POCT GLUCOSE: 271 MG/DL (ref 70–110)
POTASSIUM SERPL-SCNC: 3.2 MMOL/L (ref 3.5–5.1)
PROTHROMBIN TIME: 10.6 SEC (ref 9–12.5)
PV PEAK VELOCITY: 0.93 CM/S
RA MAJOR: 4.46 CM
RA PRESSURE: 3 MMHG
RA WIDTH: 3.3 CM
RBC # BLD AUTO: 2.7 M/UL (ref 4–5.4)
RIGHT VENTRICULAR END-DIASTOLIC DIMENSION: 3.16 CM
SINUS: 3.2 CM
SODIUM SERPL-SCNC: 142 MMOL/L (ref 136–145)
STJ: 2.17 CM
TDI LATERAL: 0.07 M/S
TDI SEPTAL: 0.04 M/S
TDI: 0.06 M/S
TR MAX PG: 17 MMHG
TRICUSPID ANNULAR PLANE SYSTOLIC EXCURSION: 2.1 CM
TV REST PULMONARY ARTERY PRESSURE: 20 MMHG
VANCOMYCIN SERPL-MCNC: 11.3 UG/ML
WBC # BLD AUTO: 4.85 K/UL (ref 3.9–12.7)

## 2022-09-22 PROCEDURE — 80202 ASSAY OF VANCOMYCIN: CPT | Performed by: STUDENT IN AN ORGANIZED HEALTH CARE EDUCATION/TRAINING PROGRAM

## 2022-09-22 PROCEDURE — 11000001 HC ACUTE MED/SURG PRIVATE ROOM

## 2022-09-22 PROCEDURE — 80048 BASIC METABOLIC PNL TOTAL CA: CPT | Performed by: STUDENT IN AN ORGANIZED HEALTH CARE EDUCATION/TRAINING PROGRAM

## 2022-09-22 PROCEDURE — 87040 BLOOD CULTURE FOR BACTERIA: CPT | Mod: 59 | Performed by: STUDENT IN AN ORGANIZED HEALTH CARE EDUCATION/TRAINING PROGRAM

## 2022-09-22 PROCEDURE — 25000003 PHARM REV CODE 250: Performed by: STUDENT IN AN ORGANIZED HEALTH CARE EDUCATION/TRAINING PROGRAM

## 2022-09-22 PROCEDURE — 63600175 PHARM REV CODE 636 W HCPCS: Performed by: INTERNAL MEDICINE

## 2022-09-22 PROCEDURE — 83735 ASSAY OF MAGNESIUM: CPT | Performed by: STUDENT IN AN ORGANIZED HEALTH CARE EDUCATION/TRAINING PROGRAM

## 2022-09-22 PROCEDURE — 85025 COMPLETE CBC W/AUTO DIFF WBC: CPT | Performed by: STUDENT IN AN ORGANIZED HEALTH CARE EDUCATION/TRAINING PROGRAM

## 2022-09-22 PROCEDURE — 36415 COLL VENOUS BLD VENIPUNCTURE: CPT | Performed by: RADIOLOGY

## 2022-09-22 PROCEDURE — 63600175 PHARM REV CODE 636 W HCPCS: Performed by: STUDENT IN AN ORGANIZED HEALTH CARE EDUCATION/TRAINING PROGRAM

## 2022-09-22 PROCEDURE — 36415 COLL VENOUS BLD VENIPUNCTURE: CPT | Performed by: STUDENT IN AN ORGANIZED HEALTH CARE EDUCATION/TRAINING PROGRAM

## 2022-09-22 PROCEDURE — 87077 CULTURE AEROBIC IDENTIFY: CPT | Performed by: STUDENT IN AN ORGANIZED HEALTH CARE EDUCATION/TRAINING PROGRAM

## 2022-09-22 PROCEDURE — 99223 1ST HOSP IP/OBS HIGH 75: CPT | Mod: ,,, | Performed by: INTERNAL MEDICINE

## 2022-09-22 PROCEDURE — 87186 SC STD MICRODIL/AGAR DIL: CPT | Performed by: STUDENT IN AN ORGANIZED HEALTH CARE EDUCATION/TRAINING PROGRAM

## 2022-09-22 PROCEDURE — 97165 OT EVAL LOW COMPLEX 30 MIN: CPT

## 2022-09-22 PROCEDURE — 84100 ASSAY OF PHOSPHORUS: CPT | Performed by: STUDENT IN AN ORGANIZED HEALTH CARE EDUCATION/TRAINING PROGRAM

## 2022-09-22 PROCEDURE — 85610 PROTHROMBIN TIME: CPT | Performed by: RADIOLOGY

## 2022-09-22 PROCEDURE — 99223 PR INITIAL HOSPITAL CARE,LEVL III: ICD-10-PCS | Mod: ,,, | Performed by: INTERNAL MEDICINE

## 2022-09-22 PROCEDURE — 97161 PT EVAL LOW COMPLEX 20 MIN: CPT

## 2022-09-22 RX ORDER — POTASSIUM CHLORIDE 20 MEQ/1
40 TABLET, EXTENDED RELEASE ORAL ONCE
Status: COMPLETED | OUTPATIENT
Start: 2022-09-22 | End: 2022-09-22

## 2022-09-22 RX ORDER — SODIUM,POTASSIUM PHOSPHATES 280-250MG
2 POWDER IN PACKET (EA) ORAL
Status: COMPLETED | OUTPATIENT
Start: 2022-09-22 | End: 2022-09-22

## 2022-09-22 RX ORDER — CEFAZOLIN SODIUM 1 G/50ML
1 SOLUTION INTRAVENOUS DAILY
Status: DISCONTINUED | OUTPATIENT
Start: 2022-09-22 | End: 2022-09-27

## 2022-09-22 RX ADMIN — ACETAMINOPHEN 650 MG: 325 TABLET ORAL at 08:09

## 2022-09-22 RX ADMIN — SODIUM BICARBONATE 1300 MG: 325 TABLET ORAL at 08:09

## 2022-09-22 RX ADMIN — Medication 2 PACKET: at 09:09

## 2022-09-22 RX ADMIN — CLOPIDOGREL 75 MG: 75 TABLET, FILM COATED ORAL at 08:09

## 2022-09-22 RX ADMIN — CEFAZOLIN SODIUM 1 G: 1 SOLUTION INTRAVENOUS at 05:09

## 2022-09-22 RX ADMIN — PIPERACILLIN AND TAZOBACTAM 4.5 G: 4; .5 INJECTION, POWDER, LYOPHILIZED, FOR SOLUTION INTRAVENOUS; PARENTERAL at 06:09

## 2022-09-22 RX ADMIN — FERROUS SULFATE TAB 325 MG (65 MG ELEMENTAL FE) 1 EACH: 325 (65 FE) TAB at 08:09

## 2022-09-22 RX ADMIN — INSULIN ASPART 1 UNITS: 100 INJECTION, SOLUTION INTRAVENOUS; SUBCUTANEOUS at 09:09

## 2022-09-22 RX ADMIN — ATORVASTATIN CALCIUM 40 MG: 40 TABLET, FILM COATED ORAL at 09:09

## 2022-09-22 RX ADMIN — Medication 2 PACKET: at 11:09

## 2022-09-22 RX ADMIN — Medication 2 PACKET: at 05:09

## 2022-09-22 RX ADMIN — Medication 2 PACKET: at 08:09

## 2022-09-22 RX ADMIN — SODIUM CHLORIDE AND POTASSIUM CHLORIDE: 9; 1.49 INJECTION, SOLUTION INTRAVENOUS at 09:09

## 2022-09-22 RX ADMIN — POTASSIUM CHLORIDE 40 MEQ: 1500 TABLET, EXTENDED RELEASE ORAL at 08:09

## 2022-09-22 RX ADMIN — SODIUM CHLORIDE AND POTASSIUM CHLORIDE: 9; 1.49 INJECTION, SOLUTION INTRAVENOUS at 06:09

## 2022-09-22 NOTE — ASSESSMENT & PLAN NOTE
This patient does have evidence of infective focus  My overall impression is sepsis. Vital signs were reviewed and noted in progress note.  Antibiotics given-   Antibiotics (From admission, onward)    Start     Stop Route Frequency Ordered    09/21/22 0700  piperacillin-tazobactam 4.5 g in dextrose 5 % 100 mL IVPB (ready to mix system)  (Sepsis Treatment Panel)         -- IV Every 12 hours (non-standard times) 09/21/22 0547    09/21/22 0643  vancomycin - pharmacy to dose  (Sepsis Treatment Panel)        See Hyperspace for full Linked Orders Report.    -- IV pharmacy to manage frequency 09/21/22 0544        Cultures were taken-   Microbiology Results (last 7 days)     Procedure Component Value Units Date/Time    Blood culture x two cultures. Draw prior to antibiotics. [[id number]]  (Abnormal) Collected: 09/21/22 0422    Order Status: Completed Specimen: Blood from Peripheral, Antecubital, Left Updated: 09/22/22 0725     Blood Culture, Routine Gram stain jose roberto bottle: Gram positive cocci in pairs Gram positive cocci      in clusters resembling Staph      Results called to and read back by:Melba Perez      Gram stain aer bottle: Gram positive cocci in clusters resembling Staph      STAPHYLOCOCCUS AUREUS  Susceptibility pending      Narrative:      Aerobic and anaerobic    Blood culture x two cultures. Draw prior to antibiotics. [[id number]]  (Abnormal) Collected: 09/21/22 0422    Order Status: Completed Specimen: Blood from Peripheral, Forearm, Right Updated: 09/22/22 0724     Blood Culture, Routine Gram stain jose roberto bottle: Gram positive cocci in clusters resembling Staph      Positive results previously called 16:05  09/21/2022      Gram stain aer bottle: Gram positive cocci in clusters resembling Staph      previously reported      STAPHYLOCOCCUS AUREUS  Susceptibility pending      Narrative:      Aerobic and anaerobic    Blood culture [[id number]] Collected: 09/22/22 0523    Order Status: Sent Specimen: Blood from  Peripheral, Right Hand Updated: 09/22/22 0558    Blood culture [[id number]] Collected: 09/22/22 0523    Order Status: Sent Specimen: Blood from Peripheral, Left Hand Updated: 09/22/22 0558    Urine culture [[id number]] Collected: 09/21/22 1101    Order Status: No result Specimen: Urine Updated: 09/21/22 1144    Aerobic culture (Specify Source) **CANNOT BE ORDERED AS STAT** [[id number]] Collected: 09/21/22 0439    Order Status: Sent Specimen: Skin from Chest, Right Updated: 09/21/22 0444    Influenza A & B by Molecular [[id number]]     Order Status: Canceled Specimen: Nasopharyngeal Swab         Latest lactate reviewed, they are-  Recent Labs   Lab 09/21/22 0422 09/21/22  0745   LACTATE 3.2* 1.7       Organ dysfunction: none  Source- UTI and bacteremia     Source control Achieved by- antibiotics    - Presents febrile, tachycardic, with elevated LA and complaints of N/V and weakness  - LA 3.4 on admission, repeat LA now 1.7  - Pt with no abdominal pain or tenderness, low suspicion for SBP  - No leukocytosis, procal elevated (however in setting of ESRD, low yield). Flu and COVID negative   - CXR with no acute process  - UA appears grossly infected  - Urine cx pending   - Blood cx with gram positive cocci  - Repeat blood cx pending  - Pt with recent THDC placement in 07/2022, culture of THDC pending.   - ID consulted   - Continue broad spectrum abx at this time:  Day 2 of vancomycin, day 2 of Zosyn

## 2022-09-22 NOTE — PROGRESS NOTES
[facility]  Progress Note    Patient Name: Anjali Dorantes  MRN: [id number]  Patient Class: IP- Inpatient   Admission Date: 9/21/2022  Length of Stay: 1 days  Attending Physician: Marcela Amezcua DO  Primary Care Provider: Tasia Carrasco MD        Subjective:     Principal Problem:Sepsis        HPI:  62 year female with history of ESRD on HD (on MWF), CVA (2014), DM, HTN, uterine cancer (s/p CIRILO on 03/30/2022) presented to the ED with complaints of nausea, vomiting, and fever for 2 days.  Tmax at home was 104F Son is bedside. Admits feeling weak overall.  She denies any chest pain, shortness breath, cough, difficulty breathing, abdominal pain, dysuria, hematuria, or any vaginal bleeding.  Denies any new food.  Denies any new medication, recent travel, or any sick contacts.  Admits PCP recently started indomethacin for gout, but patient has not taken it yet.  Patient recently tunneled cath placed in July 2022 and was initiated on dialysis in August 2022.  Has been tolerating her dialysis sessions without difficulty.  Prior to that, patient was hospitalized briefly for her hysterectomy in March 2022.    In the ED, patient was febrile and tachycardic.  Labs without leukocytosis, but it does show anemia, hypokalemia, and lactic acidosis.  Mg level 1.6. EKG noted to have prolonged QTC 605ms.  Chest x-ray with no acute process.  Patient received magnesium, potassium, Zosyn, and vancomycin in the ED.  Patient admitted to hospital medicine for further evaluation.      Overview/Hospital Course:  62 year female admitted on 09/21/22 for sepsis, likely secondary to UTI and bacteremia. Also found to have prolonged QTc and hypokalemia on admission. Urine cx pending. Blood cx with gram positive cocci. Repeat blood cultures pending.  Patient started broad-spectrum abx. Suspect bactermia may be due to THDC that was placed in 07/2022. Nephrology and ID consulted May need THDC removed. Prolonged QTc has  resolved, it could possible be due to chronic PPI and Effexor use.  Continue IV antibiotics and close monitoring      Interval History:  No acute overnight events.  Patient febrile this morning.  However, patient states she is feeling better today than she was yesterday.  Admits fatigue.  Denies any UTI symptoms.  Son is at bedside.    Review of Systems   Constitutional:  Positive for activity change, fatigue and fever. Negative for appetite change and chills.   HENT:  Negative for congestion, sinus pressure and trouble swallowing.    Eyes:  Negative for photophobia and visual disturbance.   Respiratory:  Negative for cough, chest tightness and shortness of breath.    Cardiovascular:  Negative for chest pain, palpitations and leg swelling.   Gastrointestinal:  Positive for nausea and vomiting. Negative for abdominal distention, abdominal pain, blood in stool and diarrhea.   Endocrine: Negative for polyuria.   Genitourinary:  Negative for difficulty urinating, dysuria and hematuria.   Musculoskeletal:  Negative for arthralgias, back pain and joint swelling.   Allergic/Immunologic: Positive for immunocompromised state.   Neurological:  Positive for weakness. Negative for dizziness, light-headedness and headaches.   Psychiatric/Behavioral:  Negative for confusion and hallucinations.      Objective:     Vital Signs (Most Recent):  Temp: (!) 100.8 °F (38.2 °C) (09/22/22 0916)  Pulse: 97 (09/22/22 0723)  Resp: 18 (09/22/22 0723)  BP: (!) 119/56 (09/22/22 0723)  SpO2: (!) 93 % (09/22/22 0723)   Vital Signs (24h Range):  Temp:  [98.2 °F (36.8 °C)-103.1 °F (39.5 °C)] 100.8 °F (38.2 °C)  Pulse:  [] 97  Resp:  [14-28] 18  SpO2:  [93 %-100 %] 93 %  BP: ()/(54-72) 119/56     Weight: 56.9 kg (125 lb 7.1 oz)  Body mass index is 24.5 kg/m².    Intake/Output Summary (Last 24 hours) at 9/22/2022 0955  Last data filed at 9/22/2022 0900  Gross per 24 hour   Intake 0 ml   Output --   Net 0 ml      Physical Exam  Vitals and  nursing note reviewed.   Constitutional:       General: She is not in acute distress.     Appearance: She is ill-appearing. She is not toxic-appearing or diaphoretic.      Comments: Elderly female, laying in bed. Appears fatigue    HENT:      Right Ear: External ear normal.      Left Ear: External ear normal.      Nose: Nose normal.      Mouth/Throat:      Mouth: Mucous membranes are dry.   Eyes:      Extraocular Movements: Extraocular movements intact.      Conjunctiva/sclera: Conjunctivae normal.   Cardiovascular:      Rate and Rhythm: Normal rate and regular rhythm.      Heart sounds: No murmur heard.    No gallop.   Pulmonary:      Effort: Pulmonary effort is normal. No respiratory distress.      Breath sounds: Normal breath sounds. No wheezing or rales.   Abdominal:      General: There is no distension.      Palpations: Abdomen is soft.      Tenderness: There is no abdominal tenderness. There is no right CVA tenderness, left CVA tenderness or guarding.   Musculoskeletal:         General: No swelling or tenderness.      Cervical back: Normal range of motion.      Right lower leg: No edema.      Left lower leg: No edema.   Skin:     General: Skin is warm and dry.      Comments: +THDC on right upper chest wall.  Catheter appears to be intact.  No bleeding, erythema, drainage from catheter site.  No overlying signs of infection.  No tenderness to palpation near THDC. Site looks clean    Neurological:      General: No focal deficit present.      Mental Status: She is alert and oriented to person, place, and time.      Motor: Weakness (generalized weakness) present.   Psychiatric:         Mood and Affect: Mood normal.         Behavior: Behavior normal.         Thought Content: Thought content normal.       Significant Labs: All pertinent labs within the past 24 hours have been reviewed.    Significant Imaging: I have reviewed all pertinent imaging results/findings within the past 24 hours.      Assessment/Plan:       * Sepsis  This patient does have evidence of infective focus  My overall impression is sepsis. Vital signs were reviewed and noted in progress note.  Antibiotics given-   Antibiotics (From admission, onward)    Start     Stop Route Frequency Ordered    09/21/22 0700  piperacillin-tazobactam 4.5 g in dextrose 5 % 100 mL IVPB (ready to mix system)  (Sepsis Treatment Panel)         -- IV Every 12 hours (non-standard times) 09/21/22 0547    09/21/22 0643  vancomycin - pharmacy to dose  (Sepsis Treatment Panel)        See Hyperspace for full Linked Orders Report.    -- IV pharmacy to manage frequency 09/21/22 0544        Cultures were taken-   Microbiology Results (last 7 days)     Procedure Component Value Units Date/Time    Blood culture x two cultures. Draw prior to antibiotics. [[id number]]  (Abnormal) Collected: 09/21/22 0422    Order Status: Completed Specimen: Blood from Peripheral, Antecubital, Left Updated: 09/22/22 0725     Blood Culture, Routine Gram stain jose roberto bottle: Gram positive cocci in pairs Gram positive cocci      in clusters resembling Staph      Results called to and read back by:Melba Perez      Gram stain aer bottle: Gram positive cocci in clusters resembling Staph      STAPHYLOCOCCUS AUREUS  Susceptibility pending      Narrative:      Aerobic and anaerobic    Blood culture x two cultures. Draw prior to antibiotics. [[id number]]  (Abnormal) Collected: 09/21/22 0422    Order Status: Completed Specimen: Blood from Peripheral, Forearm, Right Updated: 09/22/22 0724     Blood Culture, Routine Gram stain jose roberto bottle: Gram positive cocci in clusters resembling Staph      Positive results previously called 16:05  09/21/2022      Gram stain aer bottle: Gram positive cocci in clusters resembling Staph      previously reported      STAPHYLOCOCCUS AUREUS  Susceptibility pending      Narrative:      Aerobic and anaerobic    Blood culture [[id number]] Collected: 09/22/22 0523    Order Status: Sent Specimen:  Blood from Peripheral, Right Hand Updated: 09/22/22 0558    Blood culture [[id number]] Collected: 09/22/22 0523    Order Status: Sent Specimen: Blood from Peripheral, Left Hand Updated: 09/22/22 0558    Urine culture [[id number]] Collected: 09/21/22 1101    Order Status: No result Specimen: Urine Updated: 09/21/22 1144    Aerobic culture (Specify Source) **CANNOT BE ORDERED AS STAT** [[id number]] Collected: 09/21/22 0439    Order Status: Sent Specimen: Skin from Chest, Right Updated: 09/21/22 0444    Influenza A & B by Molecular [[id number]]     Order Status: Canceled Specimen: Nasopharyngeal Swab         Latest lactate reviewed, they are-  Recent Labs   Lab 09/21/22 0422 09/21/22  0745   LACTATE 3.2* 1.7       Organ dysfunction: none  Source- UTI and bacteremia     Source control Achieved by- antibiotics    - Presents febrile, tachycardic, with elevated LA and complaints of N/V and weakness  - LA 3.4 on admission, repeat LA now 1.7  - Pt with no abdominal pain or tenderness, low suspicion for SBP  - No leukocytosis, procal elevated (however in setting of ESRD, low yield). Flu and COVID negative   - CXR with no acute process  - UA appears grossly infected  - Urine cx pending   - Blood cx with gram positive cocci  - Repeat blood cx pending  - Pt with recent THDC placement in 07/2022, culture of THDC pending.   - ID consulted   - Continue broad spectrum abx at this time:  Day 2 of vancomycin, day 2 of Zosyn      Bacteremia  -Blood cx from 09/21 w/gram positive coci.  Susceptibility pending   -repeat blood cultures ordered   -Suspect possible due to THDC  -Nephrology and ID consulted  -continue vancomycin day 2      Prolonged Q-T interval on ECG  -EKG in the ED showed QTc prolonged at 605ms, with accelerated junctional rhythm  -Mg 1.6 on admit, was replaced with Mg IV 2gm  -Repeat EKG showed QTc of 496ms  -Chart reviewed, EKG from OSH in 07/2022 showed QTc was 492 at that time   -Suspect medication induced. Pt chronically  on Efflexor 75mg daily and protonix  -Will hold home efflexor and protonix at this time.   -Monitor on telemetry       Hypokalemia  -K 2.5 on admission  -Replaced in the ED  -Continue IVF with NS with KCL  -Repeat BMP in AM       Essential hypertension  -BP mildly elevated on admit  -Home meds:  Norvasc 5 mg daily, metoprolol 50 mg BID, losartan 50 mg daily  -resume home Norvasc and metoprolol.  Hold losartan at this time   -continue to monitor blood pressure closely.    -If elevated, then will consider resuming home losartan      Type 2 diabetes mellitus, without long-term current use of insulin  A1c:   Lab Results   Component Value Date    HGBA1C 7.9 (H) 09/21/2022     Repeat A1C as above  Hold home metformin and trulicity   Meds: SSI PRN to maintain goal 140-180  ADA diet, accuchecks ACHS, hypoglycemic protocol      ESRD (end stage renal disease)  -Recent diagnosis, THDC recently placed on 07/27/2022  -First dialysis session initiated in 08/2022, on MWF  -Concern THDC might be source of bacteremia. May need to be replaced  -Nephrology consulted    Anemia of chronic disease  -Stable, Hgb appears near baseline  -No signs of active bleeding  -Continue oral iron supplement   -Monitor       Depression  Patient has persistent depression which is mild and is currently controlled. Will hold anti-depressant medications. We will not consult psychiatry at this time. Patient does not display psychosis at this time. Continue to monitor closely and adjust plan of care as needed.    -Patient takes home Effexor 75mg daily   -Will hold at this time given prolonged QTc on admission  -Monitor closely       Endometrial cancer  -Patient s/p CIRILO/BSO/omentectomy secondary to Stage 4 high grade endometrial cancer on 03/30/2022  -Previously on chemo, but was unable to resume due to renal function   -Follows with oncology outpatient      History of CVA (cerebrovascular accident)  -Hx of CVA in 2014  -Continue plavix and statin at this  "time       Weakness  -PT/OT consulted       Advanced care planning/counseling discussion  Advance Care Planning     Date: 09/21/2022    Code Status  I engaged the the family in a conversation about the patient's preferences for care  at the very end of life. The patient wishes to have  CPR and other invasive treatments performed when her heart and/or breathing stops at this time. Son states patient has not filled out advanced directive because she wants to hold off on further conversation about it "until the time comes".  I communicated to the family that at this time, the patient wishes align with full code status. Son and patient in agreement.  I spent a total of 18 minutes engaging the patient in this advance care planning discussion.       Code status: FULL code         VTE Risk Mitigation (From admission, onward)    None          Discharge Planning   GORDO:      Code Status: Prior   Is the patient medically ready for discharge?:     Reason for patient still in hospital (select all that apply): Patient trending condition, Laboratory test, Treatment, Consult recommendations and PT / OT recommendations  Discharge Plan A: Home with family                  Marcela Amezcua DO  Department of Hospital Medicine   [facility] - Med Surg    "

## 2022-09-22 NOTE — PROGRESS NOTES
Awake alert oriented NAD    Son at bedside he did the interpretation     Denies CNS ENT CP GI  RHEUM OR DERM SX    Blood c/s pos for staph au    Urine cs 10K-50 k ecoli   Past Medical History:   Diagnosis Date    AMS (altered mental status) 06/30/2021    Anemia in CKD (chronic kidney disease)     CKD (chronic kidney disease)     Diabetes mellitus     Encounter for blood transfusion     Hypercholesterolemia     Hypertension     Stroke 2015    TIA    Uterine cancer 2021    Endometrial Cancer     Past Surgical History:   Procedure Laterality Date    ARTERIOGRAM, CEREBRAL ARTERIES  02/12/2014    and 3/18/2014    HYSTERECTOMY      OMENTECTOMY N/A 03/30/2022    Procedure: OMENTECTOMY;  Surgeon: Reuben Lopez MD;  Location: [facility] OR [address] [place] [address];  Service: OB/GYN;  Laterality: N/A;    TOTAL ABDOMINAL HYSTERECTOMY W/ BILATERAL SALPINGOOPHORECTOMY N/A 03/30/2022    Procedure: HYSTERECTOMY, TOTAL, ABDOMINAL, WITH BILATERAL SALPINGO-OOPHORECTOMY;  Surgeon: Reuben Lopez MD;  Location: [facility] OR [address] [place] [address];  Service: OB/GYN;  Laterality: N/A;     Review of patient's allergies indicates:   Allergen Reactions    Glipizide Other (See Comments)       Current Facility-Administered Medications   Medication    0.9 % NaCl with KCl 20 mEq infusion    acetaminophen tablet 650 mg    amLODIPine tablet 5 mg    atorvastatin tablet 40 mg    clopidogreL tablet 75 mg    dextrose 10% bolus 125 mL    dextrose 10% bolus 250 mL    ferrous sulfate tablet 1 each    glucagon (human recombinant) injection 1 mg    glucose chewable tablet 16 g    glucose chewable tablet 24 g    influenza (QUADRIVALENT PF) vaccine 0.5 mL    insulin aspart U-100 pen 0-5 Units    metoprolol succinate (TOPROL-XL) 24 hr tablet 50 mg    piperacillin-tazobactam 4.5 g in dextrose 5 % 100 mL IVPB (ready to mix system)    potassium, sodium phosphates 280-160-250 mg packet 2 packet    sodium bicarbonate tablet 1,300 mg    vancomycin - pharmacy to dose       LABS    Recent Results (from  the past 24 hour(s))   POCT glucose    Collection Time: 09/21/22  4:08 PM   Result Value Ref Range    POCT Glucose 294 (H) 70 - 110 mg/dL   POCT glucose    Collection Time: 09/21/22  8:23 PM   Result Value Ref Range    POCT Glucose 303 (H) 70 - 110 mg/dL   Basic Metabolic Panel    Collection Time: 09/22/22  5:23 AM   Result Value Ref Range    Sodium 142 136 - 145 mmol/L    Potassium 3.2 (L) 3.5 - 5.1 mmol/L    Chloride 105 95 - 110 mmol/L    CO2 27 23 - 29 mmol/L    Glucose 183 (H) 70 - 110 mg/dL    BUN 30 (H) 8 - 23 mg/dL    Creatinine 3.3 (H) 0.5 - 1.4 mg/dL    Calcium 8.6 (L) 8.7 - 10.5 mg/dL    Anion Gap 10 8 - 16 mmol/L    eGFR 15 (A) >60 mL/min/1.73 m^2   Magnesium    Collection Time: 09/22/22  5:23 AM   Result Value Ref Range    Magnesium 2.2 1.6 - 2.6 mg/dL   Phosphorus    Collection Time: 09/22/22  5:23 AM   Result Value Ref Range    Phosphorus 2.1 (L) 2.7 - 4.5 mg/dL   Blood culture    Collection Time: 09/22/22  5:23 AM    Specimen: Peripheral, Right Hand; Blood   Result Value Ref Range    Blood Culture, Routine No Growth to date    Blood culture    Collection Time: 09/22/22  5:23 AM    Specimen: Peripheral, Left Hand; Blood   Result Value Ref Range    Blood Culture, Routine No Growth to date    Vancomycin, Random    Collection Time: 09/22/22  5:24 AM   Result Value Ref Range    Vancomycin, Random 11.3 Not established ug/mL   CBC auto differential    Collection Time: 09/22/22  5:24 AM   Result Value Ref Range    WBC 4.85 3.90 - 12.70 K/uL    RBC 2.70 (L) 4.00 - 5.40 M/uL    Hemoglobin 8.0 (L) 12.0 - 16.0 g/dL    Hematocrit 24.9 (L) 37.0 - 48.5 %    MCV 92 82 - 98 fL    MCH 29.6 27.0 - 31.0 pg    MCHC 32.1 32.0 - 36.0 g/dL    RDW 14.2 11.5 - 14.5 %    Platelets 123 (L) 150 - 450 K/uL    MPV 9.5 9.2 - 12.9 fL    Immature Granulocytes 0.4 0.0 - 0.5 %    Gran # (ANC) 4.0 1.8 - 7.7 K/uL    Immature Grans (Abs) 0.02 0.00 - 0.04 K/uL    Lymph # 0.4 (L) 1.0 - 4.8 K/uL    Mono # 0.4 0.3 - 1.0 K/uL    Eos # 0.0 0.0  - 0.5 K/uL    Baso # 0.01 0.00 - 0.20 K/uL    nRBC 0 0 /100 WBC    Gran % 83.3 (H) 38.0 - 73.0 %    Lymph % 7.6 (L) 18.0 - 48.0 %    Mono % 8.5 4.0 - 15.0 %    Eosinophil % 0.0 0.0 - 8.0 %    Basophil % 0.2 0.0 - 1.9 %    Differential Method Automated    POCT glucose    Collection Time: 09/22/22  7:22 AM   Result Value Ref Range    POCT Glucose 185 (H) 70 - 110 mg/dL   POCT glucose    Collection Time: 09/22/22 11:48 AM   Result Value Ref Range    POCT Glucose 189 (H) 70 - 110 mg/dL   Echo    Collection Time: 09/22/22  1:53 PM   Result Value Ref Range    BSA 1.55 m2    IVC diameter 1.57 cm    Left Ventricular Outflow Tract Mean Velocity 0.79 cm/s    Left Ventricular Outflow Tract Mean Gradient 2.68 mmHg    PV PEAK VELOCITY 0.93 cm/s    LVIDd 3.90 3.5 - 6.0 cm    IVS 1.19 (A) 0.6 - 1.1 cm    Posterior Wall 1.04 0.6 - 1.1 cm    LVIDs 2.83 2.1 - 4.0 cm    FS 27 28 - 44 %    STJ 2.17 cm    Ascending aorta 2.75 cm    LV mass 142.89 g    LA size 3.52 cm    RVDD 3.16 cm    Left Ventricle Relative Wall Thickness 0.53 cm    AV Velocity Ratio 0.87     MV valve area p 1/2 method 4.83 cm2    E/A ratio 1.13     E wave deceleration time 157.05 msec    IVRT 83.04 msec    LVOT diameter 2.01 cm    LVOT area 3.2 cm2    LVOT peak chay 1.03 m/s    LVOT peak VTI 23.80 cm    Ao peak chay 1.19 m/s    LVOT stroke volume 75.48 cm3    AV peak gradient 6 mmHg    MV Peak E Chay 0.86 m/s    TR Max Chay 2.08 m/s    MV stenosis pressure 1/2 time 45.54 ms    MV Peak A Chay 0.76 m/s    LV Systolic Volume 30.29 mL    LV Systolic Volume Index 19.8 mL/m2    LV Diastolic Volume 65.78 mL    LV Diastolic Volume Index 42.99 mL/m2    LV Mass Index 93 g/m2    RA Major Axis 4.46 cm    Left Atrium Minor Axis 5.16 cm    Left Atrium Major Axis 4.64 cm    Triscuspid Valve Regurgitation Peak Gradient 17 mmHg    TDI SEPTAL 0.04 m/s    LV LATERAL E/E' RATIO 12.29 m/s    LV SEPTAL E/E' RATIO 21.50 m/s    LA WIDTH 4.40 cm    TDI LATERAL 0.07 m/s    LA volume 64.33 cm3     Sinus 3.20 cm    TAPSE 2.10 cm    Mean e' 0.06 m/s    E/E' ratio 15.64 m/s    LA Volume Index 42.0 mL/m2    RA Width 3.30 cm   ]    I/O last 3 completed shifts:  In: 957.8 [I.V.:50; IV Piggyback:907.8]  Out: -     Vitals:    09/22/22 0916 09/22/22 1009 09/22/22 1151 09/22/22 1353   BP:   (!) 106/55    Pulse:   82    Resp:   16    Temp: (!) 100.8 °F (38.2 °C)  98.9 °F (37.2 °C)    TempSrc:   Oral    SpO2:  95% 97% 97%   Weight:       Height:           No Jvd, Thyromegaly or Lymphadenopathy  Lungs: Fairly clear anteriorly and laterally  Cor: RRR no G or rubs  Abd: Soft benign good bowel sounds non tender  Ext: No E C C    A)  Staph au sepsis source ? Cath   Has lots of wbc in her urine ?? UTI   ESRD very well dialyzed  Anuric as per pt  Hypomag and hypok  DM  2nd hyperpth  Hypoalbuminemic      P)     Cultures and antibiotx   Renal Diet  Home meds  Protect access  HD in am then dc HD cath   After above a dialysis cath holiday reinsert on Monday   EPO prn   Binders prn   Adjust all meds to the degree of renal fx  Close follow up I/O and weights  Maintain Hydration        P)

## 2022-09-22 NOTE — PLAN OF CARE
Patient had an uneventful night. NAD noted. VSS. Patient denies pain. IVF's infusing at 100 ml/hr. POC discussed with patient and family. All questions answered, verbalized understanding. Will continue to monitor.

## 2022-09-22 NOTE — PLAN OF CARE
Problem: Adult Inpatient Plan of Care  Goal: Plan of Care Review  Outcome: Ongoing, Progressing  Goal: Patient-Specific Goal (Individualized)  Outcome: Ongoing, Progressing  Goal: Absence of Hospital-Acquired Illness or Injury  Outcome: Ongoing, Progressing  Goal: Optimal Comfort and Wellbeing  Outcome: Ongoing, Progressing  Goal: Readiness for Transition of Care  Outcome: Ongoing, Progressing     Problem: Diabetes Comorbidity  Goal: Blood Glucose Level Within Targeted Range  Outcome: Ongoing, Progressing     Problem: Adjustment to Illness (Sepsis/Septic Shock)  Goal: Optimal Coping  Outcome: Ongoing, Progressing     Problem: Bleeding (Sepsis/Septic Shock)  Goal: Absence of Bleeding  Outcome: Ongoing, Progressing     Problem: Glycemic Control Impaired (Sepsis/Septic Shock)  Goal: Blood Glucose Level Within Desired Range  Outcome: Ongoing, Progressing     Problem: Infection Progression (Sepsis/Septic Shock)  Goal: Absence of Infection Signs and Symptoms  Outcome: Ongoing, Progressing     Problem: Nutrition Impaired (Sepsis/Septic Shock)  Goal: Optimal Nutrition Intake  Outcome: Ongoing, Progressing     Problem: Impaired Wound Healing  Goal: Optimal Wound Healing  Outcome: Ongoing, Progressing     Problem: Device-Related Complication Risk (Hemodialysis)  Goal: Safe, Effective Therapy Delivery  Outcome: Ongoing, Progressing     Problem: Hemodynamic Instability (Hemodialysis)  Goal: Effective Tissue Perfusion  Outcome: Ongoing, Progressing     Problem: Infection (Hemodialysis)  Goal: Absence of Infection Signs and Symptoms  Outcome: Ongoing, Progressing

## 2022-09-22 NOTE — HOSPITAL COURSE
62 year female admitted on 09/21/22 for sepsis, likely secondary to UTI and bacteremia. Also found to have prolonged QTc and hypokalemia on admission. Urine cx with Ecoli. Blood cx with MSSA.  Repeat blood cultures x 24-48hr until clear.  Patient started broad-spectrum abx. Suspect bactermia may be due to THDC that was placed in 07/2022. Nephrology and ID consulted.  Recommends PermCath removal and line holiday.  Continue cefazolin, anticipate 4 weeks of IV antibiotics with dialysis.  IR removed previous catheter on 09/23/2022. Cardiology consulted for CHIKA to rule out infective endocarditis.  Plan for new THDC and CHIKA on 09/26/2022. Prolonged QTc has resolved, it could possible be due to chronic PPI and Effexor use.  Continue IV antibiotics and close monitoring. CHIKA with no evidence of infective endocarditis. Plan for IV Cefazolin 2g/2g/3g after HD until 10/20/22. Dr. Cervantes with Nephrology placed orders. Overall, feeling better. Stable for discharge home. On day of discharge, noted elevated glucose likely due to improved appetite and not being on home medications. Discussed with family to resume trulicity today once home. Patient has all DME equipment. PT/OT recommending home. Patient discharged home in stable condition.

## 2022-09-22 NOTE — NURSING
[facility] MEWS TRIGGER FOLLOW UP       MEWS Monitoring, Score is:3  Indication for review: Temp    Bedside Nurse,  contacted, no concerns verbalized at this time, instructed to call [phone] for further concerns or assistance.

## 2022-09-22 NOTE — ASSESSMENT & PLAN NOTE
-Blood cx from 09/21 w/gram positive coci.  Susceptibility pending   -repeat blood cultures ordered   -Suspect possible due to THDC  -Nephrology and ID consulted  -continue vancomycin day 2

## 2022-09-22 NOTE — PT/OT/SLP EVAL
Physical Therapy Evaluation    Patient Name:  Anjali Dorantes   MRN:  [id number]    Recommendations:     Discharge Recommendations:  home   Discharge Equipment Recommendations: none   Barriers to discharge: None    Assessment:     Anjali Dorantes is a 62 y.o. female admitted with a medical diagnosis of Sepsis.  She presents with the following impairments/functional limitations:  weakness, impaired endurance, impaired functional mobility, gait instability, impaired balance, decreased upper extremity function, decreased lower extremity function, decreased safety awareness.    Rehab Prognosis: Good; patient would benefit from acute skilled PT services to address these deficits and reach maximum level of function.    Recent Surgery: * No surgery found *      Plan:     During this hospitalization, patient to be seen 2 x/week to address the identified rehab impairments via gait training, therapeutic activities, therapeutic exercises and progress toward the following goals:    Plan of Care Expires:  10/06/22    Subjective     Chief Complaint: Pt reported lack of sleep.  Patient/Family Comments/goals: Pt agreeable to therapy with son's encouragement.   Pain/Comfort:  Pain Rating 1: 0/10    Living Environment:  Pt lives with spouse, son, and dtr in a [facility] with ramp at entry.   Prior to admission, patients level of function was mod I with ambulation using RW PRN (50% of the time).  Pt stopped driving, family provides transportation to dialysis.  Equipment used at home: walker, rolling, wheelchair, bedside commode, bath bench, grab bar.  Upon discharge, patient will have assistance from family.  Pt has 6 children.     Objective:     Patient found right sidelying with peripheral IV, telemetry upon PT entry to room.    General Precautions: Standard, fall, diabetic (ESRD on HD)   Orthopedic Precautions:N/A   Braces: N/A  Respiratory Status: Room air    Exams:  Cognitive Exam:  Patient was able to follow multiple commands.    Gross Motor Coordination:  WFL  Postural Exam:  Patient presented with the following abnormalities:    -       No postural abnormalities identified  Sensation:    -       Intact  light/touch BLE  Skin Integrity/Edema:      -       Skin integrity: Visible skin intact  -       Edema: None noted BLE  BLE ROM: WFL  BLE Strength: WFL    Functional Mobility:  Pt with generalized weakness from sepsis and lack of sleep.    Bed Mobility:     Scooting: stand by assistance and contact guard assistance  Supine to Sit: stand by assistance and contact guard assistance  Transfers:     Sit to Stand:  stand by assistance and contact guard assistance with no AD and rolling walker  Bed to Chair: contact guard assistance with  rolling walker  using  Step Transfer  Gait: Pt ambulated ~200 ft with CGA using RW/B shoes.  Pt with mild unsteady gait, decreased step length, and decreased bhanu.   Balance: Pt with fair dynamic standing balance.     Therapeutic Activities and Exercises:  Pt/son educated on acute skilled PT services and goals.  Pt/son encouraged to call for nursing assistance with OOB activities.  Family is rotating to have someone here with pt while in the hospital.  Pt/son verbalized good understanding.  Pt may benefit from outpt PT services if interested.  Pt have great family support, who are also an OT and PTA.    AM-PAC 6 CLICK MOBILITY  Total Score:21     Patient left up in chair reclined with all lines intact, call button in reach, and son present.  Tray table close by.     GOALS:   Multidisciplinary Problems       Physical Therapy Goals          Problem: Physical Therapy    Goal Priority Disciplines Outcome Goal Variances Interventions   Physical Therapy Goal     PT, PT/OT Ongoing, Progressing     Description: Goals to be met by: 10/6/22     Patient will increase functional independence with mobility by performin. Supine to sit with Modified [place]  2. Rolling to Left and Right with Modified  [place]  3. Sit to stand transfer with Modified [place]   4. Bed to chair transfer with Modified [place]   5. Gait >250 feet with Modified [place] with or without Rolling Walker  6. Upper/Lower extremity exercise program 2 sets x15 reps per handout, with independence                         History:     Past Medical History:   Diagnosis Date    AMS (altered mental status) 06/30/2021    Anemia in CKD (chronic kidney disease)     CKD (chronic kidney disease)     Diabetes mellitus     Encounter for blood transfusion     Hypercholesterolemia     Hypertension     Stroke 2015    TIA    Uterine cancer 2021    Endometrial Cancer       Past Surgical History:   Procedure Laterality Date    ARTERIOGRAM, CEREBRAL ARTERIES  02/12/2014    and 3/18/2014    HYSTERECTOMY      OMENTECTOMY N/A 03/30/2022    Procedure: OMENTECTOMY;  Surgeon: Reuben Lopez MD;  Location: [facility] OR [address] [place] [address];  Service: OB/GYN;  Laterality: N/A;    TOTAL ABDOMINAL HYSTERECTOMY W/ BILATERAL SALPINGOOPHORECTOMY N/A 03/30/2022    Procedure: HYSTERECTOMY, TOTAL, ABDOMINAL, WITH BILATERAL SALPINGO-OOPHORECTOMY;  Surgeon: Reuben Lopez MD;  Location: [facility] OR [address] [place] [address];  Service: OB/GYN;  Laterality: N/A;       Time Tracking:     PT Received On: 09/22/22  PT Start Time: 0939     PT Stop Time: 0952  PT Total Time (min): 13 min     Billable Minutes: Evaluation 13 min aliza      09/22/2022

## 2022-09-22 NOTE — ASSESSMENT & PLAN NOTE
A1c:   Lab Results   Component Value Date    HGBA1C 7.9 (H) 09/21/2022     Repeat A1C as above  Hold home metformin and trulicity   Meds: SSI PRN to maintain goal 140-180  ADA diet, accuchecks ACHS, hypoglycemic protocol

## 2022-09-22 NOTE — PROGRESS NOTES
Pharmacokinetic Assessment Follow Up: IV Vancomycin    Vancomycin serum concentration assessment(s):    The random level was drawn correctly and can be used to guide therapy at this time. The measurement is within the desired definitive target range of 10 to 20 mcg/mL.    Vancomycin Regimen Plan:    Give 750 mg after dialysis today.  Re-dose when the random level is less than 20 mcg/mL, next level to be drawn at 0400 on 9/23/2022    Drug levels (last 3 results):  Recent Labs   Lab Result Units 09/22/22  0524   Vancomycin, Random ug/mL 11.3       Pharmacy will continue to follow and monitor vancomycin.    Please contact pharmacy at extension [phone] for questions regarding this assessment.    Thank you for the consult,   James Del Real Jr       Patient brief summary:  Anjali Dorantes is a 62 y.o. female initiated on antimicrobial therapy with IV Vancomycin for treatment of sepsis    Drug Allergies:   Review of patient's allergies indicates:   Allergen Reactions    Glipizide Other (See Comments)       Actual Body Weight:   56.9 kg    Renal Function:   Estimated Creatinine Clearance: 13.8 mL/min (A) (based on SCr of 3.3 mg/dL (H)).,     Dialysis Method (if applicable):  intermittent HD    CBC (last 72 hours):  Recent Labs   Lab Result Units 09/21/22  0422 09/21/22  1130 09/22/22  0524   WBC K/uL 10.40  --  4.85   Hemoglobin g/dL 10.6*  --  8.0*   Hemoglobin A1C %  --  7.9*  --    Hematocrit % 31.7*  --  24.9*   Platelets K/uL 164  --  123*   Gran % % 89.2*  --  83.3*   Lymph % % 3.3*  --  7.6*   Mono % % 6.5  --  8.5   Eosinophil % % 0.0  --  0.0   Basophil % % 0.1  --  0.2   Differential Method  Automated  --  Automated       Metabolic Panel (last 72 hours):  Recent Labs   Lab Result Units 09/21/22  0422 09/21/22  1101 09/22/22  0523   Sodium mmol/L 138  --  142   Potassium mmol/L 2.5*  --  3.2*   Chloride mmol/L 95  --  105   CO2 mmol/L 25  --  27   Glucose mg/dL 419*  --  183*   Glucose, UA   --  4+*  --    BUN  mg/dL 23  --  30*   Creatinine mg/dL 3.3*  --  3.3*   Albumin g/dL 3.8  --   --    Total Bilirubin mg/dL 1.1*  --   --    Alkaline Phosphatase U/L 174*  --   --    AST U/L 17  --   --    ALT U/L 16  --   --    Magnesium mg/dL 1.6  --  2.2   Phosphorus mg/dL  --   --  2.1*       Vancomycin Administrations:  vancomycin given in the last 96 hours                     vancomycin 750 mg in dextrose 5 % 250 mL IVPB (ready to mix system) (mg) 750 mg New Bag 09/21/22 0619                    Microbiologic Results:  Microbiology Results (last 7 days)       Procedure Component Value Units Date/Time    Blood culture x two cultures. Draw prior to antibiotics. [[id number]]  (Abnormal) Collected: 09/21/22 0422    Order Status: Completed Specimen: Blood from Peripheral, Antecubital, Left Updated: 09/22/22 0725     Blood Culture, Routine Gram stain jose roberto bottle: Gram positive cocci in pairs Gram positive cocci      in clusters resembling Staph      Results called to and read back by:Melba Perez      Gram stain aer bottle: Gram positive cocci in clusters resembling Staph      STAPHYLOCOCCUS AUREUS  Susceptibility pending      Narrative:      Aerobic and anaerobic    Blood culture x two cultures. Draw prior to antibiotics. [[id number]]  (Abnormal) Collected: 09/21/22 0422    Order Status: Completed Specimen: Blood from Peripheral, Forearm, Right Updated: 09/22/22 0724     Blood Culture, Routine Gram stain jose roberto bottle: Gram positive cocci in clusters resembling Staph      Positive results previously called 16:05  09/21/2022      Gram stain aer bottle: Gram positive cocci in clusters resembling Staph      previously reported      STAPHYLOCOCCUS AUREUS  Susceptibility pending      Narrative:      Aerobic and anaerobic    Blood culture [[id number]] Collected: 09/22/22 0523    Order Status: Sent Specimen: Blood from Peripheral, Right Hand Updated: 09/22/22 0558    Blood culture [[id number]] Collected: 09/22/22 0523    Order Status: Sent  Specimen: Blood from Peripheral, Left Hand Updated: 09/22/22 0558    Urine culture [[id number]] Collected: 09/21/22 1101    Order Status: No result Specimen: Urine Updated: 09/21/22 1144    Aerobic culture (Specify Source) **CANNOT BE ORDERED AS STAT** [[id number]] Collected: 09/21/22 0439    Order Status: Sent Specimen: Skin from Chest, Right Updated: 09/21/22 0444    Influenza A & B by Molecular [[id number]]     Order Status: Canceled Specimen: Nasopharyngeal Swab

## 2022-09-22 NOTE — ASSESSMENT & PLAN NOTE
-Recent diagnosis, THDC recently placed on 07/27/2022  -First dialysis session initiated in 08/2022, on MWF  -Concern THDC might be source of bacteremia. May need to be replaced  -Nephrology consulted

## 2022-09-22 NOTE — HPI
"62F with h/o ESRD on HD, DM, HTN, uterine cancer (s/p CIRILO on 03/30/2022) admitted 9/22 with nausea, vomiting, and fever 104. Has been feeling poorly/weak x 2 days. Son at bedside and pt agrees for him to translate. Has tunneled cath placed July 2022 and was initiated on dialysis in August 2022.  denies issues with her catheter. Denies back pain. Denies dental caries or recent dental procedures. Recently had gout of toe few weeks ago, says resolved. Denies boils    Bcx x 2 with staph aureus from 9/21  Specimen Information: Peripheral, Antecubital, Left; Blood   0 Result Notes  Component 1 d ago    Blood Culture, Routine Gram stain jose roberto bottle: Gram positive cocci in pairs Gram positive cocci P    Blood Culture, Routine in clusters resembling Staph P    Blood Culture, Routine Results called to and read back by:Melba Perez P    Blood Culture, Routine Gram stain aer bottle: Gram positive cocci in clusters resembling Staph P    Blood Culture, Routine  Abnormal   STAPHYLOCOCCUS AUREUS   Susceptibility pending               Specimen Information: Urine   0 Result Notes  Component 1 d ago    Urine Culture, Routine  Abnormal   PRESUMPTIVE E COLI   10,000 - 49,999 cfu/ml   Identification and susceptibility pending             No echo done      ID consulted for "bacteremia"  "

## 2022-09-22 NOTE — PT/OT/SLP EVAL
Occupational Therapy   Evaluation and Discharge Note    Name: Anjali Dorantes  MRN: [id number]  Admitting Diagnosis:  Sepsis   Recent Surgery: * No surgery found *      Recommendations:     Discharge Recommendations: home (with family)  Discharge Equipment Recommendations:     Barriers to discharge:  None    Assessment:     Anjali Dorantes is a 62 y.o. female with a medical diagnosis of Sepsis.   The patient is able to amb using a RW with CGA. Per patient and son, the patient will have assistance(S) as needed at home. The patient has DME as needed at home.  OT will D/C        Plan:     During this hospitalization, patient does not require further acute OT services.  Please re-consult if situation changes.    Plan of Care Reviewed with: patient, son    Subjective     Chief Complaint: wanted to sleep  Patient/Family Comments/goals: agreeable to sit in the chair    Occupational Profile:  Living Environment: Pt lives with spouse, son, and dtr in a [facility] with ramp at entry.   Previous level of function: The patient's son reports that the patient is Mod I with amb with/without RW. The patient bathes and dresses herself.  Roles and Routines: The patient cooks and prepares her own food. The patient does not drive and receives transportation to HD from her family.  Equipment Used at home:  wheelchair, walker, rolling, bedside commode, bath bench, grab bar, raised toilet (bed rails)  Assistance upon Discharge: spouse, son and daughter    Pain/Comfort:  Pain Rating 1: 0/10    Patients cultural, spiritual, Judaism conflicts given the current situation: no    Objective:     Communicated with: nurse prior to session.  Patient found HOB elevated with peripheral IV, telemetry upon OT entry to room.    General Precautions: Standard, fall, diabetic   Orthopedic Precautions:N/A   Braces: N/A  Respiratory Status: Room air     Occupational Performance:    Bed Mobility:    Patient completed Scooting/Bridging with stand by  assistance  Patient completed Supine to Sit with stand by assistance    Functional Mobility/Transfers:  Patient completed Sit <> Stand Transfer with stand by assistance  with  rolling walker   Functional Mobility: Pt ambulated ~200 ft with CGA using RW (with PT) The patient was agreeable to sit in the chair upon return to the room.    Activities of Daily Living:  Upper Body Dressing: stand by assistance    Lower Body Dressing: stand by assistance to don B shoes    Cognitive/Visual Perceptual:  Cognitive/Psychosocial Skills:     -       Oriented to: Person, Place, and Situation   -       Follows Commands/attention:Follows one-step commands and Follows two-step commands (Cambodian interpretation)  -       Communication: speaks Cambodian   -       Memory: intact, per son  -       Safety awareness/insight to disability: required encouragement to get OOB and participate in PT/OT   -       Mood/Affect/Coping skills/emotional control: Appropriate to situation  Visual/Perceptual:      -Intact      Physical Exam:  Balance: -       good  Postural examination/scapula alignment:    -       Rounded shoulders  -       Forward head  Skin integrity: Visible skin intact  Edema:  None noted  Sensation:    -       Intact  Upper Extremity Range of Motion:     -       Right Upper Extremity: WFL  -       Left Upper Extremity: WFL  Upper Extremity Strength:    -       Right Upper Extremity: WFL  -       Left Upper Extremity: WFL    AMPAC 6 Click ADL:  AMPAC Total Score: 21    Treatment & Education:  Participated in OT eval with her son present  Discussed DME and assist available at home with son. The patient will have assist/(S) as needed from family.    Patient left up in chair with all lines intact, call button in reach, and son present    GOALS:   Multidisciplinary Problems       Occupational Therapy Goals       Not on file              Multidisciplinary Problems (Resolved)          Problem: Occupational Therapy    Goal Priority  Disciplines Outcome Interventions   Occupational Therapy Goal   (Resolved)     OT, PT/OT Met                        History:     Past Medical History:   Diagnosis Date    AMS (altered mental status) 06/30/2021    Anemia in CKD (chronic kidney disease)     CKD (chronic kidney disease)     Diabetes mellitus     Encounter for blood transfusion     Hypercholesterolemia     Hypertension     Stroke 2015    TIA    Uterine cancer 2021    Endometrial Cancer         Past Surgical History:   Procedure Laterality Date    ARTERIOGRAM, CEREBRAL ARTERIES  02/12/2014    and 3/18/2014    HYSTERECTOMY      OMENTECTOMY N/A 03/30/2022    Procedure: OMENTECTOMY;  Surgeon: Reuben Lopez MD;  Location: [facility] OR [address] [place] [address];  Service: OB/GYN;  Laterality: N/A;    TOTAL ABDOMINAL HYSTERECTOMY W/ BILATERAL SALPINGOOPHORECTOMY N/A 03/30/2022    Procedure: HYSTERECTOMY, TOTAL, ABDOMINAL, WITH BILATERAL SALPINGO-OOPHORECTOMY;  Surgeon: Reuben Lopez MD;  Location: [facility] OR [address] [place] [address];  Service: OB/GYN;  Laterality: N/A;       Time Tracking:     OT Date of Treatment: 09/22/22  OT Start Time: 0939  OT Stop Time: 0952  OT Total Time (min): 13 min    Billable Minutes:Evaluation 13 (with PT)    9/22/2022

## 2022-09-22 NOTE — PLAN OF CARE
Problem: Occupational Therapy  Goal: Occupational Therapy Goal  Outcome: Met   The patient is able to amb using a RW with CGA. Per patient and son, the patient will have assistance(S) as needed at home. The patient has DME as needed at home.  OT will D/C

## 2022-09-22 NOTE — PLAN OF CARE
Problem: Physical Therapy  Goal: Physical Therapy Goal  Description: Goals to be met by: 10/6/22     Patient will increase functional independence with mobility by performin. Supine to sit with Modified [place]  2. Rolling to Left and Right with Modified [place]  3. Sit to stand transfer with Modified [place]   4. Bed to chair transfer with Modified [place]   5. Gait >250 feet with Modified [place] with or without Rolling Walker  6. Upper/Lower extremity exercise program 2 sets x15 reps per handout, with independence    Outcome: Ongoing, Progressing    Pt ambulated ~200 ft with CGA using RW.

## 2022-09-22 NOTE — CONSULTS
Inpatient Radiology Pre-procedure Note    History of Present Illness:  Anjali Dorantes is a 62 y.o. female with pertinent PMHx of ESRD on HD via RIJV-approach 19-cm THDC placed at OSH who is admitted to [facility] with bacteremia/sepsis with request for removal of indwelling THDC for 'line holiday' over weekend and subsequent replacement on Monday 9/26/22.    A new inpatient IR consult received for fluoroscopic-guided removal of the 19-cm RIJV-approach THDC 9/23/22 and subsequent replacement on 9/26/22.    Admission H&P reviewed.  Past Medical History:   Diagnosis Date    AMS (altered mental status) 06/30/2021    Anemia in CKD (chronic kidney disease)     CKD (chronic kidney disease)     Diabetes mellitus     Encounter for blood transfusion     Hypercholesterolemia     Hypertension     Stroke 2015    TIA    Uterine cancer 2021    Endometrial Cancer     Past Surgical History:   Procedure Laterality Date    ARTERIOGRAM, CEREBRAL ARTERIES  02/12/2014    and 3/18/2014    HYSTERECTOMY      OMENTECTOMY N/A 03/30/2022    Procedure: OMENTECTOMY;  Surgeon: Reuben Lopez MD;  Location: [facility] OR [address] [place] [address];  Service: OB/GYN;  Laterality: N/A;    TOTAL ABDOMINAL HYSTERECTOMY W/ BILATERAL SALPINGOOPHORECTOMY N/A 03/30/2022    Procedure: HYSTERECTOMY, TOTAL, ABDOMINAL, WITH BILATERAL SALPINGO-OOPHORECTOMY;  Surgeon: Reuben Lopez MD;  Location: [facility] OR [address] [place] [address];  Service: OB/GYN;  Laterality: N/A;     Review of Systems:   As documented in primary team H&P    Home Meds:   Prior to Admission medications    Medication Sig Start Date End Date Taking? Authorizing Provider   acetaminophen (TYLENOL) 325 MG tablet Take 2 tablets (650 mg total) by mouth every 6 (six) hours as needed for Pain (Take every 6 hours for 5-7 days and then as needed). 4/1/22  Yes Geri Nolan MD   allopurinoL (ZYLOPRIM) 100 MG tablet Take 100 mg by mouth once daily.   Yes Historical Provider   amLODIPine (NORVASC) 5 MG tablet Take 5 mg by mouth once daily.   Yes  Historical Provider   clopidogreL (PLAVIX) 75 mg tablet Take 75 mg by mouth once daily.   Yes Historical Provider   dulaglutide (TRULICITY) 0.75 mg/0.5 mL pen injector Inject 0.75 mg into the skin every 7 days.   Yes Historical Provider   metoprolol succinate (TOPROL-XL) 50 MG 24 hr tablet Take 50 mg by mouth once daily.   Yes Historical Provider   nateglinide (STARLIX) 120 MG tablet Take 1 tablet by mouth 3 times daily FOR DIABETES 8/9/21  Yes Historical Provider   sodium bicarbonate 650 MG tablet Take 1,300 mg by mouth. 10/22/21 10/22/22 Yes Historical Provider   venlafaxine (EFFEXOR) 75 MG tablet Take 75 mg by mouth 2 (two) times daily.    Yes Historical Provider   ergocalciferol (ERGOCALCIFEROL) 50,000 unit Cap Take 50,000 Units by mouth every 7 days. 8/9/21   Historical Provider   ferrous sulfate 325 (65 FE) MG EC tablet Take 1 tablet (325 mg total) by mouth once daily. 4/1/22   Geri Nolan MD   losartan (COZAAR) 50 MG tablet Take 50 mg by mouth once daily.    Historical Provider   metoprolol tartrate (LOPRESSOR) 50 MG tablet Take 50 mg by mouth 2 (two) times daily.    Historical Provider   oxyCODONE (ROXICODONE) 5 MG immediate release tablet Take 1 tablet (5 mg total) by mouth every 6 (six) hours as needed for Pain.  Patient not taking: Reported on 4/29/2022 4/1/22   Geri Nolan MD   pantoprazole (PROTONIX) 20 MG tablet Take 20 mg by mouth once daily.     Historical Provider   rosuvastatin (CRESTOR) 20 MG tablet take ONE tablet every night at bedtime (for cholesterol) 11/11/21   Historical Provider   senna (SENOKOT) 8.6 mg tablet Take 1 tablet by mouth 2 (two) times daily as needed for Constipation.  Patient not taking: Reported on 9/21/2022 4/1/22   Geri Nolan MD   vitamin D (VITAMIN D3) 1000 units Tab Take 1,000 Units by mouth once daily.     Historical Provider     Scheduled Meds:    amLODIPine  5 mg Oral Daily    atorvastatin  40 mg Oral QHS    ceFAZolin (ANCEF) IVPB  1 g Intravenous  Daily    clopidogreL  75 mg Oral Daily    [START ON 9/23/2022] epoetin jose-epbx  5,000 Units Subcutaneous Every Mon, Wed, Fri    ferrous sulfate  1 tablet Oral Daily    metoprolol succinate  50 mg Oral Daily    potassium, sodium phosphates  2 packet Oral QID (WM & HS)    sodium bicarbonate  1,300 mg Oral Daily     Continuous Infusions:    0/9% NACL & POTASSIUM CHLORIDE 20 MEQ/L 100 mL/hr at 09/22/22 0649     PRN Meds:acetaminophen, dextrose 10%, dextrose 10%, glucagon (human recombinant), glucose, glucose, influenza, insulin aspart U-100, Pharmacy to dose Vancomycin consult **AND** vancomycin - pharmacy to dose    Anticoagulants/Antiplatelets: aspirin and Plavix    Allergies:   Review of patient's allergies indicates:   Allergen Reactions    Glipizide Other (See Comments)     Sedation Hx: have not been any systemic reactions    Labs:  No results for input(s): INR in the last 168 hours.    Invalid input(s):  PT,  PTT    Recent Labs   Lab 09/22/22  0524   WBC 4.85   HGB 8.0*   HCT 24.9*   MCV 92   *      Recent Labs   Lab 09/21/22  0422 09/22/22  0523   * 183*    142   K 2.5* 3.2*   CL 95 105   CO2 25 27   BUN 23 30*   CREATININE 3.3* 3.3*   CALCIUM 9.4 8.6*   MG 1.6 2.2   ALT 16  --    AST 17  --    ALBUMIN 3.8  --    BILITOT 1.1*  --      Vitals:  Temp: 97.6 °F (36.4 °C) (09/22/22 1606)  Pulse: 71 (09/22/22 1606)  Resp: 16 (09/22/22 1606)  BP: 131/60 (09/22/22 1606)  SpO2: 98 % (09/22/22 1606)     Physical Exam:  General: no acute distress  Mental Status: alert and oriented to person, place and time  HEENT: normocephalic, atraumatic  Chest: unlabored breathing  Heart: regular heart rate  Abdomen: nondistended  Extremity: moves all extremities    A/P:   62 y.o. female with pertinent PMHx of ESRD on HD via RIJV-approach 19-cm THDC placed at OSH who is admitted to [facility] with bacteremia/sepsis with request for removal of indwelling THDC for 'line holiday' over weekend and subsequent replacement on  Monday 9/26/22.    Bacteremia/sepsis with indwelling THDC in place - Will attempt fluoroscopic-guided removal of the 19-cm RIJV-approach THDC on 9/23/22 with local anesthetic only and, subsequent replacement on 9/26/22.    Please continue to hold all non-essential anti-platelets, anti-coagulants and keep pt NPO after midnight.    Risks (including, but not limited to, pain, bleeding, infection, damage to nearby structures, failure to obtain sufficient material for a diagnosis, the need for additional procedures, and death), benefits, and alternatives were discussed with the patient and son. All questions were answered to the best of my abilities. The patient and son wish to proceed with the procedure. Written informed consent was obtained.    Thank you for considering IR for the care of your patient.     Gume Horowitz MD  Interventional Radiology

## 2022-09-22 NOTE — SUBJECTIVE & OBJECTIVE
Interval History:  No acute overnight events.  Patient febrile this morning.  However, patient states she is feeling better today than she was yesterday.  Admits fatigue.  Denies any UTI symptoms.  Son is at bedside.    Review of Systems   Constitutional:  Positive for activity change, fatigue and fever. Negative for appetite change and chills.   HENT:  Negative for congestion, sinus pressure and trouble swallowing.    Eyes:  Negative for photophobia and visual disturbance.   Respiratory:  Negative for cough, chest tightness and shortness of breath.    Cardiovascular:  Negative for chest pain, palpitations and leg swelling.   Gastrointestinal:  Positive for nausea and vomiting. Negative for abdominal distention, abdominal pain, blood in stool and diarrhea.   Endocrine: Negative for polyuria.   Genitourinary:  Negative for difficulty urinating, dysuria and hematuria.   Musculoskeletal:  Negative for arthralgias, back pain and joint swelling.   Allergic/Immunologic: Positive for immunocompromised state.   Neurological:  Positive for weakness. Negative for dizziness, light-headedness and headaches.   Psychiatric/Behavioral:  Negative for confusion and hallucinations.      Objective:     Vital Signs (Most Recent):  Temp: (!) 100.8 °F (38.2 °C) (09/22/22 0916)  Pulse: 97 (09/22/22 0723)  Resp: 18 (09/22/22 0723)  BP: (!) 119/56 (09/22/22 0723)  SpO2: (!) 93 % (09/22/22 0723)   Vital Signs (24h Range):  Temp:  [98.2 °F (36.8 °C)-103.1 °F (39.5 °C)] 100.8 °F (38.2 °C)  Pulse:  [] 97  Resp:  [14-28] 18  SpO2:  [93 %-100 %] 93 %  BP: ()/(54-72) 119/56     Weight: 56.9 kg (125 lb 7.1 oz)  Body mass index is 24.5 kg/m².    Intake/Output Summary (Last 24 hours) at 9/22/2022 0955  Last data filed at 9/22/2022 0900  Gross per 24 hour   Intake 0 ml   Output --   Net 0 ml      Physical Exam  Vitals and nursing note reviewed.   Constitutional:       General: She is not in acute distress.     Appearance: She is  ill-appearing. She is not toxic-appearing or diaphoretic.      Comments: Elderly female, laying in bed. Appears fatigue    HENT:      Right Ear: External ear normal.      Left Ear: External ear normal.      Nose: Nose normal.      Mouth/Throat:      Mouth: Mucous membranes are dry.   Eyes:      Extraocular Movements: Extraocular movements intact.      Conjunctiva/sclera: Conjunctivae normal.   Cardiovascular:      Rate and Rhythm: Normal rate and regular rhythm.      Heart sounds: No murmur heard.    No gallop.   Pulmonary:      Effort: Pulmonary effort is normal. No respiratory distress.      Breath sounds: Normal breath sounds. No wheezing or rales.   Abdominal:      General: There is no distension.      Palpations: Abdomen is soft.      Tenderness: There is no abdominal tenderness. There is no right CVA tenderness, left CVA tenderness or guarding.   Musculoskeletal:         General: No swelling or tenderness.      Cervical back: Normal range of motion.      Right lower leg: No edema.      Left lower leg: No edema.   Skin:     General: Skin is warm and dry.      Comments: +THDC on right upper chest wall.  Catheter appears to be intact.  No bleeding, erythema, drainage from catheter site.  No overlying signs of infection.  No tenderness to palpation near THDC. Site looks clean    Neurological:      General: No focal deficit present.      Mental Status: She is alert and oriented to person, place, and time.      Motor: Weakness (generalized weakness) present.   Psychiatric:         Mood and Affect: Mood normal.         Behavior: Behavior normal.         Thought Content: Thought content normal.       Significant Labs: All pertinent labs within the past 24 hours have been reviewed.    Significant Imaging: I have reviewed all pertinent imaging results/findings within the past 24 hours.

## 2022-09-22 NOTE — ASSESSMENT & PLAN NOTE
"62F with h/o ESRD on HD, DM, admitted 9/22 with generalized weakness and fever 104. Has tunneled cath placed July 2022 for hd, denies other indwelling hardware. Bcx x 2 with staph aureus from 9/21. No echo done. ID consulted for "bacteremia"    Recommendations:   - continue vancomycin. Stop zosyn. Add cefazolin, renally dosed.   - f/u bcx. If MSSA, stop vancomycin. If MRSA, stop cefazolin  - repeat bcx q24-48h until clear  - 2d echo to eval for vegetation  - permecath removal. Ideally wait for repeat bcx to be negative x 48-72h before placing new permecath  - anticipate minimum of 4 weeks iv abx 3x/wk with HD on d/c  "

## 2022-09-22 NOTE — CONSULTS
"[facility] - Med Surg  Infectious Disease  Consult Note    Patient Name: Anjali Dorantes  MRN: [id number]  Admission Date: 9/21/2022  Hospital Length of Stay: 1 days  Attending Physician: Marcela Amezcua DO  Primary Care Provider: Tasia Carrasco MD     Isolation Status: No active isolations    Patient information was obtained from patient and ER records.      Inpatient consult to Infectious Diseases  Consult performed by: Eva Polanco MD  Consult ordered by: Marcela Amezcua DO        Assessment/Plan:     Bacteremia  62F with h/o ESRD on HD, DM, admitted 9/22 with generalized weakness and fever 104. Has tunneled cath placed July 2022 for hd, denies other indwelling hardware. Bcx x 2 with staph aureus from 9/21. No echo done. ID consulted for "bacteremia"    Recommendations:   - continue vancomycin. Stop zosyn. Add cefazolin, renally dosed.   - f/u bcx. If MSSA, stop vancomycin. If MRSA, stop cefazolin  - repeat bcx q24-48h until clear  - 2d echo to eval for vegetation  - permecath removal. Ideally wait for repeat bcx to be negative x 48-72h before placing new permecath  - anticipate minimum of 4 weeks iv abx 3x/wk with HD on d/c    discussed with hospitalist and nephrology    Thank you for your consult. I will follow-up with patient. Please contact us if you have any additional questions.    Eva Polanco MD  Infectious Disease  [facility] - Med Surg    Subjective:     Principal Problem: Sepsis    HPI:   62F with h/o ESRD on HD, DM, HTN, uterine cancer (s/p CIRILO on 03/30/2022) admitted 9/22 with nausea, vomiting, and fever 104. Has been feeling poorly/weak x 2 days. Son at bedside and pt agrees for him to translate. Has tunneled cath placed July 2022 and was initiated on dialysis in August 2022.  denies issues with her catheter. Denies back pain. Denies dental caries or recent dental procedures. Recently had gout of toe few weeks ago, says resolved. Denies boils    Bcx x 2 with staph aureus from 9/21  Specimen " "Information: Peripheral, Antecubital, Left; Blood    0 Result Notes  Component 1 d ago    Blood Culture, Routine Gram stain jose roberto bottle: Gram positive cocci in pairs Gram positive cocci P    Blood Culture, Routine in clusters resembling Staph P    Blood Culture, Routine Results called to and read back by:Melba Perez P    Blood Culture, Routine Gram stain aer bottle: Gram positive cocci in clusters resembling Staph P    Blood Culture, Routine  Abnormal   STAPHYLOCOCCUS AUREUS   Susceptibility pending               Specimen Information: Urine    0 Result Notes  Component 1 d ago    Urine Culture, Routine  Abnormal   PRESUMPTIVE E COLI   10,000 - 49,999 cfu/ml   Identification and susceptibility pending             No echo done      ID consulted for "bacteremia"      Past Medical History:   Diagnosis Date    AMS (altered mental status) 06/30/2021    Anemia in CKD (chronic kidney disease)     CKD (chronic kidney disease)     Diabetes mellitus     Encounter for blood transfusion     Hypercholesterolemia     Hypertension     Stroke 2015    TIA    Uterine cancer 2021    Endometrial Cancer       Past Surgical History:   Procedure Laterality Date    ARTERIOGRAM, CEREBRAL ARTERIES  02/12/2014    and 3/18/2014    HYSTERECTOMY      OMENTECTOMY N/A 03/30/2022    Procedure: OMENTECTOMY;  Surgeon: Reuben Lopez MD;  Location: [facility] OR [address] [place] [address];  Service: OB/GYN;  Laterality: N/A;    TOTAL ABDOMINAL HYSTERECTOMY W/ BILATERAL SALPINGOOPHORECTOMY N/A 03/30/2022    Procedure: HYSTERECTOMY, TOTAL, ABDOMINAL, WITH BILATERAL SALPINGO-OOPHORECTOMY;  Surgeon: Reuben Lopez MD;  Location: [facility] OR [address] [place] [address];  Service: OB/GYN;  Laterality: N/A;       Review of patient's allergies indicates:   Allergen Reactions    Glipizide Other (See Comments)       No current facility-administered medications on file prior to encounter.     Current Outpatient Medications on File Prior to Encounter   Medication Sig    acetaminophen (TYLENOL) " 325 MG tablet Take 2 tablets (650 mg total) by mouth every 6 (six) hours as needed for Pain (Take every 6 hours for 5-7 days and then as needed).    allopurinoL (ZYLOPRIM) 100 MG tablet Take 100 mg by mouth once daily.    amLODIPine (NORVASC) 5 MG tablet Take 5 mg by mouth once daily.    clopidogreL (PLAVIX) 75 mg tablet Take 75 mg by mouth once daily.    dulaglutide (TRULICITY) 0.75 mg/0.5 mL pen injector Inject 0.75 mg into the skin every 7 days.    metoprolol succinate (TOPROL-XL) 50 MG 24 hr tablet Take 50 mg by mouth once daily.    nateglinide (STARLIX) 120 MG tablet Take 1 tablet by mouth 3 times daily FOR DIABETES    sodium bicarbonate 650 MG tablet Take 1,300 mg by mouth.    venlafaxine (EFFEXOR) 75 MG tablet Take 75 mg by mouth 2 (two) times daily.     ergocalciferol (ERGOCALCIFEROL) 50,000 unit Cap Take 50,000 Units by mouth every 7 days.    ferrous sulfate 325 (65 FE) MG EC tablet Take 1 tablet (325 mg total) by mouth once daily.    losartan (COZAAR) 50 MG tablet Take 50 mg by mouth once daily.    metoprolol tartrate (LOPRESSOR) 50 MG tablet Take 50 mg by mouth 2 (two) times daily.    oxyCODONE (ROXICODONE) 5 MG immediate release tablet Take 1 tablet (5 mg total) by mouth every 6 (six) hours as needed for Pain. (Patient not taking: Reported on 4/29/2022)    pantoprazole (PROTONIX) 20 MG tablet Take 20 mg by mouth once daily.     rosuvastatin (CRESTOR) 20 MG tablet take ONE tablet every night at bedtime (for cholesterol)    senna (SENOKOT) 8.6 mg tablet Take 1 tablet by mouth 2 (two) times daily as needed for Constipation. (Patient not taking: Reported on 9/21/2022)    vitamin D (VITAMIN D3) 1000 units Tab Take 1,000 Units by mouth once daily.      Family History    None       Tobacco Use    Smoking status: Never    Smokeless tobacco: Never   Substance and Sexual Activity    Alcohol use: No    Drug use: No    Sexual activity: Not Currently     Partners: Male     Review of Systems    Constitutional:  Positive for activity change, chills, fatigue and fever. Negative for appetite change.   HENT:  Negative for congestion, sinus pressure and trouble swallowing.    Eyes:  Negative for photophobia and visual disturbance.   Respiratory:  Negative for cough, chest tightness and shortness of breath.    Cardiovascular:  Negative for chest pain, palpitations and leg swelling.   Gastrointestinal:  Positive for nausea and vomiting. Negative for abdominal distention, abdominal pain, blood in stool and diarrhea.   Endocrine: Negative for polyuria.   Genitourinary:  Negative for difficulty urinating, dysuria and hematuria.   Musculoskeletal:  Negative for arthralgias, back pain and joint swelling.   Allergic/Immunologic: Positive for immunocompromised state.   Neurological:  Positive for weakness. Negative for dizziness, light-headedness and headaches.   Psychiatric/Behavioral:  Negative for confusion and hallucinations.    Objective:     Vital Signs (Most Recent):  Temp: 98.9 °F (37.2 °C) (09/22/22 1151)  Pulse: 82 (09/22/22 1151)  Resp: 16 (09/22/22 1151)  BP: (!) 106/55 (09/22/22 1151)  SpO2: 97 % (09/22/22 1353)   Vital Signs (24h Range):  Temp:  [98.2 °F (36.8 °C)-101.5 °F (38.6 °C)] 98.9 °F (37.2 °C)  Pulse:  [73-97] 82  Resp:  [14-20] 16  SpO2:  [93 %-100 %] 97 %  BP: ()/(54-60) 106/55     Weight: 56.9 kg (125 lb 7.1 oz)  Body mass index is 24.5 kg/m².    Physical Exam  Vitals and nursing note reviewed.   Constitutional:       General: She is not in acute distress.     Appearance: She is ill-appearing. She is not toxic-appearing or diaphoretic.      Comments: Elderly female, laying in bed. Appears fatigue    HENT:      Right Ear: External ear normal.      Left Ear: External ear normal.      Nose: Nose normal.      Mouth/Throat:      Mouth: Mucous membranes are dry.   Eyes:      Extraocular Movements: Extraocular movements intact.      Conjunctiva/sclera: Conjunctivae normal.   Cardiovascular:       Rate and Rhythm: Normal rate and regular rhythm.      Heart sounds: No murmur heard.    No gallop.   Pulmonary:      Effort: Pulmonary effort is normal. No respiratory distress.      Breath sounds: Normal breath sounds. No wheezing or rales.   Abdominal:      General: There is no distension.      Palpations: Abdomen is soft.      Tenderness: There is no abdominal tenderness. There is no right CVA tenderness, left CVA tenderness or guarding.   Musculoskeletal:         General: No swelling or tenderness.      Cervical back: Normal range of motion.      Right lower leg: No edema.      Left lower leg: No edema.   Skin:     General: Skin is warm and dry.      Comments: +THDC on right upper chest wall.  Catheter appears to be intact.  No bleeding, erythema, drainage from catheter site.  No overlying signs of infection.  No tenderness to palpation near THDC. Site looks clean    Neurological:      General: No focal deficit present.      Mental Status: She is alert and oriented to person, place, and time.      Motor: Weakness (generalized weakness) present.   Psychiatric:         Mood and Affect: Mood normal.         Behavior: Behavior normal.         Thought Content: Thought content normal.           Significant Labs: All pertinent labs within the past 24 hours have been reviewed.  CBC:   Recent Labs   Lab 09/21/22 0422 09/22/22  0524   WBC 10.40 4.85   HGB 10.6* 8.0*   HCT 31.7* 24.9*    123*       CMP:   Recent Labs   Lab 09/21/22 0422 09/22/22  0523    142   K 2.5* 3.2*   CL 95 105   CO2 25 27   * 183*   BUN 23 30*   CREATININE 3.3* 3.3*   CALCIUM 9.4 8.6*   PROT 8.3  --    ALBUMIN 3.8  --    BILITOT 1.1*  --    ALKPHOS 174*  --    AST 17  --    ALT 16  --    ANIONGAP 18* 10         Lactic Acid:   Recent Labs   Lab 09/21/22 0422 09/21/22  0745   LACTATE 3.2* 1.7       Magnesium:   Recent Labs   Lab 09/21/22 0422 09/22/22  0523   MG 1.6 2.2           Significant Imaging: I have reviewed all  pertinent imaging results/findings within the past 24 hours.    Imaging Results              X-Ray Chest AP Portable (Final result)  Result time 09/21/22 04:47:08      Final result by Tj Melendez MD (09/21/22 04:47:08)                   Impression:      There is no radiographic evidence for acute intrathoracic process.      Electronically signed by: Tj Melendez  Date:    09/21/2022  Time:    04:47               Narrative:    EXAMINATION:  XR CHEST AP PORTABLE    CLINICAL HISTORY:  Sepsis;    TECHNIQUE:  Single frontal view of the chest was performed.    COMPARISON:  Chest radiograph March 30, 2022    FINDINGS:  Single portable chest view is submitted.  Right-sided central venous catheter noted, distal tip at the expected location of the SVC.  The cardiomediastinal silhouette appears stable.  Mild aortic atherosclerotic change noted.    There is no evidence for confluent infiltrate or consolidation, significant pleural effusion or pneumothorax.  Mild curvature of the spine noted.  The osseous structures appear intact.

## 2022-09-22 NOTE — SUBJECTIVE & OBJECTIVE
Past Medical History:   Diagnosis Date    AMS (altered mental status) 06/30/2021    Anemia in CKD (chronic kidney disease)     CKD (chronic kidney disease)     Diabetes mellitus     Encounter for blood transfusion     Hypercholesterolemia     Hypertension     Stroke 2015    TIA    Uterine cancer 2021    Endometrial Cancer       Past Surgical History:   Procedure Laterality Date    ARTERIOGRAM, CEREBRAL ARTERIES  02/12/2014    and 3/18/2014    HYSTERECTOMY      OMENTECTOMY N/A 03/30/2022    Procedure: OMENTECTOMY;  Surgeon: Reuben Lopez MD;  Location: [facility] OR [address] [place] [address];  Service: OB/GYN;  Laterality: N/A;    TOTAL ABDOMINAL HYSTERECTOMY W/ BILATERAL SALPINGOOPHORECTOMY N/A 03/30/2022    Procedure: HYSTERECTOMY, TOTAL, ABDOMINAL, WITH BILATERAL SALPINGO-OOPHORECTOMY;  Surgeon: Reuben Lopez MD;  Location: [facility] OR [address] [place] [address];  Service: OB/GYN;  Laterality: N/A;       Review of patient's allergies indicates:   Allergen Reactions    Glipizide Other (See Comments)       No current facility-administered medications on file prior to encounter.     Current Outpatient Medications on File Prior to Encounter   Medication Sig    acetaminophen (TYLENOL) 325 MG tablet Take 2 tablets (650 mg total) by mouth every 6 (six) hours as needed for Pain (Take every 6 hours for 5-7 days and then as needed).    allopurinoL (ZYLOPRIM) 100 MG tablet Take 100 mg by mouth once daily.    amLODIPine (NORVASC) 5 MG tablet Take 5 mg by mouth once daily.    clopidogreL (PLAVIX) 75 mg tablet Take 75 mg by mouth once daily.    dulaglutide (TRULICITY) 0.75 mg/0.5 mL pen injector Inject 0.75 mg into the skin every 7 days.    metoprolol succinate (TOPROL-XL) 50 MG 24 hr tablet Take 50 mg by mouth once daily.    nateglinide (STARLIX) 120 MG tablet Take 1 tablet by mouth 3 times daily FOR DIABETES    sodium bicarbonate 650 MG tablet Take 1,300 mg by mouth.    venlafaxine (EFFEXOR) 75 MG tablet Take 75 mg by mouth 2 (two) times daily.     ergocalciferol  (ERGOCALCIFEROL) 50,000 unit Cap Take 50,000 Units by mouth every 7 days.    ferrous sulfate 325 (65 FE) MG EC tablet Take 1 tablet (325 mg total) by mouth once daily.    losartan (COZAAR) 50 MG tablet Take 50 mg by mouth once daily.    metoprolol tartrate (LOPRESSOR) 50 MG tablet Take 50 mg by mouth 2 (two) times daily.    oxyCODONE (ROXICODONE) 5 MG immediate release tablet Take 1 tablet (5 mg total) by mouth every 6 (six) hours as needed for Pain. (Patient not taking: Reported on 4/29/2022)    pantoprazole (PROTONIX) 20 MG tablet Take 20 mg by mouth once daily.     rosuvastatin (CRESTOR) 20 MG tablet take ONE tablet every night at bedtime (for cholesterol)    senna (SENOKOT) 8.6 mg tablet Take 1 tablet by mouth 2 (two) times daily as needed for Constipation. (Patient not taking: Reported on 9/21/2022)    vitamin D (VITAMIN D3) 1000 units Tab Take 1,000 Units by mouth once daily.      Family History    None       Tobacco Use    Smoking status: Never    Smokeless tobacco: Never   Substance and Sexual Activity    Alcohol use: No    Drug use: No    Sexual activity: Not Currently     Partners: Male     Review of Systems   Constitutional:  Positive for activity change, chills, fatigue and fever. Negative for appetite change.   HENT:  Negative for congestion, sinus pressure and trouble swallowing.    Eyes:  Negative for photophobia and visual disturbance.   Respiratory:  Negative for cough, chest tightness and shortness of breath.    Cardiovascular:  Negative for chest pain, palpitations and leg swelling.   Gastrointestinal:  Positive for nausea and vomiting. Negative for abdominal distention, abdominal pain, blood in stool and diarrhea.   Endocrine: Negative for polyuria.   Genitourinary:  Negative for difficulty urinating, dysuria and hematuria.   Musculoskeletal:  Negative for arthralgias, back pain and joint swelling.   Allergic/Immunologic: Positive for immunocompromised state.   Neurological:  Positive for  weakness. Negative for dizziness, light-headedness and headaches.   Psychiatric/Behavioral:  Negative for confusion and hallucinations.    Objective:     Vital Signs (Most Recent):  Temp: 98.9 °F (37.2 °C) (09/22/22 1151)  Pulse: 82 (09/22/22 1151)  Resp: 16 (09/22/22 1151)  BP: (!) 106/55 (09/22/22 1151)  SpO2: 97 % (09/22/22 1353)   Vital Signs (24h Range):  Temp:  [98.2 °F (36.8 °C)-101.5 °F (38.6 °C)] 98.9 °F (37.2 °C)  Pulse:  [73-97] 82  Resp:  [14-20] 16  SpO2:  [93 %-100 %] 97 %  BP: ()/(54-60) 106/55     Weight: 56.9 kg (125 lb 7.1 oz)  Body mass index is 24.5 kg/m².    Physical Exam  Vitals and nursing note reviewed.   Constitutional:       General: She is not in acute distress.     Appearance: She is ill-appearing. She is not toxic-appearing or diaphoretic.      Comments: Elderly female, laying in bed. Appears fatigue    HENT:      Right Ear: External ear normal.      Left Ear: External ear normal.      Nose: Nose normal.      Mouth/Throat:      Mouth: Mucous membranes are dry.   Eyes:      Extraocular Movements: Extraocular movements intact.      Conjunctiva/sclera: Conjunctivae normal.   Cardiovascular:      Rate and Rhythm: Normal rate and regular rhythm.      Heart sounds: No murmur heard.    No gallop.   Pulmonary:      Effort: Pulmonary effort is normal. No respiratory distress.      Breath sounds: Normal breath sounds. No wheezing or rales.   Abdominal:      General: There is no distension.      Palpations: Abdomen is soft.      Tenderness: There is no abdominal tenderness. There is no right CVA tenderness, left CVA tenderness or guarding.   Musculoskeletal:         General: No swelling or tenderness.      Cervical back: Normal range of motion.      Right lower leg: No edema.      Left lower leg: No edema.   Skin:     General: Skin is warm and dry.      Comments: +THDC on right upper chest wall.  Catheter appears to be intact.  No bleeding, erythema, drainage from catheter site.  No overlying  signs of infection.  No tenderness to palpation near DC. Site looks clean    Neurological:      General: No focal deficit present.      Mental Status: She is alert and oriented to person, place, and time.      Motor: Weakness (generalized weakness) present.   Psychiatric:         Mood and Affect: Mood normal.         Behavior: Behavior normal.         Thought Content: Thought content normal.           Significant Labs: All pertinent labs within the past 24 hours have been reviewed.  CBC:   Recent Labs   Lab 09/21/22 0422 09/22/22  0524   WBC 10.40 4.85   HGB 10.6* 8.0*   HCT 31.7* 24.9*    123*       CMP:   Recent Labs   Lab 09/21/22 0422 09/22/22  0523    142   K 2.5* 3.2*   CL 95 105   CO2 25 27   * 183*   BUN 23 30*   CREATININE 3.3* 3.3*   CALCIUM 9.4 8.6*   PROT 8.3  --    ALBUMIN 3.8  --    BILITOT 1.1*  --    ALKPHOS 174*  --    AST 17  --    ALT 16  --    ANIONGAP 18* 10         Lactic Acid:   Recent Labs   Lab 09/21/22 0422 09/21/22  0745   LACTATE 3.2* 1.7       Magnesium:   Recent Labs   Lab 09/21/22 0422 09/22/22  0523   MG 1.6 2.2           Significant Imaging: I have reviewed all pertinent imaging results/findings within the past 24 hours.    Imaging Results              X-Ray Chest AP Portable (Final result)  Result time 09/21/22 04:47:08      Final result by Tj Melendez MD (09/21/22 04:47:08)                   Impression:      There is no radiographic evidence for acute intrathoracic process.      Electronically signed by: Tj Melendez  Date:    09/21/2022  Time:    04:47               Narrative:    EXAMINATION:  XR CHEST AP PORTABLE    CLINICAL HISTORY:  Sepsis;    TECHNIQUE:  Single frontal view of the chest was performed.    COMPARISON:  Chest radiograph March 30, 2022    FINDINGS:  Single portable chest view is submitted.  Right-sided central venous catheter noted, distal tip at the expected location of the SVC.  The cardiomediastinal silhouette appears stable.   Mild aortic atherosclerotic change noted.    There is no evidence for confluent infiltrate or consolidation, significant pleural effusion or pneumothorax.  Mild curvature of the spine noted.  The osseous structures appear intact.

## 2022-09-23 LAB
ANION GAP SERPL CALC-SCNC: 11 MMOL/L (ref 8–16)
BACTERIA BLD CULT: ABNORMAL
BACTERIA UR CULT: ABNORMAL
BASOPHILS # BLD AUTO: 0.01 K/UL (ref 0–0.2)
BASOPHILS NFR BLD: 0.3 % (ref 0–1.9)
BUN SERPL-MCNC: 28 MG/DL (ref 8–23)
CALCIUM SERPL-MCNC: 7.7 MG/DL (ref 8.7–10.5)
CHLORIDE SERPL-SCNC: 110 MMOL/L (ref 95–110)
CO2 SERPL-SCNC: 22 MMOL/L (ref 23–29)
CREAT SERPL-MCNC: 2.9 MG/DL (ref 0.5–1.4)
DIFFERENTIAL METHOD: ABNORMAL
EOSINOPHIL # BLD AUTO: 0 K/UL (ref 0–0.5)
EOSINOPHIL NFR BLD: 0.9 % (ref 0–8)
ERYTHROCYTE [DISTWIDTH] IN BLOOD BY AUTOMATED COUNT: 14.3 % (ref 11.5–14.5)
EST. GFR  (NO RACE VARIABLE): 18 ML/MIN/1.73 M^2
GLUCOSE SERPL-MCNC: 137 MG/DL (ref 70–110)
HCT VFR BLD AUTO: 24.6 % (ref 37–48.5)
HGB BLD-MCNC: 7.6 G/DL (ref 12–16)
IMM GRANULOCYTES # BLD AUTO: 0.01 K/UL (ref 0–0.04)
IMM GRANULOCYTES NFR BLD AUTO: 0.3 % (ref 0–0.5)
LYMPHOCYTES # BLD AUTO: 0.5 K/UL (ref 1–4.8)
LYMPHOCYTES NFR BLD: 15.6 % (ref 18–48)
MAGNESIUM SERPL-MCNC: 2 MG/DL (ref 1.6–2.6)
MCH RBC QN AUTO: 29.5 PG (ref 27–31)
MCHC RBC AUTO-ENTMCNC: 30.9 G/DL (ref 32–36)
MCV RBC AUTO: 95 FL (ref 82–98)
MONOCYTES # BLD AUTO: 0.4 K/UL (ref 0.3–1)
MONOCYTES NFR BLD: 13.2 % (ref 4–15)
NEUTROPHILS # BLD AUTO: 2.3 K/UL (ref 1.8–7.7)
NEUTROPHILS NFR BLD: 69.7 % (ref 38–73)
NRBC BLD-RTO: 0 /100 WBC
PHOSPHATE SERPL-MCNC: 5.4 MG/DL (ref 2.7–4.5)
PLATELET # BLD AUTO: 111 K/UL (ref 150–450)
PMV BLD AUTO: 9.8 FL (ref 9.2–12.9)
POCT GLUCOSE: 141 MG/DL (ref 70–110)
POCT GLUCOSE: 146 MG/DL (ref 70–110)
POCT GLUCOSE: 83 MG/DL (ref 70–110)
POTASSIUM SERPL-SCNC: 4.2 MMOL/L (ref 3.5–5.1)
RBC # BLD AUTO: 2.58 M/UL (ref 4–5.4)
SODIUM SERPL-SCNC: 143 MMOL/L (ref 136–145)
VANCOMYCIN SERPL-MCNC: 7.5 UG/ML
WBC # BLD AUTO: 3.26 K/UL (ref 3.9–12.7)

## 2022-09-23 PROCEDURE — 63600175 PHARM REV CODE 636 W HCPCS: Performed by: INTERNAL MEDICINE

## 2022-09-23 PROCEDURE — 85025 COMPLETE CBC W/AUTO DIFF WBC: CPT | Performed by: STUDENT IN AN ORGANIZED HEALTH CARE EDUCATION/TRAINING PROGRAM

## 2022-09-23 PROCEDURE — 99233 SBSQ HOSP IP/OBS HIGH 50: CPT | Mod: ,,, | Performed by: INTERNAL MEDICINE

## 2022-09-23 PROCEDURE — 99233 PR SUBSEQUENT HOSPITAL CARE,LEVL III: ICD-10-PCS | Mod: ,,, | Performed by: INTERNAL MEDICINE

## 2022-09-23 PROCEDURE — 80048 BASIC METABOLIC PNL TOTAL CA: CPT | Performed by: STUDENT IN AN ORGANIZED HEALTH CARE EDUCATION/TRAINING PROGRAM

## 2022-09-23 PROCEDURE — 63600175 PHARM REV CODE 636 W HCPCS: Mod: JG | Performed by: INTERNAL MEDICINE

## 2022-09-23 PROCEDURE — 11000001 HC ACUTE MED/SURG PRIVATE ROOM

## 2022-09-23 PROCEDURE — 83735 ASSAY OF MAGNESIUM: CPT | Performed by: STUDENT IN AN ORGANIZED HEALTH CARE EDUCATION/TRAINING PROGRAM

## 2022-09-23 PROCEDURE — 80100016 HC MAINTENANCE HEMODIALYSIS

## 2022-09-23 PROCEDURE — 80202 ASSAY OF VANCOMYCIN: CPT | Performed by: STUDENT IN AN ORGANIZED HEALTH CARE EDUCATION/TRAINING PROGRAM

## 2022-09-23 PROCEDURE — 87040 BLOOD CULTURE FOR BACTERIA: CPT | Performed by: STUDENT IN AN ORGANIZED HEALTH CARE EDUCATION/TRAINING PROGRAM

## 2022-09-23 PROCEDURE — 25000003 PHARM REV CODE 250: Performed by: STUDENT IN AN ORGANIZED HEALTH CARE EDUCATION/TRAINING PROGRAM

## 2022-09-23 PROCEDURE — 84100 ASSAY OF PHOSPHORUS: CPT | Performed by: STUDENT IN AN ORGANIZED HEALTH CARE EDUCATION/TRAINING PROGRAM

## 2022-09-23 PROCEDURE — 36415 COLL VENOUS BLD VENIPUNCTURE: CPT | Performed by: STUDENT IN AN ORGANIZED HEALTH CARE EDUCATION/TRAINING PROGRAM

## 2022-09-23 RX ORDER — HEPARIN SODIUM 1000 [USP'U]/ML
4000 INJECTION, SOLUTION INTRAVENOUS; SUBCUTANEOUS
Status: DISCONTINUED | OUTPATIENT
Start: 2022-09-23 | End: 2022-09-27 | Stop reason: HOSPADM

## 2022-09-23 RX ORDER — CLOPIDOGREL BISULFATE 75 MG/1
75 TABLET ORAL DAILY
Status: DISCONTINUED | OUTPATIENT
Start: 2022-09-24 | End: 2022-09-27 | Stop reason: HOSPADM

## 2022-09-23 RX ADMIN — ACETAMINOPHEN 650 MG: 325 TABLET ORAL at 05:09

## 2022-09-23 RX ADMIN — METOPROLOL SUCCINATE 50 MG: 50 TABLET, EXTENDED RELEASE ORAL at 09:09

## 2022-09-23 RX ADMIN — AMLODIPINE BESYLATE 5 MG: 5 TABLET ORAL at 09:09

## 2022-09-23 RX ADMIN — EPOETIN ALFA-EPBX 5000 UNITS: 10000 INJECTION, SOLUTION INTRAVENOUS; SUBCUTANEOUS at 09:09

## 2022-09-23 RX ADMIN — SODIUM CHLORIDE AND POTASSIUM CHLORIDE: 9; 1.49 INJECTION, SOLUTION INTRAVENOUS at 06:09

## 2022-09-23 RX ADMIN — HEPARIN SODIUM 4000 UNITS: 1000 INJECTION INTRAVENOUS; SUBCUTANEOUS at 01:09

## 2022-09-23 RX ADMIN — ATORVASTATIN CALCIUM 40 MG: 40 TABLET, FILM COATED ORAL at 08:09

## 2022-09-23 RX ADMIN — FERROUS SULFATE TAB 325 MG (65 MG ELEMENTAL FE) 1 EACH: 325 (65 FE) TAB at 09:09

## 2022-09-23 RX ADMIN — SODIUM BICARBONATE 1300 MG: 325 TABLET ORAL at 09:09

## 2022-09-23 RX ADMIN — CEFAZOLIN SODIUM 1 G: 1 SOLUTION INTRAVENOUS at 05:09

## 2022-09-23 NOTE — NURSING
Pt back from IR, son at the bedside, respirations even and unlabored, no acute distress, no complaints of pain. Safety precautions in place, call light in reach.

## 2022-09-23 NOTE — ASSESSMENT & PLAN NOTE
"62F with h/o ESRD on HD, DM, admitted 9/22 with generalized weakness and fever 104. Has tunneled cath placed July 2022 for hd, denies other indwelling hardware. Bcx x 2 with MSSA  from 9/21. Repeat 9/22 positive. 2d without veg. . ID consulted for "bacteremia"    Recommendations:   - needs permecath removal for source control of infection. Line holiday if possible  - continue cefazolin. Stop vancomycin  - repeat bcx q24-48h until clear  - consult cardiology for CHIKA to r/o IE  -  Ideally wait for repeat bcx to be negative x 48-72h before placing new permecath  - anticipate minimum of 4 weeks iv abx 3x/wk with HD on d/c    Discussed with patient and her son  "

## 2022-09-23 NOTE — SUBJECTIVE & OBJECTIVE
Interval History:  No acute overnight events.  Patient febrile this morning.  Patient states she is feeling better today. Admits fatigue.  Denies any UTI symptoms.  Son is at bedside.    Review of Systems   Constitutional:  Positive for activity change, fatigue and fever. Negative for appetite change and chills.   HENT:  Negative for congestion, sinus pressure and trouble swallowing.    Eyes:  Negative for photophobia and visual disturbance.   Respiratory:  Negative for cough, chest tightness and shortness of breath.    Cardiovascular:  Negative for chest pain, palpitations and leg swelling.   Gastrointestinal:  Negative for abdominal distention, abdominal pain, blood in stool, diarrhea, nausea and vomiting.   Endocrine: Negative for polyuria.   Genitourinary:  Negative for difficulty urinating, dysuria and hematuria.   Musculoskeletal:  Negative for arthralgias, back pain and joint swelling.   Allergic/Immunologic: Positive for immunocompromised state.   Neurological:  Positive for weakness. Negative for dizziness, light-headedness and headaches.   Psychiatric/Behavioral:  Negative for confusion and hallucinations.      Objective:     Vital Signs (Most Recent):  Temp: 100 °F (37.8 °C) (09/23/22 0735)  Pulse: 87 (09/23/22 0735)  Resp: 19 (09/23/22 0735)  BP: (!) 147/63 (09/23/22 0735)  SpO2: 95 % (09/23/22 1209)   Vital Signs (24h Range):  Temp:  [99.1 °F (37.3 °C)-100.7 °F (38.2 °C)] 100 °F (37.8 °C)  Pulse:  [77-90] 87  Resp:  [18-19] 19  SpO2:  [95 %-98 %] 95 %  BP: (124-147)/(58-72) 147/63     Weight: 56.9 kg (125 lb 7.1 oz)  Body mass index is 24.5 kg/m².    Intake/Output Summary (Last 24 hours) at 9/23/2022 1627  Last data filed at 9/23/2022 0800  Gross per 24 hour   Intake 1094.86 ml   Output --   Net 1094.86 ml        Physical Exam  Vitals and nursing note reviewed.   Constitutional:       General: She is not in acute distress.     Appearance: She is ill-appearing. She is not toxic-appearing or diaphoretic.       Comments: Elderly female, laying in bed. Appears fatigue    HENT:      Right Ear: External ear normal.      Left Ear: External ear normal.      Nose: Nose normal.      Mouth/Throat:      Mouth: Mucous membranes are dry.   Eyes:      Extraocular Movements: Extraocular movements intact.      Conjunctiva/sclera: Conjunctivae normal.   Cardiovascular:      Rate and Rhythm: Normal rate and regular rhythm.      Heart sounds: No murmur heard.    No gallop.   Pulmonary:      Effort: Pulmonary effort is normal. No respiratory distress.      Breath sounds: Normal breath sounds. No wheezing or rales.   Abdominal:      General: There is no distension.      Palpations: Abdomen is soft.      Tenderness: There is no abdominal tenderness. There is no right CVA tenderness, left CVA tenderness or guarding.   Musculoskeletal:         General: No swelling or tenderness.      Cervical back: Normal range of motion.      Right lower leg: No edema.      Left lower leg: No edema.   Skin:     General: Skin is warm and dry.      Comments: +THDC on right upper chest wall.  Catheter appears to be intact.  No bleeding, erythema, drainage from catheter site.  No overlying signs of infection.  No tenderness to palpation near THDC. Site looks clean    Neurological:      General: No focal deficit present.      Mental Status: She is alert and oriented to person, place, and time.      Motor: Weakness (generalized weakness) present.   Psychiatric:         Mood and Affect: Mood normal.         Behavior: Behavior normal.         Thought Content: Thought content normal.       Significant Labs: All pertinent labs within the past 24 hours have been reviewed.    Significant Imaging: I have reviewed all pertinent imaging results/findings within the past 24 hours.

## 2022-09-23 NOTE — PROGRESS NOTES
Pharmacokinetic Assessment Follow Up: IV Vancomycin    Vancomycin serum concentration assessment(s):    The random level was drawn correctly and can be used to guide therapy at this time. The measurement is below the desired definitive target range of 10 to 20 mcg/mL.    Vancomycin Regimen Plan:    Give 750 mg after dialysis today.  Re-dose when the random level is less than 20 mcg/mL, next level to be drawn at 0400 on 9/24/2022    Drug levels (last 3 results):  Recent Labs   Lab Result Units 09/22/22  0524 09/23/22  0444   Vancomycin, Random ug/mL 11.3 7.5       Pharmacy will continue to follow and monitor vancomycin.    Please contact pharmacy at extension [phone] for questions regarding this assessment.    Thank you for the consult,   James Del Real Jr       Patient brief summary:  Anjali Dorantes is a 62 y.o. female initiated on antimicrobial therapy with IV Vancomycin for treatment of sepsis    Drug Allergies:   Review of patient's allergies indicates:   Allergen Reactions    Glipizide Other (See Comments)       Actual Body Weight:   56.9 kg    Renal Function:   Estimated Creatinine Clearance: 15.7 mL/min (A) (based on SCr of 2.9 mg/dL (H)).,     Dialysis Method (if applicable):  intermittent HD    CBC (last 72 hours):  Recent Labs   Lab Result Units 09/21/22  0422 09/21/22  1130 09/22/22  0524 09/23/22  0444   WBC K/uL 10.40  --  4.85 3.26*   Hemoglobin g/dL 10.6*  --  8.0* 7.6*   Hemoglobin A1C %  --  7.9*  --   --    Hematocrit % 31.7*  --  24.9* 24.6*   Platelets K/uL 164  --  123* 111*   Gran % % 89.2*  --  83.3* 69.7   Lymph % % 3.3*  --  7.6* 15.6*   Mono % % 6.5  --  8.5 13.2   Eosinophil % % 0.0  --  0.0 0.9   Basophil % % 0.1  --  0.2 0.3   Differential Method  Automated  --  Automated Automated       Metabolic Panel (last 72 hours):  Recent Labs   Lab Result Units 09/21/22  0422 09/21/22  1101 09/22/22  0523 09/23/22  0444   Sodium mmol/L 138  --  142 143   Potassium mmol/L 2.5*  --  3.2* 4.2    Chloride mmol/L 95  --  105 110   CO2 mmol/L 25  --  27 22*   Glucose mg/dL 419*  --  183* 137*   Glucose, UA   --  4+*  --   --    BUN mg/dL 23  --  30* 28*   Creatinine mg/dL 3.3*  --  3.3* 2.9*   Albumin g/dL 3.8  --   --   --    Total Bilirubin mg/dL 1.1*  --   --   --    Alkaline Phosphatase U/L 174*  --   --   --    AST U/L 17  --   --   --    ALT U/L 16  --   --   --    Magnesium mg/dL 1.6  --  2.2 2.0   Phosphorus mg/dL  --   --  2.1* 5.4*       Vancomycin Administrations:  vancomycin given in the last 96 hours                     vancomycin 750 mg in dextrose 5 % 250 mL IVPB (ready to mix system) (mg) 750 mg New Bag 09/21/22 0619                    Microbiologic Results:  Microbiology Results (last 7 days)       Procedure Component Value Units Date/Time    Blood culture [[id number]] Collected: 09/23/22 0655    Order Status: Sent Specimen: Blood Updated: 09/23/22 0710    Blood culture [[id number]] Collected: 09/23/22 0655    Order Status: Sent Specimen: Blood from Peripheral, Site Unknown Updated: 09/23/22 0710    Blood culture [[id number]] Collected: 09/22/22 0523    Order Status: Completed Specimen: Blood from Peripheral, Left Hand Updated: 09/23/22 0308     Blood Culture, Routine Gram stain aer bottle: Gram positive cocci in clusters resembling Staph      Positive results previously called      09/23/2022 03:08 [facility]    Blood culture [[id number]] Collected: 09/22/22 0523    Order Status: Completed Specimen: Blood from Peripheral, Right Hand Updated: 09/23/22 0307     Blood Culture, Routine Gram stain aer bottle: Gram positive cocci in clusters resembling Staph      Positive results previously called      09/23/2022 03:06 [facility]    Urine culture [[id number]]  (Abnormal) Collected: 09/21/22 1101    Order Status: Completed Specimen: Urine Updated: 09/22/22 1042     Urine Culture, Routine PRESUMPTIVE E COLI  10,000 - 49,999 cfu/ml  Identification and susceptibility pending      Narrative:      Specimen Source->Urine     Aerobic culture (Specify Source) **CANNOT BE ORDERED AS STAT** [[id number]] Collected: 09/21/22 0439    Order Status: Completed Specimen: Skin from Chest, Right Updated: 09/22/22 1007     Aerobic Bacterial Culture No growth    Blood culture x two cultures. Draw prior to antibiotics. [[id number]]  (Abnormal) Collected: 09/21/22 0422    Order Status: Completed Specimen: Blood from Peripheral, Antecubital, Left Updated: 09/22/22 0725     Blood Culture, Routine Gram stain jose roberto bottle: Gram positive cocci in pairs Gram positive cocci      in clusters resembling Staph      Results called to and read back by:Melba Perez      Gram stain aer bottle: Gram positive cocci in clusters resembling Staph      STAPHYLOCOCCUS AUREUS  Susceptibility pending      Narrative:      Aerobic and anaerobic    Blood culture x two cultures. Draw prior to antibiotics. [[id number]]  (Abnormal) Collected: 09/21/22 0422    Order Status: Completed Specimen: Blood from Peripheral, Forearm, Right Updated: 09/22/22 0724     Blood Culture, Routine Gram stain jose roberto bottle: Gram positive cocci in clusters resembling Staph      Positive results previously called 16:05  09/21/2022      Gram stain aer bottle: Gram positive cocci in clusters resembling Staph      previously reported      STAPHYLOCOCCUS AUREUS  Susceptibility pending      Narrative:      Aerobic and anaerobic    Influenza A & B by Molecular [[id number]]     Order Status: Canceled Specimen: Nasopharyngeal Swab

## 2022-09-23 NOTE — ASSESSMENT & PLAN NOTE
-Recent diagnosis, THDC recently placed on 07/27/2022  -First dialysis session initiated in 08/2022, on MWF  -Concern THDC might be source of bacteremia.   -Nephrology consulted: Plan for PermCath on 09/23 and then line holiday.  Plan to place TH DC on 09/26, if blood cultures are clear for 48-72 hours

## 2022-09-23 NOTE — SUBJECTIVE & OBJECTIVE
Interval history: NAEO. Currently on dialysis. Catheter hasn't been removed yet    Past Surgical History:   Procedure Laterality Date    ARTERIOGRAM, CEREBRAL ARTERIES  02/12/2014    and 3/18/2014    HYSTERECTOMY      OMENTECTOMY N/A 03/30/2022    Procedure: OMENTECTOMY;  Surgeon: Reuben Lopez MD;  Location: [facility] OR [address] [place] [address];  Service: OB/GYN;  Laterality: N/A;    TOTAL ABDOMINAL HYSTERECTOMY W/ BILATERAL SALPINGOOPHORECTOMY N/A 03/30/2022    Procedure: HYSTERECTOMY, TOTAL, ABDOMINAL, WITH BILATERAL SALPINGO-OOPHORECTOMY;  Surgeon: Reuben Lopez MD;  Location: [facility] OR [address] [place] [address];  Service: OB/GYN;  Laterality: N/A;       Review of patient's allergies indicates:   Allergen Reactions    Glipizide Other (See Comments)       No current facility-administered medications on file prior to encounter.     Current Outpatient Medications on File Prior to Encounter   Medication Sig    acetaminophen (TYLENOL) 325 MG tablet Take 2 tablets (650 mg total) by mouth every 6 (six) hours as needed for Pain (Take every 6 hours for 5-7 days and then as needed).    allopurinoL (ZYLOPRIM) 100 MG tablet Take 100 mg by mouth once daily.    amLODIPine (NORVASC) 5 MG tablet Take 5 mg by mouth once daily.    clopidogreL (PLAVIX) 75 mg tablet Take 75 mg by mouth once daily.    dulaglutide (TRULICITY) 0.75 mg/0.5 mL pen injector Inject 0.75 mg into the skin every 7 days.    metoprolol succinate (TOPROL-XL) 50 MG 24 hr tablet Take 50 mg by mouth once daily.    nateglinide (STARLIX) 120 MG tablet Take 1 tablet by mouth 3 times daily FOR DIABETES    sodium bicarbonate 650 MG tablet Take 1,300 mg by mouth.    venlafaxine (EFFEXOR) 75 MG tablet Take 75 mg by mouth 2 (two) times daily.     ergocalciferol (ERGOCALCIFEROL) 50,000 unit Cap Take 50,000 Units by mouth every 7 days.    ferrous sulfate 325 (65 FE) MG EC tablet Take 1 tablet (325 mg total) by mouth once daily.    losartan (COZAAR) 50 MG tablet Take 50 mg by mouth once daily.    metoprolol  tartrate (LOPRESSOR) 50 MG tablet Take 50 mg by mouth 2 (two) times daily.    oxyCODONE (ROXICODONE) 5 MG immediate release tablet Take 1 tablet (5 mg total) by mouth every 6 (six) hours as needed for Pain. (Patient not taking: Reported on 4/29/2022)    pantoprazole (PROTONIX) 20 MG tablet Take 20 mg by mouth once daily.     rosuvastatin (CRESTOR) 20 MG tablet take ONE tablet every night at bedtime (for cholesterol)    senna (SENOKOT) 8.6 mg tablet Take 1 tablet by mouth 2 (two) times daily as needed for Constipation. (Patient not taking: Reported on 9/21/2022)    vitamin D (VITAMIN D3) 1000 units Tab Take 1,000 Units by mouth once daily.      Family History    None       Tobacco Use    Smoking status: Never    Smokeless tobacco: Never   Substance and Sexual Activity    Alcohol use: No    Drug use: No    Sexual activity: Not Currently     Partners: Male     Review of Systems   Constitutional:  Positive for activity change, chills, fatigue and fever. Negative for appetite change.   HENT:  Negative for congestion, sinus pressure and trouble swallowing.    Eyes:  Negative for photophobia and visual disturbance.   Respiratory:  Negative for cough, chest tightness and shortness of breath.    Cardiovascular:  Negative for chest pain, palpitations and leg swelling.   Gastrointestinal:  Positive for nausea and vomiting. Negative for abdominal distention, abdominal pain, blood in stool and diarrhea.   Endocrine: Negative for polyuria.   Genitourinary:  Negative for difficulty urinating, dysuria and hematuria.   Musculoskeletal:  Negative for arthralgias, back pain and joint swelling.   Allergic/Immunologic: Positive for immunocompromised state.   Neurological:  Positive for weakness. Negative for dizziness, light-headedness and headaches.   Psychiatric/Behavioral:  Negative for confusion and hallucinations.    Objective:     Vital Signs (Most Recent):  Temp: 100 °F (37.8 °C) (09/23/22 0735)  Pulse: 87 (09/23/22 0735)  Resp:  19 (09/23/22 0735)  BP: (!) 147/63 (09/23/22 0735)  SpO2: 95 % (09/23/22 1209)   Vital Signs (24h Range):  Temp:  [97.6 °F (36.4 °C)-100.7 °F (38.2 °C)] 100 °F (37.8 °C)  Pulse:  [71-90] 87  Resp:  [16-19] 19  SpO2:  [95 %-98 %] 95 %  BP: (124-147)/(58-72) 147/63     Weight: 56.9 kg (125 lb 7.1 oz)  Body mass index is 24.5 kg/m².    Physical Exam  Vitals and nursing note reviewed.   Constitutional:       General: She is not in acute distress.     Appearance: She is ill-appearing. She is not toxic-appearing or diaphoretic.      Comments: Elderly female, laying in bed. Appears fatigue    HENT:      Right Ear: External ear normal.      Left Ear: External ear normal.      Nose: Nose normal.      Mouth/Throat:      Mouth: Mucous membranes are dry.   Eyes:      Extraocular Movements: Extraocular movements intact.      Conjunctiva/sclera: Conjunctivae normal.   Cardiovascular:      Rate and Rhythm: Normal rate and regular rhythm.      Heart sounds: No murmur heard.    No gallop.   Pulmonary:      Effort: Pulmonary effort is normal. No respiratory distress.      Breath sounds: Normal breath sounds. No wheezing or rales.   Abdominal:      General: There is no distension.      Palpations: Abdomen is soft.      Tenderness: There is no abdominal tenderness. There is no right CVA tenderness, left CVA tenderness or guarding.   Musculoskeletal:         General: No swelling or tenderness.      Cervical back: Normal range of motion.      Right lower leg: No edema.      Left lower leg: No edema.   Skin:     General: Skin is warm and dry.      Comments: +THDC on right upper chest wall.  Catheter appears to be intact.  No bleeding, erythema, drainage from catheter site.  No overlying signs of infection.  No tenderness to palpation near THDC. Site looks clean    Neurological:      General: No focal deficit present.      Mental Status: She is alert and oriented to person, place, and time.      Motor: Weakness (generalized weakness)  present.   Psychiatric:         Mood and Affect: Mood normal.         Behavior: Behavior normal.         Thought Content: Thought content normal.           Significant Labs: All pertinent labs within the past 24 hours have been reviewed.  CBC:   Recent Labs   Lab 09/22/22 0524 09/23/22 0444   WBC 4.85 3.26*   HGB 8.0* 7.6*   HCT 24.9* 24.6*   * 111*       CMP:   Recent Labs   Lab 09/22/22 0523 09/23/22 0444    143   K 3.2* 4.2    110   CO2 27 22*   * 137*   BUN 30* 28*   CREATININE 3.3* 2.9*   CALCIUM 8.6* 7.7*   ANIONGAP 10 11         Lactic Acid:   No results for input(s): LACTATE in the last 48 hours.    Magnesium:   Recent Labs   Lab 09/22/22 0523 09/23/22 0444   MG 2.2 2.0           Significant Imaging: I have reviewed all pertinent imaging results/findings within the past 24 hours.    Imaging Results              X-Ray Chest AP Portable (Final result)  Result time 09/21/22 04:47:08      Final result by Tj Melendez MD (09/21/22 04:47:08)                   Impression:      There is no radiographic evidence for acute intrathoracic process.      Electronically signed by: Tj Melendez  Date:    09/21/2022  Time:    04:47               Narrative:    EXAMINATION:  XR CHEST AP PORTABLE    CLINICAL HISTORY:  Sepsis;    TECHNIQUE:  Single frontal view of the chest was performed.    COMPARISON:  Chest radiograph March 30, 2022    FINDINGS:  Single portable chest view is submitted.  Right-sided central venous catheter noted, distal tip at the expected location of the SVC.  The cardiomediastinal silhouette appears stable.  Mild aortic atherosclerotic change noted.    There is no evidence for confluent infiltrate or consolidation, significant pleural effusion or pneumothorax.  Mild curvature of the spine noted.  The osseous structures appear intact.

## 2022-09-23 NOTE — ASSESSMENT & PLAN NOTE
This patient does have evidence of infective focus  My overall impression is sepsis. Vital signs were reviewed and noted in progress note.  Antibiotics given-   Antibiotics (From admission, onward)    Start     Stop Route Frequency Ordered    09/22/22 1700  ceFAZolin (ANCEF) 1 gram in dextrose 5 % 50 mL IVPB (premix)         -- IV Daily 09/22/22 1446        Cultures were taken-   Microbiology Results (last 7 days)     Procedure Component Value Units Date/Time    Blood culture [[id number]] Collected: 09/23/22 0655    Order Status: Completed Specimen: Blood Updated: 09/23/22 1512     Blood Culture, Routine No Growth to date    Blood culture [[id number]] Collected: 09/23/22 0655    Order Status: Completed Specimen: Blood from Peripheral, Site Unknown Updated: 09/23/22 1512     Blood Culture, Routine No Growth to date    Blood culture [[id number]]  (Abnormal) Collected: 09/22/22 0523    Order Status: Completed Specimen: Blood from Peripheral, Right Hand Updated: 09/23/22 1124     Blood Culture, Routine Gram stain aer bottle: Gram positive cocci in clusters resembling Staph      Positive results previously called      09/23/2022 03:06 BM      STAPHYLOCOCCUS AUREUS  Susceptibility pending      Urine culture [[id number]]  (Abnormal)  (Susceptibility) Collected: 09/21/22 1101    Order Status: Completed Specimen: Urine Updated: 09/23/22 0821     Urine Culture, Routine ESCHERICHIA COLI  10,000 - 49,999 cfu/ml      Narrative:      Specimen Source->Urine    Aerobic culture (Specify Source) **CANNOT BE ORDERED AS STAT** [[id number]] Collected: 09/21/22 0439    Order Status: Completed Specimen: Skin from Chest, Right Updated: 09/23/22 0806     Aerobic Bacterial Culture No growth    Blood culture x two cultures. Draw prior to antibiotics. [[id number]]  (Abnormal)  (Susceptibility) Collected: 09/21/22 0422    Order Status: Completed Specimen: Blood from Peripheral, Antecubital, Left Updated: 09/23/22 0736     Blood Culture, Routine Gram  stain jose roberto bottle: Gram positive cocci in pairs Gram positive cocci      in clusters resembling Staph      Results called to and read back by:Melba Perez      Gram stain aer bottle: Gram positive cocci in clusters resembling Staph      STAPHYLOCOCCUS AUREUS    Narrative:      Aerobic and anaerobic    Blood culture x two cultures. Draw prior to antibiotics. [[id number]]  (Abnormal)  (Susceptibility) Collected: 09/21/22 0422    Order Status: Completed Specimen: Blood from Peripheral, Forearm, Right Updated: 09/23/22 0736     Blood Culture, Routine Gram stain jose roberto bottle: Gram positive cocci in clusters resembling Staph      Positive results previously called 16:05  09/21/2022      Gram stain aer bottle: Gram positive cocci in clusters resembling Staph      previously reported      STAPHYLOCOCCUS AUREUS    Narrative:      Aerobic and anaerobic    Blood culture [[id number]] Collected: 09/22/22 0523    Order Status: Completed Specimen: Blood from Peripheral, Left Hand Updated: 09/23/22 0308     Blood Culture, Routine Gram stain aer bottle: Gram positive cocci in clusters resembling Staph      Positive results previously called      09/23/2022 03:08 BML    Influenza A & B by Molecular [[id number]]     Order Status: Canceled Specimen: Nasopharyngeal Swab         Latest lactate reviewed, they are-  Recent Labs   Lab 09/21/22 0422 09/21/22  0745   LACTATE 3.2* 1.7       Organ dysfunction: none  Source- UTI and bacteremia     Source control Achieved by- antibiotics    - Presents febrile, tachycardic, with elevated LA and complaints of N/V and weakness  - LA 3.4 on admission, repeat LA now 1.7  - Pt with no abdominal pain or tenderness, low suspicion for SBP  - No leukocytosis, procal elevated (however in setting of ESRD, low yield). Flu and COVID negative   - CXR with no acute process  - UA appears grossly infected  - Urine cx with low colonies Ecoli  - Blood cx with MSSA  - Repeat blood cx [id number] until clear   - Pt with  recent THDC placement in 07/2022  - ID consulted:  Recommend PermCath removal and then line holiday.  Continue cefazolin, anticipate 4 weeks of IV antibiotics or dialysis  - continue day 2 of cefazolin

## 2022-09-23 NOTE — ASSESSMENT & PLAN NOTE
-Blood cx from 09/21 w/MSSA  -repeat blood cultures until clear  -Suspect possible due to THDC  -Nephrology and ID consulted  -ID recommends PermCath removal and line holiday   -IR consulted, plan for PermCath removal on 09/23  -continue cefazolin day 2

## 2022-09-23 NOTE — NURSING
Pt back from dialysis, 1L of fluid off VS were /64, HR 94, Temp 98.3, RR 18. Son at the bedside, respirations even and unlabored, no acute distress, no complaints of pain. Safety precautions in place, call light in reach, will continue to monitor.

## 2022-09-23 NOTE — ASSESSMENT & PLAN NOTE
-BP mildly elevated on admit  -Home meds:  Norvasc 5 mg daily, metoprolol 50 mg BID, losartan 50 mg daily  -Continue home Norvasc and metoprolol.  Hold losartan at this time   -continue to monitor blood pressure closely.    -If elevated, then will consider resuming home losartan

## 2022-09-23 NOTE — PLAN OF CARE
Problem: Adult Inpatient Plan of Care  Goal: Plan of Care Review  Outcome: Ongoing, Progressing  Goal: Patient-Specific Goal (Individualized)  Outcome: Ongoing, Progressing  Goal: Absence of Hospital-Acquired Illness or Injury  Outcome: Ongoing, Progressing  Goal: Optimal Comfort and Wellbeing  Outcome: Ongoing, Progressing  Goal: Readiness for Transition of Care  Outcome: Ongoing, Progressing     Problem: Diabetes Comorbidity  Goal: Blood Glucose Level Within Targeted Range  Outcome: Ongoing, Progressing     Problem: Adjustment to Illness (Sepsis/Septic Shock)  Goal: Optimal Coping  Outcome: Ongoing, Progressing     Problem: Bleeding (Sepsis/Septic Shock)  Goal: Absence of Bleeding  Outcome: Ongoing, Progressing     Problem: Glycemic Control Impaired (Sepsis/Septic Shock)  Goal: Blood Glucose Level Within Desired Range  Outcome: Ongoing, Progressing     Problem: Infection Progression (Sepsis/Septic Shock)  Goal: Absence of Infection Signs and Symptoms  Outcome: Ongoing, Progressing     Problem: Nutrition Impaired (Sepsis/Septic Shock)  Goal: Optimal Nutrition Intake  Outcome: Ongoing, Progressing     Problem: Impaired Wound Healing  Goal: Optimal Wound Healing  Outcome: Ongoing, Progressing     Problem: Device-Related Complication Risk (Hemodialysis)  Goal: Safe, Effective Therapy Delivery  Outcome: Ongoing, Progressing     Problem: Hemodynamic Instability (Hemodialysis)  Goal: Effective Tissue Perfusion  Outcome: Ongoing, Progressing     Problem: Infection (Hemodialysis)  Goal: Absence of Infection Signs and Symptoms  Outcome: Ongoing, Progressing     Problem: Infection  Goal: Absence of Infection Signs and Symptoms  Outcome: Ongoing, Progressing

## 2022-09-23 NOTE — PROGRESS NOTES
[facility] Medicine  Progress Note    Patient Name: Anjali Dorantes  MRN: [id number]  Patient Class: IP- Inpatient   Admission Date: 9/21/2022  Length of Stay: 2 days  Attending Physician: Marcela Amezcua DO  Primary Care Provider: Tasia Carrasco MD        Subjective:     Principal Problem:Sepsis        HPI:  62 year female with history of ESRD on HD (on MWF), CVA (2014), DM, HTN, uterine cancer (s/p CIRILO on 03/30/2022) presented to the ED with complaints of nausea, vomiting, and fever for 2 days.  Tmax at home was 104F Son is bedside. Admits feeling weak overall.  She denies any chest pain, shortness breath, cough, difficulty breathing, abdominal pain, dysuria, hematuria, or any vaginal bleeding.  Denies any new food.  Denies any new medication, recent travel, or any sick contacts.  Admits PCP recently started indomethacin for gout, but patient has not taken it yet.  Patient recently tunneled cath placed in July 2022 and was initiated on dialysis in August 2022.  Has been tolerating her dialysis sessions without difficulty.  Prior to that, patient was hospitalized briefly for her hysterectomy in March 2022.    In the ED, patient was febrile and tachycardic.  Labs without leukocytosis, but it does show anemia, hypokalemia, and lactic acidosis.  Mg level 1.6. EKG noted to have prolonged QTC 605ms.  Chest x-ray with no acute process.  Patient received magnesium, potassium, Zosyn, and vancomycin in the ED.  Patient admitted to hospital medicine for further evaluation.      Overview/Hospital Course:  62 year female admitted on 09/21/22 for sepsis, likely secondary to UTI and bacteremia. Also found to have prolonged QTc and hypokalemia on admission. Urine cx with Ecoli. Blood cx with MSSA.  Repeat blood cultures x 24-48hr until clear.  Patient started broad-spectrum abx. Suspect bactermia may be due to THDC that was placed in 07/2022. Nephrology and ID consulted.  Recommends PermCath removal and line  holiday.  Continue cefazolin, anticipate 4 weeks of IV antibiotics with dialysis.  Cardiology consulted for CHIKA to rule out infective endocarditis.  Prolonged QTc has resolved, it could possible be due to chronic PPI and Effexor use.  Continue IV antibiotics and close monitoring      Interval History:  No acute overnight events.  Patient febrile this morning.  Patient states she is feeling better today. Admits fatigue.  Denies any UTI symptoms.  Son is at bedside.    Review of Systems   Constitutional:  Positive for activity change, fatigue and fever. Negative for appetite change and chills.   HENT:  Negative for congestion, sinus pressure and trouble swallowing.    Eyes:  Negative for photophobia and visual disturbance.   Respiratory:  Negative for cough, chest tightness and shortness of breath.    Cardiovascular:  Negative for chest pain, palpitations and leg swelling.   Gastrointestinal:  Negative for abdominal distention, abdominal pain, blood in stool, diarrhea, nausea and vomiting.   Endocrine: Negative for polyuria.   Genitourinary:  Negative for difficulty urinating, dysuria and hematuria.   Musculoskeletal:  Negative for arthralgias, back pain and joint swelling.   Allergic/Immunologic: Positive for immunocompromised state.   Neurological:  Positive for weakness. Negative for dizziness, light-headedness and headaches.   Psychiatric/Behavioral:  Negative for confusion and hallucinations.      Objective:     Vital Signs (Most Recent):  Temp: 100 °F (37.8 °C) (09/23/22 0735)  Pulse: 87 (09/23/22 0735)  Resp: 19 (09/23/22 0735)  BP: (!) 147/63 (09/23/22 0735)  SpO2: 95 % (09/23/22 1209)   Vital Signs (24h Range):  Temp:  [99.1 °F (37.3 °C)-100.7 °F (38.2 °C)] 100 °F (37.8 °C)  Pulse:  [77-90] 87  Resp:  [18-19] 19  SpO2:  [95 %-98 %] 95 %  BP: (124-147)/(58-72) 147/63     Weight: 56.9 kg (125 lb 7.1 oz)  Body mass index is 24.5 kg/m².    Intake/Output Summary (Last 24 hours) at 9/23/2022 2709  Last data filed  at 9/23/2022 0800  Gross per 24 hour   Intake 1094.86 ml   Output --   Net 1094.86 ml        Physical Exam  Vitals and nursing note reviewed.   Constitutional:       General: She is not in acute distress.     Appearance: She is ill-appearing. She is not toxic-appearing or diaphoretic.      Comments: Elderly female, laying in bed. Appears fatigue    HENT:      Right Ear: External ear normal.      Left Ear: External ear normal.      Nose: Nose normal.      Mouth/Throat:      Mouth: Mucous membranes are dry.   Eyes:      Extraocular Movements: Extraocular movements intact.      Conjunctiva/sclera: Conjunctivae normal.   Cardiovascular:      Rate and Rhythm: Normal rate and regular rhythm.      Heart sounds: No murmur heard.    No gallop.   Pulmonary:      Effort: Pulmonary effort is normal. No respiratory distress.      Breath sounds: Normal breath sounds. No wheezing or rales.   Abdominal:      General: There is no distension.      Palpations: Abdomen is soft.      Tenderness: There is no abdominal tenderness. There is no right CVA tenderness, left CVA tenderness or guarding.   Musculoskeletal:         General: No swelling or tenderness.      Cervical back: Normal range of motion.      Right lower leg: No edema.      Left lower leg: No edema.   Skin:     General: Skin is warm and dry.      Comments: +THDC on right upper chest wall.  Catheter appears to be intact.  No bleeding, erythema, drainage from catheter site.  No overlying signs of infection.  No tenderness to palpation near THDC. Site looks clean    Neurological:      General: No focal deficit present.      Mental Status: She is alert and oriented to person, place, and time.      Motor: Weakness (generalized weakness) present.   Psychiatric:         Mood and Affect: Mood normal.         Behavior: Behavior normal.         Thought Content: Thought content normal.       Significant Labs: All pertinent labs within the past 24 hours have been  reviewed.    Significant Imaging: I have reviewed all pertinent imaging results/findings within the past 24 hours.      Assessment/Plan:      * Sepsis  This patient does have evidence of infective focus  My overall impression is sepsis. Vital signs were reviewed and noted in progress note.  Antibiotics given-   Antibiotics (From admission, onward)    Start     Stop Route Frequency Ordered    09/22/22 1700  ceFAZolin (ANCEF) 1 gram in dextrose 5 % 50 mL IVPB (premix)         -- IV Daily 09/22/22 1446        Cultures were taken-   Microbiology Results (last 7 days)     Procedure Component Value Units Date/Time    Blood culture [[id number]] Collected: 09/23/22 0655    Order Status: Completed Specimen: Blood Updated: 09/23/22 1512     Blood Culture, Routine No Growth to date    Blood culture [[id number]] Collected: 09/23/22 0655    Order Status: Completed Specimen: Blood from Peripheral, Site Unknown Updated: 09/23/22 1512     Blood Culture, Routine No Growth to date    Blood culture [[id number]]  (Abnormal) Collected: 09/22/22 0523    Order Status: Completed Specimen: Blood from Peripheral, Right Hand Updated: 09/23/22 1124     Blood Culture, Routine Gram stain aer bottle: Gram positive cocci in clusters resembling Staph      Positive results previously called      09/23/2022 03:06 BML      STAPHYLOCOCCUS AUREUS  Susceptibility pending      Urine culture [[id number]]  (Abnormal)  (Susceptibility) Collected: 09/21/22 1101    Order Status: Completed Specimen: Urine Updated: 09/23/22 0821     Urine Culture, Routine ESCHERICHIA COLI  10,000 - 49,999 cfu/ml      Narrative:      Specimen Source->Urine    Aerobic culture (Specify Source) **CANNOT BE ORDERED AS STAT** [[id number]] Collected: 09/21/22 0439    Order Status: Completed Specimen: Skin from Chest, Right Updated: 09/23/22 0806     Aerobic Bacterial Culture No growth    Blood culture x two cultures. Draw prior to antibiotics. [[id number]]  (Abnormal)  (Susceptibility)  Collected: 09/21/22 0422    Order Status: Completed Specimen: Blood from Peripheral, Antecubital, Left Updated: 09/23/22 0736     Blood Culture, Routine Gram stain jose roberto bottle: Gram positive cocci in pairs Gram positive cocci      in clusters resembling Staph      Results called to and read back by:Melba Perez      Gram stain aer bottle: Gram positive cocci in clusters resembling Staph      STAPHYLOCOCCUS AUREUS    Narrative:      Aerobic and anaerobic    Blood culture x two cultures. Draw prior to antibiotics. [[id number]]  (Abnormal)  (Susceptibility) Collected: 09/21/22 0422    Order Status: Completed Specimen: Blood from Peripheral, Forearm, Right Updated: 09/23/22 0736     Blood Culture, Routine Gram stain jose roberto bottle: Gram positive cocci in clusters resembling Staph      Positive results previously called 16:05  09/21/2022      Gram stain aer bottle: Gram positive cocci in clusters resembling Staph      previously reported      STAPHYLOCOCCUS AUREUS    Narrative:      Aerobic and anaerobic    Blood culture [[id number]] Collected: 09/22/22 0523    Order Status: Completed Specimen: Blood from Peripheral, Left Hand Updated: 09/23/22 0308     Blood Culture, Routine Gram stain aer bottle: Gram positive cocci in clusters resembling Staph      Positive results previously called      09/23/2022 03:08 BML    Influenza A & B by Molecular [[id number]]     Order Status: Canceled Specimen: Nasopharyngeal Swab         Latest lactate reviewed, they are-  Recent Labs   Lab 09/21/22 0422 09/21/22  0745   LACTATE 3.2* 1.7       Organ dysfunction: none  Source- UTI and bacteremia     Source control Achieved by- antibiotics    - Presents febrile, tachycardic, with elevated LA and complaints of N/V and weakness  - LA 3.4 on admission, repeat LA now 1.7  - Pt with no abdominal pain or tenderness, low suspicion for SBP  - No leukocytosis, procal elevated (however in setting of ESRD, low yield). Flu and COVID negative   - CXR with no  acute process  - UA appears grossly infected  - Urine cx with low colonies Ecoli  - Blood cx with MSSA  - Repeat blood cx u28u-60n until clear   - Pt with recent THDC placement in 07/2022  - ID consulted:  Recommend PermCath removal and then line holiday.  Continue cefazolin, anticipate 4 weeks of IV antibiotics or dialysis  - continue day 2 of cefazolin      Bacteremia  -Blood cx from 09/21 w/MSSA  -repeat blood cultures until clear  -Suspect possible due to THDC  -Nephrology and ID consulted  -ID recommends PermCath removal and line holiday   -IR consulted, plan for PermCath removal on 09/23  -continue cefazolin day 2       Prolonged Q-T interval on ECG  -EKG in the ED showed QTc prolonged at 605ms, with accelerated junctional rhythm  -Mg 1.6 on admit, was replaced with Mg IV 2gm  -Repeat EKG showed QTc of 496ms  -Chart reviewed, EKG from OSH in 07/2022 showed QTc was 492 at that time   -Suspect medication induced. Pt chronically on Efflexor 75mg daily and protonix  -Will hold home efflexor and protonix at this time.   -Monitor on telemetry       Hypokalemia  -K 2.5 on admission  -Replaced in the ED  -Continue IVF with NS with KCL  -Repeat BMP in AM       Essential hypertension  -BP mildly elevated on admit  -Home meds:  Norvasc 5 mg daily, metoprolol 50 mg BID, losartan 50 mg daily  -Continue home Norvasc and metoprolol.  Hold losartan at this time   -continue to monitor blood pressure closely.    -If elevated, then will consider resuming home losartan      Type 2 diabetes mellitus, without long-term current use of insulin  A1c:   Lab Results   Component Value Date    HGBA1C 7.9 (H) 09/21/2022     Repeat A1C as above  Hold home metformin and trulicity   Meds: SSI PRN to maintain goal 140-180  ADA diet, accuchecks ACHS, hypoglycemic protocol      ESRD (end stage renal disease)  -Recent diagnosis, THDC recently placed on 07/27/2022  -First dialysis session initiated in 08/2022, on MWF  -Concern THDC might be source  "of bacteremia.   -Nephrology consulted: Plan for PermCath on 09/23 and then line holiday.  Plan to place TH DC on 09/26, if blood cultures are clear for 48-72 hours    Anemia of chronic disease  -Hgb down trending   -No signs of active bleeding  -Continue oral iron supplement   -Suspect due to ESRD  -Monitor       Depression  Patient has persistent depression which is mild and is currently controlled. Will hold anti-depressant medications. We will not consult psychiatry at this time. Patient does not display psychosis at this time. Continue to monitor closely and adjust plan of care as needed.    -Patient takes home Effexor 75mg daily   -Will hold at this time given prolonged QTc on admission  -Monitor closely       Endometrial cancer  -Patient s/p CIRILO/BSO/omentectomy secondary to Stage 4 high grade endometrial cancer on 03/30/2022  -Previously on chemo, but was unable to resume due to renal function   -Follows with oncology outpatient      History of CVA (cerebrovascular accident)  -Hx of CVA in 2014  -Continue plavix and statin at this time       Weakness  -PT/OT consulted: home       Advanced care planning/counseling discussion  Advance Care Planning     Date: 09/21/2022    Code Status  I engaged the the family in a conversation about the patient's preferences for care  at the very end of life. The patient wishes to have  CPR and other invasive treatments performed when her heart and/or breathing stops at this time. Son states patient has not filled out advanced directive because she wants to hold off on further conversation about it "until the time comes".  I communicated to the family that at this time, the patient wishes align with full code status. Son and patient in agreement.  I spent a total of 18 minutes engaging the patient in this advance care planning discussion.       Code status: FULL code         VTE Risk Mitigation (From admission, onward)         Ordered     heparin (porcine) injection 4,000 Units "  As needed (PRN)         09/23/22 1206                Discharge Planning   GORDO:      Code Status: Full Code   Is the patient medically ready for discharge?:     Reason for patient still in hospital (select all that apply): Patient trending condition, Treatment, Consult recommendations and PT / OT recommendations  Discharge Plan A: Home with family                  Marcela Amezcua DO  Department of Hospital Medicine   [facility]

## 2022-09-23 NOTE — PT/OT/SLP PROGRESS
Physical Therapy      Patient Name:  Anjali Dorantes   MRN:  [id number]    9:35 am Patient not seen today secondary to Dialysis. Will follow-up as able.

## 2022-09-23 NOTE — PROGRESS NOTES
Awake alert oriented NAD    Seen while on HD tolereting tx well     Denies CNS ENT CP GI  RHEUM OR DERM SX    Blood c/s pos for staph au    Urine cs 10K-50 k ecoli   Past Medical History:   Diagnosis Date    AMS (altered mental status) 06/30/2021    Anemia in CKD (chronic kidney disease)     CKD (chronic kidney disease)     Diabetes mellitus     Encounter for blood transfusion     Hypercholesterolemia     Hypertension     Stroke 2015    TIA    Uterine cancer 2021    Endometrial Cancer     Past Surgical History:   Procedure Laterality Date    ARTERIOGRAM, CEREBRAL ARTERIES  02/12/2014    and 3/18/2014    HYSTERECTOMY      OMENTECTOMY N/A 03/30/2022    Procedure: OMENTECTOMY;  Surgeon: Reuben Lopez MD;  Location: [facility] OR [address] [place] [address];  Service: OB/GYN;  Laterality: N/A;    TOTAL ABDOMINAL HYSTERECTOMY W/ BILATERAL SALPINGOOPHORECTOMY N/A 03/30/2022    Procedure: HYSTERECTOMY, TOTAL, ABDOMINAL, WITH BILATERAL SALPINGO-OOPHORECTOMY;  Surgeon: Reuben Lopez MD;  Location: [facility] OR [address] [place] [address];  Service: OB/GYN;  Laterality: N/A;     Review of patient's allergies indicates:   Allergen Reactions    Glipizide Other (See Comments)       Current Facility-Administered Medications   Medication    acetaminophen tablet 650 mg    amLODIPine tablet 5 mg    atorvastatin tablet 40 mg    ceFAZolin (ANCEF) 1 gram in dextrose 5 % 50 mL IVPB (premix)    [START ON 9/24/2022] clopidogreL tablet 75 mg    dextrose 10% bolus 125 mL    dextrose 10% bolus 250 mL    epoetin jose-epbx injection 5,000 Units    ferrous sulfate tablet 1 each    glucagon (human recombinant) injection 1 mg    glucose chewable tablet 16 g    glucose chewable tablet 24 g    influenza (QUADRIVALENT PF) vaccine 0.5 mL    insulin aspart U-100 pen 0-5 Units    metoprolol succinate (TOPROL-XL) 24 hr tablet 50 mg    sodium bicarbonate tablet 1,300 mg    vancomycin - pharmacy to dose       LABS    Recent Results (from the past 24 hour(s))   POCT glucose    Collection Time: 09/22/22  11:48 AM   Result Value Ref Range    POCT Glucose 189 (H) 70 - 110 mg/dL   Echo    Collection Time: 09/22/22  1:53 PM   Result Value Ref Range    BSA 1.55 m2    IVC diameter 1.57 cm    Left Ventricular Outflow Tract Mean Velocity 0.79 cm/s    Left Ventricular Outflow Tract Mean Gradient 2.68 mmHg    PV PEAK VELOCITY 0.93 cm/s    LVIDd 3.90 3.5 - 6.0 cm    IVS 1.19 (A) 0.6 - 1.1 cm    Posterior Wall 1.04 0.6 - 1.1 cm    LVIDs 2.83 2.1 - 4.0 cm    FS 27 28 - 44 %    STJ 2.17 cm    Ascending aorta 2.75 cm    LV mass 142.89 g    LA size 3.52 cm    RVDD 3.16 cm    Left Ventricle Relative Wall Thickness 0.53 cm    AV Velocity Ratio 0.87     MV valve area p 1/2 method 4.83 cm2    E/A ratio 1.13     E wave deceleration time 157.05 msec    IVRT 83.04 msec    LVOT diameter 2.01 cm    LVOT area 3.2 cm2    LVOT peak chay 1.03 m/s    LVOT peak VTI 23.80 cm    Ao peak chay 1.19 m/s    LVOT stroke volume 75.48 cm3    AV peak gradient 6 mmHg    MV Peak E Chay 0.86 m/s    TR Max Chay 2.08 m/s    MV stenosis pressure 1/2 time 45.54 ms    MV Peak A Chay 0.76 m/s    LV Systolic Volume 30.29 mL    LV Systolic Volume Index 19.8 mL/m2    LV Diastolic Volume 65.78 mL    LV Diastolic Volume Index 42.99 mL/m2    LV Mass Index 93 g/m2    RA Major Axis 4.46 cm    Left Atrium Minor Axis 5.16 cm    Left Atrium Major Axis 4.64 cm    Triscuspid Valve Regurgitation Peak Gradient 17 mmHg    TDI SEPTAL 0.04 m/s    LV LATERAL E/E' RATIO 12.29 m/s    LV SEPTAL E/E' RATIO 21.50 m/s    LA WIDTH 4.40 cm    TDI LATERAL 0.07 m/s    LA volume 64.33 cm3    Sinus 3.20 cm    TAPSE 2.10 cm    Mean e' 0.06 m/s    E/E' ratio 15.64 m/s    LA Volume Index 42.0 mL/m2    RA Width 3.30 cm    Right Atrial Pressure (from IVC) 3 mmHg    EF 55 %    TV rest pulmonary artery pressure 20 mmHg   POCT glucose    Collection Time: 09/22/22  4:07 PM   Result Value Ref Range    POCT Glucose 198 (H) 70 - 110 mg/dL   Protime-INR    Collection Time: 09/22/22  5:14 PM   Result Value Ref  Range    Prothrombin Time 10.6 9.0 - 12.5 sec    INR 1.0 0.8 - 1.2   POCT glucose    Collection Time: 09/22/22  7:57 PM   Result Value Ref Range    POCT Glucose 271 (H) 70 - 110 mg/dL   CBC auto differential    Collection Time: 09/23/22  4:44 AM   Result Value Ref Range    WBC 3.26 (L) 3.90 - 12.70 K/uL    RBC 2.58 (L) 4.00 - 5.40 M/uL    Hemoglobin 7.6 (L) 12.0 - 16.0 g/dL    Hematocrit 24.6 (L) 37.0 - 48.5 %    MCV 95 82 - 98 fL    MCH 29.5 27.0 - 31.0 pg    MCHC 30.9 (L) 32.0 - 36.0 g/dL    RDW 14.3 11.5 - 14.5 %    Platelets 111 (L) 150 - 450 K/uL    MPV 9.8 9.2 - 12.9 fL    Immature Granulocytes 0.3 0.0 - 0.5 %    Gran # (ANC) 2.3 1.8 - 7.7 K/uL    Immature Grans (Abs) 0.01 0.00 - 0.04 K/uL    Lymph # 0.5 (L) 1.0 - 4.8 K/uL    Mono # 0.4 0.3 - 1.0 K/uL    Eos # 0.0 0.0 - 0.5 K/uL    Baso # 0.01 0.00 - 0.20 K/uL    nRBC 0 0 /100 WBC    Gran % 69.7 38.0 - 73.0 %    Lymph % 15.6 (L) 18.0 - 48.0 %    Mono % 13.2 4.0 - 15.0 %    Eosinophil % 0.9 0.0 - 8.0 %    Basophil % 0.3 0.0 - 1.9 %    Differential Method Automated    Basic Metabolic Panel    Collection Time: 09/23/22  4:44 AM   Result Value Ref Range    Sodium 143 136 - 145 mmol/L    Potassium 4.2 3.5 - 5.1 mmol/L    Chloride 110 95 - 110 mmol/L    CO2 22 (L) 23 - 29 mmol/L    Glucose 137 (H) 70 - 110 mg/dL    BUN 28 (H) 8 - 23 mg/dL    Creatinine 2.9 (H) 0.5 - 1.4 mg/dL    Calcium 7.7 (L) 8.7 - 10.5 mg/dL    Anion Gap 11 8 - 16 mmol/L    eGFR 18 (A) >60 mL/min/1.73 m^2   Magnesium    Collection Time: 09/23/22  4:44 AM   Result Value Ref Range    Magnesium 2.0 1.6 - 2.6 mg/dL   Phosphorus    Collection Time: 09/23/22  4:44 AM   Result Value Ref Range    Phosphorus 5.4 (H) 2.7 - 4.5 mg/dL   Vancomycin, Random    Collection Time: 09/23/22  4:44 AM   Result Value Ref Range    Vancomycin, Random 7.5 Not established ug/mL   POCT glucose    Collection Time: 09/23/22  7:34 AM   Result Value Ref Range    POCT Glucose 146 (H) 70 - 110 mg/dL   ]    I/O last 3 completed  shifts:  In: 1334.9 [P.O.:360; I.V.:974.9]  Out: -     Vitals:    09/22/22 1956 09/22/22 2357 09/23/22 0455 09/23/22 0735   BP: (!) 129/58 124/60 (!) 147/72 (!) 147/63   Pulse: 77 83 90 87   Resp: 18 18 18 19   Temp: 99.1 °F (37.3 °C) 99.8 °F (37.7 °C) (!) 100.7 °F (38.2 °C) 100 °F (37.8 °C)   TempSrc: Oral Oral Oral Oral   SpO2: 98% 96% 95% 95%   Weight:       Height:           No Jvd, Thyromegaly or Lymphadenopathy  Lungs: Fairly clear anteriorly and laterally  Cor: RRR no G or rubs  Abd: Soft benign good bowel sounds non tender  Ext: No E C C    A)  Staph au sepsis source ? Cath   Has lots of wbc in her urine ?? UTI   ESRD very well dialyzed  Anuric as per pt  Hypomag and hypok  DM  2nd hyperpth  Hypoalbuminemic      P)     Cultures and antibiotx   Renal Diet  Home meds  Protect access  HD in am then dc HD cath   After above a dialysis cath holiday reinsert on Monday   EPO prn   Binders prn   Adjust all meds to the degree of renal fx  Close follow up I/O and weights  Maintain Hydration    Antibiotx as per ID

## 2022-09-23 NOTE — CONSULTS
"[facility] Surg  Infectious Disease  Consult Note    Patient Name: Anjali Dorantes  MRN: [id number]  Admission Date: 9/21/2022  Hospital Length of Stay: 2 days  Attending Physician: Marcela Amezcua DO  Primary Care Provider: Tasia Carrasco MD     Isolation Status: No active isolations    Patient information was obtained from patient and ER records.      Consults  Assessment/Plan:     Bacteremia  62F with h/o ESRD on HD, DM, admitted 9/22 with generalized weakness and fever 104. Has tunneled cath placed July 2022 for hd, denies other indwelling hardware. Bcx x 2 with MSSA  from 9/21. Repeat 9/22 positive. 2d without veg. . ID consulted for "bacteremia"    Recommendations:   - needs permecath removal for source control of infection. Line holiday if possible  - continue cefazolin. Stop vancomycin  - repeat bcx q24-48h until clear  - consult cardiology for CHIKA to r/o IE  -  Ideally wait for repeat bcx to be negative x 48-72h before placing new permecath  - anticipate minimum of 4 weeks iv abx 3x/wk with HD on d/c    Discussed with patient and her son        Thank you for your consult. I will follow-up with patient. Please contact us if you have any additional questions.    Eva Polanco MD  Infectious Disease  [facility] - Med Surg    Subjective:     Principal Problem: Sepsis    HPI:   62F with h/o ESRD on HD, DM, HTN, uterine cancer (s/p CIRILO on 03/30/2022) admitted 9/22 with nausea, vomiting, and fever 104. Has been feeling poorly/weak x 2 days. Son at bedside and pt agrees for him to translate. Has tunneled cath placed July 2022 and was initiated on dialysis in August 2022.  denies issues with her catheter. Denies back pain. Denies dental caries or recent dental procedures. Recently had gout of toe few weeks ago, says resolved. Denies boils    Bcx x 2 with staph aureus from 9/21  Specimen Information: Peripheral, Antecubital, Left; Blood    0 Result Notes  Component 1 d ago    Blood Culture, Routine Gram stain jose roberto " "bottle: Gram positive cocci in pairs Gram positive cocci P    Blood Culture, Routine in clusters resembling Staph P    Blood Culture, Routine Results called to and read back by:Melba Perez P    Blood Culture, Routine Gram stain aer bottle: Gram positive cocci in clusters resembling Staph P    Blood Culture, Routine  Abnormal   STAPHYLOCOCCUS AUREUS   Susceptibility pending               Specimen Information: Urine    0 Result Notes  Component 1 d ago    Urine Culture, Routine  Abnormal   PRESUMPTIVE E COLI   10,000 - 49,999 cfu/ml   Identification and susceptibility pending             No echo done      ID consulted for "bacteremia"      Interval history: NAEO. Currently on dialysis. Catheter hasn't been removed yet    Past Surgical History:   Procedure Laterality Date    ARTERIOGRAM, CEREBRAL ARTERIES  02/12/2014    and 3/18/2014    HYSTERECTOMY      OMENTECTOMY N/A 03/30/2022    Procedure: OMENTECTOMY;  Surgeon: Reuben Lopez MD;  Location: [facility] OR [address] [place] [address];  Service: OB/GYN;  Laterality: N/A;    TOTAL ABDOMINAL HYSTERECTOMY W/ BILATERAL SALPINGOOPHORECTOMY N/A 03/30/2022    Procedure: HYSTERECTOMY, TOTAL, ABDOMINAL, WITH BILATERAL SALPINGO-OOPHORECTOMY;  Surgeon: Reuben Lopez MD;  Location: [facility] OR [address] [place] [address];  Service: OB/GYN;  Laterality: N/A;       Review of patient's allergies indicates:   Allergen Reactions    Glipizide Other (See Comments)       No current facility-administered medications on file prior to encounter.     Current Outpatient Medications on File Prior to Encounter   Medication Sig    acetaminophen (TYLENOL) 325 MG tablet Take 2 tablets (650 mg total) by mouth every 6 (six) hours as needed for Pain (Take every 6 hours for 5-7 days and then as needed).    allopurinoL (ZYLOPRIM) 100 MG tablet Take 100 mg by mouth once daily.    amLODIPine (NORVASC) 5 MG tablet Take 5 mg by mouth once daily.    clopidogreL (PLAVIX) 75 mg tablet Take 75 mg by mouth once daily.    dulaglutide (TRULICITY) " 0.75 mg/0.5 mL pen injector Inject 0.75 mg into the skin every 7 days.    metoprolol succinate (TOPROL-XL) 50 MG 24 hr tablet Take 50 mg by mouth once daily.    nateglinide (STARLIX) 120 MG tablet Take 1 tablet by mouth 3 times daily FOR DIABETES    sodium bicarbonate 650 MG tablet Take 1,300 mg by mouth.    venlafaxine (EFFEXOR) 75 MG tablet Take 75 mg by mouth 2 (two) times daily.     ergocalciferol (ERGOCALCIFEROL) 50,000 unit Cap Take 50,000 Units by mouth every 7 days.    ferrous sulfate 325 (65 FE) MG EC tablet Take 1 tablet (325 mg total) by mouth once daily.    losartan (COZAAR) 50 MG tablet Take 50 mg by mouth once daily.    metoprolol tartrate (LOPRESSOR) 50 MG tablet Take 50 mg by mouth 2 (two) times daily.    oxyCODONE (ROXICODONE) 5 MG immediate release tablet Take 1 tablet (5 mg total) by mouth every 6 (six) hours as needed for Pain. (Patient not taking: Reported on 4/29/2022)    pantoprazole (PROTONIX) 20 MG tablet Take 20 mg by mouth once daily.     rosuvastatin (CRESTOR) 20 MG tablet take ONE tablet every night at bedtime (for cholesterol)    senna (SENOKOT) 8.6 mg tablet Take 1 tablet by mouth 2 (two) times daily as needed for Constipation. (Patient not taking: Reported on 9/21/2022)    vitamin D (VITAMIN D3) 1000 units Tab Take 1,000 Units by mouth once daily.      Family History    None       Tobacco Use    Smoking status: Never    Smokeless tobacco: Never   Substance and Sexual Activity    Alcohol use: No    Drug use: No    Sexual activity: Not Currently     Partners: Male     Review of Systems   Constitutional:  Positive for activity change, chills, fatigue and fever. Negative for appetite change.   HENT:  Negative for congestion, sinus pressure and trouble swallowing.    Eyes:  Negative for photophobia and visual disturbance.   Respiratory:  Negative for cough, chest tightness and shortness of breath.    Cardiovascular:  Negative for chest pain, palpitations and leg swelling.    Gastrointestinal:  Positive for nausea and vomiting. Negative for abdominal distention, abdominal pain, blood in stool and diarrhea.   Endocrine: Negative for polyuria.   Genitourinary:  Negative for difficulty urinating, dysuria and hematuria.   Musculoskeletal:  Negative for arthralgias, back pain and joint swelling.   Allergic/Immunologic: Positive for immunocompromised state.   Neurological:  Positive for weakness. Negative for dizziness, light-headedness and headaches.   Psychiatric/Behavioral:  Negative for confusion and hallucinations.    Objective:     Vital Signs (Most Recent):  Temp: 100 °F (37.8 °C) (09/23/22 0735)  Pulse: 87 (09/23/22 0735)  Resp: 19 (09/23/22 0735)  BP: (!) 147/63 (09/23/22 0735)  SpO2: 95 % (09/23/22 1209)   Vital Signs (24h Range):  Temp:  [97.6 °F (36.4 °C)-100.7 °F (38.2 °C)] 100 °F (37.8 °C)  Pulse:  [71-90] 87  Resp:  [16-19] 19  SpO2:  [95 %-98 %] 95 %  BP: (124-147)/(58-72) 147/63     Weight: 56.9 kg (125 lb 7.1 oz)  Body mass index is 24.5 kg/m².    Physical Exam  Vitals and nursing note reviewed.   Constitutional:       General: She is not in acute distress.     Appearance: She is ill-appearing. She is not toxic-appearing or diaphoretic.      Comments: Elderly female, laying in bed. Appears fatigue    HENT:      Right Ear: External ear normal.      Left Ear: External ear normal.      Nose: Nose normal.      Mouth/Throat:      Mouth: Mucous membranes are dry.   Eyes:      Extraocular Movements: Extraocular movements intact.      Conjunctiva/sclera: Conjunctivae normal.   Cardiovascular:      Rate and Rhythm: Normal rate and regular rhythm.      Heart sounds: No murmur heard.    No gallop.   Pulmonary:      Effort: Pulmonary effort is normal. No respiratory distress.      Breath sounds: Normal breath sounds. No wheezing or rales.   Abdominal:      General: There is no distension.      Palpations: Abdomen is soft.      Tenderness: There is no abdominal tenderness. There is no  right CVA tenderness, left CVA tenderness or guarding.   Musculoskeletal:         General: No swelling or tenderness.      Cervical back: Normal range of motion.      Right lower leg: No edema.      Left lower leg: No edema.   Skin:     General: Skin is warm and dry.      Comments: +THDC on right upper chest wall.  Catheter appears to be intact.  No bleeding, erythema, drainage from catheter site.  No overlying signs of infection.  No tenderness to palpation near THDC. Site looks clean    Neurological:      General: No focal deficit present.      Mental Status: She is alert and oriented to person, place, and time.      Motor: Weakness (generalized weakness) present.   Psychiatric:         Mood and Affect: Mood normal.         Behavior: Behavior normal.         Thought Content: Thought content normal.           Significant Labs: All pertinent labs within the past 24 hours have been reviewed.  CBC:   Recent Labs   Lab 09/22/22 0524 09/23/22  0444   WBC 4.85 3.26*   HGB 8.0* 7.6*   HCT 24.9* 24.6*   * 111*       CMP:   Recent Labs   Lab 09/22/22  0523 09/23/22  0444    143   K 3.2* 4.2    110   CO2 27 22*   * 137*   BUN 30* 28*   CREATININE 3.3* 2.9*   CALCIUM 8.6* 7.7*   ANIONGAP 10 11         Lactic Acid:   No results for input(s): LACTATE in the last 48 hours.    Magnesium:   Recent Labs   Lab 09/22/22 0523 09/23/22  0444   MG 2.2 2.0           Significant Imaging: I have reviewed all pertinent imaging results/findings within the past 24 hours.    Imaging Results              X-Ray Chest AP Portable (Final result)  Result time 09/21/22 04:47:08      Final result by Tj Melendez MD (09/21/22 04:47:08)                   Impression:      There is no radiographic evidence for acute intrathoracic process.      Electronically signed by: Tj Melendez  Date:    09/21/2022  Time:    04:47               Narrative:    EXAMINATION:  XR CHEST AP PORTABLE    CLINICAL  HISTORY:  Sepsis;    TECHNIQUE:  Single frontal view of the chest was performed.    COMPARISON:  Chest radiograph March 30, 2022    FINDINGS:  Single portable chest view is submitted.  Right-sided central venous catheter noted, distal tip at the expected location of the SVC.  The cardiomediastinal silhouette appears stable.  Mild aortic atherosclerotic change noted.    There is no evidence for confluent infiltrate or consolidation, significant pleural effusion or pneumothorax.  Mild curvature of the spine noted.  The osseous structures appear intact.

## 2022-09-23 NOTE — ASSESSMENT & PLAN NOTE
-Hgb down trending   -No signs of active bleeding  -Continue oral iron supplement   -Suspect due to ESRD  -Monitor

## 2022-09-24 LAB
ANION GAP SERPL CALC-SCNC: 10 MMOL/L (ref 8–16)
BACTERIA BLD CULT: ABNORMAL
BASOPHILS # BLD AUTO: 0.01 K/UL (ref 0–0.2)
BASOPHILS NFR BLD: 0.3 % (ref 0–1.9)
BUN SERPL-MCNC: 14 MG/DL (ref 8–23)
CALCIUM SERPL-MCNC: 8.2 MG/DL (ref 8.7–10.5)
CHLORIDE SERPL-SCNC: 106 MMOL/L (ref 95–110)
CO2 SERPL-SCNC: 20 MMOL/L (ref 23–29)
CREAT SERPL-MCNC: 2 MG/DL (ref 0.5–1.4)
DIFFERENTIAL METHOD: ABNORMAL
EOSINOPHIL # BLD AUTO: 0 K/UL (ref 0–0.5)
EOSINOPHIL NFR BLD: 1.1 % (ref 0–8)
ERYTHROCYTE [DISTWIDTH] IN BLOOD BY AUTOMATED COUNT: 13.6 % (ref 11.5–14.5)
EST. GFR  (NO RACE VARIABLE): 28 ML/MIN/1.73 M^2
GLUCOSE SERPL-MCNC: 109 MG/DL (ref 70–110)
HCT VFR BLD AUTO: 24.9 % (ref 37–48.5)
HGB BLD-MCNC: 8.1 G/DL (ref 12–16)
IMM GRANULOCYTES # BLD AUTO: 0.02 K/UL (ref 0–0.04)
IMM GRANULOCYTES NFR BLD AUTO: 0.6 % (ref 0–0.5)
LYMPHOCYTES # BLD AUTO: 0.8 K/UL (ref 1–4.8)
LYMPHOCYTES NFR BLD: 21.4 % (ref 18–48)
MAGNESIUM SERPL-MCNC: 1.8 MG/DL (ref 1.6–2.6)
MCH RBC QN AUTO: 30.9 PG (ref 27–31)
MCHC RBC AUTO-ENTMCNC: 32.5 G/DL (ref 32–36)
MCV RBC AUTO: 95 FL (ref 82–98)
MONOCYTES # BLD AUTO: 0.4 K/UL (ref 0.3–1)
MONOCYTES NFR BLD: 11.7 % (ref 4–15)
NEUTROPHILS # BLD AUTO: 2.3 K/UL (ref 1.8–7.7)
NEUTROPHILS NFR BLD: 64.9 % (ref 38–73)
NRBC BLD-RTO: 0 /100 WBC
PHOSPHATE SERPL-MCNC: 2.5 MG/DL (ref 2.7–4.5)
PLATELET # BLD AUTO: 130 K/UL (ref 150–450)
PMV BLD AUTO: 9.8 FL (ref 9.2–12.9)
POCT GLUCOSE: 106 MG/DL (ref 70–110)
POCT GLUCOSE: 154 MG/DL (ref 70–110)
POCT GLUCOSE: 236 MG/DL (ref 70–110)
POCT GLUCOSE: 254 MG/DL (ref 70–110)
POTASSIUM SERPL-SCNC: 3.9 MMOL/L (ref 3.5–5.1)
RBC # BLD AUTO: 2.62 M/UL (ref 4–5.4)
SODIUM SERPL-SCNC: 136 MMOL/L (ref 136–145)
WBC # BLD AUTO: 3.51 K/UL (ref 3.9–12.7)

## 2022-09-24 PROCEDURE — 99222 PR INITIAL HOSPITAL CARE,LEVL II: ICD-10-PCS | Mod: ,,, | Performed by: INTERNAL MEDICINE

## 2022-09-24 PROCEDURE — 99222 1ST HOSP IP/OBS MODERATE 55: CPT | Mod: ,,, | Performed by: INTERNAL MEDICINE

## 2022-09-24 PROCEDURE — 83735 ASSAY OF MAGNESIUM: CPT | Performed by: STUDENT IN AN ORGANIZED HEALTH CARE EDUCATION/TRAINING PROGRAM

## 2022-09-24 PROCEDURE — 25000003 PHARM REV CODE 250: Performed by: STUDENT IN AN ORGANIZED HEALTH CARE EDUCATION/TRAINING PROGRAM

## 2022-09-24 PROCEDURE — 63600175 PHARM REV CODE 636 W HCPCS: Performed by: INTERNAL MEDICINE

## 2022-09-24 PROCEDURE — 85025 COMPLETE CBC W/AUTO DIFF WBC: CPT | Performed by: STUDENT IN AN ORGANIZED HEALTH CARE EDUCATION/TRAINING PROGRAM

## 2022-09-24 PROCEDURE — 80048 BASIC METABOLIC PNL TOTAL CA: CPT | Performed by: STUDENT IN AN ORGANIZED HEALTH CARE EDUCATION/TRAINING PROGRAM

## 2022-09-24 PROCEDURE — 84100 ASSAY OF PHOSPHORUS: CPT | Performed by: STUDENT IN AN ORGANIZED HEALTH CARE EDUCATION/TRAINING PROGRAM

## 2022-09-24 PROCEDURE — 11000001 HC ACUTE MED/SURG PRIVATE ROOM

## 2022-09-24 RX ADMIN — ATORVASTATIN CALCIUM 40 MG: 40 TABLET, FILM COATED ORAL at 08:09

## 2022-09-24 RX ADMIN — SODIUM BICARBONATE 1300 MG: 325 TABLET ORAL at 08:09

## 2022-09-24 RX ADMIN — FERROUS SULFATE TAB 325 MG (65 MG ELEMENTAL FE) 1 EACH: 325 (65 FE) TAB at 08:09

## 2022-09-24 RX ADMIN — CEFAZOLIN SODIUM 1 G: 1 SOLUTION INTRAVENOUS at 04:09

## 2022-09-24 RX ADMIN — METOPROLOL SUCCINATE 50 MG: 50 TABLET, EXTENDED RELEASE ORAL at 08:09

## 2022-09-24 RX ADMIN — AMLODIPINE BESYLATE 5 MG: 5 TABLET ORAL at 08:09

## 2022-09-24 RX ADMIN — CLOPIDOGREL 75 MG: 75 TABLET, FILM COATED ORAL at 08:09

## 2022-09-24 RX ADMIN — INSULIN ASPART 1 UNITS: 100 INJECTION, SOLUTION INTRAVENOUS; SUBCUTANEOUS at 08:09

## 2022-09-24 NOTE — ASSESSMENT & PLAN NOTE
-Blood cx from 09/21 w/MSSA  -repeat blood cultures until clear  -Blood cx from 09/23/22 with NGTD  -Suspect possible due to THDC  -Nephrology and ID consulted  -ID recommends PermCath removal and line holiday   -IR consulted, s/p Cath removal on 09/23  -continue cefazolin day 3

## 2022-09-24 NOTE — HPI
Anjali Dorantes is a 62 year female whom we are asked to evaluate for transesophageal echocardiogram.  Patient presented with complaints of nausea, vomiting.  Fever over a few days prior to arrival.  Reported max temperature of 104° F. history of ESRD on hemodialysis, Monday Wednesday Friday.  History of a stroke.  Diabetes.  Hypertension.  Recent tunnel cath placed in July 2022.  Started dialysis in August 2022.  Sepsis.  Positive urine cultures with E coli.  Positive blood cultures with MSSA.  PermCath removed on 09/23/2022.  Transthoracic echocardiogram showed normal left ventricular systolic function.  No significant valvular abnormalities noted.    Echocardiogram 09/22/2022:    The estimated ejection fraction is 55%.  The left ventricle is normal in size with mild concentric hypertrophy and normal systolic function.  Moderate left atrial enlargement.  Grade II left ventricular diastolic dysfunction.  Moderate right atrial enlargement.  Normal central venous pressure (3 mmHg).  The estimated PA systolic pressure is 20 mmHg.  Normal right ventricular size with normal right ventricular systolic function.  Mild tricuspid regurgitation.

## 2022-09-24 NOTE — ASSESSMENT & PLAN NOTE
This patient does have evidence of infective focus  My overall impression is sepsis. Vital signs were reviewed and noted in progress note.  Antibiotics given-   Antibiotics (From admission, onward)    Start     Stop Route Frequency Ordered    09/22/22 1700  ceFAZolin (ANCEF) 1 gram in dextrose 5 % 50 mL IVPB (premix)         -- IV Daily 09/22/22 1446        Cultures were taken-   Microbiology Results (last 7 days)     Procedure Component Value Units Date/Time    Aerobic culture (Specify Source) **CANNOT BE ORDERED AS STAT** [[id number]] Collected: 09/21/22 0439    Order Status: Completed Specimen: Skin from Chest, Right Updated: 09/24/22 0922     Aerobic Bacterial Culture No growth    Blood culture [[id number]] Collected: 09/23/22 0655    Order Status: Completed Specimen: Blood from Peripheral, Site Unknown Updated: 09/24/22 0903     Blood Culture, Routine No Growth to date      No Growth to date    Blood culture [[id number]] Collected: 09/23/22 0655    Order Status: Completed Specimen: Blood Updated: 09/24/22 0903     Blood Culture, Routine No Growth to date      No Growth to date    Blood culture [[id number]]  (Abnormal) Collected: 09/22/22 0523    Order Status: Completed Specimen: Blood from Peripheral, Left Hand Updated: 09/24/22 0812     Blood Culture, Routine Gram stain aer bottle: Gram positive cocci in clusters resembling Staph      Positive results previously called      09/23/2022 03:08 BML      STAPHYLOCOCCUS AUREUS  For susceptibility see order # [id number]      Blood culture [[id number]]  (Abnormal)  (Susceptibility) Collected: 09/22/22 0523    Order Status: Completed Specimen: Blood from Peripheral, Right Hand Updated: 09/24/22 0807     Blood Culture, Routine Gram stain aer bottle: Gram positive cocci in clusters resembling Staph      Positive results previously called      09/23/2022 03:06 BML      STAPHYLOCOCCUS AUREUS    Urine culture [[id number]]  (Abnormal)  (Susceptibility) Collected: 09/21/22 1101     Order Status: Completed Specimen: Urine Updated: 09/23/22 0821     Urine Culture, Routine ESCHERICHIA COLI  10,000 - 49,999 cfu/ml      Narrative:      Specimen Source->Urine    Blood culture x two cultures. Draw prior to antibiotics. [[id number]]  (Abnormal)  (Susceptibility) Collected: 09/21/22 0422    Order Status: Completed Specimen: Blood from Peripheral, Antecubital, Left Updated: 09/23/22 0736     Blood Culture, Routine Gram stain jose roberto bottle: Gram positive cocci in pairs Gram positive cocci      in clusters resembling Staph      Results called to and read back by:Melba Perez      Gram stain aer bottle: Gram positive cocci in clusters resembling Staph      STAPHYLOCOCCUS AUREUS    Narrative:      Aerobic and anaerobic    Blood culture x two cultures. Draw prior to antibiotics. [[id number]]  (Abnormal)  (Susceptibility) Collected: 09/21/22 0422    Order Status: Completed Specimen: Blood from Peripheral, Forearm, Right Updated: 09/23/22 0736     Blood Culture, Routine Gram stain jose roberto bottle: Gram positive cocci in clusters resembling Staph      Positive results previously called 16:05  09/21/2022      Gram stain aer bottle: Gram positive cocci in clusters resembling Staph      previously reported      STAPHYLOCOCCUS AUREUS    Narrative:      Aerobic and anaerobic    Influenza A & B by Molecular [[id number]]     Order Status: Canceled Specimen: Nasopharyngeal Swab         Latest lactate reviewed, they are-  No results for input(s): LACTATE in the last 72 hours.    Organ dysfunction: none  Source- UTI and bacteremia     Source control Achieved by- antibiotics    - Presented febrile, tachycardic, with elevated LA and complaints of N/V and weakness  - LA 3.4 on admission, repeat LA 1.7  - Pt with no abdominal pain or tenderness, low suspicion for SBP  - No leukocytosis, procal elevated (however in setting of ESRD, low yield). Flu and COVID negative   - CXR with no acute process  - UA appears grossly infected  - Urine  cx with low colonies Ecoli  - Blood cx with MSSA  - Repeat blood cx [id number] until clear   - Pt with recent THDC placement in 07/2022  - ID consulted:  Recommend PermCath removal and then line holiday.  Continue cefazolin, anticipate 4 weeks of IV antibiotics or dialysis  -Previous dialysis cath removed on 09/23/22 by IR  -Plan for CHIKA and possible THDC on Monday, 09/26  - continue day 3 of cefazolin

## 2022-09-24 NOTE — ASSESSMENT & PLAN NOTE
-Recent diagnosis, THDC recently placed on 07/27/2022  -First dialysis session initiated in 08/2022, on MWF  -Concern THDC might be source of bacteremia.   -Nephrology consulted: s/p removal of PermCath on 09/23 and then line holiday.  Plan to place THDC on 09/26, if blood cultures are clear for 48-72 hours

## 2022-09-24 NOTE — CONSULTS
[facility]  Cardiology  Consult Note    Patient Name: Anjali Dorantes  MRN: [id number]  Admission Date: 9/21/2022  Hospital Length of Stay: 3 days  Code Status: Full Code   Attending Provider: Marcela Amezcua DO   Consulting Provider: Mitesh Mccray MD  Primary Care Physician: Tasia Carrasco MD  Principal Problem:Sepsis    Patient information was obtained from patient, past medical records and ER records.     Inpatient consult to Cardiology  Consult performed by: Mitesh Mccray MD  Consult ordered by: Marcela Amezcua DO        Subjective:     Chief Complaint:  Sepsis (CHIKA evaluation)     HPI:   Anjali Dorantes is a 62 year female whom we are asked to evaluate for transesophageal echocardiogram.  Patient presented with complaints of nausea, vomiting.  Fever over a few days prior to arrival.  Reported max temperature of 104° F. history of ESRD on hemodialysis, Monday Wednesday Friday.  History of a stroke.  Diabetes.  Hypertension.  Recent tunnel cath placed in July 2022.  Started dialysis in August 2022.  Sepsis.  Positive urine cultures with E coli.  Positive blood cultures with MSSA.  PermCath removed on 09/23/2022.  Transthoracic echocardiogram showed normal left ventricular systolic function.  No significant valvular abnormalities noted.    Echocardiogram 09/22/2022:     The estimated ejection fraction is 55%.   The left ventricle is normal in size with mild concentric hypertrophy and normal systolic function.   Moderate left atrial enlargement.   Grade II left ventricular diastolic dysfunction.   Moderate right atrial enlargement.   Normal central venous pressure (3 mmHg).   The estimated PA systolic pressure is 20 mmHg.   Normal right ventricular size with normal right ventricular systolic function.   Mild tricuspid regurgitation.              Past Medical History:   Diagnosis Date    AMS (altered mental status) 06/30/2021    Anemia in CKD (chronic kidney disease)     CKD (chronic kidney  disease)     Diabetes mellitus     Encounter for blood transfusion     Hypercholesterolemia     Hypertension     Stroke 2015    TIA    Uterine cancer 2021    Endometrial Cancer       Past Surgical History:   Procedure Laterality Date    ARTERIOGRAM, CEREBRAL ARTERIES  02/12/2014    and 3/18/2014    HYSTERECTOMY      OMENTECTOMY N/A 03/30/2022    Procedure: OMENTECTOMY;  Surgeon: Reuben Lopez MD;  Location: [address];  Service: OB/GYN;  Laterality: N/A;    TOTAL ABDOMINAL HYSTERECTOMY W/ BILATERAL SALPINGOOPHORECTOMY N/A 03/30/2022    Procedure: HYSTERECTOMY, TOTAL, ABDOMINAL, WITH BILATERAL SALPINGO-OOPHORECTOMY;  Surgeon: Reuben Lopez MD;  Location: [address];  Service: OB/GYN;  Laterality: N/A;       Review of patient's allergies indicates:   Allergen Reactions    Glipizide Other (See Comments)       No current facility-administered medications on file prior to encounter.     Current Outpatient Medications on File Prior to Encounter   Medication Sig    acetaminophen (TYLENOL) 325 MG tablet Take 2 tablets (650 mg total) by mouth every 6 (six) hours as needed for Pain (Take every 6 hours for 5-7 days and then as needed).    allopurinoL (ZYLOPRIM) 100 MG tablet Take 100 mg by mouth once daily.    amLODIPine (NORVASC) 5 MG tablet Take 5 mg by mouth once daily.    clopidogreL (PLAVIX) 75 mg tablet Take 75 mg by mouth once daily.    dulaglutide (TRULICITY) 0.75 mg/0.5 mL pen injector Inject 0.75 mg into the skin every 7 days.    metoprolol succinate (TOPROL-XL) 50 MG 24 hr tablet Take 50 mg by mouth once daily.    nateglinide (STARLIX) 120 MG tablet Take 1 tablet by mouth 3 times daily FOR DIABETES    sodium bicarbonate 650 MG tablet Take 1,300 mg by mouth.    venlafaxine (EFFEXOR) 75 MG tablet Take 75 mg by mouth 2 (two) times daily.     ergocalciferol (ERGOCALCIFEROL) 50,000 unit Cap Take 50,000 Units by mouth every 7 days.    ferrous sulfate 325 (65 FE) MG EC tablet Take 1 tablet  (325 mg total) by mouth once daily.    losartan (COZAAR) 50 MG tablet Take 50 mg by mouth once daily.    metoprolol tartrate (LOPRESSOR) 50 MG tablet Take 50 mg by mouth 2 (two) times daily.    oxyCODONE (ROXICODONE) 5 MG immediate release tablet Take 1 tablet (5 mg total) by mouth every 6 (six) hours as needed for Pain. (Patient not taking: Reported on 4/29/2022)    pantoprazole (PROTONIX) 20 MG tablet Take 20 mg by mouth once daily.     rosuvastatin (CRESTOR) 20 MG tablet take ONE tablet every night at bedtime (for cholesterol)    senna (SENOKOT) 8.6 mg tablet Take 1 tablet by mouth 2 (two) times daily as needed for Constipation. (Patient not taking: Reported on 9/21/2022)    vitamin D (VITAMIN D3) 1000 units Tab Take 1,000 Units by mouth once daily.      Family History    None       Tobacco Use    Smoking status: Never    Smokeless tobacco: Never   Substance and Sexual Activity    Alcohol use: No    Drug use: No    Sexual activity: Not Currently     Partners: Male     Review of Systems   Constitutional: Positive for malaise/fatigue. Negative for diaphoresis.   Cardiovascular:  Negative for chest pain, dyspnea on exertion, irregular heartbeat, leg swelling, near-syncope, orthopnea, palpitations, paroxysmal nocturnal dyspnea and syncope.   Respiratory:  Positive for sleep disturbances due to breathing. Negative for shortness of breath, sputum production and wheezing.    Gastrointestinal:  Negative for abdominal pain, heartburn, hematemesis and melena.   Neurological:  Negative for dizziness, focal weakness, light-headedness, numbness, paresthesias, seizures and weakness.   Objective:     Vital Signs (Most Recent):  Temp: 99 °F (37.2 °C) (09/24/22 1111)  Pulse: 93 (09/24/22 1111)  Resp: 16 (09/24/22 1111)  BP: 139/63 (09/24/22 1111)  SpO2: 96 % (09/24/22 1111) Vital Signs (24h Range):  Temp:  [98.3 °F (36.8 °C)-100.2 °F (37.9 °C)] 99 °F (37.2 °C)  Pulse:  [85-99] 93  Resp:  [16-20] 16  SpO2:  [95 %-98 %]  96 %  BP: (136-149)/(63-76) 139/63     Weight: 56.9 kg (125 lb 7.1 oz)  Body mass index is 24.5 kg/m².    SpO2: 96 %  O2 Device (Oxygen Therapy): room air      Intake/Output Summary (Last 24 hours) at 9/24/2022 1147  Last data filed at 9/23/2022 1730  Gross per 24 hour   Intake 740 ml   Output 1500 ml   Net -760 ml       Lines/Drains/Airways       Central Venous Catheter Line  Duration                  Hemodialysis Catheter right internal jugular -- days              Peripheral Intravenous Line  Duration                  Peripheral IV - Single Lumen 09/21/22 0415 20 G Left Antecubital 3 days         Peripheral IV - Single Lumen 09/21/22 0604 20 G Anterior;Distal;Right Forearm 3 days                    Physical Exam  Vitals reviewed.   Constitutional:       General: She is not in acute distress.     Appearance: She is ill-appearing. She is not diaphoretic.   Neck:      Vascular: No carotid bruit or JVD.   Cardiovascular:      Rate and Rhythm: Normal rate and regular rhythm.      Pulses: Normal pulses.   Pulmonary:      Effort: Pulmonary effort is normal.      Breath sounds: Normal breath sounds.   Abdominal:      General: Bowel sounds are normal.      Palpations: Abdomen is soft.      Tenderness: There is no abdominal tenderness.   Musculoskeletal:      Right lower leg: No edema.      Left lower leg: No edema.   Neurological:      Mental Status: She is alert and oriented to person, place, and time.   Psychiatric:         Speech: Speech normal.         Behavior: Behavior normal.       Significant Labs: CMP   Recent Labs   Lab 09/23/22  0444 09/24/22  0536    136   K 4.2 3.9    106   CO2 22* 20*   * 109   BUN 28* 14   CREATININE 2.9* 2.0*   CALCIUM 7.7* 8.2*   ANIONGAP 11 10   , CBC   Recent Labs   Lab 09/23/22  0444 09/24/22  0536   WBC 3.26* 3.51*   HGB 7.6* 8.1*   HCT 24.6* 24.9*   * 130*   , Lipid Panel No results for input(s): CHOL, HDL, LDLCALC, TRIG, CHOLHDL in the last 48 hours., and  Troponin No results for input(s): TROPONINI in the last 48 hours.    Significant Imaging: Echocardiogram: Transthoracic echo (TTE) complete (Cupid Only):   Results for orders placed or performed during the hospital encounter of 09/21/22   Echo   Result Value Ref Range    BSA 1.55 m2    IVC diameter 1.57 cm    Left Ventricular Outflow Tract Mean Velocity 0.79 cm/s    Left Ventricular Outflow Tract Mean Gradient 2.68 mmHg    PV PEAK VELOCITY 0.93 cm/s    LVIDd 3.90 3.5 - 6.0 cm    IVS 1.19 (A) 0.6 - 1.1 cm    Posterior Wall 1.04 0.6 - 1.1 cm    LVIDs 2.83 2.1 - 4.0 cm    FS 27 28 - 44 %    STJ 2.17 cm    Ascending aorta 2.75 cm    LV mass 142.89 g    LA size 3.52 cm    RVDD 3.16 cm    Left Ventricle Relative Wall Thickness 0.53 cm    AV Velocity Ratio 0.87     MV valve area p 1/2 method 4.83 cm2    E/A ratio 1.13     E wave deceleration time 157.05 msec    IVRT 83.04 msec    LVOT diameter 2.01 cm    LVOT area 3.2 cm2    LVOT peak chay 1.03 m/s    LVOT peak VTI 23.80 cm    Ao peak chay 1.19 m/s    LVOT stroke volume 75.48 cm3    AV peak gradient 6 mmHg    MV Peak E Chay 0.86 m/s    TR Max Chay 2.08 m/s    MV stenosis pressure 1/2 time 45.54 ms    MV Peak A Chay 0.76 m/s    LV Systolic Volume 30.29 mL    LV Systolic Volume Index 19.8 mL/m2    LV Diastolic Volume 65.78 mL    LV Diastolic Volume Index 42.99 mL/m2    LV Mass Index 93 g/m2    RA Major Axis 4.46 cm    Left Atrium Minor Axis 5.16 cm    Left Atrium Major Axis 4.64 cm    Triscuspid Valve Regurgitation Peak Gradient 17 mmHg    TDI SEPTAL 0.04 m/s    LV LATERAL E/E' RATIO 12.29 m/s    LV SEPTAL E/E' RATIO 21.50 m/s    LA WIDTH 4.40 cm    TDI LATERAL 0.07 m/s    LA volume 64.33 cm3    Sinus 3.20 cm    TAPSE 2.10 cm    Mean e' 0.06 m/s    E/E' ratio 15.64 m/s    LA Volume Index 42.0 mL/m2    RA Width 3.30 cm    Right Atrial Pressure (from IVC) 3 mmHg    EF 55 %    TV rest pulmonary artery pressure 20 mmHg    Narrative    · The estimated ejection fraction is 55%.  · The  left ventricle is normal in size with mild concentric hypertrophy   and normal systolic function.  · Moderate left atrial enlargement.  · Grade II left ventricular diastolic dysfunction.  · Moderate right atrial enlargement.  · Normal central venous pressure (3 mmHg).  · The estimated PA systolic pressure is 20 mmHg.  · Normal right ventricular size with normal right ventricular systolic   function.  · Mild tricuspid regurgitation.        Assessment and Plan:     * Sepsis  09/24/2022:  We are consulted for transesophageal echocardiogram secondary to positive cultures.  Risks, benefits, alternatives were explained to patient and her son.  She acknowledges understanding and is willing to proceed.  Plan for transesophageal echocardiogram on 09/26/2022.  Consent signed, witnessed and placed in chart.     Quynh [id number]        VTE Risk Mitigation (From admission, onward)         Ordered     heparin (porcine) injection 4,000 Units  As needed (PRN)         09/23/22 1206                Thank you for your consult. I will follow-up with patient. Please contact us if you have any additional questions.    Mitesh Mccray MD  Cardiology   [facility]

## 2022-09-24 NOTE — ASSESSMENT & PLAN NOTE
-Hgb near baseline  -No signs of active bleeding  -Continue oral iron supplement   -Suspect due to ESRD  -Monitor

## 2022-09-24 NOTE — SUBJECTIVE & OBJECTIVE
Past Medical History:   Diagnosis Date    AMS (altered mental status) 06/30/2021    Anemia in CKD (chronic kidney disease)     CKD (chronic kidney disease)     Diabetes mellitus     Encounter for blood transfusion     Hypercholesterolemia     Hypertension     Stroke 2015    TIA    Uterine cancer 2021    Endometrial Cancer       Past Surgical History:   Procedure Laterality Date    ARTERIOGRAM, CEREBRAL ARTERIES  02/12/2014    and 3/18/2014    HYSTERECTOMY      OMENTECTOMY N/A 03/30/2022    Procedure: OMENTECTOMY;  Surgeon: Reuben Lopez MD;  Location: [facility] [address] [place] [address];  Service: OB/GYN;  Laterality: N/A;    TOTAL ABDOMINAL HYSTERECTOMY W/ BILATERAL SALPINGOOPHORECTOMY N/A 03/30/2022    Procedure: HYSTERECTOMY, TOTAL, ABDOMINAL, WITH BILATERAL SALPINGO-OOPHORECTOMY;  Surgeon: Reuben Lopez MD;  Location: [facility] [address] [place] [address];  Service: OB/GYN;  Laterality: N/A;       Review of patient's allergies indicates:   Allergen Reactions    Glipizide Other (See Comments)       No current facility-administered medications on file prior to encounter.     Current Outpatient Medications on File Prior to Encounter   Medication Sig    acetaminophen (TYLENOL) 325 MG tablet Take 2 tablets (650 mg total) by mouth every 6 (six) hours as needed for Pain (Take every 6 hours for 5-7 days and then as needed).    allopurinoL (ZYLOPRIM) 100 MG tablet Take 100 mg by mouth once daily.    amLODIPine (NORVASC) 5 MG tablet Take 5 mg by mouth once daily.    clopidogreL (PLAVIX) 75 mg tablet Take 75 mg by mouth once daily.    dulaglutide (TRULICITY) 0.75 mg/0.5 mL pen injector Inject 0.75 mg into the skin every 7 days.    metoprolol succinate (TOPROL-XL) 50 MG 24 hr tablet Take 50 mg by mouth once daily.    nateglinide (STARLIX) 120 MG tablet Take 1 tablet by mouth 3 times daily FOR DIABETES    sodium bicarbonate 650 MG tablet Take 1,300 mg by mouth.    venlafaxine (EFFEXOR) 75 MG tablet Take 75 mg by mouth 2 (two) times daily.     ergocalciferol  (ERGOCALCIFEROL) 50,000 unit Cap Take 50,000 Units by mouth every 7 days.    ferrous sulfate 325 (65 FE) MG EC tablet Take 1 tablet (325 mg total) by mouth once daily.    losartan (COZAAR) 50 MG tablet Take 50 mg by mouth once daily.    metoprolol tartrate (LOPRESSOR) 50 MG tablet Take 50 mg by mouth 2 (two) times daily.    oxyCODONE (ROXICODONE) 5 MG immediate release tablet Take 1 tablet (5 mg total) by mouth every 6 (six) hours as needed for Pain. (Patient not taking: Reported on 4/29/2022)    pantoprazole (PROTONIX) 20 MG tablet Take 20 mg by mouth once daily.     rosuvastatin (CRESTOR) 20 MG tablet take ONE tablet every night at bedtime (for cholesterol)    senna (SENOKOT) 8.6 mg tablet Take 1 tablet by mouth 2 (two) times daily as needed for Constipation. (Patient not taking: Reported on 9/21/2022)    vitamin D (VITAMIN D3) 1000 units Tab Take 1,000 Units by mouth once daily.      Family History    None       Tobacco Use    Smoking status: Never    Smokeless tobacco: Never   Substance and Sexual Activity    Alcohol use: No    Drug use: No    Sexual activity: Not Currently     Partners: Male     Review of Systems   Constitutional: Positive for malaise/fatigue. Negative for diaphoresis.   Cardiovascular:  Negative for chest pain, dyspnea on exertion, irregular heartbeat, leg swelling, near-syncope, orthopnea, palpitations, paroxysmal nocturnal dyspnea and syncope.   Respiratory:  Positive for sleep disturbances due to breathing. Negative for shortness of breath, sputum production and wheezing.    Gastrointestinal:  Negative for abdominal pain, heartburn, hematemesis and melena.   Neurological:  Negative for dizziness, focal weakness, light-headedness, numbness, paresthesias, seizures and weakness.   Objective:     Vital Signs (Most Recent):  Temp: 99 °F (37.2 °C) (09/24/22 1111)  Pulse: 93 (09/24/22 1111)  Resp: 16 (09/24/22 1111)  BP: 139/63 (09/24/22 1111)  SpO2: 96 % (09/24/22 1111) Vital Signs (24h  Range):  Temp:  [98.3 °F (36.8 °C)-100.2 °F (37.9 °C)] 99 °F (37.2 °C)  Pulse:  [85-99] 93  Resp:  [16-20] 16  SpO2:  [95 %-98 %] 96 %  BP: (136-149)/(63-76) 139/63     Weight: 56.9 kg (125 lb 7.1 oz)  Body mass index is 24.5 kg/m².    SpO2: 96 %  O2 Device (Oxygen Therapy): room air      Intake/Output Summary (Last 24 hours) at 9/24/2022 1147  Last data filed at 9/23/2022 1730  Gross per 24 hour   Intake 740 ml   Output 1500 ml   Net -760 ml       Lines/Drains/Airways       Central Venous Catheter Line  Duration                  Hemodialysis Catheter right internal jugular -- days              Peripheral Intravenous Line  Duration                  Peripheral IV - Single Lumen 09/21/22 0415 20 G Left Antecubital 3 days         Peripheral IV - Single Lumen 09/21/22 0604 20 G Anterior;Distal;Right Forearm 3 days                    Physical Exam  Vitals reviewed.   Constitutional:       General: She is not in acute distress.     Appearance: She is ill-appearing. She is not diaphoretic.   Neck:      Vascular: No carotid bruit or JVD.   Cardiovascular:      Rate and Rhythm: Normal rate and regular rhythm.      Pulses: Normal pulses.   Pulmonary:      Effort: Pulmonary effort is normal.      Breath sounds: Normal breath sounds.   Abdominal:      General: Bowel sounds are normal.      Palpations: Abdomen is soft.      Tenderness: There is no abdominal tenderness.   Musculoskeletal:      Right lower leg: No edema.      Left lower leg: No edema.   Neurological:      Mental Status: She is alert and oriented to person, place, and time.   Psychiatric:         Speech: Speech normal.         Behavior: Behavior normal.       Significant Labs: CMP   Recent Labs   Lab 09/23/22  0444 09/24/22  0536    136   K 4.2 3.9    106   CO2 22* 20*   * 109   BUN 28* 14   CREATININE 2.9* 2.0*   CALCIUM 7.7* 8.2*   ANIONGAP 11 10   , CBC   Recent Labs   Lab 09/23/22  0444 09/24/22  0536   WBC 3.26* 3.51*   HGB 7.6* 8.1*   HCT  24.6* 24.9*   * 130*   , Lipid Panel No results for input(s): CHOL, HDL, LDLCALC, TRIG, CHOLHDL in the last 48 hours., and Troponin No results for input(s): TROPONINI in the last 48 hours.    Significant Imaging: Echocardiogram: Transthoracic echo (TTE) complete (Cupid Only):   Results for orders placed or performed during the hospital encounter of 09/21/22   Echo   Result Value Ref Range    BSA 1.55 m2    IVC diameter 1.57 cm    Left Ventricular Outflow Tract Mean Velocity 0.79 cm/s    Left Ventricular Outflow Tract Mean Gradient 2.68 mmHg    PV PEAK VELOCITY 0.93 cm/s    LVIDd 3.90 3.5 - 6.0 cm    IVS 1.19 (A) 0.6 - 1.1 cm    Posterior Wall 1.04 0.6 - 1.1 cm    LVIDs 2.83 2.1 - 4.0 cm    FS 27 28 - 44 %    STJ 2.17 cm    Ascending aorta 2.75 cm    LV mass 142.89 g    LA size 3.52 cm    RVDD 3.16 cm    Left Ventricle Relative Wall Thickness 0.53 cm    AV Velocity Ratio 0.87     MV valve area p 1/2 method 4.83 cm2    E/A ratio 1.13     E wave deceleration time 157.05 msec    IVRT 83.04 msec    LVOT diameter 2.01 cm    LVOT area 3.2 cm2    LVOT peak chay 1.03 m/s    LVOT peak VTI 23.80 cm    Ao peak chay 1.19 m/s    LVOT stroke volume 75.48 cm3    AV peak gradient 6 mmHg    MV Peak E Chay 0.86 m/s    TR Max Chay 2.08 m/s    MV stenosis pressure 1/2 time 45.54 ms    MV Peak A Chay 0.76 m/s    LV Systolic Volume 30.29 mL    LV Systolic Volume Index 19.8 mL/m2    LV Diastolic Volume 65.78 mL    LV Diastolic Volume Index 42.99 mL/m2    LV Mass Index 93 g/m2    RA Major Axis 4.46 cm    Left Atrium Minor Axis 5.16 cm    Left Atrium Major Axis 4.64 cm    Triscuspid Valve Regurgitation Peak Gradient 17 mmHg    TDI SEPTAL 0.04 m/s    LV LATERAL E/E' RATIO 12.29 m/s    LV SEPTAL E/E' RATIO 21.50 m/s    LA WIDTH 4.40 cm    TDI LATERAL 0.07 m/s    LA volume 64.33 cm3    Sinus 3.20 cm    TAPSE 2.10 cm    Mean e' 0.06 m/s    E/E' ratio 15.64 m/s    LA Volume Index 42.0 mL/m2    RA Width 3.30 cm    Right Atrial Pressure (from  IVC) 3 mmHg    EF 55 %    TV rest pulmonary artery pressure 20 mmHg    Narrative    · The estimated ejection fraction is 55%.  · The left ventricle is normal in size with mild concentric hypertrophy   and normal systolic function.  · Moderate left atrial enlargement.  · Grade II left ventricular diastolic dysfunction.  · Moderate right atrial enlargement.  · Normal central venous pressure (3 mmHg).  · The estimated PA systolic pressure is 20 mmHg.  · Normal right ventricular size with normal right ventricular systolic   function.  · Mild tricuspid regurgitation.

## 2022-09-24 NOTE — ASSESSMENT & PLAN NOTE
09/24/2022:  We are consulted for transesophageal echocardiogram secondary to positive cultures.  Risks, benefits, alternatives were explained to patient and her son.  She acknowledges understanding and is willing to proceed.  Plan for transesophageal echocardiogram on 09/26/2022.  Consent signed, witnessed and placed in chart.     Quynh [id number]

## 2022-09-24 NOTE — PROGRESS NOTES
[facility] Medicine  Progress Note    Patient Name: Anjali Dorantes  MRN: [id number]  Patient Class: IP- Inpatient   Admission Date: 9/21/2022  Length of Stay: 3 days  Attending Physician: Marcela Amezcua DO  Primary Care Provider: Tasia Carrasco MD        Subjective:     Principal Problem:Sepsis        HPI:  62 year female with history of ESRD on HD (on MWF), CVA (2014), DM, HTN, uterine cancer (s/p CIRILO on 03/30/2022) presented to the ED with complaints of nausea, vomiting, and fever for 2 days.  Tmax at home was 104F Son is bedside. Admits feeling weak overall.  She denies any chest pain, shortness breath, cough, difficulty breathing, abdominal pain, dysuria, hematuria, or any vaginal bleeding.  Denies any new food.  Denies any new medication, recent travel, or any sick contacts.  Admits PCP recently started indomethacin for gout, but patient has not taken it yet.  Patient recently tunneled cath placed in July 2022 and was initiated on dialysis in August 2022.  Has been tolerating her dialysis sessions without difficulty.  Prior to that, patient was hospitalized briefly for her hysterectomy in March 2022.    In the ED, patient was febrile and tachycardic.  Labs without leukocytosis, but it does show anemia, hypokalemia, and lactic acidosis.  Mg level 1.6. EKG noted to have prolonged QTC 605ms.  Chest x-ray with no acute process.  Patient received magnesium, potassium, Zosyn, and vancomycin in the ED.  Patient admitted to hospital medicine for further evaluation.      Overview/Hospital Course:  62 year female admitted on 09/21/22 for sepsis, likely secondary to UTI and bacteremia. Also found to have prolonged QTc and hypokalemia on admission. Urine cx with Ecoli. Blood cx with MSSA.  Repeat blood cultures x 24-48hr until clear.  Patient started broad-spectrum abx. Suspect bactermia may be due to THDC that was placed in 07/2022. Nephrology and ID consulted.  Recommends PermCath removal and line  holiday.  Continue cefazolin, anticipate 4 weeks of IV antibiotics with dialysis.  IR removed previous catheter on 09/23/2022. Cardiology consulted for CHIKA to rule out infective endocarditis.  Prolonged QTc has resolved, it could possible be due to chronic PPI and Effexor use.  Continue IV antibiotics and close monitoring      Interval History:  No acute overnight events.  Patient afebrile overnight.  No fever in the last 24 hours.  Having some trouble sleeping at night.  Admits fatigue.  Denies any UTI symptoms.  Son is at bedside.    Review of Systems   Constitutional:  Positive for activity change and fatigue. Negative for appetite change, chills and fever.   HENT:  Negative for congestion, sinus pressure and trouble swallowing.    Eyes:  Negative for photophobia and visual disturbance.   Respiratory:  Negative for cough, chest tightness and shortness of breath.    Cardiovascular:  Negative for chest pain, palpitations and leg swelling.   Gastrointestinal:  Negative for abdominal distention, abdominal pain, blood in stool, diarrhea, nausea and vomiting.   Endocrine: Negative for polyuria.   Genitourinary:  Negative for difficulty urinating, dysuria and hematuria.   Musculoskeletal:  Negative for arthralgias, back pain and joint swelling.   Allergic/Immunologic: Positive for immunocompromised state.   Neurological:  Positive for weakness. Negative for dizziness, light-headedness and headaches.   Psychiatric/Behavioral:  Positive for sleep disturbance. Negative for confusion and hallucinations.      Objective:     Vital Signs (Most Recent):  Temp: 99.3 °F (37.4 °C) (09/24/22 0758)  Pulse: 85 (09/24/22 0758)  Resp: 20 (09/24/22 0758)  BP: (!) 141/68 (09/24/22 0856)  SpO2: 98 % (09/24/22 0758)   Vital Signs (24h Range):  Temp:  [98.3 °F (36.8 °C)-100.2 °F (37.9 °C)] 99.3 °F (37.4 °C)  Pulse:  [85-99] 85  Resp:  [18-20] 20  SpO2:  [95 %-98 %] 98 %  BP: (128-149)/(64-76) 141/68     Weight: 56.9 kg (125 lb 7.1 oz)  Body  mass index is 24.5 kg/m².    Intake/Output Summary (Last 24 hours) at 9/24/2022 1033  Last data filed at 9/23/2022 1730  Gross per 24 hour   Intake 740 ml   Output 1500 ml   Net -760 ml        Physical Exam  Vitals and nursing note reviewed.   Constitutional:       General: She is not in acute distress.     Appearance: She is ill-appearing. She is not toxic-appearing or diaphoretic.      Comments: Elderly female, laying in bed. Appears fatigue    HENT:      Right Ear: External ear normal.      Left Ear: External ear normal.      Nose: Nose normal.      Mouth/Throat:      Mouth: Mucous membranes are moist.   Eyes:      Extraocular Movements: Extraocular movements intact.      Conjunctiva/sclera: Conjunctivae normal.   Cardiovascular:      Rate and Rhythm: Normal rate and regular rhythm.      Heart sounds: No murmur heard.    No gallop.   Pulmonary:      Effort: Pulmonary effort is normal. No respiratory distress.      Breath sounds: Normal breath sounds. No wheezing or rales.   Abdominal:      General: There is no distension.      Palpations: Abdomen is soft.      Tenderness: There is no abdominal tenderness. There is no guarding.   Musculoskeletal:         General: No swelling or tenderness.      Cervical back: Normal range of motion.      Right lower leg: No edema.      Left lower leg: No edema.   Skin:     General: Skin is warm and dry.      Comments: Previous THDC removed.  Site with no bleeding, erythema, or drainage. No overlying signs of infection.  No tenderness. Site looks clean    Neurological:      General: No focal deficit present.      Mental Status: She is alert and oriented to person, place, and time.      Motor: Weakness (generalized weakness) present.   Psychiatric:         Mood and Affect: Mood normal.         Behavior: Behavior normal.         Thought Content: Thought content normal.       Significant Labs: All pertinent labs within the past 24 hours have been reviewed.    Significant Imaging: I  have reviewed all pertinent imaging results/findings within the past 24 hours.      Assessment/Plan:      * Sepsis  This patient does have evidence of infective focus  My overall impression is sepsis. Vital signs were reviewed and noted in progress note.  Antibiotics given-   Antibiotics (From admission, onward)    Start     Stop Route Frequency Ordered    09/22/22 1700  ceFAZolin (ANCEF) 1 gram in dextrose 5 % 50 mL IVPB (premix)         -- IV Daily 09/22/22 1446        Cultures were taken-   Microbiology Results (last 7 days)     Procedure Component Value Units Date/Time    Aerobic culture (Specify Source) **CANNOT BE ORDERED AS STAT** [[id number]] Collected: 09/21/22 0439    Order Status: Completed Specimen: Skin from Chest, Right Updated: 09/24/22 0922     Aerobic Bacterial Culture No growth    Blood culture [[id number]] Collected: 09/23/22 0655    Order Status: Completed Specimen: Blood from Peripheral, Site Unknown Updated: 09/24/22 0903     Blood Culture, Routine No Growth to date      No Growth to date    Blood culture [[id number]] Collected: 09/23/22 0655    Order Status: Completed Specimen: Blood Updated: 09/24/22 0903     Blood Culture, Routine No Growth to date      No Growth to date    Blood culture [[id number]]  (Abnormal) Collected: 09/22/22 0523    Order Status: Completed Specimen: Blood from Peripheral, Left Hand Updated: 09/24/22 0812     Blood Culture, Routine Gram stain aer bottle: Gram positive cocci in clusters resembling Staph      Positive results previously called      09/23/2022 03:08 BML      STAPHYLOCOCCUS AUREUS  For susceptibility see order # [id number]      Blood culture [[id number]]  (Abnormal)  (Susceptibility) Collected: 09/22/22 0523    Order Status: Completed Specimen: Blood from Peripheral, Right Hand Updated: 09/24/22 0807     Blood Culture, Routine Gram stain aer bottle: Gram positive cocci in clusters resembling Staph      Positive results previously called      09/23/2022 03:06  BM      STAPHYLOCOCCUS AUREUS    Urine culture [[id number]]  (Abnormal)  (Susceptibility) Collected: 09/21/22 1101    Order Status: Completed Specimen: Urine Updated: 09/23/22 0821     Urine Culture, Routine ESCHERICHIA COLI  10,000 - 49,999 cfu/ml      Narrative:      Specimen Source->Urine    Blood culture x two cultures. Draw prior to antibiotics. [[id number]]  (Abnormal)  (Susceptibility) Collected: 09/21/22 0422    Order Status: Completed Specimen: Blood from Peripheral, Antecubital, Left Updated: 09/23/22 0736     Blood Culture, Routine Gram stain jose roberto bottle: Gram positive cocci in pairs Gram positive cocci      in clusters resembling Staph      Results called to and read back by:Melba Perez      Gram stain aer bottle: Gram positive cocci in clusters resembling Staph      STAPHYLOCOCCUS AUREUS    Narrative:      Aerobic and anaerobic    Blood culture x two cultures. Draw prior to antibiotics. [[id number]]  (Abnormal)  (Susceptibility) Collected: 09/21/22 0422    Order Status: Completed Specimen: Blood from Peripheral, Forearm, Right Updated: 09/23/22 0736     Blood Culture, Routine Gram stain jose roberto bottle: Gram positive cocci in clusters resembling Staph      Positive results previously called 16:05  09/21/2022      Gram stain aer bottle: Gram positive cocci in clusters resembling Staph      previously reported      STAPHYLOCOCCUS AUREUS    Narrative:      Aerobic and anaerobic    Influenza A & B by Molecular [[id number]]     Order Status: Canceled Specimen: Nasopharyngeal Swab         Latest lactate reviewed, they are-  No results for input(s): LACTATE in the last 72 hours.    Organ dysfunction: none  Source- UTI and bacteremia     Source control Achieved by- antibiotics    - Presented febrile, tachycardic, with elevated LA and complaints of N/V and weakness  - LA 3.4 on admission, repeat LA 1.7  - Pt with no abdominal pain or tenderness, low suspicion for SBP  - No leukocytosis, procal elevated (however in  setting of ESRD, low yield). Flu and COVID negative   - CXR with no acute process  - UA appears grossly infected  - Urine cx with low colonies Ecoli  - Blood cx with MSSA  - Repeat blood cx f89k-11k until clear   - Pt with recent THDC placement in 07/2022  - ID consulted:  Recommend PermCath removal and then line holiday.  Continue cefazolin, anticipate 4 weeks of IV antibiotics or dialysis  -Previous dialysis cath removed on 09/23/22 by IR  -Plan for CHIKA and possible THDC on Monday, 09/26  - continue day 3 of cefazolin      Bacteremia  -Blood cx from 09/21 w/MSSA  -repeat blood cultures until clear  -Blood cx from 09/23/22 with NGTD  -Suspect possible due to THDC  -Nephrology and ID consulted  -ID recommends PermCath removal and line holiday   -IR consulted, s/p Cath removal on 09/23  -continue cefazolin day 3      Prolonged Q-T interval on ECG  -EKG in the ED showed QTc prolonged at 605ms, with accelerated junctional rhythm  -Mg 1.6 on admit, was replaced with Mg IV 2gm  -Repeat EKG showed QTc of 496ms  -Chart reviewed, EKG from OSH in 07/2022 showed QTc was 492 at that time   -Suspect medication induced. Pt chronically on Efflexor 75mg daily and protonix  -Will hold home efflexor and protonix at this time.   -Monitor on telemetry       Hypokalemia  -K 2.5 on admission  -Replaced in the ED  -Received IVF with NS with KCL  -Resolved    Essential hypertension  -BP mildly elevated on admit  -Home meds:  Norvasc 5 mg daily, metoprolol 50 mg BID, losartan 50 mg daily  -Continue home Norvasc and metoprolol.  Hold losartan at this time   -continue to monitor blood pressure closely.    -If elevated, then will consider resuming home losartan      Type 2 diabetes mellitus, without long-term current use of insulin  A1c:   Lab Results   Component Value Date    HGBA1C 7.9 (H) 09/21/2022     Repeat A1C as above  Hold home metformin and trulicity   Meds: SSI PRN to maintain goal 140-180  ADA diet, accuchecks ACHS, hypoglycemic  "protocol      ESRD (end stage renal disease)  -Recent diagnosis, THDC recently placed on 07/27/2022  -First dialysis session initiated in 08/2022, on MWF  -Concern THDC might be source of bacteremia.   -Nephrology consulted: s/p removal of PermCath on 09/23 and then line holiday.  Plan to place THDC on 09/26, if blood cultures are clear for 48-72 hours    Anemia of chronic disease  -Hgb near baseline  -No signs of active bleeding  -Continue oral iron supplement   -Suspect due to ESRD  -Monitor       Depression  Patient has persistent depression which is mild and is currently controlled. Will hold anti-depressant medications. We will not consult psychiatry at this time. Patient does not display psychosis at this time. Continue to monitor closely and adjust plan of care as needed.    -Patient takes home Effexor 75mg daily   -Will hold at this time given prolonged QTc on admission  -Monitor closely       Endometrial cancer  -Patient s/p CIRILO/BSO/omentectomy secondary to Stage 4 high grade endometrial cancer on 03/30/2022  -Previously on chemo, but was unable to resume due to renal function   -Follows with oncology outpatient      History of CVA (cerebrovascular accident)  -Hx of CVA in 2014  -Continue plavix and statin at this time       Weakness  -PT/OT consulted: home       Advanced care planning/counseling discussion  Advance Care Planning     Date: 09/21/2022    Code Status  I engaged the the family in a conversation about the patient's preferences for care  at the very end of life. The patient wishes to have  CPR and other invasive treatments performed when her heart and/or breathing stops at this time. Son states patient has not filled out advanced directive because she wants to hold off on further conversation about it "until the time comes".  I communicated to the family that at this time, the patient wishes align with full code status. Son and patient in agreement.  I spent a total of 18 minutes engaging the " patient in this advance care planning discussion.       Code status: FULL code         VTE Risk Mitigation (From admission, onward)         Ordered     heparin (porcine) injection 4,000 Units  As needed (PRN)         09/23/22 1206                Discharge Planning   GORDO:      Code Status: Full Code   Is the patient medically ready for discharge?:     Reason for patient still in hospital (select all that apply): Patient trending condition, Treatment and Consult recommendations  Discharge Plan A: Home with family                  Marcela Amezcua DO  Department of Hospital Medicine   [facility]

## 2022-09-24 NOTE — SUBJECTIVE & OBJECTIVE
Interval History:  No acute overnight events.  Patient afebrile overnight.  No fever in the last 24 hours.  Having some trouble sleeping at night.  Admits fatigue.  Denies any UTI symptoms.  Son is at bedside.    Review of Systems   Constitutional:  Positive for activity change and fatigue. Negative for appetite change, chills and fever.   HENT:  Negative for congestion, sinus pressure and trouble swallowing.    Eyes:  Negative for photophobia and visual disturbance.   Respiratory:  Negative for cough, chest tightness and shortness of breath.    Cardiovascular:  Negative for chest pain, palpitations and leg swelling.   Gastrointestinal:  Negative for abdominal distention, abdominal pain, blood in stool, diarrhea, nausea and vomiting.   Endocrine: Negative for polyuria.   Genitourinary:  Negative for difficulty urinating, dysuria and hematuria.   Musculoskeletal:  Negative for arthralgias, back pain and joint swelling.   Allergic/Immunologic: Positive for immunocompromised state.   Neurological:  Positive for weakness. Negative for dizziness, light-headedness and headaches.   Psychiatric/Behavioral:  Positive for sleep disturbance. Negative for confusion and hallucinations.      Objective:     Vital Signs (Most Recent):  Temp: 99.3 °F (37.4 °C) (09/24/22 0758)  Pulse: 85 (09/24/22 0758)  Resp: 20 (09/24/22 0758)  BP: (!) 141/68 (09/24/22 0856)  SpO2: 98 % (09/24/22 0758)   Vital Signs (24h Range):  Temp:  [98.3 °F (36.8 °C)-100.2 °F (37.9 °C)] 99.3 °F (37.4 °C)  Pulse:  [85-99] 85  Resp:  [18-20] 20  SpO2:  [95 %-98 %] 98 %  BP: (128-149)/(64-76) 141/68     Weight: 56.9 kg (125 lb 7.1 oz)  Body mass index is 24.5 kg/m².    Intake/Output Summary (Last 24 hours) at 9/24/2022 1033  Last data filed at 9/23/2022 1730  Gross per 24 hour   Intake 740 ml   Output 1500 ml   Net -760 ml        Physical Exam  Vitals and nursing note reviewed.   Constitutional:       General: She is not in acute distress.     Appearance: She is  ill-appearing. She is not toxic-appearing or diaphoretic.      Comments: Elderly female, laying in bed. Appears fatigue    HENT:      Right Ear: External ear normal.      Left Ear: External ear normal.      Nose: Nose normal.      Mouth/Throat:      Mouth: Mucous membranes are moist.   Eyes:      Extraocular Movements: Extraocular movements intact.      Conjunctiva/sclera: Conjunctivae normal.   Cardiovascular:      Rate and Rhythm: Normal rate and regular rhythm.      Heart sounds: No murmur heard.    No gallop.   Pulmonary:      Effort: Pulmonary effort is normal. No respiratory distress.      Breath sounds: Normal breath sounds. No wheezing or rales.   Abdominal:      General: There is no distension.      Palpations: Abdomen is soft.      Tenderness: There is no abdominal tenderness. There is no guarding.   Musculoskeletal:         General: No swelling or tenderness.      Cervical back: Normal range of motion.      Right lower leg: No edema.      Left lower leg: No edema.   Skin:     General: Skin is warm and dry.      Comments: Previous THDC removed.  Site with no bleeding, erythema, or drainage. No overlying signs of infection.  No tenderness. Site looks clean    Neurological:      General: No focal deficit present.      Mental Status: She is alert and oriented to person, place, and time.      Motor: Weakness (generalized weakness) present.   Psychiatric:         Mood and Affect: Mood normal.         Behavior: Behavior normal.         Thought Content: Thought content normal.       Significant Labs: All pertinent labs within the past 24 hours have been reviewed.    Significant Imaging: I have reviewed all pertinent imaging results/findings within the past 24 hours.

## 2022-09-25 PROBLEM — B95.61 BACTEREMIA DUE TO METHICILLIN SUSCEPTIBLE STAPHYLOCOCCUS AUREUS (MSSA): Status: ACTIVE | Noted: 2022-09-22

## 2022-09-25 LAB
BACTERIA SPEC AEROBE CULT: NO GROWTH
POCT GLUCOSE: 161 MG/DL (ref 70–110)
POCT GLUCOSE: 177 MG/DL (ref 70–110)
POCT GLUCOSE: 188 MG/DL (ref 70–110)
POCT GLUCOSE: 265 MG/DL (ref 70–110)
POCT GLUCOSE: 329 MG/DL (ref 70–110)

## 2022-09-25 PROCEDURE — 11000001 HC ACUTE MED/SURG PRIVATE ROOM

## 2022-09-25 PROCEDURE — 99233 SBSQ HOSP IP/OBS HIGH 50: CPT | Mod: ,,, | Performed by: INTERNAL MEDICINE

## 2022-09-25 PROCEDURE — 63600175 PHARM REV CODE 636 W HCPCS: Performed by: INTERNAL MEDICINE

## 2022-09-25 PROCEDURE — 25000003 PHARM REV CODE 250: Performed by: STUDENT IN AN ORGANIZED HEALTH CARE EDUCATION/TRAINING PROGRAM

## 2022-09-25 PROCEDURE — 99233 PR SUBSEQUENT HOSPITAL CARE,LEVL III: ICD-10-PCS | Mod: ,,, | Performed by: INTERNAL MEDICINE

## 2022-09-25 RX ADMIN — CLOPIDOGREL 75 MG: 75 TABLET, FILM COATED ORAL at 08:09

## 2022-09-25 RX ADMIN — INSULIN ASPART 2 UNITS: 100 INJECTION, SOLUTION INTRAVENOUS; SUBCUTANEOUS at 08:09

## 2022-09-25 RX ADMIN — FERROUS SULFATE TAB 325 MG (65 MG ELEMENTAL FE) 1 EACH: 325 (65 FE) TAB at 08:09

## 2022-09-25 RX ADMIN — METOPROLOL SUCCINATE 50 MG: 50 TABLET, EXTENDED RELEASE ORAL at 08:09

## 2022-09-25 RX ADMIN — SODIUM BICARBONATE 1300 MG: 325 TABLET ORAL at 08:09

## 2022-09-25 RX ADMIN — AMLODIPINE BESYLATE 5 MG: 5 TABLET ORAL at 08:09

## 2022-09-25 RX ADMIN — INSULIN ASPART 3 UNITS: 100 INJECTION, SOLUTION INTRAVENOUS; SUBCUTANEOUS at 12:09

## 2022-09-25 RX ADMIN — CEFAZOLIN SODIUM 1 G: 1 SOLUTION INTRAVENOUS at 04:09

## 2022-09-25 RX ADMIN — ATORVASTATIN CALCIUM 40 MG: 40 TABLET, FILM COATED ORAL at 08:09

## 2022-09-25 NOTE — ASSESSMENT & PLAN NOTE
-Blood cx from 09/21 w/MSSA  -repeat blood cultures until clear  -Blood cx from 09/23/22 with NGTD  -Suspect possible due to THDC  -Nephrology and ID consulted  -ID recommends PermCath removal and line holiday   -IR consulted, s/p Cath removal on 09/23  -continue cefazolin day 4

## 2022-09-25 NOTE — ASSESSMENT & PLAN NOTE
"62F with h/o ESRD on HD, DM, admitted 9/22 with generalized weakness and fever. Has tunneled cath placed July 2022 for hd, denies other indwelling hardware. Bcx x 2 with MSSA  from 9/21. Repeat 9/22 positive, 9/23 NGTD. 2d without veg. CHIKA planned 9/26 . ID consulted for "bacteremia"    Suspect source of infection was permecath and has been removed. Cultures cleared with line removal. Fortunately no metastatic signs of infection. Agree with excluding infective endocarditis with CHIKA    Recommendations:   - f/u bcx 9/23 to confirm clearance. Repeat if positive.   - continue cefazolin  - f/u CHIKA, appreciate cardiology assistance  -  Ideally wait for repeat bcx to be negative x 48-72h before placing new permecath  - anticipate minimum of 4 weeks iv abx 3x/wk with HD on d/c    Outpatient Antibiotic Therapy Plan:    Please send referral to [facility].    1) Infection: MSSA bacteremia, line infection    2) Discharge Antibiotics:    Intravenous antibiotics:  Cefazolin 2gm/2gm/3gm 3x/wk with HD    3) Therapy Duration:  4 weeks from clearance    Estimated end date of IV antibiotics: 10/20/22    4) Outpatient Weekly Labs:    Order the following labs to be drawn on Mondays:    CBC   CMP    CRP      5) Fax Lab Results to Infectious Diseases Provider: Dr villarreal    [facility] Fax Number: [phone]    6) Outpatient Infectious Diseases Follow-up     Follow-up appointment will be arranged by the ID clinic and will be found in the patient's appointments tab.     Prior to discharge, please ensure the patient's follow-up has been scheduled.     If there is still no follow-up scheduled prior to discharge, please send an EPIC message to Renetta Sands in Infectious Diseases.          Discussed with patient and her son  "

## 2022-09-25 NOTE — PLAN OF CARE
Problem: Adult Inpatient Plan of Care  Goal: Plan of Care Review  Outcome: Ongoing, Progressing  Goal: Patient-Specific Goal (Individualized)  Outcome: Ongoing, Progressing  Goal: Absence of Hospital-Acquired Illness or Injury  Outcome: Ongoing, Progressing  Goal: Optimal Comfort and Wellbeing  Outcome: Ongoing, Progressing  Goal: Readiness for Transition of Care  Outcome: Ongoing, Progressing     Problem: Diabetes Comorbidity  Goal: Blood Glucose Level Within Targeted Range  Outcome: Ongoing, Progressing     Problem: Adjustment to Illness (Sepsis/Septic Shock)  Goal: Optimal Coping  Outcome: Ongoing, Progressing     Problem: Bleeding (Sepsis/Septic Shock)  Goal: Absence of Bleeding  Outcome: Ongoing, Progressing     Problem: Glycemic Control Impaired (Sepsis/Septic Shock)  Goal: Blood Glucose Level Within Desired Range  Outcome: Ongoing, Progressing     Problem: Infection Progression (Sepsis/Septic Shock)  Goal: Absence of Infection Signs and Symptoms  Outcome: Ongoing, Progressing     Problem: Nutrition Impaired (Sepsis/Septic Shock)  Goal: Optimal Nutrition Intake  Outcome: Ongoing, Progressing     Problem: Impaired Wound Healing  Goal: Optimal Wound Healing  Outcome: Ongoing, Progressing     Problem: Device-Related Complication Risk (Hemodialysis)  Goal: Safe, Effective Therapy Delivery  Outcome: Ongoing, Progressing     Problem: Hemodynamic Instability (Hemodialysis)  Goal: Effective Tissue Perfusion  Outcome: Ongoing, Progressing     Problem: Infection (Hemodialysis)  Goal: Absence of Infection Signs and Symptoms  Outcome: Ongoing, Progressing     Problem: Infection  Goal: Absence of Infection Signs and Symptoms  Outcome: Ongoing, Progressing     No acute events throughout shift. POC reviewed. NPO at midnight for CHIKA. All questions and concerns addressed and answered. All needs met at this time. Will continue with POC.

## 2022-09-25 NOTE — ASSESSMENT & PLAN NOTE
This patient does have evidence of infective focus  My overall impression is sepsis. Vital signs were reviewed and noted in progress note.  Antibiotics given-   Antibiotics (From admission, onward)    Start     Stop Route Frequency Ordered    09/22/22 1700  ceFAZolin (ANCEF) 1 gram in dextrose 5 % 50 mL IVPB (premix)         -- IV Daily 09/22/22 1446        Cultures were taken-   Microbiology Results (last 7 days)     Procedure Component Value Units Date/Time    Blood culture [[id number]] Collected: 09/23/22 0655    Order Status: Completed Specimen: Blood from Peripheral, Site Unknown Updated: 09/25/22 0903     Blood Culture, Routine No Growth to date      No Growth to date      No Growth to date    Blood culture [[id number]] Collected: 09/23/22 0655    Order Status: Completed Specimen: Blood Updated: 09/25/22 0903     Blood Culture, Routine No Growth to date      No Growth to date      No Growth to date    Aerobic culture (Specify Source) **CANNOT BE ORDERED AS STAT** [[id number]] Collected: 09/21/22 0439    Order Status: Completed Specimen: Skin from Chest, Right Updated: 09/25/22 0659     Aerobic Bacterial Culture No growth    Blood culture [[id number]]  (Abnormal) Collected: 09/22/22 0523    Order Status: Completed Specimen: Blood from Peripheral, Left Hand Updated: 09/24/22 0812     Blood Culture, Routine Gram stain aer bottle: Gram positive cocci in clusters resembling Staph      Positive results previously called      09/23/2022 03:08 BML      STAPHYLOCOCCUS AUREUS  For susceptibility see order # [id number]      Blood culture [[id number]]  (Abnormal)  (Susceptibility) Collected: 09/22/22 0523    Order Status: Completed Specimen: Blood from Peripheral, Right Hand Updated: 09/24/22 0807     Blood Culture, Routine Gram stain aer bottle: Gram positive cocci in clusters resembling Staph      Positive results previously called      09/23/2022 03:06 BML      STAPHYLOCOCCUS AUREUS    Urine culture [[id number]]  (Abnormal)   (Susceptibility) Collected: 09/21/22 1101    Order Status: Completed Specimen: Urine Updated: 09/23/22 0821     Urine Culture, Routine ESCHERICHIA COLI  10,000 - 49,999 cfu/ml      Narrative:      Specimen Source->Urine    Blood culture x two cultures. Draw prior to antibiotics. [[id number]]  (Abnormal)  (Susceptibility) Collected: 09/21/22 0422    Order Status: Completed Specimen: Blood from Peripheral, Antecubital, Left Updated: 09/23/22 0736     Blood Culture, Routine Gram stain jose roberto bottle: Gram positive cocci in pairs Gram positive cocci      in clusters resembling Staph      Results called to and read back by:Melba Perez      Gram stain aer bottle: Gram positive cocci in clusters resembling Staph      STAPHYLOCOCCUS AUREUS    Narrative:      Aerobic and anaerobic    Blood culture x two cultures. Draw prior to antibiotics. [[id number]]  (Abnormal)  (Susceptibility) Collected: 09/21/22 0422    Order Status: Completed Specimen: Blood from Peripheral, Forearm, Right Updated: 09/23/22 0736     Blood Culture, Routine Gram stain jose roberto bottle: Gram positive cocci in clusters resembling Staph      Positive results previously called 16:05  09/21/2022      Gram stain aer bottle: Gram positive cocci in clusters resembling Staph      previously reported      STAPHYLOCOCCUS AUREUS    Narrative:      Aerobic and anaerobic    Influenza A & B by Molecular [[id number]]     Order Status: Canceled Specimen: Nasopharyngeal Swab         Latest lactate reviewed, they are-  No results for input(s): LACTATE in the last 72 hours.    Organ dysfunction: none  Source- UTI and bacteremia     Source control Achieved by- antibiotics    - Presented febrile, tachycardic, with elevated LA and complaints of N/V and weakness  - LA 3.4 on admission, repeat LA 1.7  - Pt with no abdominal pain or tenderness, low suspicion for SBP  - No leukocytosis, procal elevated (however in setting of ESRD, low yield). Flu and COVID negative   - CXR with no acute  process  - UA appears grossly infected  - Urine cx with low colonies Ecoli  - Blood cx with MSSA  - Repeat blood cx [id number] until clear   -Blood cx from 09/23 with NGTD  - Pt with recent THDC placement in 07/2022  - ID consulted:  Recommend PermCath removal and then line holiday.  Continue cefazolin, anticipate 4 weeks of IV antibiotics or dialysis  -Previous dialysis cath removed on 09/23/22 by IR  -Plan for CHIKA and possible THDC on Monday, 09/26  - continue day 4 of cefazolin

## 2022-09-25 NOTE — PROGRESS NOTES
[facility]  Progress Note    Patient Name: Anjali Dorantes  MRN: [id number]  Patient Class: IP- Inpatient   Admission Date: 9/21/2022  Length of Stay: 4 days  Attending Physician: Marcela Amezcua DO  Primary Care Provider: Tasia Carrasco MD        Subjective:     Principal Problem:Sepsis        HPI:  62 year female with history of ESRD on HD (on MWF), CVA (2014), DM, HTN, uterine cancer (s/p CIRILO on 03/30/2022) presented to the ED with complaints of nausea, vomiting, and fever for 2 days.  Tmax at home was 104F Son is bedside. Admits feeling weak overall.  She denies any chest pain, shortness breath, cough, difficulty breathing, abdominal pain, dysuria, hematuria, or any vaginal bleeding.  Denies any new food.  Denies any new medication, recent travel, or any sick contacts.  Admits PCP recently started indomethacin for gout, but patient has not taken it yet.  Patient recently tunneled cath placed in July 2022 and was initiated on dialysis in August 2022.  Has been tolerating her dialysis sessions without difficulty.  Prior to that, patient was hospitalized briefly for her hysterectomy in March 2022.    In the ED, patient was febrile and tachycardic.  Labs without leukocytosis, but it does show anemia, hypokalemia, and lactic acidosis.  Mg level 1.6. EKG noted to have prolonged QTC 605ms.  Chest x-ray with no acute process.  Patient received magnesium, potassium, Zosyn, and vancomycin in the ED.  Patient admitted to hospital medicine for further evaluation.      Overview/Hospital Course:  62 year female admitted on 09/21/22 for sepsis, likely secondary to UTI and bacteremia. Also found to have prolonged QTc and hypokalemia on admission. Urine cx with Ecoli. Blood cx with MSSA.  Repeat blood cultures x 24-48hr until clear.  Patient started broad-spectrum abx. Suspect bactermia may be due to THDC that was placed in 07/2022. Nephrology and ID consulted.  Recommends PermCath removal and line  holiday.  Continue cefazolin, anticipate 4 weeks of IV antibiotics with dialysis.  IR removed previous catheter on 09/23/2022. Cardiology consulted for CHIKA to rule out infective endocarditis.  Tentative date for new THDC and CHIKA scheduled on 09/26/2022. Prolonged QTc has resolved, it could possible be due to chronic PPI and Effexor use.  Continue IV antibiotics and close monitoring      Interval History:  No acute overnight events.  Patient afebrile overnight.  No fever in the last 24 hours.  Slept better last night.  Eating breakfast this morning without issues. Denies any UTI symptoms.  Son is at bedside.    Review of Systems   Constitutional:  Positive for activity change. Negative for appetite change, chills and fever.   HENT:  Negative for congestion, sinus pressure and trouble swallowing.    Eyes:  Negative for photophobia and visual disturbance.   Respiratory:  Negative for cough, chest tightness and shortness of breath.    Cardiovascular:  Negative for chest pain, palpitations and leg swelling.   Gastrointestinal:  Negative for abdominal distention, abdominal pain, blood in stool, diarrhea, nausea and vomiting.   Endocrine: Negative for polyuria.   Genitourinary:  Negative for difficulty urinating, dysuria and hematuria.   Musculoskeletal:  Negative for arthralgias, back pain and joint swelling.   Allergic/Immunologic: Positive for immunocompromised state.   Neurological:  Positive for weakness. Negative for dizziness, light-headedness and headaches.   Psychiatric/Behavioral:  Positive for sleep disturbance. Negative for confusion and hallucinations.      Objective:     Vital Signs (Most Recent):  Temp: 98.3 °F (36.8 °C) (09/25/22 0734)  Pulse: 81 (09/25/22 0734)  Resp: 18 (09/25/22 0734)  BP: (!) 148/72 (09/25/22 0734)  SpO2: 96 % (09/25/22 0800)   Vital Signs (24h Range):  Temp:  [97.1 °F (36.2 °C)-99 °F (37.2 °C)] 98.3 °F (36.8 °C)  Pulse:  [77-93] 81  Resp:  [16-18] 18  SpO2:  [96 %-98 %] 96 %  BP:  (111-152)/(60-72) 148/72     Weight: 56.9 kg (125 lb 7.1 oz)  Body mass index is 24.5 kg/m².    Intake/Output Summary (Last 24 hours) at 9/25/2022 1011  Last data filed at 9/25/2022 0600  Gross per 24 hour   Intake 240 ml   Output --   Net 240 ml        Physical Exam  Vitals and nursing note reviewed.   Constitutional:       General: She is not in acute distress.     Appearance: She is ill-appearing. She is not toxic-appearing or diaphoretic.      Comments: Elderly female, laying in bed. Appears fatigue    HENT:      Right Ear: External ear normal.      Left Ear: External ear normal.      Nose: Nose normal.      Mouth/Throat:      Mouth: Mucous membranes are moist.   Eyes:      Extraocular Movements: Extraocular movements intact.      Conjunctiva/sclera: Conjunctivae normal.   Cardiovascular:      Rate and Rhythm: Normal rate and regular rhythm.      Heart sounds: No murmur heard.    No gallop.   Pulmonary:      Effort: Pulmonary effort is normal. No respiratory distress.      Breath sounds: Normal breath sounds. No wheezing or rales.   Abdominal:      General: There is no distension.      Palpations: Abdomen is soft.      Tenderness: There is no abdominal tenderness. There is no guarding.   Musculoskeletal:         General: No swelling or tenderness.      Cervical back: Normal range of motion.      Right lower leg: No edema.      Left lower leg: No edema.   Skin:     General: Skin is warm and dry.      Comments: Previous THDC removed.  Site with no bleeding, erythema, or drainage. No overlying signs of infection.  No tenderness. Site looks clean    Neurological:      General: No focal deficit present.      Mental Status: She is alert and oriented to person, place, and time.      Motor: Weakness (generalized weakness) present.   Psychiatric:         Mood and Affect: Mood normal.         Behavior: Behavior normal.         Thought Content: Thought content normal.       Significant Labs: All pertinent labs within the  past 24 hours have been reviewed.    Significant Imaging: I have reviewed all pertinent imaging results/findings within the past 24 hours.      Assessment/Plan:      * Sepsis  This patient does have evidence of infective focus  My overall impression is sepsis. Vital signs were reviewed and noted in progress note.  Antibiotics given-   Antibiotics (From admission, onward)    Start     Stop Route Frequency Ordered    09/22/22 1700  ceFAZolin (ANCEF) 1 gram in dextrose 5 % 50 mL IVPB (premix)         -- IV Daily 09/22/22 1446        Cultures were taken-   Microbiology Results (last 7 days)     Procedure Component Value Units Date/Time    Blood culture [[id number]] Collected: 09/23/22 0655    Order Status: Completed Specimen: Blood from Peripheral, Site Unknown Updated: 09/25/22 0903     Blood Culture, Routine No Growth to date      No Growth to date      No Growth to date    Blood culture [[id number]] Collected: 09/23/22 0655    Order Status: Completed Specimen: Blood Updated: 09/25/22 0903     Blood Culture, Routine No Growth to date      No Growth to date      No Growth to date    Aerobic culture (Specify Source) **CANNOT BE ORDERED AS STAT** [[id number]] Collected: 09/21/22 0439    Order Status: Completed Specimen: Skin from Chest, Right Updated: 09/25/22 0659     Aerobic Bacterial Culture No growth    Blood culture [[id number]]  (Abnormal) Collected: 09/22/22 0523    Order Status: Completed Specimen: Blood from Peripheral, Left Hand Updated: 09/24/22 0812     Blood Culture, Routine Gram stain aer bottle: Gram positive cocci in clusters resembling Staph      Positive results previously called      09/23/2022 03:08 [facility]      STAPHYLOCOCCUS AUREUS  For susceptibility see order # [id number]      Blood culture [[id number]]  (Abnormal)  (Susceptibility) Collected: 09/22/22 0523    Order Status: Completed Specimen: Blood from Peripheral, Right Hand Updated: 09/24/22 0807     Blood Culture, Routine Gram stain aer bottle: Gram  positive cocci in clusters resembling Staph      Positive results previously called      09/23/2022 03:06 BML      STAPHYLOCOCCUS AUREUS    Urine culture [[id number]]  (Abnormal)  (Susceptibility) Collected: 09/21/22 1101    Order Status: Completed Specimen: Urine Updated: 09/23/22 0821     Urine Culture, Routine ESCHERICHIA COLI  10,000 - 49,999 cfu/ml      Narrative:      Specimen Source->Urine    Blood culture x two cultures. Draw prior to antibiotics. [[id number]]  (Abnormal)  (Susceptibility) Collected: 09/21/22 0422    Order Status: Completed Specimen: Blood from Peripheral, Antecubital, Left Updated: 09/23/22 0736     Blood Culture, Routine Gram stain jose roberto bottle: Gram positive cocci in pairs Gram positive cocci      in clusters resembling Staph      Results called to and read back by:Melba Perez      Gram stain aer bottle: Gram positive cocci in clusters resembling Staph      STAPHYLOCOCCUS AUREUS    Narrative:      Aerobic and anaerobic    Blood culture x two cultures. Draw prior to antibiotics. [[id number]]  (Abnormal)  (Susceptibility) Collected: 09/21/22 0422    Order Status: Completed Specimen: Blood from Peripheral, Forearm, Right Updated: 09/23/22 0736     Blood Culture, Routine Gram stain jose roberto bottle: Gram positive cocci in clusters resembling Staph      Positive results previously called 16:05  09/21/2022      Gram stain aer bottle: Gram positive cocci in clusters resembling Staph      previously reported      STAPHYLOCOCCUS AUREUS    Narrative:      Aerobic and anaerobic    Influenza A & B by Molecular [[id number]]     Order Status: Canceled Specimen: Nasopharyngeal Swab         Latest lactate reviewed, they are-  No results for input(s): LACTATE in the last 72 hours.    Organ dysfunction: none  Source- UTI and bacteremia     Source control Achieved by- antibiotics    - Presented febrile, tachycardic, with elevated LA and complaints of N/V and weakness  - LA 3.4 on admission, repeat LA 1.7  - Pt with  no abdominal pain or tenderness, low suspicion for SBP  - No leukocytosis, procal elevated (however in setting of ESRD, low yield). Flu and COVID negative   - CXR with no acute process  - UA appears grossly infected  - Urine cx with low colonies Ecoli  - Blood cx with MSSA  - Repeat blood cx [id number] until clear   -Blood cx from 09/23 with NGTD  - Pt with recent THDC placement in 07/2022  - ID consulted:  Recommend PermCath removal and then line holiday.  Continue cefazolin, anticipate 4 weeks of IV antibiotics or dialysis  -Previous dialysis cath removed on 09/23/22 by IR  -Plan for CHIKA and possible THDC on Monday, 09/26  - continue day 4 of cefazolin      Bacteremia  -Blood cx from 09/21 w/MSSA  -repeat blood cultures until clear  -Blood cx from 09/23/22 with NGTD  -Suspect possible due to THDC  -Nephrology and ID consulted  -ID recommends PermCath removal and line holiday   -IR consulted, s/p Cath removal on 09/23  -continue cefazolin day 4      Prolonged Q-T interval on ECG  -EKG in the ED showed QTc prolonged at 605ms, with accelerated junctional rhythm  -Mg 1.6 on admit, was replaced with Mg IV 2gm  -Repeat EKG showed QTc of 496ms  -Chart reviewed, EKG from OSH in 07/2022 showed QTc was 492 at that time   -Suspect medication induced. Pt chronically on Efflexor 75mg daily and protonix  -Will hold home efflexor and protonix at this time.   -Monitor on telemetry       Hypokalemia  -K 2.5 on admission  -Replaced in the ED  -Received IVF with NS with KCL  -Resolved    Essential hypertension  -BP mildly elevated on admit  -Home meds:  Norvasc 5 mg daily, metoprolol 50 mg BID, losartan 50 mg daily  -Continue home Norvasc and metoprolol.  Hold losartan at this time   -continue to monitor blood pressure closely.    -If elevated, then will consider resuming home losartan      Type 2 diabetes mellitus, without long-term current use of insulin  A1c:   Lab Results   Component Value Date    HGBA1C 7.9 (H) 09/21/2022  "    Repeat A1C as above  Hold home metformin and trulicity   Meds: SSI PRN to maintain goal 140-180  ADA diet, accuchecks ACHS, hypoglycemic protocol      ESRD (end stage renal disease)  -Recent diagnosis, THDC recently placed on 07/27/2022  -First dialysis session initiated in 08/2022, on MWF  -Concern THDC might be source of bacteremia.   -Nephrology consulted: s/p removal of PermCath on 09/23 and then line holiday.  Plan to place THDC on 09/26, if blood cultures are clear for 48-72 hours    Anemia of chronic disease  -Hgb near baseline  -No signs of active bleeding  -Continue oral iron supplement   -Suspect due to ESRD  -Monitor       Depression  Patient has persistent depression which is mild and is currently controlled. Will hold anti-depressant medications. We will not consult psychiatry at this time. Patient does not display psychosis at this time. Continue to monitor closely and adjust plan of care as needed.    -Patient takes home Effexor 75mg daily   -Will hold at this time given prolonged QTc on admission  -Monitor closely       Endometrial cancer  -Patient s/p CIRILO/BSO/omentectomy secondary to Stage 4 high grade endometrial cancer on 03/30/2022  -Previously on chemo, but was unable to resume due to renal function   -Follows with oncology outpatient      History of CVA (cerebrovascular accident)  -Hx of CVA in 2014  -Continue plavix and statin at this time       Weakness  -PT/OT consulted: home       Advanced care planning/counseling discussion  Advance Care Planning     Date: 09/21/2022    Code Status  I engaged the the family in a conversation about the patient's preferences for care  at the very end of life. The patient wishes to have  CPR and other invasive treatments performed when her heart and/or breathing stops at this time. Son states patient has not filled out advanced directive because she wants to hold off on further conversation about it "until the time comes".  I communicated to the family " that at this time, the patient wishes align with full code status. Son and patient in agreement.  I spent a total of 18 minutes engaging the patient in this advance care planning discussion.       Code status: FULL code         VTE Risk Mitigation (From admission, onward)         Ordered     heparin (porcine) injection 4,000 Units  As needed (PRN)         09/23/22 1206                Discharge Planning   GORDO:      Code Status: Full Code   Is the patient medically ready for discharge?:     Reason for patient still in hospital (select all that apply): Patient trending condition, Treatment and Consult recommendations  Discharge Plan A: Home with family                  Marcela Amezcua DO  Department of Hospital Medicine   [facility] - Med Surg

## 2022-09-25 NOTE — CONSULTS
"[facility] Surg  Infectious Disease  Consult Note    Patient Name: Anjali Dorantes  MRN: [id number]  Admission Date: 9/21/2022  Hospital Length of Stay: 4 days  Attending Physician: Marcela Amezcua DO  Primary Care Provider: Tasia Carrasco MD     Isolation Status: No active isolations    Patient information was obtained from patient and ER records.      Consults  Assessment/Plan:     Bacteremia due to methicillin susceptible Staphylococcus aureus (MSSA)  62F with h/o ESRD on HD, DM, admitted 9/22 with generalized weakness and fever. Has tunneled cath placed July 2022 for hd, denies other indwelling hardware. Bcx x 2 with MSSA  from 9/21. Repeat 9/22 positive, 9/23 NGTD. 2d without veg. CHIKA planned 9/26 . ID consulted for "bacteremia"    Suspect source of infection was permecath and has been removed. Cultures cleared with line removal. Fortunately no metastatic signs of infection. Agree with excluding infective endocarditis with CHIKA    Recommendations:   - f/u bcx 9/23 to confirm clearance. Repeat if positive.   - continue cefazolin  - f/u CHIKA, appreciate cardiology assistance  -  Ideally wait for repeat bcx to be negative x 48-72h before placing new permecath  - anticipate minimum of 4 weeks iv abx 3x/wk with HD on d/c    Outpatient Antibiotic Therapy Plan:    Please send referral to [facility].    1) Infection: MSSA bacteremia, line infection    2) Discharge Antibiotics:    Intravenous antibiotics:  Cefazolin 2gm/2gm/3gm 3x/wk with HD    3) Therapy Duration:  4 weeks from clearance    Estimated end date of IV antibiotics: 10/20/22    4) Outpatient Weekly Labs:    Order the following labs to be drawn on Mondays:    CBC   CMP    CRP      5) Fax Lab Results to Infectious Diseases Provider: Dr villarreal    [facility] Fax Number: [phone]    6) Outpatient Infectious Diseases Follow-up     Follow-up appointment will be arranged by the ID clinic and will be found in the " "patient's appointments tab.     Prior to discharge, please ensure the patient's follow-up has been scheduled.     If there is still no follow-up scheduled prior to discharge, please send an EPIC message to Renetta Sands in Infectious Diseases.          Discussed with patient and her son        Thank you for your consult. I will follow-up with patient. Please contact us if you have any additional questions.    Eva Polanco MD  Infectious Disease  [facility] [place] - Med Surg    Subjective:     Principal Problem: Sepsis    HPI:   62F with h/o ESRD on HD, DM, HTN, uterine cancer (s/p CIRILO on 03/30/2022) admitted 9/22 with nausea, vomiting, and fever 104. Has been feeling poorly/weak x 2 days. Son at bedside and pt agrees for him to translate. Has tunneled cath placed July 2022 and was initiated on dialysis in August 2022.  denies issues with her catheter. Denies back pain. Denies dental caries or recent dental procedures. Recently had gout of toe few weeks ago, says resolved. Denies boils    Bcx x 2 with staph aureus from 9/21  Specimen Information: Peripheral, Antecubital, Left; Blood    0 Result Notes  Component 1 d ago    Blood Culture, Routine Gram stain jose roberto bottle: Gram positive cocci in pairs Gram positive cocci P    Blood Culture, Routine in clusters resembling Staph P    Blood Culture, Routine Results called to and read back by:Melba Perez P    Blood Culture, Routine Gram stain aer bottle: Gram positive cocci in clusters resembling Staph P    Blood Culture, Routine  Abnormal   STAPHYLOCOCCUS AUREUS   Susceptibility pending               Specimen Information: Urine    0 Result Notes  Component 1 d ago    Urine Culture, Routine  Abnormal   PRESUMPTIVE E COLI   10,000 - 49,999 cfu/ml   Identification and susceptibility pending             No echo done      ID consulted for "bacteremia"      Interval history: NAEO. Son at bedside (says he works for [employer], cardiology dept). Permecath was removed after HD Friday. Pt " denies pains or complaints. Agreeable to CHIKA    Past Surgical History:   Procedure Laterality Date    ARTERIOGRAM, CEREBRAL ARTERIES  02/12/2014    and 3/18/2014    HYSTERECTOMY      OMENTECTOMY N/A 03/30/2022    Procedure: OMENTECTOMY;  Surgeon: Reuben Lopez MD;  Location: [facility] OR [address] [place] [address];  Service: OB/GYN;  Laterality: N/A;    TOTAL ABDOMINAL HYSTERECTOMY W/ BILATERAL SALPINGOOPHORECTOMY N/A 03/30/2022    Procedure: HYSTERECTOMY, TOTAL, ABDOMINAL, WITH BILATERAL SALPINGO-OOPHORECTOMY;  Surgeon: Reuben Lopez MD;  Location: [facility] OR [address] [place] [address];  Service: OB/GYN;  Laterality: N/A;       Review of patient's allergies indicates:   Allergen Reactions    Glipizide Other (See Comments)       No current facility-administered medications on file prior to encounter.     Current Outpatient Medications on File Prior to Encounter   Medication Sig    acetaminophen (TYLENOL) 325 MG tablet Take 2 tablets (650 mg total) by mouth every 6 (six) hours as needed for Pain (Take every 6 hours for 5-7 days and then as needed).    allopurinoL (ZYLOPRIM) 100 MG tablet Take 100 mg by mouth once daily.    amLODIPine (NORVASC) 5 MG tablet Take 5 mg by mouth once daily.    clopidogreL (PLAVIX) 75 mg tablet Take 75 mg by mouth once daily.    dulaglutide (TRULICITY) 0.75 mg/0.5 mL pen injector Inject 0.75 mg into the skin every 7 days.    metoprolol succinate (TOPROL-XL) 50 MG 24 hr tablet Take 50 mg by mouth once daily.    nateglinide (STARLIX) 120 MG tablet Take 1 tablet by mouth 3 times daily FOR DIABETES    sodium bicarbonate 650 MG tablet Take 1,300 mg by mouth.    venlafaxine (EFFEXOR) 75 MG tablet Take 75 mg by mouth 2 (two) times daily.     ergocalciferol (ERGOCALCIFEROL) 50,000 unit Cap Take 50,000 Units by mouth every 7 days.    ferrous sulfate 325 (65 FE) MG EC tablet Take 1 tablet (325 mg total) by mouth once daily.    losartan (COZAAR) 50 MG tablet Take 50 mg by mouth once daily.    metoprolol tartrate (LOPRESSOR)  50 MG tablet Take 50 mg by mouth 2 (two) times daily.    oxyCODONE (ROXICODONE) 5 MG immediate release tablet Take 1 tablet (5 mg total) by mouth every 6 (six) hours as needed for Pain. (Patient not taking: Reported on 4/29/2022)    pantoprazole (PROTONIX) 20 MG tablet Take 20 mg by mouth once daily.     rosuvastatin (CRESTOR) 20 MG tablet take ONE tablet every night at bedtime (for cholesterol)    senna (SENOKOT) 8.6 mg tablet Take 1 tablet by mouth 2 (two) times daily as needed for Constipation. (Patient not taking: Reported on 9/21/2022)    vitamin D (VITAMIN D3) 1000 units Tab Take 1,000 Units by mouth once daily.      Family History    None       Tobacco Use    Smoking status: Never    Smokeless tobacco: Never   Substance and Sexual Activity    Alcohol use: No    Drug use: No    Sexual activity: Not Currently     Partners: Male     Review of Systems   Constitutional:  Positive for activity change, chills, fatigue and fever. Negative for appetite change.   HENT:  Negative for congestion, sinus pressure and trouble swallowing.    Eyes:  Negative for photophobia and visual disturbance.   Respiratory:  Negative for cough, chest tightness and shortness of breath.    Cardiovascular:  Negative for chest pain, palpitations and leg swelling.   Gastrointestinal:  Positive for nausea and vomiting. Negative for abdominal distention, abdominal pain, blood in stool and diarrhea.   Endocrine: Negative for polyuria.   Genitourinary:  Negative for difficulty urinating, dysuria and hematuria.   Musculoskeletal:  Negative for arthralgias, back pain and joint swelling.   Allergic/Immunologic: Positive for immunocompromised state.   Neurological:  Positive for weakness. Negative for dizziness, light-headedness and headaches.   Psychiatric/Behavioral:  Negative for confusion and hallucinations.    Objective:     Vital Signs (Most Recent):  Temp: 98.3 °F (36.8 °C) (09/25/22 0734)  Pulse: 81 (09/25/22 0734)  Resp: 18  (09/25/22 0734)  BP: (!) 148/72 (09/25/22 0734)  SpO2: 96 % (09/25/22 0800)   Vital Signs (24h Range):  Temp:  [97.1 °F (36.2 °C)-98.6 °F (37 °C)] 98.3 °F (36.8 °C)  Pulse:  [77-93] 81  Resp:  [18] 18  SpO2:  [96 %-98 %] 96 %  BP: (111-152)/(60-72) 148/72     Weight: 56.9 kg (125 lb 7.1 oz)  Body mass index is 24.5 kg/m².    Physical Exam  Vitals and nursing note reviewed.   Constitutional:       General: She is not in acute distress.     Appearance: She is ill-appearing. She is not toxic-appearing or diaphoretic.      Comments: Elderly female, laying in bed. Appears fatigue    HENT:      Right Ear: External ear normal.      Left Ear: External ear normal.      Nose: Nose normal.      Mouth/Throat:      Mouth: Mucous membranes are dry.   Eyes:      Extraocular Movements: Extraocular movements intact.      Conjunctiva/sclera: Conjunctivae normal.   Cardiovascular:      Rate and Rhythm: Normal rate and regular rhythm.      Heart sounds: No murmur heard.    No gallop.   Pulmonary:      Effort: Pulmonary effort is normal. No respiratory distress.      Breath sounds: Normal breath sounds. No wheezing or rales.   Abdominal:      General: There is no distension.      Palpations: Abdomen is soft.      Tenderness: There is no abdominal tenderness. There is no right CVA tenderness, left CVA tenderness or guarding.   Musculoskeletal:         General: No swelling or tenderness.      Cervical back: Normal range of motion.      Right lower leg: No edema.      Left lower leg: No edema.   Skin:     General: Skin is warm and dry.      Comments: +THDC on right upper chest wall.  Catheter appears to be intact.  No bleeding, erythema, drainage from catheter site.  No overlying signs of infection.  No tenderness to palpation near THDC. Site looks clean    Neurological:      General: No focal deficit present.      Mental Status: She is alert and oriented to person, place, and time.      Motor: Weakness (generalized weakness) present.    Psychiatric:         Mood and Affect: Mood normal.         Behavior: Behavior normal.         Thought Content: Thought content normal.           Significant Labs: All pertinent labs within the past 24 hours have been reviewed.  CBC:   Recent Labs   Lab 09/24/22  0536   WBC 3.51*   HGB 8.1*   HCT 24.9*   *       CMP:   Recent Labs   Lab 09/24/22  0536      K 3.9      CO2 20*      BUN 14   CREATININE 2.0*   CALCIUM 8.2*   ANIONGAP 10         Lactic Acid:   No results for input(s): LACTATE in the last 48 hours.    Magnesium:   Recent Labs   Lab 09/24/22  0536   MG 1.8           Significant Imaging: I have reviewed all pertinent imaging results/findings within the past 24 hours.    Imaging Results              X-Ray Chest AP Portable (Final result)  Result time 09/21/22 04:47:08      Final result by Tj Melendez MD (09/21/22 04:47:08)                   Impression:      There is no radiographic evidence for acute intrathoracic process.      Electronically signed by: Tj Melendez  Date:    09/21/2022  Time:    04:47               Narrative:    EXAMINATION:  XR CHEST AP PORTABLE    CLINICAL HISTORY:  Sepsis;    TECHNIQUE:  Single frontal view of the chest was performed.    COMPARISON:  Chest radiograph March 30, 2022    FINDINGS:  Single portable chest view is submitted.  Right-sided central venous catheter noted, distal tip at the expected location of the SVC.  The cardiomediastinal silhouette appears stable.  Mild aortic atherosclerotic change noted.    There is no evidence for confluent infiltrate or consolidation, significant pleural effusion or pneumothorax.  Mild curvature of the spine noted.  The osseous structures appear intact.

## 2022-09-25 NOTE — SUBJECTIVE & OBJECTIVE
Interval History:  No acute overnight events.  Patient afebrile overnight.  No fever in the last 24 hours.  Slept better last night.  Eating breakfast this morning without issues. Denies any UTI symptoms.  Son is at bedside.    Review of Systems   Constitutional:  Positive for activity change. Negative for appetite change, chills and fever.   HENT:  Negative for congestion, sinus pressure and trouble swallowing.    Eyes:  Negative for photophobia and visual disturbance.   Respiratory:  Negative for cough, chest tightness and shortness of breath.    Cardiovascular:  Negative for chest pain, palpitations and leg swelling.   Gastrointestinal:  Negative for abdominal distention, abdominal pain, blood in stool, diarrhea, nausea and vomiting.   Endocrine: Negative for polyuria.   Genitourinary:  Negative for difficulty urinating, dysuria and hematuria.   Musculoskeletal:  Negative for arthralgias, back pain and joint swelling.   Allergic/Immunologic: Positive for immunocompromised state.   Neurological:  Positive for weakness. Negative for dizziness, light-headedness and headaches.   Psychiatric/Behavioral:  Positive for sleep disturbance. Negative for confusion and hallucinations.      Objective:     Vital Signs (Most Recent):  Temp: 98.3 °F (36.8 °C) (09/25/22 0734)  Pulse: 81 (09/25/22 0734)  Resp: 18 (09/25/22 0734)  BP: (!) 148/72 (09/25/22 0734)  SpO2: 96 % (09/25/22 0800)   Vital Signs (24h Range):  Temp:  [97.1 °F (36.2 °C)-99 °F (37.2 °C)] 98.3 °F (36.8 °C)  Pulse:  [77-93] 81  Resp:  [16-18] 18  SpO2:  [96 %-98 %] 96 %  BP: (111-152)/(60-72) 148/72     Weight: 56.9 kg (125 lb 7.1 oz)  Body mass index is 24.5 kg/m².    Intake/Output Summary (Last 24 hours) at 9/25/2022 1011  Last data filed at 9/25/2022 0600  Gross per 24 hour   Intake 240 ml   Output --   Net 240 ml        Physical Exam  Vitals and nursing note reviewed.   Constitutional:       General: She is not in acute distress.     Appearance: She is  ill-appearing. She is not toxic-appearing or diaphoretic.      Comments: Elderly female, laying in bed. Appears fatigue    HENT:      Right Ear: External ear normal.      Left Ear: External ear normal.      Nose: Nose normal.      Mouth/Throat:      Mouth: Mucous membranes are moist.   Eyes:      Extraocular Movements: Extraocular movements intact.      Conjunctiva/sclera: Conjunctivae normal.   Cardiovascular:      Rate and Rhythm: Normal rate and regular rhythm.      Heart sounds: No murmur heard.    No gallop.   Pulmonary:      Effort: Pulmonary effort is normal. No respiratory distress.      Breath sounds: Normal breath sounds. No wheezing or rales.   Abdominal:      General: There is no distension.      Palpations: Abdomen is soft.      Tenderness: There is no abdominal tenderness. There is no guarding.   Musculoskeletal:         General: No swelling or tenderness.      Cervical back: Normal range of motion.      Right lower leg: No edema.      Left lower leg: No edema.   Skin:     General: Skin is warm and dry.      Comments: Previous THDC removed.  Site with no bleeding, erythema, or drainage. No overlying signs of infection.  No tenderness. Site looks clean    Neurological:      General: No focal deficit present.      Mental Status: She is alert and oriented to person, place, and time.      Motor: Weakness (generalized weakness) present.   Psychiatric:         Mood and Affect: Mood normal.         Behavior: Behavior normal.         Thought Content: Thought content normal.       Significant Labs: All pertinent labs within the past 24 hours have been reviewed.    Significant Imaging: I have reviewed all pertinent imaging results/findings within the past 24 hours.

## 2022-09-25 NOTE — PROGRESS NOTES
Anjali Dorantes is a 62 y.o. female patient.    Follow for ESRD, dialysis    No new c/o, comfortable    Scheduled Meds:   amLODIPine  5 mg Oral Daily    atorvastatin  40 mg Oral QHS    ceFAZolin (ANCEF) IVPB  1 g Intravenous Daily    clopidogreL  75 mg Oral Daily    epoetin jose-epbx  5,000 Units Subcutaneous Every Mon, Wed, Fri    ferrous sulfate  1 tablet Oral Daily    metoprolol succinate  50 mg Oral Daily    sodium bicarbonate  1,300 mg Oral Daily       Review of patient's allergies indicates:   Allergen Reactions    Glipizide Other (See Comments)         Vital Signs Range (Last 24H):  Temp:  [97.1 °F (36.2 °C)-99 °F (37.2 °C)]   Pulse:  [77-93]   Resp:  [16-18]   BP: (111-152)/(60-72)   SpO2:  [96 %-98 %]     I & O (Last 24H):  Intake/Output Summary (Last 24 hours) at 9/25/2022 1001  Last data filed at 9/25/2022 0600  Gross per 24 hour   Intake 240 ml   Output --   Net 240 ml           Physical Exam:  General appearance: well developed, well nourished  Lungs:  clear to auscultation bilaterally and normal respiratory effort  Heart: regular rate and rhythm  Abdomen: soft, non-tender non-distented; bowel sounds normal; no masses,  no organomegaly  Extremities: no cyanosis or edema, or clubbing    Laboratory:  I have reviewed all pertinent lab results within the past 24 hours.  CBC:   Recent Labs   Lab 09/24/22  0536   WBC 3.51*   RBC 2.62*   HGB 8.1*   HCT 24.9*   *   MCV 95   MCH 30.9   MCHC 32.5     CMP:   Recent Labs   Lab 09/21/22  0422 09/22/22  0523 09/24/22  0536   *   < > 109   CALCIUM 9.4   < > 8.2*   ALBUMIN 3.8  --   --    PROT 8.3  --   --       < > 136   K 2.5*   < > 3.9   CO2 25   < > 20*   CL 95   < > 106   BUN 23   < > 14   CREATININE 3.3*   < > 2.0*   ALKPHOS 174*  --   --    ALT 16  --   --    AST 17  --   --    BILITOT 1.1*  --   --     < > = values in this interval not displayed.     Imp/Plan    ESRD  Bacteremia - s/p tunneled cath removal  HTN  DM type  2  Endometrial CA  Anemia of CKD    Blood cultures on 9/23 so far has neen negative  Consult IR to place new tunneled cath tomorrow if cultures remainig negative  HD after catheter placement      Belinda Cervantes  9/25/2022

## 2022-09-25 NOTE — SUBJECTIVE & OBJECTIVE
Interval history: NAEO. Son at bedside (says he works for [employer], cardiology dept). Permecath was removed after HD Friday. Pt denies pains or complaints. Agreeable to CHIKA    Past Surgical History:   Procedure Laterality Date    ARTERIOGRAM, CEREBRAL ARTERIES  02/12/2014    and 3/18/2014    HYSTERECTOMY      OMENTECTOMY N/A 03/30/2022    Procedure: OMENTECTOMY;  Surgeon: Reuben Lopez MD;  Location: [facility] OR [address] [place] [address];  Service: OB/GYN;  Laterality: N/A;    TOTAL ABDOMINAL HYSTERECTOMY W/ BILATERAL SALPINGOOPHORECTOMY N/A 03/30/2022    Procedure: HYSTERECTOMY, TOTAL, ABDOMINAL, WITH BILATERAL SALPINGO-OOPHORECTOMY;  Surgeon: Reuben Lopez MD;  Location: [facility] OR [address] [place] [address];  Service: OB/GYN;  Laterality: N/A;       Review of patient's allergies indicates:   Allergen Reactions    Glipizide Other (See Comments)       No current facility-administered medications on file prior to encounter.     Current Outpatient Medications on File Prior to Encounter   Medication Sig    acetaminophen (TYLENOL) 325 MG tablet Take 2 tablets (650 mg total) by mouth every 6 (six) hours as needed for Pain (Take every 6 hours for 5-7 days and then as needed).    allopurinoL (ZYLOPRIM) 100 MG tablet Take 100 mg by mouth once daily.    amLODIPine (NORVASC) 5 MG tablet Take 5 mg by mouth once daily.    clopidogreL (PLAVIX) 75 mg tablet Take 75 mg by mouth once daily.    dulaglutide (TRULICITY) 0.75 mg/0.5 mL pen injector Inject 0.75 mg into the skin every 7 days.    metoprolol succinate (TOPROL-XL) 50 MG 24 hr tablet Take 50 mg by mouth once daily.    nateglinide (STARLIX) 120 MG tablet Take 1 tablet by mouth 3 times daily FOR DIABETES    sodium bicarbonate 650 MG tablet Take 1,300 mg by mouth.    venlafaxine (EFFEXOR) 75 MG tablet Take 75 mg by mouth 2 (two) times daily.     ergocalciferol (ERGOCALCIFEROL) 50,000 unit Cap Take 50,000 Units by mouth every 7 days.    ferrous sulfate 325 (65 FE) MG EC tablet Take 1 tablet (325 mg total) by mouth once  daily.    losartan (COZAAR) 50 MG tablet Take 50 mg by mouth once daily.    metoprolol tartrate (LOPRESSOR) 50 MG tablet Take 50 mg by mouth 2 (two) times daily.    oxyCODONE (ROXICODONE) 5 MG immediate release tablet Take 1 tablet (5 mg total) by mouth every 6 (six) hours as needed for Pain. (Patient not taking: Reported on 4/29/2022)    pantoprazole (PROTONIX) 20 MG tablet Take 20 mg by mouth once daily.     rosuvastatin (CRESTOR) 20 MG tablet take ONE tablet every night at bedtime (for cholesterol)    senna (SENOKOT) 8.6 mg tablet Take 1 tablet by mouth 2 (two) times daily as needed for Constipation. (Patient not taking: Reported on 9/21/2022)    vitamin D (VITAMIN D3) 1000 units Tab Take 1,000 Units by mouth once daily.      Family History    None       Tobacco Use    Smoking status: Never    Smokeless tobacco: Never   Substance and Sexual Activity    Alcohol use: No    Drug use: No    Sexual activity: Not Currently     Partners: Male     Review of Systems   Constitutional:  Positive for activity change, chills, fatigue and fever. Negative for appetite change.   HENT:  Negative for congestion, sinus pressure and trouble swallowing.    Eyes:  Negative for photophobia and visual disturbance.   Respiratory:  Negative for cough, chest tightness and shortness of breath.    Cardiovascular:  Negative for chest pain, palpitations and leg swelling.   Gastrointestinal:  Positive for nausea and vomiting. Negative for abdominal distention, abdominal pain, blood in stool and diarrhea.   Endocrine: Negative for polyuria.   Genitourinary:  Negative for difficulty urinating, dysuria and hematuria.   Musculoskeletal:  Negative for arthralgias, back pain and joint swelling.   Allergic/Immunologic: Positive for immunocompromised state.   Neurological:  Positive for weakness. Negative for dizziness, light-headedness and headaches.   Psychiatric/Behavioral:  Negative for confusion and hallucinations.    Objective:     Vital Signs  (Most Recent):  Temp: 98.3 °F (36.8 °C) (09/25/22 0734)  Pulse: 81 (09/25/22 0734)  Resp: 18 (09/25/22 0734)  BP: (!) 148/72 (09/25/22 0734)  SpO2: 96 % (09/25/22 0800)   Vital Signs (24h Range):  Temp:  [97.1 °F (36.2 °C)-98.6 °F (37 °C)] 98.3 °F (36.8 °C)  Pulse:  [77-93] 81  Resp:  [18] 18  SpO2:  [96 %-98 %] 96 %  BP: (111-152)/(60-72) 148/72     Weight: 56.9 kg (125 lb 7.1 oz)  Body mass index is 24.5 kg/m².    Physical Exam  Vitals and nursing note reviewed.   Constitutional:       General: She is not in acute distress.     Appearance: She is ill-appearing. She is not toxic-appearing or diaphoretic.      Comments: Elderly female, laying in bed. Appears fatigue    HENT:      Right Ear: External ear normal.      Left Ear: External ear normal.      Nose: Nose normal.      Mouth/Throat:      Mouth: Mucous membranes are dry.   Eyes:      Extraocular Movements: Extraocular movements intact.      Conjunctiva/sclera: Conjunctivae normal.   Cardiovascular:      Rate and Rhythm: Normal rate and regular rhythm.      Heart sounds: No murmur heard.    No gallop.   Pulmonary:      Effort: Pulmonary effort is normal. No respiratory distress.      Breath sounds: Normal breath sounds. No wheezing or rales.   Abdominal:      General: There is no distension.      Palpations: Abdomen is soft.      Tenderness: There is no abdominal tenderness. There is no right CVA tenderness, left CVA tenderness or guarding.   Musculoskeletal:         General: No swelling or tenderness.      Cervical back: Normal range of motion.      Right lower leg: No edema.      Left lower leg: No edema.   Skin:     General: Skin is warm and dry.      Comments: +THDC on right upper chest wall.  Catheter appears to be intact.  No bleeding, erythema, drainage from catheter site.  No overlying signs of infection.  No tenderness to palpation near THDC. Site looks clean    Neurological:      General: No focal deficit present.      Mental Status: She is alert and  oriented to person, place, and time.      Motor: Weakness (generalized weakness) present.   Psychiatric:         Mood and Affect: Mood normal.         Behavior: Behavior normal.         Thought Content: Thought content normal.           Significant Labs: All pertinent labs within the past 24 hours have been reviewed.  CBC:   Recent Labs   Lab 09/24/22  0536   WBC 3.51*   HGB 8.1*   HCT 24.9*   *       CMP:   Recent Labs   Lab 09/24/22  0536      K 3.9      CO2 20*      BUN 14   CREATININE 2.0*   CALCIUM 8.2*   ANIONGAP 10         Lactic Acid:   No results for input(s): LACTATE in the last 48 hours.    Magnesium:   Recent Labs   Lab 09/24/22  0536   MG 1.8           Significant Imaging: I have reviewed all pertinent imaging results/findings within the past 24 hours.    Imaging Results              X-Ray Chest AP Portable (Final result)  Result time 09/21/22 04:47:08      Final result by Tj Melendez MD (09/21/22 04:47:08)                   Impression:      There is no radiographic evidence for acute intrathoracic process.      Electronically signed by: Tj Melendez  Date:    09/21/2022  Time:    04:47               Narrative:    EXAMINATION:  XR CHEST AP PORTABLE    CLINICAL HISTORY:  Sepsis;    TECHNIQUE:  Single frontal view of the chest was performed.    COMPARISON:  Chest radiograph March 30, 2022    FINDINGS:  Single portable chest view is submitted.  Right-sided central venous catheter noted, distal tip at the expected location of the SVC.  The cardiomediastinal silhouette appears stable.  Mild aortic atherosclerotic change noted.    There is no evidence for confluent infiltrate or consolidation, significant pleural effusion or pneumothorax.  Mild curvature of the spine noted.  The osseous structures appear intact.

## 2022-09-26 ENCOUNTER — ANESTHESIA EVENT (OUTPATIENT)
Dept: CARDIOLOGY | Facility: HOSPITAL | Age: 62
DRG: 314 | End: 2022-09-26
Payer: MEDICARE

## 2022-09-26 ENCOUNTER — ANESTHESIA (OUTPATIENT)
Dept: CARDIOLOGY | Facility: HOSPITAL | Age: 62
DRG: 314 | End: 2022-09-26
Payer: MEDICARE

## 2022-09-26 LAB
ANION GAP SERPL CALC-SCNC: 10 MMOL/L (ref 8–16)
ANISOCYTOSIS BLD QL SMEAR: SLIGHT
BASOPHILS # BLD AUTO: 0.02 K/UL (ref 0–0.2)
BASOPHILS NFR BLD: 0.3 % (ref 0–1.9)
BSA FOR ECHO PROCEDURE: 1.55 M2
BUN SERPL-MCNC: 23 MG/DL (ref 8–23)
CALCIUM SERPL-MCNC: 8.2 MG/DL (ref 8.7–10.5)
CHLORIDE SERPL-SCNC: 108 MMOL/L (ref 95–110)
CO2 SERPL-SCNC: 23 MMOL/L (ref 23–29)
CREAT SERPL-MCNC: 2.5 MG/DL (ref 0.5–1.4)
DACRYOCYTES BLD QL SMEAR: ABNORMAL
DIFFERENTIAL METHOD: ABNORMAL
EJECTION FRACTION: 65 %
EOSINOPHIL # BLD AUTO: 0.1 K/UL (ref 0–0.5)
EOSINOPHIL NFR BLD: 1.5 % (ref 0–8)
ERYTHROCYTE [DISTWIDTH] IN BLOOD BY AUTOMATED COUNT: 13.9 % (ref 11.5–14.5)
EST. GFR  (NO RACE VARIABLE): 21 ML/MIN/1.73 M^2
GLUCOSE SERPL-MCNC: 195 MG/DL (ref 70–110)
HCT VFR BLD AUTO: 25.4 % (ref 37–48.5)
HGB BLD-MCNC: 8.1 G/DL (ref 12–16)
IMM GRANULOCYTES # BLD AUTO: 0.17 K/UL (ref 0–0.04)
IMM GRANULOCYTES NFR BLD AUTO: 2.7 % (ref 0–0.5)
LYMPHOCYTES # BLD AUTO: 1.6 K/UL (ref 1–4.8)
LYMPHOCYTES NFR BLD: 26.5 % (ref 18–48)
MAGNESIUM SERPL-MCNC: 2.1 MG/DL (ref 1.6–2.6)
MCH RBC QN AUTO: 30.1 PG (ref 27–31)
MCHC RBC AUTO-ENTMCNC: 31.9 G/DL (ref 32–36)
MCV RBC AUTO: 94 FL (ref 82–98)
MONOCYTES # BLD AUTO: 0.5 K/UL (ref 0.3–1)
MONOCYTES NFR BLD: 7.6 % (ref 4–15)
NEUTROPHILS # BLD AUTO: 3.8 K/UL (ref 1.8–7.7)
NEUTROPHILS NFR BLD: 61.4 % (ref 38–73)
NRBC BLD-RTO: 0 /100 WBC
OVALOCYTES BLD QL SMEAR: ABNORMAL
PHOSPHATE SERPL-MCNC: 2.9 MG/DL (ref 2.7–4.5)
PLATELET # BLD AUTO: 190 K/UL (ref 150–450)
PLATELET BLD QL SMEAR: ABNORMAL
PMV BLD AUTO: 9.5 FL (ref 9.2–12.9)
POCT GLUCOSE: 148 MG/DL (ref 70–110)
POCT GLUCOSE: 209 MG/DL (ref 70–110)
POIKILOCYTOSIS BLD QL SMEAR: SLIGHT
POTASSIUM SERPL-SCNC: 4 MMOL/L (ref 3.5–5.1)
RBC # BLD AUTO: 2.69 M/UL (ref 4–5.4)
SODIUM SERPL-SCNC: 141 MMOL/L (ref 136–145)
TARGETS BLD QL SMEAR: ABNORMAL
WBC # BLD AUTO: 6.2 K/UL (ref 3.9–12.7)

## 2022-09-26 PROCEDURE — 25000003 PHARM REV CODE 250: Performed by: STUDENT IN AN ORGANIZED HEALTH CARE EDUCATION/TRAINING PROGRAM

## 2022-09-26 PROCEDURE — 84100 ASSAY OF PHOSPHORUS: CPT | Performed by: STUDENT IN AN ORGANIZED HEALTH CARE EDUCATION/TRAINING PROGRAM

## 2022-09-26 PROCEDURE — 63600175 PHARM REV CODE 636 W HCPCS: Performed by: INTERNAL MEDICINE

## 2022-09-26 PROCEDURE — 63600175 PHARM REV CODE 636 W HCPCS: Performed by: STUDENT IN AN ORGANIZED HEALTH CARE EDUCATION/TRAINING PROGRAM

## 2022-09-26 PROCEDURE — D9220A PRA ANESTHESIA: Mod: ANES,,, | Performed by: ANESTHESIOLOGY

## 2022-09-26 PROCEDURE — 90935 HEMODIALYSIS ONE EVALUATION: CPT

## 2022-09-26 PROCEDURE — D9220A PRA ANESTHESIA: ICD-10-PCS | Mod: ANES,,, | Performed by: ANESTHESIOLOGY

## 2022-09-26 PROCEDURE — 83735 ASSAY OF MAGNESIUM: CPT | Performed by: STUDENT IN AN ORGANIZED HEALTH CARE EDUCATION/TRAINING PROGRAM

## 2022-09-26 PROCEDURE — 25000003 PHARM REV CODE 250: Performed by: INTERNAL MEDICINE

## 2022-09-26 PROCEDURE — 36415 COLL VENOUS BLD VENIPUNCTURE: CPT | Performed by: STUDENT IN AN ORGANIZED HEALTH CARE EDUCATION/TRAINING PROGRAM

## 2022-09-26 PROCEDURE — 63600175 PHARM REV CODE 636 W HCPCS: Mod: JG | Performed by: INTERNAL MEDICINE

## 2022-09-26 PROCEDURE — D9220A PRA ANESTHESIA: ICD-10-PCS | Mod: CRNA,,, | Performed by: STUDENT IN AN ORGANIZED HEALTH CARE EDUCATION/TRAINING PROGRAM

## 2022-09-26 PROCEDURE — 80100016 HC MAINTENANCE HEMODIALYSIS

## 2022-09-26 PROCEDURE — 11000001 HC ACUTE MED/SURG PRIVATE ROOM

## 2022-09-26 PROCEDURE — 37000008 HC ANESTHESIA 1ST 15 MINUTES: Performed by: INTERNAL MEDICINE

## 2022-09-26 PROCEDURE — 37000009 HC ANESTHESIA EA ADD 15 MINS: Performed by: INTERNAL MEDICINE

## 2022-09-26 PROCEDURE — D9220A PRA ANESTHESIA: Mod: CRNA,,, | Performed by: STUDENT IN AN ORGANIZED HEALTH CARE EDUCATION/TRAINING PROGRAM

## 2022-09-26 PROCEDURE — 63600175 PHARM REV CODE 636 W HCPCS: Performed by: RADIOLOGY

## 2022-09-26 PROCEDURE — 85025 COMPLETE CBC W/AUTO DIFF WBC: CPT | Performed by: STUDENT IN AN ORGANIZED HEALTH CARE EDUCATION/TRAINING PROGRAM

## 2022-09-26 PROCEDURE — 80048 BASIC METABOLIC PNL TOTAL CA: CPT | Performed by: STUDENT IN AN ORGANIZED HEALTH CARE EDUCATION/TRAINING PROGRAM

## 2022-09-26 RX ORDER — FENTANYL CITRATE 50 UG/ML
INJECTION, SOLUTION INTRAMUSCULAR; INTRAVENOUS
Status: DISCONTINUED | OUTPATIENT
Start: 2022-09-26 | End: 2022-09-26 | Stop reason: HOSPADM

## 2022-09-26 RX ORDER — MIDAZOLAM HYDROCHLORIDE 1 MG/ML
INJECTION INTRAMUSCULAR; INTRAVENOUS
Status: DISCONTINUED | OUTPATIENT
Start: 2022-09-26 | End: 2022-09-26 | Stop reason: HOSPADM

## 2022-09-26 RX ORDER — SODIUM CHLORIDE 9 MG/ML
INJECTION, SOLUTION INTRAVENOUS ONCE
Status: DISCONTINUED | OUTPATIENT
Start: 2022-09-26 | End: 2022-09-27 | Stop reason: HOSPADM

## 2022-09-26 RX ORDER — PROPOFOL 10 MG/ML
VIAL (ML) INTRAVENOUS
Status: DISCONTINUED | OUTPATIENT
Start: 2022-09-26 | End: 2022-09-26

## 2022-09-26 RX ORDER — LIDOCAINE HYDROCHLORIDE 20 MG/ML
INJECTION INTRAVENOUS
Status: DISCONTINUED | OUTPATIENT
Start: 2022-09-26 | End: 2022-09-26

## 2022-09-26 RX ORDER — MUPIROCIN 20 MG/G
OINTMENT TOPICAL 2 TIMES DAILY
Status: DISCONTINUED | OUTPATIENT
Start: 2022-09-26 | End: 2022-09-27 | Stop reason: HOSPADM

## 2022-09-26 RX ADMIN — ACETAMINOPHEN 650 MG: 325 TABLET ORAL at 09:09

## 2022-09-26 RX ADMIN — MUPIROCIN: 20 OINTMENT TOPICAL at 10:09

## 2022-09-26 RX ADMIN — SODIUM CHLORIDE: 0.9 INJECTION, SOLUTION INTRAVENOUS at 09:09

## 2022-09-26 RX ADMIN — FERROUS SULFATE TAB 325 MG (65 MG ELEMENTAL FE) 1 EACH: 325 (65 FE) TAB at 10:09

## 2022-09-26 RX ADMIN — LIDOCAINE HYDROCHLORIDE 30 MG: 20 INJECTION, SOLUTION INTRAVENOUS at 09:09

## 2022-09-26 RX ADMIN — HEPARIN SODIUM 4000 UNITS: 1000 INJECTION INTRAVENOUS; SUBCUTANEOUS at 02:09

## 2022-09-26 RX ADMIN — LIDOCAINE HYDROCHLORIDE 50 MG: 20 INJECTION, SOLUTION INTRAVENOUS at 09:09

## 2022-09-26 RX ADMIN — CLOPIDOGREL 75 MG: 75 TABLET, FILM COATED ORAL at 10:09

## 2022-09-26 RX ADMIN — CEFAZOLIN SODIUM 1 G: 1 SOLUTION INTRAVENOUS at 05:09

## 2022-09-26 RX ADMIN — EPOETIN ALFA-EPBX 5000 UNITS: 10000 INJECTION, SOLUTION INTRAVENOUS; SUBCUTANEOUS at 10:09

## 2022-09-26 RX ADMIN — MUPIROCIN: 20 OINTMENT TOPICAL at 09:09

## 2022-09-26 RX ADMIN — FENTANYL CITRATE 50 MCG: 50 INJECTION INTRAMUSCULAR; INTRAVENOUS at 08:09

## 2022-09-26 RX ADMIN — ATORVASTATIN CALCIUM 40 MG: 40 TABLET, FILM COATED ORAL at 09:09

## 2022-09-26 RX ADMIN — PROPOFOL 100 MG: 10 INJECTION, EMULSION INTRAVENOUS at 09:09

## 2022-09-26 RX ADMIN — SODIUM BICARBONATE 1300 MG: 325 TABLET ORAL at 10:09

## 2022-09-26 RX ADMIN — MIDAZOLAM HYDROCHLORIDE 1 MG: 1 INJECTION, SOLUTION INTRAMUSCULAR; INTRAVENOUS at 08:09

## 2022-09-26 RX ADMIN — INSULIN ASPART 1 UNITS: 100 INJECTION, SOLUTION INTRAVENOUS; SUBCUTANEOUS at 10:09

## 2022-09-26 NOTE — ASSESSMENT & PLAN NOTE
-Blood cx from 09/21 w/MSSA  -repeat blood cultures until clear  -Blood cx from 09/23/22 with NGTD  -Suspect possible due to THDC  -Nephrology and ID consulted  -ID recommends PermCath removal and line holiday. Anticipate 4 weeks of abx. ROBIN 10/20/2022  -IR consulted, s/p Cath removal on 09/23  -continue cefazolin day 5

## 2022-09-26 NOTE — H&P
Inpatient Radiology Pre-procedure Note    History of Present Illness:  Anjali Dorantes is a 62 y.o. female who presents for THDC placement.  Admission H&P reviewed.  Past Medical History:   Diagnosis Date    AMS (altered mental status) 06/30/2021    Anemia in CKD (chronic kidney disease)     CKD (chronic kidney disease)     Diabetes mellitus     Encounter for blood transfusion     Hypercholesterolemia     Hypertension     Stroke 2015    TIA    Uterine cancer 2021    Endometrial Cancer     Past Surgical History:   Procedure Laterality Date    ARTERIOGRAM, CEREBRAL ARTERIES  02/12/2014    and 3/18/2014    HYSTERECTOMY      OMENTECTOMY N/A 03/30/2022    Procedure: OMENTECTOMY;  Surgeon: Reuben Lopez MD;  Location: [facility] OR [address] [place] [address];  Service: OB/GYN;  Laterality: N/A;    TOTAL ABDOMINAL HYSTERECTOMY W/ BILATERAL SALPINGOOPHORECTOMY N/A 03/30/2022    Procedure: HYSTERECTOMY, TOTAL, ABDOMINAL, WITH BILATERAL SALPINGO-OOPHORECTOMY;  Surgeon: Reuben Lopez MD;  Location: [facility] OR [address] [place] [address];  Service: OB/GYN;  Laterality: N/A;       Review of Systems:   As documented in primary team H&P    Home Meds:   Prior to Admission medications    Medication Sig Start Date End Date Taking? Authorizing Provider   acetaminophen (TYLENOL) 325 MG tablet Take 2 tablets (650 mg total) by mouth every 6 (six) hours as needed for Pain (Take every 6 hours for 5-7 days and then as needed). 4/1/22  Yes Geri Nolan MD   allopurinoL (ZYLOPRIM) 100 MG tablet Take 100 mg by mouth once daily.   Yes Historical Provider   amLODIPine (NORVASC) 5 MG tablet Take 5 mg by mouth once daily.   Yes Historical Provider   clopidogreL (PLAVIX) 75 mg tablet Take 75 mg by mouth once daily.   Yes Historical Provider   dulaglutide (TRULICITY) 0.75 mg/0.5 mL pen injector Inject 0.75 mg into the skin every 7 days.   Yes Historical Provider   metoprolol succinate (TOPROL-XL) 50 MG 24 hr tablet Take 50 mg by mouth once daily.   Yes Historical Provider   nateglinide  (STARLIX) 120 MG tablet Take 1 tablet by mouth 3 times daily FOR DIABETES 8/9/21  Yes Historical Provider   sodium bicarbonate 650 MG tablet Take 1,300 mg by mouth. 10/22/21 10/22/22 Yes Historical Provider   venlafaxine (EFFEXOR) 75 MG tablet Take 75 mg by mouth 2 (two) times daily.    Yes Historical Provider   ergocalciferol (ERGOCALCIFEROL) 50,000 unit Cap Take 50,000 Units by mouth every 7 days. 8/9/21   Historical Provider   ferrous sulfate 325 (65 FE) MG EC tablet Take 1 tablet (325 mg total) by mouth once daily. 4/1/22   Geri Nolan MD   losartan (COZAAR) 50 MG tablet Take 50 mg by mouth once daily.    Historical Provider   metoprolol tartrate (LOPRESSOR) 50 MG tablet Take 50 mg by mouth 2 (two) times daily.    Historical Provider   oxyCODONE (ROXICODONE) 5 MG immediate release tablet Take 1 tablet (5 mg total) by mouth every 6 (six) hours as needed for Pain.  Patient not taking: Reported on 4/29/2022 4/1/22   Geri Nolan MD   pantoprazole (PROTONIX) 20 MG tablet Take 20 mg by mouth once daily.     Historical Provider   rosuvastatin (CRESTOR) 20 MG tablet take ONE tablet every night at bedtime (for cholesterol) 11/11/21   Historical Provider   senna (SENOKOT) 8.6 mg tablet Take 1 tablet by mouth 2 (two) times daily as needed for Constipation.  Patient not taking: Reported on 9/21/2022 4/1/22   Geri Nolan MD   vitamin D (VITAMIN D3) 1000 units Tab Take 1,000 Units by mouth once daily.     Historical Provider     Scheduled Meds:    amLODIPine  5 mg Oral Daily    atorvastatin  40 mg Oral QHS    ceFAZolin (ANCEF) IVPB  1 g Intravenous Daily    clopidogreL  75 mg Oral Daily    epoetin jose-epbx  5,000 Units Subcutaneous Every Mon, Wed, Fri    ferrous sulfate  1 tablet Oral Daily    metoprolol succinate  50 mg Oral Daily    sodium bicarbonate  1,300 mg Oral Daily     Continuous Infusions:   PRN Meds:acetaminophen, dextrose 10%, dextrose 10%, glucagon (human recombinant), glucose, glucose, heparin  (porcine), influenza, insulin aspart U-100  Anticoagulants/Antiplatelets: Plavix    Allergies:   Review of patient's allergies indicates:   Allergen Reactions    Glipizide Other (See Comments)     Sedation Hx: have not been any systemic reactions    Labs:  Recent Labs   Lab 09/22/22  1714   INR 1.0       Recent Labs   Lab 09/26/22  0403   WBC 6.20   HGB 8.1*   HCT 25.4*   MCV 94         Recent Labs   Lab 09/21/22  0422 09/22/22  0523 09/26/22  0403   *   < > 195*      < > 141   K 2.5*   < > 4.0   CL 95   < > 108   CO2 25   < > 23   BUN 23   < > 23   CREATININE 3.3*   < > 2.5*   CALCIUM 9.4   < > 8.2*   MG 1.6   < > 2.1   ALT 16  --   --    AST 17  --   --    ALBUMIN 3.8  --   --    BILITOT 1.1*  --   --     < > = values in this interval not displayed.         Vitals:  Temp: 98 °F (36.7 °C) (09/26/22 0513)  Pulse: 82 (09/26/22 0513)  Resp: 17 (09/26/22 0513)  BP: (!) 148/64 (09/26/22 0513)  SpO2: 97 % (09/26/22 0513)     Physical Exam:  ASA: 2  Mallampati: 2    General: no acute distress  Mental Status: alert and oriented to person, place and time  HEENT: normocephalic, atraumatic  Chest: unlabored breathing  Heart: regular heart rate  Abdomen: nondistended  Extremity: moves all extremities, left sided weakness due to CVA    Plan: proceed with THDC placement  Sedation Plan: moderate    Sandip Denis MD  Staff Radiologist  Department of Radiology  Pager: [phone]

## 2022-09-26 NOTE — NURSING
Pt back to unit from dialysis. 1.5L removed. VSS. Pt resting comfortably in bed. All needs met at this time. Will continue with POC.

## 2022-09-26 NOTE — PROGRESS NOTES
Anjali Dorantes is a 62 y.o. female patient.    Follow for ESRD, dialysis    S/p [facility] placemet this am  Seen while on dialysis  No new c/o, comfortable      Scheduled Meds:   sodium chloride 0.9%   Intravenous Once    amLODIPine  5 mg Oral Daily    atorvastatin  40 mg Oral QHS    ceFAZolin (ANCEF) IVPB  1 g Intravenous Daily    clopidogreL  75 mg Oral Daily    epoetin jose-epbx  5,000 Units Subcutaneous Every Mon, Wed, Fri    ferrous sulfate  1 tablet Oral Daily    metoprolol succinate  50 mg Oral Daily    mupirocin   Nasal BID    sodium bicarbonate  1,300 mg Oral Daily       Review of patient's allergies indicates:   Allergen Reactions    Glipizide Other (See Comments)         Vital Signs Range (Last 24H):  Temp:  [97.4 °F (36.3 °C)-99 °F (37.2 °C)]   Pulse:  [78-94]   Resp:  [12-20]   BP: (107-148)/(57-69)   SpO2:  [95 %-99 %]     I & O (Last 24H):  Intake/Output Summary (Last 24 hours) at 9/26/2022 1129  Last data filed at 9/26/2022 0948  Gross per 24 hour   Intake 440 ml   Output --   Net 440 ml           Physical Exam:  General appearance: well developed, well nourished, no distress  Lungs:  clear to auscultation bilaterally and normal respiratory effort  Heart: regular rate and rhythm  Abdomen:  soft, normal bowel sounds  Extremities: no cyanosis or edema, or clubbing    Laboratory:  I have reviewed all pertinent lab results within the past 24 hours.  CBC:   Recent Labs   Lab 09/26/22  0403   WBC 6.20   RBC 2.69*   HGB 8.1*   HCT 25.4*      MCV 94   MCH 30.1   MCHC 31.9*     CMP:   Recent Labs   Lab 09/21/22  0422 09/22/22  0523 09/26/22  0403   *   < > 195*   CALCIUM 9.4   < > 8.2*   ALBUMIN 3.8  --   --    PROT 8.3  --   --       < > 141   K 2.5*   < > 4.0   CO2 25   < > 23   CL 95   < > 108   BUN 23   < > 23   CREATININE 3.3*   < > 2.5*   ALKPHOS 174*  --   --    ALT 16  --   --    AST 17  --   --    BILITOT 1.1*  --   --     < > = values in this interval not displayed.        Imp/Plan    ESRD - on dialysis, stable, tolerated well.  New catheter working well  Bacteremia - CHIKA negative for vegetation  HTN  DM type 2  Endometrial CA  Anemia of CKD    Vontinue present Rx  HD q MWF  We'll follow for dialysis      Trac RENE Cervantes  9/26/2022

## 2022-09-26 NOTE — PLAN OF CARE
Plan for patient discharge home with family. Pt to resume HD dialysis with [facility] MWF. Goal for discharge tomorrow with 4 weeks abx. Spoke with patient's son at bedside regarding dc planning. [place] to continue to follow up for dc needs.    09/26/22 1402   Discharge Reassessment   Assessment Type Discharge Planning Reassessment   Did the patient's condition or plan change since previous assessment? No   Discharge Plan discussed with: Adult children   Communicated GORDO with patient/caregiver Yes   Discharge Plan A Home with family   Discharge Plan B Home   DME Needed Upon Discharge  none   Discharge Barriers Identified None   Why the patient remains in the hospital Requires continued medical care   Post-Acute Status   Coverage Medicare   Discharge Delays None known at this time

## 2022-09-26 NOTE — PT/OT/SLP PROGRESS
Physical Therapy      Patient Name:  Anjali Dorantes   MRN:  [id number]    Patient not seen at this time for PT tx secondary to (pt off unit to IR for cath placement). Will follow-up.

## 2022-09-26 NOTE — PLAN OF CARE
09/26/22 1308   Medicare Message   Important Message from Medicare regarding Discharge Appeal Rights Given to patient/caregiver;Explained to patient/caregiver;Signed/date by patient/caregiver   Date IMM was signed 09/26/22   Time IMM was signed [id number]

## 2022-09-26 NOTE — ASSESSMENT & PLAN NOTE
This patient does have evidence of infective focus  My overall impression is sepsis. Vital signs were reviewed and noted in progress note.  Antibiotics given-   Antibiotics (From admission, onward)    Start     Stop Route Frequency Ordered    09/22/22 1700  ceFAZolin (ANCEF) 1 gram in dextrose 5 % 50 mL IVPB (premix)         -- IV Daily 09/22/22 1446        Cultures were taken-   Microbiology Results (last 7 days)     Procedure Component Value Units Date/Time    Blood culture [[id number]] Collected: 09/23/22 0655    Order Status: Completed Specimen: Blood Updated: 09/26/22 0903     Blood Culture, Routine No Growth to date      No Growth to date      No Growth to date      No Growth to date    Blood culture [[id number]] Collected: 09/23/22 0655    Order Status: Completed Specimen: Blood from Peripheral, Site Unknown Updated: 09/26/22 0903     Blood Culture, Routine No Growth to date      No Growth to date      No Growth to date      No Growth to date    Aerobic culture (Specify Source) **CANNOT BE ORDERED AS STAT** [[id number]] Collected: 09/21/22 0439    Order Status: Completed Specimen: Skin from Chest, Right Updated: 09/25/22 0659     Aerobic Bacterial Culture No growth    Blood culture [[id number]]  (Abnormal) Collected: 09/22/22 0523    Order Status: Completed Specimen: Blood from Peripheral, Left Hand Updated: 09/24/22 0812     Blood Culture, Routine Gram stain aer bottle: Gram positive cocci in clusters resembling Staph      Positive results previously called      09/23/2022 03:08 BML      STAPHYLOCOCCUS AUREUS  For susceptibility see order # [id number]      Blood culture [[id number]]  (Abnormal)  (Susceptibility) Collected: 09/22/22 0523    Order Status: Completed Specimen: Blood from Peripheral, Right Hand Updated: 09/24/22 0807     Blood Culture, Routine Gram stain aer bottle: Gram positive cocci in clusters resembling Staph      Positive results previously called      09/23/2022 03:06 BML      STAPHYLOCOCCUS  AUREUS    Urine culture [[id number]]  (Abnormal)  (Susceptibility) Collected: 09/21/22 1101    Order Status: Completed Specimen: Urine Updated: 09/23/22 0821     Urine Culture, Routine ESCHERICHIA COLI  10,000 - 49,999 cfu/ml      Narrative:      Specimen Source->Urine    Blood culture x two cultures. Draw prior to antibiotics. [[id number]]  (Abnormal)  (Susceptibility) Collected: 09/21/22 0422    Order Status: Completed Specimen: Blood from Peripheral, Antecubital, Left Updated: 09/23/22 0736     Blood Culture, Routine Gram stain jose roberto bottle: Gram positive cocci in pairs Gram positive cocci      in clusters resembling Staph      Results called to and read back by:Melba Perez      Gram stain aer bottle: Gram positive cocci in clusters resembling Staph      STAPHYLOCOCCUS AUREUS    Narrative:      Aerobic and anaerobic    Blood culture x two cultures. Draw prior to antibiotics. [[id number]]  (Abnormal)  (Susceptibility) Collected: 09/21/22 0422    Order Status: Completed Specimen: Blood from Peripheral, Forearm, Right Updated: 09/23/22 0736     Blood Culture, Routine Gram stain jose roberto bottle: Gram positive cocci in clusters resembling Staph      Positive results previously called 16:05  09/21/2022      Gram stain aer bottle: Gram positive cocci in clusters resembling Staph      previously reported      STAPHYLOCOCCUS AUREUS    Narrative:      Aerobic and anaerobic    Influenza A & B by Molecular [[id number]]     Order Status: Canceled Specimen: Nasopharyngeal Swab         Latest lactate reviewed, they are-  No results for input(s): LACTATE in the last 72 hours.    Organ dysfunction: none  Source- UTI and bacteremia     Source control Achieved by- antibiotics    - Presented febrile, tachycardic, with elevated LA and complaints of N/V and weakness  - LA 3.4 on admission, repeat LA 1.7  - Pt with no abdominal pain or tenderness, low suspicion for SBP  - No leukocytosis, procal elevated (however in setting of ESRD, low  yield). Flu and COVID negative   - CXR with no acute process  - UA appears grossly infected  - Urine cx with low colonies Ecoli  - Blood cx with MSSA  - Repeat blood cx [id number] until clear   -Blood cx from 09/23 with NGTD  - Pt with recent THDC placement in 07/2022  - ID consulted:  Recommend PermCath removal and then line holiday.  Continue cefazolin, anticipate 4 weeks of IV antibiotics or dialysis. ROBIN 10/20/22  -Previous dialysis cath removed on 09/23/22 by IR  -Plan for CHIKA and THDC on Monday, 09/26  - continue day 5 of cefazolin

## 2022-09-26 NOTE — TRANSFER OF CARE
Anesthesia Transfer of Care Note    Patient: Anjali Dorantes    Procedure(s) Performed: Procedure(s) (LRB):  Transesophageal echo (CHIKA) intra-procedure log documentation (N/A)    Patient location: Cath Lab    Anesthesia Type: general    Transport from OR: Transported from OR on room air with adequate spontaneous ventilation    Post pain: adequate analgesia    Post assessment: no apparent anesthetic complications    Post vital signs: stable    Level of consciousness: sedated and responds to stimulation    Nausea/Vomiting: no nausea/vomiting    Complications: none    Transfer of care protocol was followed      Last vitals:   Visit Vitals  BP (!) 107/57 (BP Location: Right arm, Patient Position: Lying)   Pulse 93   Temp 37 °C (98.6 °F) (Skin)   Resp 12   Ht 5' (1.524 m)   Wt 56.9 kg (125 lb 7.1 oz)   SpO2 98%   Breastfeeding No   BMI 24.50 kg/m²

## 2022-09-26 NOTE — PLAN OF CARE
Problem: Adult Inpatient Plan of Care  Goal: Plan of Care Review  Outcome: Ongoing, Progressing  Goal: Patient-Specific Goal (Individualized)  Outcome: Ongoing, Progressing  Goal: Absence of Hospital-Acquired Illness or Injury  Outcome: Ongoing, Progressing  Goal: Optimal Comfort and Wellbeing  Outcome: Ongoing, Progressing  Goal: Readiness for Transition of Care  Outcome: Ongoing, Progressing     Problem: Diabetes Comorbidity  Goal: Blood Glucose Level Within Targeted Range  Outcome: Ongoing, Progressing     Problem: Adjustment to Illness (Sepsis/Septic Shock)  Goal: Optimal Coping  Outcome: Ongoing, Progressing     Problem: Bleeding (Sepsis/Septic Shock)  Goal: Absence of Bleeding  Outcome: Ongoing, Progressing     Problem: Glycemic Control Impaired (Sepsis/Septic Shock)  Goal: Blood Glucose Level Within Desired Range  Outcome: Ongoing, Progressing     Problem: Infection Progression (Sepsis/Septic Shock)  Goal: Absence of Infection Signs and Symptoms  Outcome: Ongoing, Progressing     Problem: Nutrition Impaired (Sepsis/Septic Shock)  Goal: Optimal Nutrition Intake  Outcome: Ongoing, Progressing     Problem: Impaired Wound Healing  Goal: Optimal Wound Healing  Outcome: Ongoing, Progressing     Problem: Device-Related Complication Risk (Hemodialysis)  Goal: Safe, Effective Therapy Delivery  Outcome: Ongoing, Progressing     Problem: Hemodynamic Instability (Hemodialysis)  Goal: Effective Tissue Perfusion  Outcome: Ongoing, Progressing     Problem: Infection (Hemodialysis)  Goal: Absence of Infection Signs and Symptoms  Outcome: Ongoing, Progressing     Problem: Infection  Goal: Absence of Infection Signs and Symptoms  Outcome: Ongoing, Progressing     CHIKA and new dialysis

## 2022-09-26 NOTE — BRIEF OP NOTE
Radiology Post-Procedure Note    Pre Op Diagnosis: Bacteremia/sepsis with indwelling THDC in place  Post Op Diagnosis: Same    Procedure: 1. Fluoroscopic-guided removal of the 19-cm RIJV-approach THDC    Procedure performed by: Gume Horowitz MD    Written Informed Consent Obtained: Yes  Specimen Removed: NO  Estimated Blood Loss: Minimal    Findings:   Successful fluoroscopic-guided removal of the 19-cm RIJV-approach THDC with local anesthetic only. Patient tolerated the procedure well. No immediate post-procedural complications noted.     Patient transferred back to floor for 2 hours post-op monitoring and bed rest with HOB elevated at least 30 degrees during bed rest.    Thank you for considering IR for the care of your patient.     Gume Horowitz MD  Interventional Radiology  
Transesophageal echocardiogram (TDS). No obvious evidence of endocarditis.  
13-May-2021 16:40

## 2022-09-26 NOTE — PLAN OF CARE
CHIKA done and new THDC placed today. 1.5L removed during dialysis. POC reviewed. All questions and concerns answered and addressed. Pt resting comfortably in bed. All needs met at this time. Will continue with POC.

## 2022-09-26 NOTE — PROGRESS NOTES
[facility] Medicine  Progress Note    Patient Name: Anjali Dorantes  MRN: [id number]  Patient Class: IP- Inpatient   Admission Date: 9/21/2022  Length of Stay: 5 days  Attending Physician: Marcela Amezcua DO  Primary Care Provider: Tasia Carrasco MD        Subjective:     Principal Problem:Sepsis        HPI:  62 year female with history of ESRD on HD (on MWF), CVA (2014), DM, HTN, uterine cancer (s/p CIRILO on 03/30/2022) presented to the ED with complaints of nausea, vomiting, and fever for 2 days.  Tmax at home was 104F Son is bedside. Admits feeling weak overall.  She denies any chest pain, shortness breath, cough, difficulty breathing, abdominal pain, dysuria, hematuria, or any vaginal bleeding.  Denies any new food.  Denies any new medication, recent travel, or any sick contacts.  Admits PCP recently started indomethacin for gout, but patient has not taken it yet.  Patient recently tunneled cath placed in July 2022 and was initiated on dialysis in August 2022.  Has been tolerating her dialysis sessions without difficulty.  Prior to that, patient was hospitalized briefly for her hysterectomy in March 2022.    In the ED, patient was febrile and tachycardic.  Labs without leukocytosis, but it does show anemia, hypokalemia, and lactic acidosis.  Mg level 1.6. EKG noted to have prolonged QTC 605ms.  Chest x-ray with no acute process.  Patient received magnesium, potassium, Zosyn, and vancomycin in the ED.  Patient admitted to hospital medicine for further evaluation.      Overview/Hospital Course:  62 year female admitted on 09/21/22 for sepsis, likely secondary to UTI and bacteremia. Also found to have prolonged QTc and hypokalemia on admission. Urine cx with Ecoli. Blood cx with MSSA.  Repeat blood cultures x 24-48hr until clear.  Patient started broad-spectrum abx. Suspect bactermia may be due to THDC that was placed in 07/2022. Nephrology and ID consulted.  Recommends PermCath removal and line  holiday.  Continue cefazolin, anticipate 4 weeks of IV antibiotics with dialysis.  IR removed previous catheter on 09/23/2022. Cardiology consulted for CHIKA to rule out infective endocarditis.  Plan for new THDC and CHIKA today on 09/26/2022. Prolonged QTc has resolved, it could possible be due to chronic PPI and Effexor use.  Continue IV antibiotics and close monitoring      Interval History:  No acute overnight events.  Patient afebrile overnight.  No fever in the last 72 hours. Son is at bedside.     Review of Systems   Constitutional:  Positive for activity change. Negative for appetite change, chills and fever.   HENT:  Negative for congestion, sinus pressure and trouble swallowing.    Eyes:  Negative for photophobia and visual disturbance.   Respiratory:  Negative for cough, chest tightness and shortness of breath.    Cardiovascular:  Negative for chest pain, palpitations and leg swelling.   Gastrointestinal:  Negative for abdominal distention, abdominal pain, blood in stool, diarrhea, nausea and vomiting.   Endocrine: Negative for polyuria.   Genitourinary:  Negative for difficulty urinating, dysuria and hematuria.   Musculoskeletal:  Negative for arthralgias, back pain and joint swelling.   Allergic/Immunologic: Positive for immunocompromised state.   Neurological:  Positive for weakness. Negative for dizziness, light-headedness and headaches.   Psychiatric/Behavioral:  Negative for confusion, hallucinations and sleep disturbance.      Objective:     Vital Signs (Most Recent):  Temp: 98 °F (36.7 °C) (09/26/22 0513)  Pulse: 82 (09/26/22 0513)  Resp: 17 (09/26/22 0513)  BP: (!) 148/64 (09/26/22 0513)  SpO2: 97 % (09/26/22 0513)   Vital Signs (24h Range):  Temp:  [98 °F (36.7 °C)-99 °F (37.2 °C)] 98 °F (36.7 °C)  Pulse:  [78-94] 82  Resp:  [16-20] 17  SpO2:  [95 %-99 %] 97 %  BP: (120-148)/(58-69) 148/64     Weight: 56.9 kg (125 lb 7.1 oz)  Body mass index is 24.5 kg/m².    Intake/Output Summary (Last 24 hours) at  9/26/2022 0947  Last data filed at 9/26/2022 0541  Gross per 24 hour   Intake 240 ml   Output --   Net 240 ml        Physical Exam  Vitals and nursing note reviewed.   Constitutional:       General: She is not in acute distress.     Appearance: She is ill-appearing. She is not toxic-appearing or diaphoretic.      Comments: Elderly female, laying in bed. Was sleeping    HENT:      Right Ear: External ear normal.      Left Ear: External ear normal.      Nose: Nose normal.      Mouth/Throat:      Mouth: Mucous membranes are moist.   Eyes:      Extraocular Movements: Extraocular movements intact.      Conjunctiva/sclera: Conjunctivae normal.   Cardiovascular:      Rate and Rhythm: Normal rate and regular rhythm.      Heart sounds: No murmur heard.    No gallop.   Pulmonary:      Effort: Pulmonary effort is normal. No respiratory distress.      Breath sounds: Normal breath sounds. No wheezing or rales.   Abdominal:      General: There is no distension.      Palpations: Abdomen is soft.      Tenderness: There is no abdominal tenderness. There is no guarding.   Musculoskeletal:         General: No swelling or tenderness.      Cervical back: Normal range of motion.      Right lower leg: No edema.      Left lower leg: No edema.   Skin:     General: Skin is warm and dry.      Comments: Previous THDC removed.  Site with no bleeding, erythema, or drainage. No overlying signs of infection.  No tenderness. Site looks clean    Neurological:      General: No focal deficit present.      Mental Status: She is alert and oriented to person, place, and time.      Motor: Weakness (generalized weakness) present.   Psychiatric:         Mood and Affect: Mood normal.         Behavior: Behavior normal.         Thought Content: Thought content normal.       Significant Labs: All pertinent labs within the past 24 hours have been reviewed.    Significant Imaging: I have reviewed all pertinent imaging results/findings within the past 24  hours.      Assessment/Plan:      * Sepsis  This patient does have evidence of infective focus  My overall impression is sepsis. Vital signs were reviewed and noted in progress note.  Antibiotics given-   Antibiotics (From admission, onward)    Start     Stop Route Frequency Ordered    09/22/22 1700  ceFAZolin (ANCEF) 1 gram in dextrose 5 % 50 mL IVPB (premix)         -- IV Daily 09/22/22 1446        Cultures were taken-   Microbiology Results (last 7 days)     Procedure Component Value Units Date/Time    Blood culture [[id number]] Collected: 09/23/22 0655    Order Status: Completed Specimen: Blood Updated: 09/26/22 0903     Blood Culture, Routine No Growth to date      No Growth to date      No Growth to date      No Growth to date    Blood culture [[id number]] Collected: 09/23/22 0655    Order Status: Completed Specimen: Blood from Peripheral, Site Unknown Updated: 09/26/22 0903     Blood Culture, Routine No Growth to date      No Growth to date      No Growth to date      No Growth to date    Aerobic culture (Specify Source) **CANNOT BE ORDERED AS STAT** [[id number]] Collected: 09/21/22 0439    Order Status: Completed Specimen: Skin from Chest, Right Updated: 09/25/22 0659     Aerobic Bacterial Culture No growth    Blood culture [[id number]]  (Abnormal) Collected: 09/22/22 0523    Order Status: Completed Specimen: Blood from Peripheral, Left Hand Updated: 09/24/22 0812     Blood Culture, Routine Gram stain aer bottle: Gram positive cocci in clusters resembling Staph      Positive results previously called      09/23/2022 03:08 BML      STAPHYLOCOCCUS AUREUS  For susceptibility see order # [id number]      Blood culture [[id number]]  (Abnormal)  (Susceptibility) Collected: 09/22/22 0523    Order Status: Completed Specimen: Blood from Peripheral, Right Hand Updated: 09/24/22 0807     Blood Culture, Routine Gram stain aer bottle: Gram positive cocci in clusters resembling Staph      Positive results previously called       09/23/2022 03:06 BML      STAPHYLOCOCCUS AUREUS    Urine culture [[id number]]  (Abnormal)  (Susceptibility) Collected: 09/21/22 1101    Order Status: Completed Specimen: Urine Updated: 09/23/22 0821     Urine Culture, Routine ESCHERICHIA COLI  10,000 - 49,999 cfu/ml      Narrative:      Specimen Source->Urine    Blood culture x two cultures. Draw prior to antibiotics. [[id number]]  (Abnormal)  (Susceptibility) Collected: 09/21/22 0422    Order Status: Completed Specimen: Blood from Peripheral, Antecubital, Left Updated: 09/23/22 0736     Blood Culture, Routine Gram stain jose roberto bottle: Gram positive cocci in pairs Gram positive cocci      in clusters resembling Staph      Results called to and read back by:Melba Perez      Gram stain aer bottle: Gram positive cocci in clusters resembling Staph      STAPHYLOCOCCUS AUREUS    Narrative:      Aerobic and anaerobic    Blood culture x two cultures. Draw prior to antibiotics. [[id number]]  (Abnormal)  (Susceptibility) Collected: 09/21/22 0422    Order Status: Completed Specimen: Blood from Peripheral, Forearm, Right Updated: 09/23/22 0736     Blood Culture, Routine Gram stain jose roberto bottle: Gram positive cocci in clusters resembling Staph      Positive results previously called 16:05  09/21/2022      Gram stain aer bottle: Gram positive cocci in clusters resembling Staph      previously reported      STAPHYLOCOCCUS AUREUS    Narrative:      Aerobic and anaerobic    Influenza A & B by Molecular [[id number]]     Order Status: Canceled Specimen: Nasopharyngeal Swab         Latest lactate reviewed, they are-  No results for input(s): LACTATE in the last 72 hours.    Organ dysfunction: none  Source- UTI and bacteremia     Source control Achieved by- antibiotics    - Presented febrile, tachycardic, with elevated LA and complaints of N/V and weakness  - LA 3.4 on admission, repeat LA 1.7  - Pt with no abdominal pain or tenderness, low suspicion for SBP  - No leukocytosis, procal  elevated (however in setting of ESRD, low yield). Flu and COVID negative   - CXR with no acute process  - UA appears grossly infected  - Urine cx with low colonies Ecoli  - Blood cx with MSSA  - Repeat blood cx [id number]-67g until clear   -Blood cx from 09/23 with NGTD  - Pt with recent THDC placement in 07/2022  - ID consulted:  Recommend PermCath removal and then line holiday.  Continue cefazolin, anticipate 4 weeks of IV antibiotics or dialysis. ROBIN 10/20/22  -Previous dialysis cath removed on 09/23/22 by IR  -Plan for CHIKA and THDC on Monday, 09/26  - continue day 5 of cefazolin      Bacteremia due to methicillin susceptible Staphylococcus aureus (MSSA)  -Blood cx from 09/21 w/MSSA  -repeat blood cultures until clear  -Blood cx from 09/23/22 with NGTD  -Suspect possible due to THDC  -Nephrology and ID consulted  -ID recommends PermCath removal and line holiday. Anticipate 4 weeks of abx. ROBIN 10/20/2022  -IR consulted, s/p Cath removal on 09/23  -continue cefazolin day 5      Prolonged Q-T interval on ECG  -EKG in the ED showed QTc prolonged at 605ms, with accelerated junctional rhythm  -Mg 1.6 on admit, was replaced with Mg IV 2gm  -Repeat EKG showed QTc of 496ms  -Chart reviewed, EKG from OSH in 07/2022 showed QTc was 492 at that time   -Suspect medication induced. Pt chronically on Efflexor 75mg daily and protonix  -Will hold home efflexor and protonix at this time.   -Monitor on telemetry       Hypokalemia  -K 2.5 on admission  -Replaced in the ED  -Received IVF with NS with KCL  -Resolved    Essential hypertension  -BP mildly elevated on admit  -Home meds:  Norvasc 5 mg daily, metoprolol 50 mg BID, losartan 50 mg daily  -Continue home Norvasc and metoprolol.  Hold losartan at this time   -continue to monitor blood pressure closely.    -If elevated, then will consider resuming home losartan      Type 2 diabetes mellitus, without long-term current use of insulin  A1c:   Lab Results   Component Value Date    HGBA1C  "7.9 (H) 09/21/2022     Repeat A1C as above  Hold home metformin and trulicity   Meds: SSI PRN to maintain goal 140-180  ADA diet, accuchecks ACHS, hypoglycemic protocol      ESRD (end stage renal disease)  -Recent diagnosis, THDC recently placed on 07/27/2022  -First dialysis session initiated in 08/2022, on MWF  -Concern THDC might be source of bacteremia.   -Nephrology consulted: s/p removal of PermCath on 09/23 and then line holiday.  Plan to place THDC on 09/26, if blood cultures are clear for 48-72 hours    Anemia of chronic disease  -Hgb near baseline  -No signs of active bleeding  -Continue oral iron supplement   -Suspect due to ESRD  -Monitor       Depression  Patient has persistent depression which is mild and is currently controlled. Will hold anti-depressant medications. We will not consult psychiatry at this time. Patient does not display psychosis at this time. Continue to monitor closely and adjust plan of care as needed.    -Patient takes home Effexor 75mg daily   -Will hold at this time given prolonged QTc on admission  -Monitor closely       Endometrial cancer  -Patient s/p CIRILO/BSO/omentectomy secondary to Stage 4 high grade endometrial cancer on 03/30/2022  -Previously on chemo, but was unable to resume due to renal function   -Follows with oncology outpatient      History of CVA (cerebrovascular accident)  -Hx of CVA in 2014  -Continue plavix and statin at this time       Weakness  -PT/OT consulted: home       Advanced care planning/counseling discussion  Advance Care Planning     Date: 09/21/2022    Code Status  I engaged the the family in a conversation about the patient's preferences for care  at the very end of life. The patient wishes to have  CPR and other invasive treatments performed when her heart and/or breathing stops at this time. Son states patient has not filled out advanced directive because she wants to hold off on further conversation about it "until the time comes".  I " communicated to the family that at this time, the patient wishes align with full code status. Son and patient in agreement.  I spent a total of 18 minutes engaging the patient in this advance care planning discussion.       Code status: FULL code         VTE Risk Mitigation (From admission, onward)         Ordered     heparin (porcine) injection 4,000 Units  As needed (PRN)         09/23/22 1206                Discharge Planning   GORDO:      Code Status: Full Code   Is the patient medically ready for discharge?:     Reason for patient still in hospital (select all that apply): Patient trending condition, Treatment and Consult recommendations  Discharge Plan A: Home with family                  Marcela Amezcua DO  Department of Hospital Medicine   [facility] [place] [facility]

## 2022-09-26 NOTE — SUBJECTIVE & OBJECTIVE
Interval History:  No acute overnight events.  Patient afebrile overnight.  No fever in the last 72 hours. Son is at bedside.     Review of Systems   Constitutional:  Positive for activity change. Negative for appetite change, chills and fever.   HENT:  Negative for congestion, sinus pressure and trouble swallowing.    Eyes:  Negative for photophobia and visual disturbance.   Respiratory:  Negative for cough, chest tightness and shortness of breath.    Cardiovascular:  Negative for chest pain, palpitations and leg swelling.   Gastrointestinal:  Negative for abdominal distention, abdominal pain, blood in stool, diarrhea, nausea and vomiting.   Endocrine: Negative for polyuria.   Genitourinary:  Negative for difficulty urinating, dysuria and hematuria.   Musculoskeletal:  Negative for arthralgias, back pain and joint swelling.   Allergic/Immunologic: Positive for immunocompromised state.   Neurological:  Positive for weakness. Negative for dizziness, light-headedness and headaches.   Psychiatric/Behavioral:  Negative for confusion, hallucinations and sleep disturbance.      Objective:     Vital Signs (Most Recent):  Temp: 98 °F (36.7 °C) (09/26/22 0513)  Pulse: 82 (09/26/22 0513)  Resp: 17 (09/26/22 0513)  BP: (!) 148/64 (09/26/22 0513)  SpO2: 97 % (09/26/22 0513)   Vital Signs (24h Range):  Temp:  [98 °F (36.7 °C)-99 °F (37.2 °C)] 98 °F (36.7 °C)  Pulse:  [78-94] 82  Resp:  [16-20] 17  SpO2:  [95 %-99 %] 97 %  BP: (120-148)/(58-69) 148/64     Weight: 56.9 kg (125 lb 7.1 oz)  Body mass index is 24.5 kg/m².    Intake/Output Summary (Last 24 hours) at 9/26/2022 0901  Last data filed at 9/26/2022 0541  Gross per 24 hour   Intake 240 ml   Output --   Net 240 ml        Physical Exam  Vitals and nursing note reviewed.   Constitutional:       General: She is not in acute distress.     Appearance: She is ill-appearing. She is not toxic-appearing or diaphoretic.      Comments: Elderly female, laying in bed. Was sleeping     HENT:      Right Ear: External ear normal.      Left Ear: External ear normal.      Nose: Nose normal.      Mouth/Throat:      Mouth: Mucous membranes are moist.   Eyes:      Extraocular Movements: Extraocular movements intact.      Conjunctiva/sclera: Conjunctivae normal.   Cardiovascular:      Rate and Rhythm: Normal rate and regular rhythm.      Heart sounds: No murmur heard.    No gallop.   Pulmonary:      Effort: Pulmonary effort is normal. No respiratory distress.      Breath sounds: Normal breath sounds. No wheezing or rales.   Abdominal:      General: There is no distension.      Palpations: Abdomen is soft.      Tenderness: There is no abdominal tenderness. There is no guarding.   Musculoskeletal:         General: No swelling or tenderness.      Cervical back: Normal range of motion.      Right lower leg: No edema.      Left lower leg: No edema.   Skin:     General: Skin is warm and dry.      Comments: Previous THDC removed.  Site with no bleeding, erythema, or drainage. No overlying signs of infection.  No tenderness. Site looks clean    Neurological:      General: No focal deficit present.      Mental Status: She is alert and oriented to person, place, and time.      Motor: Weakness (generalized weakness) present.   Psychiatric:         Mood and Affect: Mood normal.         Behavior: Behavior normal.         Thought Content: Thought content normal.       Significant Labs: All pertinent labs within the past 24 hours have been reviewed.    Significant Imaging: I have reviewed all pertinent imaging results/findings within the past 24 hours.

## 2022-09-26 NOTE — ANESTHESIA PREPROCEDURE EVALUATION
09/26/2022  Anjali Dorantes is a 62 y.o., female.  To undergo Procedure(s) (LRB):  Transesophageal echo (CHIKA) intra-procedure log documentation (N/A)     Denies CP/SOB/GERD/MI/URI symptoms.  Endorses CVA with left sided weakness.  METS > 4  NPO > 8    Past Medical History:  Past Medical History:   Diagnosis Date    AMS (altered mental status) 06/30/2021    Anemia in CKD (chronic kidney disease)     CKD (chronic kidney disease)     Diabetes mellitus     Encounter for blood transfusion     Hypercholesterolemia     Hypertension     Stroke 2015    TIA    Uterine cancer 2021    Endometrial Cancer       Past Surgical History:  Past Surgical History:   Procedure Laterality Date    ARTERIOGRAM, CEREBRAL ARTERIES  02/12/2014    and 3/18/2014    HYSTERECTOMY      OMENTECTOMY N/A 03/30/2022    Procedure: OMENTECTOMY;  Surgeon: Reuben Lopez MD;  Location: [facility] OR [address] [place] [address];  Service: OB/GYN;  Laterality: N/A;    TOTAL ABDOMINAL HYSTERECTOMY W/ BILATERAL SALPINGOOPHORECTOMY N/A 03/30/2022    Procedure: HYSTERECTOMY, TOTAL, ABDOMINAL, WITH BILATERAL SALPINGO-OOPHORECTOMY;  Surgeon: Reuben Lopez MD;  Location: [facility] OR [address] [place] [address];  Service: OB/GYN;  Laterality: N/A;       Social History:  Social History     Socioeconomic History    Marital status:    Tobacco Use    Smoking status: Never    Smokeless tobacco: Never   Substance and Sexual Activity    Alcohol use: No    Drug use: No    Sexual activity: Not Currently     Partners: Male       Medications:  No current facility-administered medications on file prior to encounter.     Current Outpatient Medications on File Prior to Encounter   Medication Sig Dispense Refill    acetaminophen (TYLENOL) 325 MG tablet Take 2 tablets (650 mg total) by mouth every 6 (six) hours as needed for Pain (Take every 6 hours for 5-7 days and then as needed). 60 tablet 1     allopurinoL (ZYLOPRIM) 100 MG tablet Take 100 mg by mouth once daily.      amLODIPine (NORVASC) 5 MG tablet Take 5 mg by mouth once daily.      clopidogreL (PLAVIX) 75 mg tablet Take 75 mg by mouth once daily.      dulaglutide (TRULICITY) 0.75 mg/0.5 mL pen injector Inject 0.75 mg into the skin every 7 days.      metoprolol succinate (TOPROL-XL) 50 MG 24 hr tablet Take 50 mg by mouth once daily.      nateglinide (STARLIX) 120 MG tablet Take 1 tablet by mouth 3 times daily FOR DIABETES      sodium bicarbonate 650 MG tablet Take 1,300 mg by mouth.      venlafaxine (EFFEXOR) 75 MG tablet Take 75 mg by mouth 2 (two) times daily.       ergocalciferol (ERGOCALCIFEROL) 50,000 unit Cap Take 50,000 Units by mouth every 7 days.      ferrous sulfate 325 (65 FE) MG EC tablet Take 1 tablet (325 mg total) by mouth once daily. 60 tablet 2    losartan (COZAAR) 50 MG tablet Take 50 mg by mouth once daily.      metoprolol tartrate (LOPRESSOR) 50 MG tablet Take 50 mg by mouth 2 (two) times daily.      oxyCODONE (ROXICODONE) 5 MG immediate release tablet Take 1 tablet (5 mg total) by mouth every 6 (six) hours as needed for Pain. (Patient not taking: Reported on 4/29/2022) 20 tablet 0    pantoprazole (PROTONIX) 20 MG tablet Take 20 mg by mouth once daily.       rosuvastatin (CRESTOR) 20 MG tablet take ONE tablet every night at bedtime (for cholesterol)      senna (SENOKOT) 8.6 mg tablet Take 1 tablet by mouth 2 (two) times daily as needed for Constipation. (Patient not taking: Reported on 9/21/2022) 60 tablet 1    vitamin D (VITAMIN D3) 1000 units Tab Take 1,000 Units by mouth once daily.          Allergies:  Review of patient's allergies indicates:   Allergen Reactions    Glipizide Other (See Comments)       Active Problems:  Patient Active Problem List   Diagnosis    Essential hypertension    Acute encephalopathy    Elevated LFTs, elevated lipase    Stage 4 chronic kidney disease    Anemia of chronic disease     Pelvic mass    Type 2 diabetes mellitus with hyperglycemia    Malignant ascites    SBP (spontaneous bacterial peritonitis)    Adenocarcinoma    Endometrial cancer    Encounter for antineoplastic chemotherapy    Thrombocytopenia due to drugs    s/p CIRILO/BSO/omentectomy on 03/30/22    Depression    History of CVA (cerebrovascular accident)    Hyperkalemia    Ovarian cancer, bilateral    Prolonged Q-T interval on ECG    Hypokalemia    Sepsis    Type 2 diabetes mellitus, without long-term current use of insulin    ESRD (end stage renal disease)    Advanced care planning/counseling discussion    Weakness    Bacteremia due to methicillin susceptible Staphylococcus aureus (MSSA)       Diagnostic Studies:    CBC:  Recent Labs   Lab 09/26/22  0403   WBC 6.20   RBC 2.69*   HGB 8.1*   HCT 25.4*      MCV 94   MCH 30.1   MCHC 31.9*        CMP:  Recent Labs   Lab 09/26/22  0403   CALCIUM 8.2*      K 4.0   CO2 23      BUN 23   CREATININE 2.5*     EKG (9/21/22):  ST    TTE (9/22/22):   The estimated ejection fraction is 55%.   The left ventricle is normal in size with mild concentric hypertrophy and normal systolic function.   Moderate left atrial enlargement.   Grade II left ventricular diastolic dysfunction.   Moderate right atrial enlargement.   Normal central venous pressure (3 mmHg).   The estimated PA systolic pressure is 20 mmHg.   Normal right ventricular size with normal right ventricular systolic function.   Mild tricuspid regurgitation.    24 Hour Vitals:  Temp:  [36.7 °C (98 °F)-37.2 °C (99 °F)] 36.7 °C (98 °F)  Pulse:  [78-94] 82  Resp:  [16-20] 17  SpO2:  [95 %-99 %] 97 %  BP: (120-148)/(58-69) 148/64   See Nursing Charting For Additional Vitals      Pre-op Assessment    I have reviewed the Patient Summary Reports.     I have reviewed the Nursing Notes.       Review of Systems  Anesthesia Hx:  No problems with previous Anesthesia   Denies Personal Hx of Anesthesia  complications.   Social:  Non-Smoker, No Alcohol Use    Hematology/Oncology:         -- Anemia: Hematology Comments: Bacteremia Current/Recent Cancer. Oncology Comments: H/O endometrial CA     Cardiovascular:   Exercise tolerance: good Hypertension ECG has been reviewed.    Pulmonary:  Pulmonary Normal    Renal/:   Chronic Renal Disease, CKD    Hepatic/GI:  Hepatic/GI Normal    Neurological:   CVA, residual symptoms L sided weakness   Endocrine:   Diabetes    Psych:   depression          Physical Exam  General: Well nourished    Airway:  Mallampati: III   Mouth Opening: Small, but > 3cm  TM Distance: Normal      Dental:  Intact    Chest/Lungs:  Clear to auscultation, Normal Respiratory Rate    Heart:  Rate: Normal  Rhythm: Regular Rhythm        Anesthesia Plan  Type of Anesthesia, risks & benefits discussed:    Anesthesia Type: MAC, Gen Natural Airway, Gen ETT  Intra-op Monitoring Plan: Standard ASA Monitors  Post Op Pain Control Plan: multimodal analgesia  Induction:  IV  Informed Consent: Informed consent signed with the Patient representative and all parties understand the risks and agree with anesthesia plan.  All questions answered.   ASA Score: 3  Anesthesia Plan Notes: Consent obtained via son given recent sedation administration.    Ready For Surgery From Anesthesia Perspective.     .

## 2022-09-26 NOTE — PROCEDURES
[facility]  Interventional Radiology  Procedure - Inpatient      Date: 09/26/2022 Time: 9:01 AM    Pre-Op Diagnosis: ESRD on HD after line holiday    Post-Op Diagnosis: Same    Procedure Performed by: Cira    Assistant: N/A    Procedure: THDC placement    Specimen/Tissue Removed: N/A    Estimated Blood Loss: Less than 50 mL    Procedure Note/Findings: 14.5 Fr 23 cm long Glidepath THDC placed via left EJV approach (due to bilateral IJV thrombus). Catheter tip in RA.  Both ports aspirate and flush easily.  Ready to use.  Patient tolerated procedure well.      Please refer to dictated report for additional details.

## 2022-09-27 VITALS
WEIGHT: 125.44 LBS | DIASTOLIC BLOOD PRESSURE: 58 MMHG | BODY MASS INDEX: 24.63 KG/M2 | OXYGEN SATURATION: 99 % | SYSTOLIC BLOOD PRESSURE: 123 MMHG | TEMPERATURE: 97 F | HEART RATE: 84 BPM | RESPIRATION RATE: 18 BRPM | HEIGHT: 60 IN

## 2022-09-27 LAB
BACTERIA BLD CULT: NORMAL
BACTERIA BLD CULT: NORMAL
POCT GLUCOSE: 197 MG/DL (ref 70–110)
POCT GLUCOSE: 306 MG/DL (ref 70–110)
POCT GLUCOSE: 354 MG/DL (ref 70–110)

## 2022-09-27 PROCEDURE — 63600175 PHARM REV CODE 636 W HCPCS: Performed by: STUDENT IN AN ORGANIZED HEALTH CARE EDUCATION/TRAINING PROGRAM

## 2022-09-27 PROCEDURE — 25000003 PHARM REV CODE 250: Performed by: INTERNAL MEDICINE

## 2022-09-27 PROCEDURE — 25000003 PHARM REV CODE 250: Performed by: STUDENT IN AN ORGANIZED HEALTH CARE EDUCATION/TRAINING PROGRAM

## 2022-09-27 RX ORDER — INSULIN ASPART 100 [IU]/ML
5 INJECTION, SOLUTION INTRAVENOUS; SUBCUTANEOUS ONCE
Status: COMPLETED | OUTPATIENT
Start: 2022-09-27 | End: 2022-09-27

## 2022-09-27 RX ORDER — CEFAZOLIN SODIUM 1 G/50ML
SOLUTION INTRAVENOUS
Status: ON HOLD
Start: 2022-09-27 | End: 2023-02-05 | Stop reason: HOSPADM

## 2022-09-27 RX ORDER — CEFAZOLIN SODIUM 1 G/50ML
1 SOLUTION INTRAVENOUS DAILY
Status: DISCONTINUED | OUTPATIENT
Start: 2022-09-27 | End: 2022-09-27 | Stop reason: HOSPADM

## 2022-09-27 RX ADMIN — AMLODIPINE BESYLATE 5 MG: 5 TABLET ORAL at 08:09

## 2022-09-27 RX ADMIN — MUPIROCIN: 20 OINTMENT TOPICAL at 08:09

## 2022-09-27 RX ADMIN — SODIUM BICARBONATE 1300 MG: 325 TABLET ORAL at 08:09

## 2022-09-27 RX ADMIN — METOPROLOL SUCCINATE 50 MG: 50 TABLET, EXTENDED RELEASE ORAL at 08:09

## 2022-09-27 RX ADMIN — INSULIN ASPART 5 UNITS: 100 INJECTION, SOLUTION INTRAVENOUS; SUBCUTANEOUS at 12:09

## 2022-09-27 RX ADMIN — FERROUS SULFATE TAB 325 MG (65 MG ELEMENTAL FE) 1 EACH: 325 (65 FE) TAB at 08:09

## 2022-09-27 RX ADMIN — CEFAZOLIN SODIUM 1 G: 1 SOLUTION INTRAVENOUS at 11:09

## 2022-09-27 RX ADMIN — CLOPIDOGREL 75 MG: 75 TABLET, FILM COATED ORAL at 08:09

## 2022-09-27 NOTE — DISCHARGE SUMMARY
[facility] Medicine  Discharge Summary      Patient Name: Anjali Dorantes  MRN: [id number]  Patient Class: IP- Inpatient  Admission Date: 9/21/2022  Hospital Length of Stay: 6 days  Discharge Date and Time: 9/27/2022  2:05 PM  Attending Physician: No att. providers found   Discharging Provider: Marc Livingston MD  Primary Care Provider: Tasia Carrasco MD      HPI:   62 year female with history of ESRD on HD (on MWF), CVA (2014), DM, HTN, uterine cancer (s/p CIRILO on 03/30/2022) presented to the ED with complaints of nausea, vomiting, and fever for 2 days.  Tmax at home was 104F Son is bedside. Admits feeling weak overall.  She denies any chest pain, shortness breath, cough, difficulty breathing, abdominal pain, dysuria, hematuria, or any vaginal bleeding.  Denies any new food.  Denies any new medication, recent travel, or any sick contacts.  Admits PCP recently started indomethacin for gout, but patient has not taken it yet.  Patient recently tunneled cath placed in July 2022 and was initiated on dialysis in August 2022.  Has been tolerating her dialysis sessions without difficulty.  Prior to that, patient was hospitalized briefly for her hysterectomy in March 2022.    In the ED, patient was febrile and tachycardic.  Labs without leukocytosis, but it does show anemia, hypokalemia, and lactic acidosis.  Mg level 1.6. EKG noted to have prolonged QTC 605ms.  Chest x-ray with no acute process.  Patient received magnesium, potassium, Zosyn, and vancomycin in the ED.  Patient admitted to hospital medicine for further evaluation.      Procedure(s) (LRB):  Transesophageal echo (CHIKA) intra-procedure log documentation (N/A)      Hospital Course:   62 year female admitted on 09/21/22 for sepsis, likely secondary to UTI and bacteremia. Also found to have prolonged QTc and hypokalemia on admission. Urine cx with Ecoli. Blood cx with MSSA.  Repeat blood cultures x 24-48hr until clear.  Patient started broad-spectrum  abx. Suspect bactermia may be due to THDC that was placed in 07/2022. Nephrology and ID consulted.  Recommends PermCath removal and line holiday.  Continue cefazolin, anticipate 4 weeks of IV antibiotics with dialysis.  IR removed previous catheter on 09/23/2022. Cardiology consulted for CHIKA to rule out infective endocarditis.  Plan for new THDC and CHIKA on 09/26/2022. Prolonged QTc has resolved, it could possible be due to chronic PPI and Effexor use.  Continue IV antibiotics and close monitoring. CHIKA with no evidence of infective endocarditis. Plan for IV Cefazolin 2g/2g/3g after HD until 10/20/22. Dr. Rai with Nephrology placed orders. Overall, feeling better. Stable for discharge home. On day of discharge, noted elevated glucose likely due to improved appetite and not being on home medications. Discussed with family to resume trulicity today once home. Patient has all DME equipment. PT/OT recommending home. Patient discharged home in stable condition.       Goals of Care Treatment Preferences:  Code Status: Full Code      Consults:   Consults (From admission, onward)        Status Ordering Provider     Inpatient consult to Interventional Radiology  Once        Provider:  Sandip Denis MD    Completed CASEY RAI T.     Inpatient consult to Cardiology  Once        Provider:  Mitesh Mccray MD    Completed ZAK FINK T.     Inpatient consult to Interventional Radiology  Once        Provider:  Gume Horowitz MD    Completed CARMEN FIGUEROA     Inpatient consult to Infectious Diseases  Once        Provider:  Eva Polanco MD    Completed ZAK FINK T.          No new Assessment & Plan notes have been filed under this hospital service since the last note was generated.  Service: Hospital Medicine    Final Active Diagnoses:    Diagnosis Date Noted POA    PRINCIPAL PROBLEM:  Sepsis [A41.9] 09/21/2022 Yes    Bacteremia due to methicillin susceptible Staphylococcus aureus (MSSA) [R78.81, B95.61]  09/22/2022 Yes    Prolonged Q-T interval on ECG [R94.31] 09/21/2022 Yes    Hypokalemia [E87.6] 09/21/2022 Yes    Type 2 diabetes mellitus, without long-term current use of insulin [E11.9] 09/21/2022 Yes    ESRD (end stage renal disease) [N18.6] 09/21/2022 Yes    Advanced care planning/counseling discussion [Z71.89] 09/21/2022 Not Applicable    Weakness [R53.1] 09/21/2022 Yes    History of CVA (cerebrovascular accident) [Z86.73] 04/01/2022 Not Applicable    Depression [F32.A] 03/31/2022 Yes    Endometrial cancer [C54.1] 08/06/2021 Yes    Anemia of chronic disease [D63.8] 06/30/2021 Yes    Essential hypertension [I10] 04/29/2021 Yes      Problems Resolved During this Admission:       Discharged Condition: stable    Disposition: Home or Self Care    Follow Up:   Follow-up Information     Tasia Carrasco MD Follow up on 10/4/2022.    Specialty: Internal Medicine  Why: @2:45pm with Dr.Phuong Carrasco.  Contact information:  [address] [facility] [place] [address] [phone]                       Patient Instructions:      Diet renal     Notify your health care provider if you experience any of the following:  temperature >100.4     Notify your health care provider if you experience any of the following:  persistent nausea and vomiting or diarrhea     Notify your health care provider if you experience any of the following:  severe uncontrolled pain     Notify your health care provider if you experience any of the following:  redness, tenderness, or signs of infection (pain, swelling, redness, odor or green/yellow discharge around incision site)     Notify your health care provider if you experience any of the following:  difficulty breathing or increased cough     Notify your health care provider if you experience any of the following:  severe persistent headache     Notify your health care provider if you experience any of the following:  persistent dizziness, light-headedness, or visual disturbances      Notify your health care provider if you experience any of the following:  worsening rash     Notify your health care provider if you experience any of the following:  increased confusion or weakness     Activity as tolerated       Significant Diagnostic Studies: Labs: All labs within the past 24 hours have been reviewed  Microbiology:   Blood Culture   Lab Results   Component Value Date    LABBLOO No Growth after 4 days. 09/23/2022    LABBLOO No Growth after 4 days. 09/23/2022       Pending Diagnostic Studies:     None         Medications:  Reconciled Home Medications:      Medication List      START taking these medications    ceFAZolin 1 g/50ml Dextrose IVPB 1 gram/50 mL  Commonly known as: ANCEF  2g/2g/3g after HD every Mon/Wed/Fri until 10/20/22        CONTINUE taking these medications    acetaminophen 325 MG tablet  Commonly known as: TYLENOL  Take 2 tablets (650 mg total) by mouth every 6 (six) hours as needed for Pain (Take every 6 hours for 5-7 days and then as needed).     allopurinoL 100 MG tablet  Commonly known as: ZYLOPRIM  Take 100 mg by mouth once daily.     amLODIPine 5 MG tablet  Commonly known as: NORVASC  Take 5 mg by mouth once daily.     clopidogreL 75 mg tablet  Commonly known as: PLAVIX  Take 75 mg by mouth once daily.     ferrous sulfate 325 (65 FE) MG EC tablet  Take 1 tablet (325 mg total) by mouth once daily.     metoprolol succinate 50 MG 24 hr tablet  Commonly known as: TOPROL-XL  Take 50 mg by mouth once daily.     pantoprazole 20 MG tablet  Commonly known as: PROTONIX  Take 20 mg by mouth once daily.     rosuvastatin 20 MG tablet  Commonly known as: CRESTOR  take ONE tablet every night at bedtime (for cholesterol)     sodium bicarbonate 650 MG tablet  Take 1,300 mg by mouth.     TRULICITY 0.75 mg/0.5 mL pen injector  Generic drug: dulaglutide  Inject 0.75 mg into the skin every 7 days.     venlafaxine 75 MG tablet  Commonly known as: EFFEXOR  Take 75 mg by mouth 2 (two) times  daily.        STOP taking these medications    ergocalciferol 50,000 unit Cap  Commonly known as: ERGOCALCIFEROL     losartan 50 MG tablet  Commonly known as: COZAAR     metoprolol tartrate 50 MG tablet  Commonly known as: LOPRESSOR     nateglinide 120 MG tablet  Commonly known as: STARLIX     oxyCODONE 5 MG immediate release tablet  Commonly known as: ROXICODONE     SENNA 8.6 mg tablet  Generic drug: senna     vitamin D 1000 units Tab  Commonly known as: VITAMIN D3            Indwelling Lines/Drains at time of discharge:   Lines/Drains/Airways     Central Venous Catheter Line  Duration                Hemodialysis Catheter 09/26/22 0852 left internal jugular 1 day                Time spent on the discharge of patient: >35 minutes         Marc Livingston MD  Department of Hospital Medicine  [facility] Surg

## 2022-09-27 NOTE — PLAN OF CARE
Brief id note     blessing neg. repeat cultures neg x 4 days. needs cefazolin 2gm/2gm/3gm 3x/wk with HD set up until 10/20 (see last ID note). Have requested outpatient ID f/u. Discussed with primary team.

## 2022-09-27 NOTE — PLAN OF CARE
[facility] - Med Surg  Initial Discharge Assessment    Patient clear to discharge from case management stand point. Reviewed pcp follow up appointment with pt and son at bedside, listed on avs. Instructed patient to resume previously scheduled HD dialysis with [facility], verbalized acceptance. Pt's son to help her home at discharge.        Primary Care Provider: Tasia Carrasco MD    Admission Diagnosis: Dehydration [E86.0]  Prolonged Q-T interval on ECG [R94.31]  SIRS (systemic inflammatory response syndrome) [R65.10]  Non-intractable vomiting with nausea, unspecified vomiting type [R11.2]  Hypokalemia due to excessive gastrointestinal loss of potassium [E87.6]    Admission Date: 9/21/2022  Expected Discharge Date: 9/27/2022    Discharge Barriers Identified: None    Payor: MEDICARE / Plan: MEDICARE PART A & B / Product Type: Government /     Extended Emergency Contact Information  Primary Emergency Contact: Willie Amezcua  Mobile Phone: [phone]  Relation: Son  Preferred language: English   needed? No  Secondary Emergency Contact: Bibi Amezcua   [place]  Mobile Phone: [phone]  Relation: Daughter    Discharge Plan A: Home with family  Discharge Plan B: Home      [facility] - [place], [place] - [address]  [address]  [place] [address]  Phone: [phone] Fax: [phone]      Initial Assessment (most recent)       Adult Discharge Assessment - 09/21/22 1248          Discharge Assessment    Assessment Type Discharge Planning Assessment     Confirmed/corrected address, phone number and insurance Yes     Confirmed Demographics Correct on Facesheet     Source of Information family;health record   son:  Willie Dorantes  [phone]    If unable to respond/provide information was family/caregiver contacted? Yes     Contact Name/Number Willie Dorantes  [phone]     Reason For Admission Prolonged Q-T interval     Lives With spouse;child(chandrakant), adult     Facility Arrived From: home     Do  you expect to return to your current living situation? Yes     Do you have help at home or someone to help you manage your care at home? Yes     Who are your caregiver(s) and their phone number(s)? spouse; daughter (Bibi Amezcua  [phone])     Prior to hospitilization cognitive status: Unable to Assess     Current cognitive status: Unable to Assess   patient asleep    Walking or Climbing Stairs Difficulty ambulation difficulty, requires equipment     Mobility Management RW     Equipment Currently Used at Home walker, rolling     Readmission within 30 days? No     Patient currently being followed by outpatient case management? No     Do you currently have service(s) that help you manage your care at home? No     Do you take prescription medications? Yes     Do you have prescription coverage? Yes     Coverage Medicaid     Do you have any problems affording any of your prescribed medications? No     Is the patient taking medications as prescribed? yes     Who is going to help you get home at discharge? family     How do you get to doctors appointments? family or friend will provide     Are you on dialysis? Yes     Dialysis Name and Scheduled days [facility] MWF     Do you take coumadin? No   Plavix    Discharge Plan A Home with family     Discharge Plan B Home     DME Needed Upon Discharge  none     Discharge Plan discussed with: Adult children     Discharge Barriers Identified None        Relationship/Environment    Name(s) of Who Lives With Patient spouse, son, daughter (Bibi Amezcua)

## 2022-09-27 NOTE — PROGRESS NOTES
Discharge paperwork given to pt. IV and tele monitor removed. All questions and concerns answered and addressed. Pt transported off unit with family and all belongings in possession.

## 2022-09-27 NOTE — ANESTHESIA POSTPROCEDURE EVALUATION
Anesthesia Post Evaluation    Patient: Anjali Dorantes    Procedure(s) Performed: Procedure(s) (LRB):  Transesophageal echo (CHIKA) intra-procedure log documentation (N/A)    Final Anesthesia Type: MAC      Patient location during evaluation: PACU  Patient participation: Yes- Able to Participate  Level of consciousness: awake and alert and oriented  Post-procedure vital signs: reviewed and stable  Pain management: adequate  Airway patency: patent    PONV status at discharge: No PONV  Anesthetic complications: no      Cardiovascular status: hemodynamically stable and blood pressure returned to baseline  Respiratory status: spontaneous ventilation, room air and unassisted  Hydration status: euvolemic  Follow-up not needed.          Vitals Value Taken Time   /65 09/27/22 0718   Temp 36.4 °C (97.5 °F) 09/27/22 0718   Pulse 88 09/27/22 0718   Resp 16 09/27/22 0718   SpO2 98 % 09/27/22 0718         No case tracking events are documented in the log.      Pain/Kaden Score: Pain Rating Prior to Med Admin: 1 (9/26/2022  9:56 PM)  Pain Rating Post Med Admin: 1 (9/26/2022 10:56 PM)

## 2022-09-27 NOTE — PLAN OF CARE
Problem: Adult Inpatient Plan of Care  Goal: Plan of Care Review  Outcome: Ongoing, Progressing  Goal: Optimal Comfort and Wellbeing  Outcome: Ongoing, Progressing  Goal: Readiness for Transition of Care  Outcome: Ongoing, Progressing     Problem: Diabetes Comorbidity  Goal: Blood Glucose Level Within Targeted Range  Outcome: Ongoing, Progressing     Problem: Adjustment to Illness (Sepsis/Septic Shock)  Goal: Optimal Coping  Outcome: Ongoing, Progressing     Problem: Glycemic Control Impaired (Sepsis/Septic Shock)  Goal: Blood Glucose Level Within Desired Range  Outcome: Ongoing, Progressing     Problem: Impaired Wound Healing  Goal: Optimal Wound Healing  Outcome: Ongoing, Progressing

## 2022-09-28 ENCOUNTER — PATIENT OUTREACH (OUTPATIENT)
Dept: ADMINISTRATIVE | Facility: CLINIC | Age: 62
End: 2022-09-28
Payer: MEDICARE

## 2022-09-28 NOTE — PROGRESS NOTES
C3 nurse spoke with Anjali Dorantes for a TCC post hospital discharge follow up call. The patient has a scheduled [facility] appointment with Tasia Carrasco MD on 10/04/22 @ 8696.

## 2022-10-07 ENCOUNTER — TELEPHONE (OUTPATIENT)
Dept: INFECTIOUS DISEASES | Facility: CLINIC | Age: 62
End: 2022-10-07
Payer: MEDICARE

## 2022-10-07 DIAGNOSIS — Z79.2 RECEIVING INTRAVENOUS ANTIBIOTIC TREATMENT AT HOME: Primary | ICD-10-CM

## 2022-10-07 NOTE — TELEPHONE ENCOUNTER
Pt is currently getting her IV antibiotics after HD.  Can you place orders for blood work.   I will set her up for weekly labs at [facility] lab.

## 2022-10-08 LAB
BASOPHILS NFR BLD: 0.2 % (ref 0–1.5)
EOSINOPHIL NFR BLD: 2.6 % (ref 0–7)
ERYTHROCYTE [DISTWIDTH] IN BLOOD BY AUTOMATED COUNT: 15.7 % (ref 11.5–14.5)
HBA1C MFR BLD: 7.1 % (ref 4.8–5.9)
HCT VFR BLD AUTO: 29.8 % (ref 37–47)
HEMOGLOBIN X 3: 29.1 % (ref 36–48)
HGB BLD-MCNC: 9.7 G/DL (ref 12–16)
LUC: 1.6 % (ref 0–4)
LYMPH%: 16.4 % (ref 19–48)
MCH RBC QN AUTO: 30 PG (ref 27–31)
MCHC RBC AUTO-ENTMCNC: 32.6 G/DL (ref 30–36)
MCV RBC AUTO: 92 FL (ref 80–100)
MONO%: 6.2 % (ref 3–10)
NEUTROPHILS NFR BLD: 73 % (ref 40–75)
PLATELET # BLD AUTO: 247 1000/MCL (ref 130–400)
RBC # BLD AUTO: 3.23 MILL/MCL (ref 4.2–5.4)
WBC # BLD AUTO: 5.88 1000/MCL (ref 4.8–10.8)

## 2022-10-09 LAB
25(OH)D3 SERPL-MCNC: 18.8 NG/ML (ref 30–100)
ALBUMIN SERPL-MCNC: 4 G/DL (ref 3.5–5.2)
ALP SERPL-CCNC: 175 U/L (ref 35–104)
ALT SERPL W P-5'-P-CCNC: 9 U/L (ref 7–52)
BICARBONATE: 31 MEQ/L (ref 20–31)
BUN, POST: 4 MG/DL (ref 6–19)
BUN, PRE: 13 MG/DL (ref 6–19)
BUN/CREAT SERPL: 5.2 (ref 10–20)
CALCIUM PHOS PRODUCT: 21 (ref 0–54)
CALCIUM SERPL-MCNC: 9 MG/DL (ref 8.7–10.4)
CHLORIDE: 98 MEQ/L (ref 96–108)
CHOLEST SERPL-MSCNC: 105 MG/DL (ref 0–199)
CREAT SERPL-MCNC: 2.48 MG/DL (ref 0.6–1.3)
FERRITIN SERPL-MCNC: 262 NG/ML (ref 10–291)
FOLATE: 5.6 NG/ML
GLUCOSE SERPL-MCNC: 263 MG/DL (ref 70–100)
HBV SURFACE AB SER-ACNC: <10 MIU/ML
HBV SURFACE AG SERPL QL IA: NEGATIVE
HCV AB SERPL QL IA: NONREACTIVE
HCV S/CO RATIO(INDEX): 0.06 (ref 0–0.79)
HDL/CHOLESTEROL RATIO: 2.8 (ref 0–4.5)
HDLC SERPL-MCNC: 38 MG/DL
IRON: 55 MCG/DL (ref 30–160)
LDLC SERPL CALC-MCNC: 41 MG/DL (ref 0–99)
PHOSPHATE FLD-MCNC: 2.3 MG/DL (ref 2.6–4.5)
POTASSIUM SERPL-SCNC: 3 MEQ/L (ref 3.5–5.1)
PTH, INTACT: 235 PG/ML (ref 16–80)
SODIUM BLD-SCNC: 139 MEQ/L (ref 136–145)
TOTAL IRON BINDING CAPACITY: 246 MCG/DL (ref 185–515)
TRANSFERRIN SATURATION: 22 % (ref 20–55)
TRIGL SERPL-MCNC: 130 MG/DL (ref 0–149)
UIBC SERPL-MCNC: 191 MCG/DL (ref 155–355)
UREA REDUCTION RATIO: 69 % (ref 65–80)
VLDL CHOLESTEROL: 26 MG/DL (ref 10–30)

## 2022-10-10 LAB
DIALYSIS START TIME: 1125
DIALYSIS STOP TIME: 1427
POST-WEIGHT, KG: 53 KG
POST-WEIGHT, LB: 116.6
PRE-WEIGHT, KG: 54 KG
PRE-WEIGHT, LB: 118.8
SPKT/V, DAUGIRDAS II: 1.32

## 2022-10-11 ENCOUNTER — LAB VISIT (OUTPATIENT)
Dept: LAB | Facility: HOSPITAL | Age: 62
End: 2022-10-11
Attending: INTERNAL MEDICINE
Payer: MEDICARE

## 2022-10-11 DIAGNOSIS — Z79.2 RECEIVING INTRAVENOUS ANTIBIOTIC TREATMENT AT HOME: ICD-10-CM

## 2022-10-11 LAB
ALBUMIN SERPL BCP-MCNC: 3.7 G/DL (ref 3.5–5.2)
ALP SERPL-CCNC: 195 U/L (ref 55–135)
ALT SERPL W/O P-5'-P-CCNC: 6 U/L (ref 10–44)
ALUMINUM SERPL-MCNC: <5 MCG/L (ref 0–10)
ANION GAP SERPL CALC-SCNC: 14 MMOL/L (ref 8–16)
AST SERPL-CCNC: 21 U/L (ref 10–40)
BASOPHILS # BLD AUTO: 0.02 K/UL (ref 0–0.2)
BASOPHILS NFR BLD: 0.3 % (ref 0–1.9)
BILIRUB SERPL-MCNC: 0.5 MG/DL (ref 0.1–1)
BUN SERPL-MCNC: 10 MG/DL (ref 8–23)
CALCIUM SERPL-MCNC: 10.1 MG/DL (ref 8.7–10.5)
CHLORIDE SERPL-SCNC: 95 MMOL/L (ref 95–110)
CO2 SERPL-SCNC: 30 MMOL/L (ref 23–29)
CREAT SERPL-MCNC: 2.6 MG/DL (ref 0.5–1.4)
CRP SERPL-MCNC: 1.7 MG/L (ref 0–8.2)
DIFFERENTIAL METHOD: ABNORMAL
EOSINOPHIL # BLD AUTO: 0.1 K/UL (ref 0–0.5)
EOSINOPHIL NFR BLD: 1.3 % (ref 0–8)
ERYTHROCYTE [DISTWIDTH] IN BLOOD BY AUTOMATED COUNT: 14.9 % (ref 11.5–14.5)
ERYTHROCYTE [SEDIMENTATION RATE] IN BLOOD BY PHOTOMETRIC METHOD: 68 MM/HR (ref 0–36)
EST. GFR  (NO RACE VARIABLE): 20.1 ML/MIN/1.73 M^2
GLUCOSE SERPL-MCNC: 247 MG/DL (ref 70–110)
HCT VFR BLD AUTO: 34.3 % (ref 37–48.5)
HGB BLD-MCNC: 11 G/DL (ref 12–16)
IMM GRANULOCYTES # BLD AUTO: 0.02 K/UL (ref 0–0.04)
IMM GRANULOCYTES NFR BLD AUTO: 0.3 % (ref 0–0.5)
LYMPHOCYTES # BLD AUTO: 1.3 K/UL (ref 1–4.8)
LYMPHOCYTES NFR BLD: 19.5 % (ref 18–48)
MCH RBC QN AUTO: 30.3 PG (ref 27–31)
MCHC RBC AUTO-ENTMCNC: 32.1 G/DL (ref 32–36)
MCV RBC AUTO: 95 FL (ref 82–98)
MONOCYTES # BLD AUTO: 0.6 K/UL (ref 0.3–1)
MONOCYTES NFR BLD: 9 % (ref 4–15)
NEUTROPHILS # BLD AUTO: 4.6 K/UL (ref 1.8–7.7)
NEUTROPHILS NFR BLD: 69.6 % (ref 38–73)
NRBC BLD-RTO: 0 /100 WBC
PLATELET # BLD AUTO: 205 K/UL (ref 150–450)
PMV BLD AUTO: 9.2 FL (ref 9.2–12.9)
POTASSIUM SERPL-SCNC: 3.5 MMOL/L (ref 3.5–5.1)
PROT SERPL-MCNC: 8.2 G/DL (ref 6–8.4)
RBC # BLD AUTO: 3.63 M/UL (ref 4–5.4)
SODIUM SERPL-SCNC: 139 MMOL/L (ref 136–145)
WBC # BLD AUTO: 6.67 K/UL (ref 3.9–12.7)

## 2022-10-11 PROCEDURE — 85652 RBC SED RATE AUTOMATED: CPT | Performed by: INTERNAL MEDICINE

## 2022-10-11 PROCEDURE — 86140 C-REACTIVE PROTEIN: CPT | Performed by: INTERNAL MEDICINE

## 2022-10-11 PROCEDURE — 85025 COMPLETE CBC W/AUTO DIFF WBC: CPT | Performed by: INTERNAL MEDICINE

## 2022-10-11 PROCEDURE — 80053 COMPREHEN METABOLIC PANEL: CPT | Performed by: INTERNAL MEDICINE

## 2022-10-11 PROCEDURE — 36415 COLL VENOUS BLD VENIPUNCTURE: CPT | Mod: PO | Performed by: INTERNAL MEDICINE

## 2022-10-18 ENCOUNTER — LAB VISIT (OUTPATIENT)
Dept: LAB | Facility: HOSPITAL | Age: 62
End: 2022-10-18
Attending: INTERNAL MEDICINE
Payer: MEDICARE

## 2022-10-18 DIAGNOSIS — Z79.2 RECEIVING INTRAVENOUS ANTIBIOTIC TREATMENT AT HOME: ICD-10-CM

## 2022-10-18 LAB
25(OH)D3 SERPL-MCNC: 16.6 NG/ML (ref 30–100)
ALBUMIN SERPL BCP-MCNC: 3.8 G/DL (ref 3.5–5.2)
ALBUMIN SERPL-MCNC: 4.3 G/DL (ref 3.5–5.2)
ALP SERPL-CCNC: 179 U/L (ref 35–104)
ALP SERPL-CCNC: 191 U/L (ref 55–135)
ALT SERPL W P-5'-P-CCNC: 13 U/L (ref 7–52)
ALT SERPL W/O P-5'-P-CCNC: 10 U/L (ref 10–44)
ANION GAP SERPL CALC-SCNC: 14 MMOL/L (ref 8–16)
AST SERPL-CCNC: 42 U/L (ref 10–40)
BASOPHILS # BLD AUTO: 0.03 K/UL (ref 0–0.2)
BASOPHILS NFR BLD: 0.4 % (ref 0–1.9)
BASOPHILS NFR BLD: 0.5 % (ref 0–1.5)
BICARBONATE: 30 MEQ/L (ref 20–31)
BILIRUB SERPL-MCNC: 0.4 MG/DL (ref 0.1–1)
BUN SERPL-MCNC: 12 MG/DL (ref 8–23)
BUN, PRE: 20 MG/DL (ref 6–19)
BUN/CREAT SERPL: 7.2 (ref 10–20)
CALCIUM PHOS PRODUCT: 26 (ref 0–54)
CALCIUM SERPL-MCNC: 9.3 MG/DL (ref 8.7–10.4)
CALCIUM SERPL-MCNC: 9.7 MG/DL (ref 8.7–10.5)
CHLORIDE SERPL-SCNC: 96 MMOL/L (ref 95–110)
CHLORIDE: 96 MEQ/L (ref 96–108)
CHOLEST SERPL-MSCNC: 114 MG/DL (ref 0–199)
CO2 SERPL-SCNC: 29 MMOL/L (ref 23–29)
CREAT SERPL-MCNC: 2.4 MG/DL (ref 0.5–1.4)
CREAT SERPL-MCNC: 2.77 MG/DL (ref 0.6–1.3)
CRP SERPL-MCNC: 1.4 MG/L (ref 0–8.2)
DIFFERENTIAL METHOD: ABNORMAL
EOSINOPHIL # BLD AUTO: 0.1 K/UL (ref 0–0.5)
EOSINOPHIL NFR BLD: 1.1 % (ref 0–8)
EOSINOPHIL NFR BLD: 2.1 % (ref 0–7)
ERYTHROCYTE [DISTWIDTH] IN BLOOD BY AUTOMATED COUNT: 14.1 % (ref 11.5–14.5)
ERYTHROCYTE [DISTWIDTH] IN BLOOD BY AUTOMATED COUNT: 15.3 % (ref 11.5–14.5)
ERYTHROCYTE [SEDIMENTATION RATE] IN BLOOD BY PHOTOMETRIC METHOD: 72 MM/HR (ref 0–36)
EST. GFR  (NO RACE VARIABLE): 22.1 ML/MIN/1.73 M^2
FERRITIN SERPL-MCNC: 436 NG/ML (ref 10–291)
FOLATE: 9.8 NG/ML
GLUCOSE SERPL-MCNC: 271 MG/DL (ref 70–110)
GLUCOSE SERPL-MCNC: 308 MG/DL (ref 70–100)
HCT VFR BLD AUTO: 31.5 % (ref 37–47)
HCT VFR BLD AUTO: 34.8 % (ref 37–48.5)
HDL/CHOLESTEROL RATIO: 2.9 (ref 0–4.5)
HDLC SERPL-MCNC: 40 MG/DL
HEMOGLOBIN X 3: 30 % (ref 36–48)
HGB BLD-MCNC: 10 G/DL (ref 12–16)
HGB BLD-MCNC: 10.8 G/DL (ref 12–16)
IMM GRANULOCYTES # BLD AUTO: 0.02 K/UL (ref 0–0.04)
IMM GRANULOCYTES NFR BLD AUTO: 0.3 % (ref 0–0.5)
IRON: 59 MCG/DL (ref 30–160)
LDLC SERPL CALC-MCNC: 35 MG/DL (ref 0–99)
LUC: 1.7 % (ref 0–4)
LYMPH%: 16.5 % (ref 19–48)
LYMPHOCYTES # BLD AUTO: 1.7 K/UL (ref 1–4.8)
LYMPHOCYTES NFR BLD: 21 % (ref 18–48)
MCH RBC QN AUTO: 30 PG (ref 27–31)
MCH RBC QN AUTO: 30.1 PG (ref 27–31)
MCHC RBC AUTO-ENTMCNC: 31 G/DL (ref 32–36)
MCHC RBC AUTO-ENTMCNC: 31.6 G/DL (ref 30–36)
MCV RBC AUTO: 95 FL (ref 80–100)
MCV RBC AUTO: 97 FL (ref 82–98)
MONO%: 6.7 % (ref 3–10)
MONOCYTES # BLD AUTO: 0.6 K/UL (ref 0.3–1)
MONOCYTES NFR BLD: 8 % (ref 4–15)
NEUTROPHILS # BLD AUTO: 5.5 K/UL (ref 1.8–7.7)
NEUTROPHILS NFR BLD: 69.2 % (ref 38–73)
NEUTROPHILS NFR BLD: 72.5 % (ref 40–75)
NRBC BLD-RTO: 0 /100 WBC
PHOSPHATE FLD-MCNC: 2.8 MG/DL (ref 2.6–4.5)
PLATELET # BLD AUTO: 229 1000/MCL (ref 130–400)
PLATELET # BLD AUTO: 279 K/UL (ref 150–450)
PMV BLD AUTO: 9.1 FL (ref 9.2–12.9)
POTASSIUM SERPL-SCNC: 2.6 MEQ/L (ref 3.5–5.1)
POTASSIUM SERPL-SCNC: 3.5 MMOL/L (ref 3.5–5.1)
PROT SERPL-MCNC: 8.1 G/DL (ref 6–8.4)
PTH, INTACT: 307 PG/ML (ref 16–80)
RBC # BLD AUTO: 3.33 MILL/MCL (ref 4.2–5.4)
RBC # BLD AUTO: 3.59 M/UL (ref 4–5.4)
SODIUM BLD-SCNC: 139 MEQ/L (ref 136–145)
SODIUM SERPL-SCNC: 139 MMOL/L (ref 136–145)
TOTAL IRON BINDING CAPACITY: 273 MCG/DL (ref 185–515)
TRANSFERRIN SATURATION: 22 % (ref 20–55)
TRIGL SERPL-MCNC: 197 MG/DL (ref 0–149)
UIBC SERPL-MCNC: 214 MCG/DL (ref 155–355)
VLDL CHOLESTEROL: 39 MG/DL (ref 10–30)
WBC # BLD AUTO: 6.01 1000/MCL (ref 4.8–10.8)
WBC # BLD AUTO: 7.9 K/UL (ref 3.9–12.7)

## 2022-10-18 PROCEDURE — 86140 C-REACTIVE PROTEIN: CPT | Performed by: INTERNAL MEDICINE

## 2022-10-18 PROCEDURE — 85025 COMPLETE CBC W/AUTO DIFF WBC: CPT | Performed by: INTERNAL MEDICINE

## 2022-10-18 PROCEDURE — 36415 COLL VENOUS BLD VENIPUNCTURE: CPT | Mod: PO | Performed by: INTERNAL MEDICINE

## 2022-10-18 PROCEDURE — 85652 RBC SED RATE AUTOMATED: CPT | Performed by: INTERNAL MEDICINE

## 2022-10-18 PROCEDURE — 80053 COMPREHEN METABOLIC PANEL: CPT | Performed by: INTERNAL MEDICINE

## 2022-10-20 LAB
ALUMINUM SERPL-MCNC: <5 MCG/L (ref 0–10)
BICARBONATE: 31 MEQ/L (ref 20–31)
HCT VFR BLD AUTO: 31.5 % (ref 37–47)
HEMOGLOBIN X 3: 30.9 % (ref 36–48)
HGB BLD-MCNC: 10.3 G/DL (ref 12–16)

## 2022-10-25 ENCOUNTER — TELEPHONE (OUTPATIENT)
Dept: PAIN MEDICINE | Facility: HOSPITAL | Age: 62
End: 2022-10-25

## 2022-10-29 LAB — POTASSIUM SERPL-SCNC: 2.6 MEQ/L (ref 3.5–5.1)

## 2022-11-04 LAB
BASOPHILS NFR BLD: 0.3 % (ref 0–1.5)
DIALYSIS START TIME: 1116
DIALYSIS STOP TIME: 1420
EOSINOPHIL NFR BLD: 1.2 % (ref 0–7)
ERYTHROCYTE [DISTWIDTH] IN BLOOD BY AUTOMATED COUNT: 16 % (ref 11.5–14.5)
HCT VFR BLD AUTO: 31.7 % (ref 37–47)
HEMOGLOBIN X 3: 29.7 % (ref 36–48)
HGB BLD-MCNC: 9.9 G/DL (ref 12–16)
LUC: 1 % (ref 0–4)
LYMPH%: 12.5 % (ref 19–48)
MCH RBC QN AUTO: 29.7 PG (ref 27–31)
MCHC RBC AUTO-ENTMCNC: 31.2 G/DL (ref 30–36)
MCV RBC AUTO: 95 FL (ref 80–100)
MONO%: 7.8 % (ref 3–10)
NEUTROPHILS NFR BLD: 77.2 % (ref 40–75)
PLATELET # BLD AUTO: 207 1000/MCL (ref 130–400)
POST-WEIGHT, KG: 50.9 KG
POST-WEIGHT, LB: 112
PRE-WEIGHT, KG: 51.8 KG
PRE-WEIGHT, LB: 114
RBC # BLD AUTO: 3.33 MILL/MCL (ref 4.2–5.4)
WBC # BLD AUTO: 6.89 1000/MCL (ref 4.8–10.8)

## 2022-11-05 LAB
25(OH)D3 SERPL-MCNC: 21.9 NG/ML (ref 30–100)
ALBUMIN SERPL-MCNC: 4.1 G/DL (ref 3.5–5.2)
ALP SERPL-CCNC: 171 U/L (ref 35–104)
ALT SERPL W P-5'-P-CCNC: 18 U/L (ref 7–52)
BICARBONATE: 31 MEQ/L (ref 20–31)
BUN, POST: 5 MG/DL (ref 6–19)
BUN, PRE: 24 MG/DL (ref 6–19)
BUN/CREAT SERPL: 6.4 (ref 10–20)
CALCIUM PHOS PRODUCT: 26 (ref 0–54)
CALCIUM SERPL-MCNC: 9.1 MG/DL (ref 8.7–10.4)
CHLORIDE: 97 MEQ/L (ref 96–108)
CREAT SERPL-MCNC: 3.74 MG/DL (ref 0.6–1.3)
GLUCOSE SERPL-MCNC: 340 MG/DL (ref 70–100)
HBV SURFACE AG SERPL QL IA: NEGATIVE
IRON: 54 MCG/DL (ref 30–160)
PHOSPHATE FLD-MCNC: 2.9 MG/DL (ref 2.6–4.5)
POTASSIUM SERPL-SCNC: 2.6 MEQ/L (ref 3.5–5.1)
SODIUM BLD-SCNC: 138 MEQ/L (ref 136–145)
SPKT/V, DAUGIRDAS II: 1.75
TOTAL IRON BINDING CAPACITY: 250 MCG/DL (ref 185–515)
TRANSFERRIN SATURATION: 22 % (ref 20–55)
UIBC SERPL-MCNC: 196 MCG/DL (ref 155–355)
UREA REDUCTION RATIO: 79 % (ref 65–80)

## 2022-11-10 ENCOUNTER — OFFICE VISIT (OUTPATIENT)
Dept: INFECTIOUS DISEASES | Facility: CLINIC | Age: 62
End: 2022-11-10
Payer: MEDICARE

## 2022-11-10 VITALS
HEIGHT: 61 IN | BODY MASS INDEX: 21.44 KG/M2 | WEIGHT: 113.56 LBS | TEMPERATURE: 99 F | DIASTOLIC BLOOD PRESSURE: 72 MMHG | HEART RATE: 105 BPM | SYSTOLIC BLOOD PRESSURE: 128 MMHG

## 2022-11-10 DIAGNOSIS — R78.81 BACTEREMIA DUE TO METHICILLIN SUSCEPTIBLE STAPHYLOCOCCUS AUREUS (MSSA): Primary | ICD-10-CM

## 2022-11-10 DIAGNOSIS — B95.61 BACTEREMIA DUE TO METHICILLIN SUSCEPTIBLE STAPHYLOCOCCUS AUREUS (MSSA): Primary | ICD-10-CM

## 2022-11-10 LAB
HCT VFR BLD AUTO: 31 % (ref 37–47)
HEMOGLOBIN X 3: 29.7 % (ref 36–48)
HGB BLD-MCNC: 9.9 G/DL (ref 12–16)
PHOSPHATE FLD-MCNC: 2.3 MG/DL (ref 2.6–4.5)
POTASSIUM SERPL-SCNC: 2.6 MEQ/L (ref 3.5–5.1)

## 2022-11-10 PROCEDURE — 99213 OFFICE O/P EST LOW 20 MIN: CPT | Mod: PBBFAC | Performed by: INTERNAL MEDICINE

## 2022-11-10 PROCEDURE — 99999 PR PBB SHADOW E&M-EST. PATIENT-LVL III: CPT | Mod: PBBFAC,,, | Performed by: INTERNAL MEDICINE

## 2022-11-10 PROCEDURE — 99213 PR OFFICE/OUTPT VISIT, EST, LEVL III, 20-29 MIN: ICD-10-PCS | Mod: S$PBB,,, | Performed by: INTERNAL MEDICINE

## 2022-11-10 PROCEDURE — 99999 PR PBB SHADOW E&M-EST. PATIENT-LVL III: ICD-10-PCS | Mod: PBBFAC,,, | Performed by: INTERNAL MEDICINE

## 2022-11-10 PROCEDURE — 99213 OFFICE O/P EST LOW 20 MIN: CPT | Mod: S$PBB,,, | Performed by: INTERNAL MEDICINE

## 2022-11-10 RX ORDER — SODIUM BICARBONATE 650 MG/1
650 TABLET ORAL 4 TIMES DAILY
COMMUNITY

## 2022-11-10 NOTE — PROGRESS NOTES
INFECTIOUS DISEASE CLIN[facility]  11/10/2022 9:30 AM    Subjective:      Chief Complaint: hospital follow up    History of Present Illness:    Patient Anjali Dorantes is a 62 y.o. female with h/o ESRD on HD, DM who presents today for hospital follow up of MSSA bacteremia. Had initially presented 9/22 9/22 with generalized weakness and fever. Has tunneled cath placed July 2022 for hd, denies other indwelling hardware. Bcx x 2 with MSSA  from 9/21. Repeat 9/22 positive, 9/23 NGTD. 2d and CHIKA without veg.  Id recommended 4 weeks iv cefazolin with hd, which she completed and has been off abx for several weeks. Denies issues with abx. Denies fever/chills. Denies problems with permecath. Denies new complaints. Agreeable to vaccines, but wants to check vaccine history with PCP first.         CHIKA 9/26  The left ventricle is normal in size with normal systolic function.  The estimated ejection fraction is 65%.  Normal left ventricular diastolic function.  Normal right ventricular size with normal right ventricular systolic function.  No interatrial septal defect present.  Normal appearing left atrial appendage. No thrombus is present in the appendage.  No obvious evidence of endocarditis.        Bcx cleared 9/23 x 2              Review of Symptoms:  Constitutional: Denies fevers, chills, or weakness.  ENT: Denies dysphagia, nasal discharge, ear pain or discharge.  Cardiovascular: Denies chest pain, palpitations, orthopnea, or claudication.  Respiratory: Denies shortness of breath, cough, hemoptysis, or wheezing.  GI: Denies nausea/vomitting, hematochezia, melena, abd pain, or changes in appetite.  : Denies dysuria, incontinence, or hematuria.  Musculoskeletal: Denies joint pain or myalgias.  Skin/breast: permecath   Neurologic: Denies headache, dizziness, vertigo, or paresthesias.    Past Medical History:   Diagnosis Date    AMS (altered mental status) 06/30/2021    Anemia in CKD (chronic kidney disease)     CKD (chronic  "kidney disease)     Diabetes mellitus     Encounter for blood transfusion     Hypercholesterolemia     Hypertension     Stroke 2015    TIA    Uterine cancer 2021    Endometrial Cancer       Past Surgical History:   Procedure Laterality Date    ARTERIOGRAM, CEREBRAL ARTERIES  02/12/2014    and 3/18/2014    HYSTERECTOMY      OMENTECTOMY N/A 03/30/2022    Procedure: OMENTECTOMY;  Surgeon: Reuben Lopez MD;  Location: [facility] OR [address] [place] [address];  Service: OB/GYN;  Laterality: N/A;    TOTAL ABDOMINAL HYSTERECTOMY W/ BILATERAL SALPINGOOPHORECTOMY N/A 03/30/2022    Procedure: HYSTERECTOMY, TOTAL, ABDOMINAL, WITH BILATERAL SALPINGO-OOPHORECTOMY;  Surgeon: Reuben Lopez MD;  Location: [facility] OR [address] [place] [address];  Service: OB/GYN;  Laterality: N/A;       No family history on file.    Social History     Socioeconomic History    Marital status:    Tobacco Use    Smoking status: Never    Smokeless tobacco: Never   Substance and Sexual Activity    Alcohol use: No    Drug use: No    Sexual activity: Not Currently     Partners: Male       Review of patient's allergies indicates:   Allergen Reactions    Glipizide Other (See Comments)         Objective:   /72 (BP Location: Left arm)   Pulse 105   Temp 98.8 °F (37.1 °C) (Oral)   Ht 5' 1" (1.549 m)   Wt 51.5 kg (113 lb 8.6 oz)   BMI 21.45 kg/m²     General: Afebrile, alert, comfortable, no acute distress.   HEENT: ALBERTO. EOMI, no scleral icterus.   Pulmonary: Non labored,clear to auscultation A/P/L. No wheezing, crackles, or rhonchi.  Cardiac: normal S1 & S2 w/o rubs/murmurs/gallops.   Abdominal: Non-tender, non-distended.  Extremities: Moves all extremities x 4. No peripheral edema.   Skin: permecath in L chest. No surrounding erythema tenderness or warmth  Neurological:  Alert and oriented x 4.     Labs:    Glucose   Date Value Ref Range Status   11/02/2022 340 (H) 70 - 100 mg/dL Final   10/18/2022 271 (H) 70 - 110 mg/dL Final   10/17/2022 308 (H) 70 - 100 mg/dL Final       Calcium "   Date Value Ref Range Status   11/02/2022 9.1 8.7 - 10.4 mg/dL Final     Comment:     Please note change in reference range.   10/18/2022 9.7 8.7 - 10.5 mg/dL Final   10/17/2022 9.3 8.7 - 10.4 mg/dL Final     Comment:     Please note change in reference range.       Albumin   Date Value Ref Range Status   11/02/2022 4.1 3.5 - 5.2 g/dL Final   10/18/2022 3.8 3.5 - 5.2 g/dL Final   10/17/2022 4.3 3.5 - 5.2 g/dL Final   10/11/2022 3.7 3.5 - 5.2 g/dL Final   10/07/2022 4.0 3.5 - 5.2 g/dL Final       Total Protein   Date Value Ref Range Status   10/18/2022 8.1 6.0 - 8.4 g/dL Final   10/11/2022 8.2 6.0 - 8.4 g/dL Final   09/21/2022 8.3 6.0 - 8.4 g/dL Final       Sodium   Date Value Ref Range Status   11/02/2022 138 136 - 145 mEq/L Final   10/18/2022 139 136 - 145 mmol/L Final   10/17/2022 139 136 - 145 mEq/L Final       Potassium   Date Value Ref Range Status   11/02/2022 2.6 (L) 3.5 - 5.1 mEq/L Final     Comment:     Verified by repeat analysis.   10/28/2022 2.6 (L) 3.5 - 5.1 mEq/L Final     Comment:     Verified by repeat analysis.   10/18/2022 3.5 3.5 - 5.1 mmol/L Final       CO2   Date Value Ref Range Status   10/18/2022 29 23 - 29 mmol/L Final   10/11/2022 30 (H) 23 - 29 mmol/L Final   09/26/2022 23 23 - 29 mmol/L Final       Chloride   Date Value Ref Range Status   11/02/2022 97 96 - 108 mEq/L Final   10/18/2022 96 95 - 110 mmol/L Final   10/17/2022 96 96 - 108 mEq/L Final   10/11/2022 95 95 - 110 mmol/L Final   10/07/2022 98 96 - 108 mEq/L Final       BUN   Date Value Ref Range Status   10/18/2022 12 8 - 23 mg/dL Final   10/11/2022 10 8 - 23 mg/dL Final   09/26/2022 23 8 - 23 mg/dL Final       Creatinine   Date Value Ref Range Status   11/02/2022 3.74 (H) 0.60 - 1.30 mg/dL Final   10/18/2022 2.4 (H) 0.5 - 1.4 mg/dL Final   10/17/2022 2.77 (H) 0.60 - 1.30 mg/dL Final     Comment:     Custom Exception       Alkaline Phosphatase   Date Value Ref Range Status   11/02/2022 171 (H) 35 - 104 U/L Final   10/18/2022 191  (H) 55 - 135 U/L Final   10/17/2022 179 (H) 35 - 104 U/L Final       ALT   Date Value Ref Range Status   11/02/2022 18 7 - 52 U/L Final   10/18/2022 10 10 - 44 U/L Final   10/17/2022 13 7 - 52 U/L Final       AST   Date Value Ref Range Status   10/18/2022 42 (H) 10 - 40 U/L Final   10/11/2022 21 10 - 40 U/L Final   09/21/2022 17 10 - 40 U/L Final       Total Bilirubin   Date Value Ref Range Status   10/18/2022 0.4 0.1 - 1.0 mg/dL Final     Comment:     For infants and newborns, interpretation of results should be based  on gestational age, weight and in agreement with clinical  observations.    Premature Infant recommended reference ranges:  Up to 24 hours.............<8.0 mg/dL  Up to 48 hours............<12.0 mg/dL  3-5 days..................<15.0 mg/dL  6-29 days.................<15.0 mg/dL     10/11/2022 0.5 0.1 - 1.0 mg/dL Final     Comment:     For infants and newborns, interpretation of results should be based  on gestational age, weight and in agreement with clinical  observations.    Premature Infant recommended reference ranges:  Up to 24 hours.............<8.0 mg/dL  Up to 48 hours............<12.0 mg/dL  3-5 days..................<15.0 mg/dL  6-29 days.................<15.0 mg/dL     09/21/2022 1.1 (H) 0.1 - 1.0 mg/dL Final     Comment:     For infants and newborns, interpretation of results should be based  on gestational age, weight and in agreement with clinical  observations.    Premature Infant recommended reference ranges:  Up to 24 hours.............<8.0 mg/dL  Up to 48 hours............<12.0 mg/dL  3-5 days..................<15.0 mg/dL  6-29 days.................<15.0 mg/dL         WBC   Date Value Ref Range Status   11/02/2022 6.89 4.80 - 10.80 1000/mcL Final   10/18/2022 7.90 3.90 - 12.70 K/uL Final   10/17/2022 6.01 4.80 - 10.80 1000/mcL Final       Hemoglobin   Date Value Ref Range Status   11/02/2022 9.9 (L) 12.0 - 16.0 g/dL Final   10/19/2022 10.3 (L) 12.0 - 16.0 g/dL Final   10/18/2022  10.8 (L) 12.0 - 16.0 g/dL Final       POC Hematocrit   Date Value Ref Range Status   03/30/2022 30 (L) 36 - 54 %PCV Final   03/30/2022 25 (L) 36 - 54 %PCV Final   03/30/2022 18 (LL) 36 - 54 %PCV Final     Hematocrit   Date Value Ref Range Status   11/02/2022 31.7 (L) 37.0 - 47.0 % Final   10/19/2022 31.5 (L) 37.0 - 47.0 % Final   10/18/2022 34.8 (L) 37.0 - 48.5 % Final       MCV   Date Value Ref Range Status   11/02/2022 95 80 - 100 fl Final   10/18/2022 97 82 - 98 fL Final   10/17/2022 95 80 - 100 fl Final       Platelets   Date Value Ref Range Status   11/02/2022 207 130 - 400 1000/mcL Final   10/18/2022 279 150 - 450 K/uL Final   10/17/2022 229 130 - 400 1000/mcL Final       Lab Results   Component Value Date    CHOL 114 10/17/2022    CHOL 105 10/07/2022    CHOL 97 (L) 07/01/2021       Lab Results   Component Value Date    HDL 40 10/17/2022    HDL 38 10/07/2022    HDL 17 (L) 07/01/2021       Lab Results   Component Value Date    LDLCALC 35 10/17/2022    LDLCALC 41 10/07/2022    LDLCALC 43.2 (L) 07/01/2021       Lab Results   Component Value Date    TRIG 197 (H) 10/17/2022    TRIG 130 10/07/2022    TRIG 184 (H) 07/01/2021       Lab Results   Component Value Date    CHOLHDL 2.9 10/17/2022    CHOLHDL 2.8 10/07/2022    CHOLHDL 17.5 (L) 07/01/2021     No results found for: RPR  No results found for: QUANTIFERON    Medications:  Current Outpatient Medications on File Prior to Visit   Medication Sig Dispense Refill    acetaminophen (TYLENOL) 325 MG tablet Take 2 tablets (650 mg total) by mouth every 6 (six) hours as needed for Pain (Take every 6 hours for 5-7 days and then as needed). 60 tablet 1    allopurinoL (ZYLOPRIM) 100 MG tablet Take 100 mg by mouth once daily.      amLODIPine (NORVASC) 5 MG tablet Take 5 mg by mouth once daily.      ceFAZolin 1 g/50ml Dextrose IVPB (ANCEF) 1 gram/50 mL 2g/2g/3g after HD every Mon/Wed/Fri until 10/20/22      clopidogreL (PLAVIX) 75 mg tablet Take 75 mg by mouth once daily.       dulaglutide (TRULICITY) 0.75 mg/0.5 mL pen injector Inject 0.75 mg into the skin every 7 days.      ferrous sulfate 325 (65 FE) MG EC tablet Take 1 tablet (325 mg total) by mouth once daily. (Patient not taking: Reported on 9/28/2022) 60 tablet 2    metoprolol succinate (TOPROL-XL) 50 MG 24 hr tablet Take 50 mg by mouth once daily.      pantoprazole (PROTONIX) 20 MG tablet Take 20 mg by mouth once daily.       rosuvastatin (CRESTOR) 20 MG tablet take ONE tablet every night at bedtime (for cholesterol)      venlafaxine (EFFEXOR) 75 MG tablet Take 75 mg by mouth 2 (two) times daily.        Current Facility-Administered Medications on File Prior to Visit   Medication Dose Route Frequency Provider Last Rate Last Admin    [COMPLETED] heparin (porcine) injection 1,800 Units  1,800 Units Intra-Catheter 1 time in Clinic/FLAKITO Boyle MD   1,800 Units at 11/09/22 1412    [COMPLETED] heparin (porcine) injection 1,800 Units  1,800 Units Intra-Catheter 1 time in Clinic/FLAKITO Boyle MD   1,800 Units at 11/09/22 1412    [COMPLETED] iron sucrose Soln 50 mg  50 mg Injection 1 time in Clinic/FLAKITO Boyle MD   50 mg at 11/09/22 1200    [DISCONTINUED] ondansetron injection 4 mg  4 mg Intravenous Once Leticia Boyle MD           Antibiotics:   Antibiotics (From admission, onward)      None            HIV: No components found for: HIV 1/2 AG/AB  Hepatitis C IgG: No components found for: HEPATITIS C  Syphilis: No results found for: RPR    Hepatitis A IgG: No components found for: HEPATITIS A IGG  Hepatitis Bc IgG: No components found for: HEPATITIS B CORE IGG  Hepatitis Bs IgG:  Quantiferon: No results found for: QUANTIFERON  VZV IgG: No components found for: VARICELLA IGG    No components found for: SEDIMENTATION RATE  No components found for: C-REACTIVE PROTEIN      Microbiology x 7d:   Microbiology Results (last 7 days)       ** No results found for the last 168 hours. **            Immunization History   Administered Date(s)  Administered    COVID-19, MRNA, LN-S, PF (Pfizer) (Purple Cap) 03/23/2021, 04/13/2021, 12/28/2021         Reviewed records today as well as relevant labs, cultures, and imaging    Assessment:     MSSA bacteremia  Tunneled cath infection  ESRD on HD  DM  Vaccine counseling    Suspect source of infection was permecath and has been removed. Cultures cleared with line removal. Fortunately no metastatic signs of infection and CHIKA negative.     No toxicities from antimicrobials  Extremely medically complex  Patient is high risk for infectious complications given permecath    Plan:     Agree with stopping antibiotics  Discussed vaccines, but pt to d/w pcp first (looks like last TdaP 2005, so should be updated)         I spent a total of 20 minutes on the day of the visit. This includes face to face time and non-face to face time preparing to see the patient (eg, review of tests), obtaining and/or reviewing separately obtained history, documenting clinical information in the electronic or other health record, independently interpreting results, and communicating results to the patient/family/caregiver, or care coordination.      Eva Polanco MD, MPH  Infectious Disease

## 2022-11-11 ENCOUNTER — PATIENT MESSAGE (OUTPATIENT)
Dept: NEPHROLOGY | Facility: CLINIC | Age: 62
End: 2022-11-11
Payer: MEDICARE

## 2022-11-17 LAB
HCT VFR BLD AUTO: 29.4 % (ref 37–47)
HEMOGLOBIN X 3: 28.5 % (ref 36–48)
HGB BLD-MCNC: 9.5 G/DL (ref 12–16)

## 2022-11-22 LAB
HCT VFR BLD AUTO: 30.7 % (ref 37–47)
HEMOGLOBIN X 3: 30 % (ref 36–48)
HGB BLD-MCNC: 10 G/DL (ref 12–16)
PHOSPHATE FLD-MCNC: 3.2 MG/DL (ref 2.6–4.5)
POTASSIUM SERPL-SCNC: 3 MEQ/L (ref 3.5–5.1)

## 2022-12-01 LAB
PHOSPHATE FLD-MCNC: 3.1 MG/DL (ref 2.6–4.5)
POTASSIUM SERPL-SCNC: 2.7 MEQ/L (ref 3.5–5.1)

## 2022-12-08 LAB
ALBUMIN SERPL-MCNC: 4.4 G/DL (ref 3.5–5.2)
ALP SERPL-CCNC: 160 U/L (ref 35–104)
ALT SERPL W P-5'-P-CCNC: 28 U/L (ref 7–52)
BASOPHILS NFR BLD: 0.1 % (ref 0–1.5)
BICARBONATE: 23 MEQ/L (ref 20–31)
BUN, POST: 5 MG/DL (ref 6–19)
BUN, PRE: 23 MG/DL (ref 6–19)
BUN/CREAT SERPL: 6.7 (ref 10–20)
CALCIUM PHOS PRODUCT: 23 (ref 0–54)
CALCIUM SERPL-MCNC: 9.2 MG/DL (ref 8.7–10.4)
CHLORIDE: 102 MEQ/L (ref 96–108)
CREAT SERPL-MCNC: 3.42 MG/DL (ref 0.6–1.3)
DIALYSIS START TIME: 1119
DIALYSIS STOP TIME: 1422
EOSINOPHIL NFR BLD: 0.8 % (ref 0–7)
ERYTHROCYTE [DISTWIDTH] IN BLOOD BY AUTOMATED COUNT: 17.8 % (ref 11.5–14.5)
GLUCOSE SERPL-MCNC: 206 MG/DL (ref 70–100)
HBV SURFACE AG SERPL QL IA: NEGATIVE
HCT VFR BLD AUTO: 32.5 % (ref 37–47)
HEMOGLOBIN X 3: 31.8 % (ref 36–48)
HGB BLD-MCNC: 10.6 G/DL (ref 12–16)
IRON: 41 MCG/DL (ref 30–160)
LUC: 0.6 % (ref 0–4)
LYMPH%: 15.3 % (ref 19–48)
MCH RBC QN AUTO: 29.8 PG (ref 27–31)
MCHC RBC AUTO-ENTMCNC: 32.7 G/DL (ref 30–36)
MCV RBC AUTO: 91 FL (ref 80–100)
MONO%: 5.8 % (ref 3–10)
NEUTROPHILS NFR BLD: 77.4 % (ref 40–75)
PHOSPHATE FLD-MCNC: 2.5 MG/DL (ref 2.6–4.5)
PLATELET # BLD AUTO: 181 1000/MCL (ref 130–400)
POST-WEIGHT, KG: 47.8 KG
POST-WEIGHT, LB: 105.2
POTASSIUM SERPL-SCNC: 3 MEQ/L (ref 3.5–5.1)
PRE-WEIGHT, KG: 48.8 KG
PRE-WEIGHT, LB: 107.4
PTH, INTACT: 241 PG/ML (ref 16–80)
RBC # BLD AUTO: 3.56 MILL/MCL (ref 4.2–5.4)
SODIUM BLD-SCNC: 137 MEQ/L (ref 136–145)
SPKT/V, DAUGIRDAS II: 1.71
TOTAL IRON BINDING CAPACITY: 239 MCG/DL (ref 185–515)
TRANSFERRIN SATURATION: 17 % (ref 20–55)
UIBC SERPL-MCNC: 198 MCG/DL (ref 155–355)
UREA REDUCTION RATIO: 78 % (ref 65–80)
WBC # BLD AUTO: 8.28 1000/MCL (ref 4.8–10.8)

## 2022-12-22 LAB
HCT VFR BLD AUTO: 33.4 % (ref 37–47)
HEMOGLOBIN X 3: 33.3 % (ref 36–48)
HGB BLD-MCNC: 11.1 G/DL (ref 12–16)

## 2022-12-26 PROBLEM — A41.9 SEPSIS: Status: RESOLVED | Noted: 2022-09-21 | Resolved: 2022-12-26

## 2022-12-30 ENCOUNTER — PATIENT MESSAGE (OUTPATIENT)
Dept: NEPHROLOGY | Facility: CLINIC | Age: 62
End: 2022-12-30
Payer: MEDICARE

## 2023-01-03 ENCOUNTER — OFFICE VISIT (OUTPATIENT)
Dept: URGENT CARE | Facility: CLINIC | Age: 63
End: 2023-01-03
Payer: MEDICARE

## 2023-01-03 VITALS
HEIGHT: 61 IN | OXYGEN SATURATION: 98 % | DIASTOLIC BLOOD PRESSURE: 84 MMHG | SYSTOLIC BLOOD PRESSURE: 128 MMHG | HEART RATE: 110 BPM | WEIGHT: 113 LBS | RESPIRATION RATE: 16 BRPM | BODY MASS INDEX: 21.34 KG/M2 | TEMPERATURE: 99 F

## 2023-01-03 DIAGNOSIS — N30.01 ACUTE CYSTITIS WITH HEMATURIA: Primary | ICD-10-CM

## 2023-01-03 DIAGNOSIS — R39.15 URGENCY OF URINATION: ICD-10-CM

## 2023-01-03 DIAGNOSIS — R39.9 UTI SYMPTOMS: ICD-10-CM

## 2023-01-03 LAB
BILIRUB UR QL STRIP: NEGATIVE
GLUCOSE UR QL STRIP: NEGATIVE
KETONES UR QL STRIP: NEGATIVE
LEUKOCYTE ESTERASE UR QL STRIP: POSITIVE
PH, POC UA: 5.5 (ref 5–8)
POC BLOOD, URINE: POSITIVE
POC NITRATES, URINE: POSITIVE
PROT UR QL STRIP: POSITIVE
SP GR UR STRIP: 1.02 (ref 1–1.03)
UROBILINOGEN UR STRIP-ACNC: ABNORMAL (ref 0.1–1.1)

## 2023-01-03 PROCEDURE — 99213 PR OFFICE/OUTPT VISIT, EST, LEVL III, 20-29 MIN: ICD-10-PCS | Mod: S$GLB,,, | Performed by: FAMILY MEDICINE

## 2023-01-03 PROCEDURE — 81003 POCT URINALYSIS, DIPSTICK, AUTOMATED, W/O SCOPE: ICD-10-PCS | Mod: QW,S$GLB,, | Performed by: FAMILY MEDICINE

## 2023-01-03 PROCEDURE — 87086 URINE CULTURE/COLONY COUNT: CPT | Performed by: FAMILY MEDICINE

## 2023-01-03 PROCEDURE — 99213 OFFICE O/P EST LOW 20 MIN: CPT | Mod: S$GLB,,, | Performed by: FAMILY MEDICINE

## 2023-01-03 PROCEDURE — 81003 URINALYSIS AUTO W/O SCOPE: CPT | Mod: QW,S$GLB,, | Performed by: FAMILY MEDICINE

## 2023-01-03 RX ORDER — CEFDINIR 300 MG/1
300 CAPSULE ORAL DAILY
Qty: 7 CAPSULE | Refills: 0 | Status: SHIPPED | OUTPATIENT
Start: 2023-01-03 | End: 2023-01-10

## 2023-01-03 NOTE — PROGRESS NOTES
"Subjective:       Patient ID: Anjali Dorantes is a 62 y.o. female.    Vitals:  height is 5' 1" (1.549 m) and weight is 51.3 kg (113 lb). Her tympanic temperature is 98.7 °F (37.1 °C). Her blood pressure is 128/84 and her pulse is 110. Her respiration is 16 and oxygen saturation is 98%.     Chief Complaint: Urinary Tract Infection    Pt is coming in with a possible UTI that started about 3 to 5 days ago. Pt son says she have been having the urgency, frequency, and strong urine smell. Pt son says he doesn't have any pain or burning.Pt also has been feeling very fatigue.  Pt son says she didn't take any medication to help.    Provider note begins below:  Past Medical History:  06/30/2021: AMS (altered mental status)  No date: Anemia in CKD (chronic kidney disease)  No date: CKD (chronic kidney disease)  No date: Diabetes mellitus  No date: Encounter for blood transfusion  No date: Hypercholesterolemia  No date: Hypertension  2015: Stroke      Comment:  TIA  2021: Uterine cancer      Comment:  Endometrial Cancer   Pt with above pmh, dialysis MWF, presents with son who provides, hx. He is declining . He reports Friday he noticed a strong smell to her urine, and since then and says she has chills after voiding. Son notices she has been more tired lately. Denies any ams, fever, hr 110 which he reports is baseline for her. Denies any hx of recurrent UTI.     Urinary Tract Infection   This is a new problem. The current episode started in the past 7 days. The problem occurs every urination. The problem has been gradually worsening. The pain is at a severity of 0/10. The patient is experiencing no pain. There has been no fever. She is Not sexually active. There is No history of pyelonephritis. Associated symptoms include chills, frequency and urgency. Pertinent negatives include no behavior changes, discharge, flank pain, hematuria, hesitancy, nausea, possible pregnancy, sweats, vomiting, weight loss, bubble bath " use, constipation, rash or withholding. She has tried nothing for the symptoms. The treatment provided no relief. Her past medical history is significant for diabetes mellitus and hypertension. There is no history of catheterization.     Constitution: Positive for activity change, chills and fatigue. Negative for appetite change, sweating, fever and unexpected weight change.   Gastrointestinal:  Negative for nausea, vomiting and constipation.   Genitourinary:  Positive for frequency and urgency. Negative for dysuria, urine decreased, flank pain, bladder incontinence, bed wetting and hematuria.   Skin:  Negative for rash.     Objective:      Physical Exam   Constitutional: She is oriented to person, place, and time. She appears well-developed.  Non-toxic appearance. She does not appear ill. No distress.   HENT:   Head: Normocephalic and atraumatic.   Ears:   Right Ear: External ear normal.   Left Ear: External ear normal.   Nose: Nose normal.   Mouth/Throat: Oropharynx is clear and moist.   Eyes: Conjunctivae, EOM and lids are normal. Pupils are equal, round, and reactive to light.   Neck: Trachea normal and phonation normal. Neck supple.   Abdominal: Soft. flat abdomen There is no abdominal tenderness. There is no rebound, no guarding, no tenderness at McBurney's point, negative Srivastava's sign, no left CVA tenderness, negative Rovsing's sign, negative psoas sign, no right CVA tenderness and negative obturator sign.   Musculoskeletal: Normal range of motion.         General: Normal range of motion.   Neurological: She is alert and oriented to person, place, and time.   Skin: Skin is warm, dry, intact and not diaphoretic.   Psychiatric: Her speech is normal and behavior is normal. Judgment and thought content normal.   Nursing note and vitals reviewed.      Assessment:       1. Acute cystitis with hematuria    2. Urgency of urination    3. UTI symptoms          Results for orders placed or performed in visit on  01/03/23   POCT Urinalysis, Dipstick, Automated, W/O Scope   Result Value Ref Range    POC Blood, Urine Positive (A) Negative    POC Bilirubin, Urine Negative Negative    POC Urobilinogen, Urine norm 0.1 - 1.1    POC Ketones, Urine Negative Negative    POC Protein, Urine Positive (A) Negative    POC Nitrates, Urine Positive (A) Negative    POC Glucose, Urine Negative Negative    pH, UA 5.5 5 - 8    POC Specific Gravity, Urine 1.020 1.003 - 1.029    POC Leukocytes, Urine Positive (A) Negative      Plan:       Complicated, will have her fu with pcp 2-3 days for re-eval  Cefdinir renal dosing applied.  Reviewed with son to take post dialysis.    Discussed results/diagnosis/plan with son and pt in clinic. Strict precautions given to son to monitor for worsening signs and symptoms. Advised to follow up with PCP or specialist.    Explained side effects of medications prescribed with patient and informed him/her to discontinue use if he/she has any side effects and to inform UC or PCP if this occurs. All questions answered. Strict ED verses clinic return precautions stressed and given in depth. Advised if symptoms worsens of fail to improve he/she should go to the Emergency Room. Discharge and follow-up instructions given verbally/printed with the patient who expressed understanding and willingness to comply with my recommendations. Patient voiced understanding and in agreement with current treatment plan. Patient exits the exam room in no acute distress. Conversant and engaged during discharge discussion, verbalized understanding.      Acute cystitis with hematuria  -     cefdinir (OMNICEF) 300 MG capsule; Take 1 capsule (300 mg total) by mouth once daily. for 7 days  Dispense: 7 capsule; Refill: 0    Urgency of urination  -     POCT Urinalysis, Dipstick, Automated, W/O Scope  -     CULTURE, URINE    UTI symptoms  -     CULTURE, URINE           Medical Decision Making:   Clinical Tests:   Lab Tests: Ordered and Reviewed      Patient Instructions   General Discharge Instructions   PLEASE READ YOUR DISCHARGE INSTRUCTIONS ENTIRELY AS IT CONTAINS IMPORTANT INFORMATION.  If you were prescribed a narcotic or controlled medication, do not drive or operate heavy equipment or machinery while taking these medications.  If you were prescribed antibiotics, please take them to completion.  You must understand that you've received an Urgent Care treatment only and that you may be released before all your medical problems are known or treated. You, the patient, will arrange for follow up care as instructed.    OVER THE COUNTER RECOMMENDATIONS/SUGGESTIONS.    Make sure to stay well hydrated.    Use Nasal Saline to mechanically move any post nasal drip from your eustachian tube or from the back of your throat.    Use warm salt water gargles to ease your throat pain. Warm salt water gargles as needed for sore throat- 1/2 tsp salt to 1 cup warm water, gargle as desired.    Use an antihistamine such as Claritin, Zyrtec or Allegra to dry you out.    Use pseudoephedrine (behind the counter) to decongest. Pseudoephedrine 30 mg up to 240 mg /day. It can raise your blood pressure and give you palpitations.    Use mucinex (guaifenesin) to break up mucous up to 2400mg/day to loosen any mucous.    The mucinex DM pill has a cough suppressant that can be sedating. It can be used at night to stop the tickle at the back of your throat.    You can use Mucinex D (it has guaifenesin and a high dose of pseudoephedrine) in the mornings to help decongest.    Use Afrin in each nare for no longer than 3 days, as it is addictive. It can also dry out your mucous membranes and cause elevated blood pressure. This is especially useful if you are flying.    Use Flonase 1-2 sprays/nostril per day. It is a local acting steroid nasal spray, if you develop a bloody nose, stop using the medication immediately.    Sometimes Nyquil at night is beneficial to help you get some rest,  however it is sedating and it does have an antihistamine, and tylenol.    Honey is a natural cough suppressant that can be used.    Tylenol up to 4,000 mg a day is safe for short periods and can be used for body aches, pain, and fever. However in high doses and prolonged use it can cause liver irritation.    Ibuprofen is a non-steroidal anti-inflammatory that can be used for body aches, pain, and fever.However it can also cause stomach irritation if over used.     Follow up with your PCP or specialty clinic as instructed in the next 2-3 days if not improved or as needed. You can call [phone] to schedule an appointment with appropriate provider.      If you condition worsens, we recommend that you receive another evaluation at the emergency room immediately or contact your primary medical clinic's after hours call service to discuss your concerns.      Please return here or go to the Emergency Department for any concerns or worsening condition.   You can also call [phone] to schedule an appointment with the appropriate provider.    Please return here or go to the Emergency Department for any concerns or worsening of condition.    Thank you for choosing [facility]!    Our goal in the Urgent Care is to always provide outstanding medical care. You may receive a survey by mail or e-mail in the next week regarding your experience today. We would greatly appreciate you completing and returning the survey. Your feedback provides us with a way to recognize our staff who provide very good care, and it helps us learn how to improve when your experience was below our aspiration of excellence.      We appreciate you trusting us with your medical care. We hope you feel better soon. We will be happy to take care of you for all of your future medical needs.    Sincerely,    ARGENTINA Garcia

## 2023-01-03 NOTE — PATIENT INSTRUCTIONS
General Discharge Instructions   PLEASE READ YOUR DISCHARGE INSTRUCTIONS ENTIRELY AS IT CONTAINS IMPORTANT INFORMATION.  If you were prescribed a narcotic or controlled medication, do not drive or operate heavy equipment or machinery while taking these medications.  If you were prescribed antibiotics, please take them to completion.  You must understand that you've received an Urgent Care treatment only and that you may be released before all your medical problems are known or treated. You, the patient, will arrange for follow up care as instructed.    OVER THE COUNTER RECOMMENDATIONS/SUGGESTIONS.    Make sure to stay well hydrated.    Use Nasal Saline to mechanically move any post nasal drip from your eustachian tube or from the back of your throat.    Use warm salt water gargles to ease your throat pain. Warm salt water gargles as needed for sore throat- 1/2 tsp salt to 1 cup warm water, gargle as desired.    Use an antihistamine such as Claritin, Zyrtec or Allegra to dry you out.    Use pseudoephedrine (behind the counter) to decongest. Pseudoephedrine 30 mg up to 240 mg /day. It can raise your blood pressure and give you palpitations.    Use mucinex (guaifenesin) to break up mucous up to 2400mg/day to loosen any mucous.    The mucinex DM pill has a cough suppressant that can be sedating. It can be used at night to stop the tickle at the back of your throat.    You can use Mucinex D (it has guaifenesin and a high dose of pseudoephedrine) in the mornings to help decongest.    Use Afrin in each nare for no longer than 3 days, as it is addictive. It can also dry out your mucous membranes and cause elevated blood pressure. This is especially useful if you are flying.    Use Flonase 1-2 sprays/nostril per day. It is a local acting steroid nasal spray, if you develop a bloody nose, stop using the medication immediately.    Sometimes Nyquil at night is beneficial to help you get some rest, however it is sedating and it  does have an antihistamine, and tylenol.    Honey is a natural cough suppressant that can be used.    Tylenol up to 4,000 mg a day is safe for short periods and can be used for body aches, pain, and fever. However in high doses and prolonged use it can cause liver irritation.    Ibuprofen is a non-steroidal anti-inflammatory that can be used for body aches, pain, and fever.However it can also cause stomach irritation if over used.     Follow up with your PCP or specialty clinic as instructed in the next 2-3 days if not improved or as needed. You can call [phone] to schedule an appointment with appropriate provider.      If you condition worsens, we recommend that you receive another evaluation at the emergency room immediately or contact your primary medical clinic's after hours call service to discuss your concerns.      Please return here or go to the Emergency Department for any concerns or worsening condition.   You can also call [phone] to schedule an appointment with the appropriate provider.    Please return here or go to the Emergency Department for any concerns or worsening of condition.    Thank you for choosing [facility]!    Our goal in the Urgent Care is to always provide outstanding medical care. You may receive a survey by mail or e-mail in the next week regarding your experience today. We would greatly appreciate you completing and returning the survey. Your feedback provides us with a way to recognize our staff who provide very good care, and it helps us learn how to improve when your experience was below our aspiration of excellence.      We appreciate you trusting us with your medical care. We hope you feel better soon. We will be happy to take care of you for all of your future medical needs.    Sincerely,    ARGENTINA Garcia

## 2023-01-04 LAB
BACTERIA UR CULT: NORMAL
BACTERIA UR CULT: NORMAL

## 2023-01-05 ENCOUNTER — TELEPHONE (OUTPATIENT)
Dept: URGENT CARE | Facility: CLINIC | Age: 63
End: 2023-01-05
Payer: MEDICARE

## 2023-01-05 NOTE — TELEPHONE ENCOUNTER
Results for orders placed or performed in visit on 01/03/23   CULTURE, URINE    Specimen: Urine, Clean Catch   Result Value Ref Range    Urine Culture, Routine       Multiple organisms isolated. None in predominance.  Repeat if    Urine Culture, Routine clinically necessary.    POCT Urinalysis, Dipstick, Automated, W/O Scope   Result Value Ref Range    POC Blood, Urine Positive (A) Negative    POC Bilirubin, Urine Negative Negative    POC Urobilinogen, Urine norm 0.1 - 1.1    POC Ketones, Urine Negative Negative    POC Protein, Urine Positive (A) Negative    POC Nitrates, Urine Positive (A) Negative    POC Glucose, Urine Negative Negative    pH, UA 5.5 5 - 8    POC Specific Gravity, Urine 1.020 1.003 - 1.029    POC Leukocytes, Urine Positive (A) Negative      Called to discuss test results with son who verified full name and date of birth.  Advised on urine culture results.  He reports his mom is doing better, less lethargic and tired.  He has been giving her the antibiotics as directed.  Advised to follow-up with any future questions or concerns they agreed with plan.

## 2023-01-20 ENCOUNTER — OFFICE VISIT (OUTPATIENT)
Dept: URGENT CARE | Facility: CLINIC | Age: 63
End: 2023-01-20
Payer: MEDICARE

## 2023-01-20 ENCOUNTER — PATIENT MESSAGE (OUTPATIENT)
Dept: NEPHROLOGY | Facility: CLINIC | Age: 63
End: 2023-01-20
Payer: MEDICARE

## 2023-01-20 VITALS
HEIGHT: 61 IN | SYSTOLIC BLOOD PRESSURE: 108 MMHG | HEART RATE: 110 BPM | BODY MASS INDEX: 21.34 KG/M2 | TEMPERATURE: 98 F | RESPIRATION RATE: 20 BRPM | OXYGEN SATURATION: 97 % | DIASTOLIC BLOOD PRESSURE: 73 MMHG | WEIGHT: 113 LBS

## 2023-01-20 DIAGNOSIS — N30.01 ACUTE CYSTITIS WITH HEMATURIA: ICD-10-CM

## 2023-01-20 DIAGNOSIS — R30.0 DYSURIA: ICD-10-CM

## 2023-01-20 DIAGNOSIS — N39.0 COMPLICATED UTI (URINARY TRACT INFECTION): Primary | ICD-10-CM

## 2023-01-20 LAB
BILIRUB UR QL STRIP: NEGATIVE
GLUCOSE UR QL STRIP: NEGATIVE
KETONES UR QL STRIP: NEGATIVE
LEUKOCYTE ESTERASE UR QL STRIP: POSITIVE
PH, POC UA: 5
POC BLOOD, URINE: POSITIVE
POC NITRATES, URINE: NEGATIVE
PROT UR QL STRIP: POSITIVE
SP GR UR STRIP: 1.02 (ref 1–1.03)
UROBILINOGEN UR STRIP-ACNC: NORMAL (ref 0.1–1.1)

## 2023-01-20 PROCEDURE — 81003 POCT URINALYSIS, DIPSTICK, AUTOMATED, W/O SCOPE: ICD-10-PCS | Mod: QW,S$GLB,, | Performed by: PHYSICIAN ASSISTANT

## 2023-01-20 PROCEDURE — 87086 URINE CULTURE/COLONY COUNT: CPT | Performed by: PHYSICIAN ASSISTANT

## 2023-01-20 PROCEDURE — 99214 OFFICE O/P EST MOD 30 MIN: CPT | Mod: S$GLB,,, | Performed by: PHYSICIAN ASSISTANT

## 2023-01-20 PROCEDURE — 87077 CULTURE AEROBIC IDENTIFY: CPT | Performed by: PHYSICIAN ASSISTANT

## 2023-01-20 PROCEDURE — 81003 URINALYSIS AUTO W/O SCOPE: CPT | Mod: QW,S$GLB,, | Performed by: PHYSICIAN ASSISTANT

## 2023-01-20 PROCEDURE — 87186 SC STD MICRODIL/AGAR DIL: CPT | Mod: 59 | Performed by: PHYSICIAN ASSISTANT

## 2023-01-20 PROCEDURE — 99214 PR OFFICE/OUTPT VISIT, EST, LEVL IV, 30-39 MIN: ICD-10-PCS | Mod: S$GLB,,, | Performed by: PHYSICIAN ASSISTANT

## 2023-01-20 PROCEDURE — 87088 URINE BACTERIA CULTURE: CPT | Performed by: PHYSICIAN ASSISTANT

## 2023-01-20 RX ORDER — AMOXICILLIN AND CLAVULANATE POTASSIUM 500; 125 MG/1; MG/1
1 TABLET, FILM COATED ORAL 2 TIMES DAILY
Qty: 20 TABLET | Refills: 0 | Status: SHIPPED | OUTPATIENT
Start: 2023-01-20 | End: 2023-01-27 | Stop reason: ALTCHOICE

## 2023-01-20 NOTE — PATIENT INSTRUCTIONS
You must understand that you've received an Urgent Care treatment only and that you may be released before all your medical problems are known or treated. You, the patient, will arrange for follow up care as instructed.      Follow up with your PCP or specialty clinic as instructed in the next 2-3 days if not improved or as needed. You can call [phone] to schedule an appointment with appropriate provider.      If you condition worsens, we recommend that you receive another evaluation at the emergency room immediately or contact your primary medical clinic's after hours call service to discuss your concerns.      Please return here or go to the Emergency Department for any concerns or worsening condition.      If you were prescribed a narcotic or controlled substance, do not drive or operate heavy equipment or machinery while taking these medications.

## 2023-01-20 NOTE — PROGRESS NOTES
"Subjective:       Patient ID: Anjali Dorantes is a 62 y.o. female.    Vitals:  height is 5' 1" (1.549 m) and weight is 51.3 kg (113 lb). Her oral temperature is 97.7 °F (36.5 °C). Her blood pressure is 108/73 and her pulse is 110. Her respiration is 20 and oxygen saturation is 97%.     Chief Complaint: Urinary Tract Infection    Patient provider note starts here:  Of note, patient's son is interpreting for patient-the patient requested for her son to interpret and she declined interpretation services today.   Patient presents with son for urinary urgency and frequency for the past 2 days. Of note, evaluated here 1/3 and diagnosed with UTI and put on Cefdinir, urine culture at the time showed multiple organisms without any in predominance. Reports that the symptoms improved but the daughter noticed urgency and frequency 2 days ago. Patient herself denies dysuria, flank pain, fevers, N/V. Patient is on hemodialysis and does dialysis again this afternoon.     Urinary Tract Infection   This is a new problem. Episode onset: 2 days ago. The problem has been gradually worsening. The patient is experiencing no pain. There has been no fever. She is Not sexually active. There is No history of pyelonephritis. Associated symptoms include frequency and urgency. Pertinent negatives include no behavior changes, chills, discharge, flank pain, hematuria, hesitancy, nausea, possible pregnancy, sweats, vomiting, weight loss, bubble bath use, constipation, rash or withholding. She has tried nothing for the symptoms. The treatment provided no relief. Her past medical history is significant for hypertension and recurrent UTIs. There is no history of catheterization, diabetes insipidus, diabetes mellitus, genitourinary reflux, kidney stones, a single kidney, STD, urinary stasis or a urological procedure.     Constitution: Negative for chills and fever.   Neck: Negative for neck pain.   Cardiovascular:  Negative for chest pain, " palpitations and sob on exertion.   Respiratory:  Negative for chest tightness and wheezing.    Gastrointestinal:  Negative for abdominal pain, nausea, vomiting, constipation and diarrhea.   Genitourinary:  Positive for frequency and urgency. Negative for dysuria, flank pain and hematuria.   Skin:  Negative for color change, rash and wound.   Neurological:  Negative for numbness and tingling.     Objective:      Physical Exam   Constitutional: She is oriented to person, place, and time. She appears well-developed.   HENT:   Head: Normocephalic and atraumatic.   Ears:   Right Ear: External ear normal.   Left Ear: External ear normal.   Nose: Nose normal. No nasal deformity. No epistaxis.   Mouth/Throat: Oropharynx is clear and moist and mucous membranes are normal.   Eyes: Lids are normal.   Neck: Trachea normal and phonation normal. Neck supple.   Cardiovascular: Normal pulses.   Pulmonary/Chest: Effort normal.   Abdominal: Normal appearance. Soft. There is no abdominal tenderness. There is no left CVA tenderness and no right CVA tenderness.   Neurological: She is alert and oriented to person, place, and time.   Skin: Skin is warm, dry and intact.   Psychiatric: Her speech is normal and behavior is normal.   Nursing note and vitals reviewed.      Assessment:       1. Complicated UTI (urinary tract infection)    2. Dysuria    3. Acute cystitis with hematuria          Results for orders placed or performed in visit on 01/20/23   POCT Urinalysis, Dipstick, Automated, W/O Scope   Result Value Ref Range    POC Blood, Urine Positive (A) Negative    POC Bilirubin, Urine Negative Negative    POC Urobilinogen, Urine normal 0.1 - 1.1    POC Ketones, Urine Negative Negative    POC Protein, Urine Positive (A) Negative    POC Nitrates, Urine Negative Negative    POC Glucose, Urine Negative Negative    pH, UA 5.0     POC Specific Gravity, Urine 1.005 1.003 - 1.029    POC Leukocytes, Urine Positive (A) Negative       Plan:          Complicated UTI (urinary tract infection)  -     amoxicillin-clavulanate 500-125mg (AUGMENTIN) 500-125 mg Tab; Take 1 tablet (500 mg total) by mouth 2 (two) times daily. for 10 days  Dispense: 20 tablet; Refill: 0  -     CULTURE, URINE    Dysuria  -     POCT Urinalysis, Dipstick, Automated, W/O Scope    Acute cystitis with hematuria           Medical Decision Making:   History:   Old Medical Records: I decided to obtain old medical records.  Differential Diagnosis:   Differential diagnosis includes but is not limited to: acute cystitis, complicated UTI, pyelonephritis, neprholithiasis   Clinical Tests:   Lab Tests: Ordered and Reviewed       <> Summary of Lab: UA with blood, protein and leukocytes  Urgent Care Management:  Patient presents with son with complaints of urinary urgency and frequency.  On exam, she is afebrile and nontoxic in appearance.  No CVA tenderness noted.  Urinalysis shows blood, protein, leukocytes.  Obtaining another urine culture and place on Augmentin, renally dose, for a complicated UTI.  Strongly encouraged very close follow-up with primary care.  Patient's son verbalized understanding and agreed with plan. Strict ED precautions discussed.        Patient Instructions   You must understand that you've received an Urgent Care treatment only and that you may be released before all your medical problems are known or treated. You, the patient, will arrange for follow up care as instructed.      Follow up with your PCP or specialty clinic as instructed in the next 2-3 days if not improved or as needed. You can call [phone] to schedule an appointment with appropriate provider.      If you condition worsens, we recommend that you receive another evaluation at the emergency room immediately or contact your primary medical clinic's after hours call service to discuss your concerns.      Please return here or go to the Emergency Department for any concerns or worsening condition.      If  you were prescribed a narcotic or controlled substance, do not drive or operate heavy equipment or machinery while taking these medications.

## 2023-01-23 LAB
BACTERIA UR CULT: ABNORMAL
BACTERIA UR CULT: ABNORMAL

## 2023-01-24 ENCOUNTER — HOSPITAL ENCOUNTER (EMERGENCY)
Facility: HOSPITAL | Age: 63
Discharge: HOME | End: 2023-01-24
Attending: EMERGENCY MEDICINE
Payer: MEDICARE

## 2023-01-24 ENCOUNTER — TELEPHONE (OUTPATIENT)
Dept: URGENT CARE | Facility: CLINIC | Age: 63
End: 2023-01-24
Payer: MEDICARE

## 2023-01-24 VITALS
TEMPERATURE: 98 F | HEART RATE: 104 BPM | WEIGHT: 113 LBS | DIASTOLIC BLOOD PRESSURE: 65 MMHG | RESPIRATION RATE: 16 BRPM | SYSTOLIC BLOOD PRESSURE: 99 MMHG | HEIGHT: 61 IN | BODY MASS INDEX: 21.34 KG/M2 | OXYGEN SATURATION: 97 %

## 2023-01-24 DIAGNOSIS — R31.9 URINARY TRACT INFECTION WITH HEMATURIA, SITE UNSPECIFIED: Primary | ICD-10-CM
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
## 2023-01-24 DIAGNOSIS — N39.0 URINARY TRACT INFECTION WITH HEMATURIA, SITE UNSPECIFIED: Primary | ICD-10-CM

## 2023-01-24 LAB
ALBUMIN SERPL BCP-MCNC: 3.4 G/DL (ref 3.5–5.2)
ALP SERPL-CCNC: 199 U/L (ref 55–135)
ALT SERPL W/O P-5'-P-CCNC: 29 U/L (ref 10–44)
ANION GAP SERPL CALC-SCNC: 12 MMOL/L (ref 8–16)
AST SERPL-CCNC: 37 U/L (ref 10–40)
BASOPHILS # BLD AUTO: 0.02 K/UL (ref 0–0.2)
BASOPHILS NFR BLD: 0.2 % (ref 0–1.9)
BILIRUB SERPL-MCNC: 0.5 MG/DL (ref 0.1–1)
BUN SERPL-MCNC: 13 MG/DL (ref 8–23)
CALCIUM SERPL-MCNC: 9 MG/DL (ref 8.7–10.5)
CHLORIDE SERPL-SCNC: 108 MMOL/L (ref 95–110)
CO2 SERPL-SCNC: 16 MMOL/L (ref 23–29)
CREAT SERPL-MCNC: 2.7 MG/DL (ref 0.5–1.4)
DIFFERENTIAL METHOD: ABNORMAL
EOSINOPHIL # BLD AUTO: 0 K/UL (ref 0–0.5)
EOSINOPHIL NFR BLD: 0.1 % (ref 0–8)
ERYTHROCYTE [DISTWIDTH] IN BLOOD BY AUTOMATED COUNT: 17 % (ref 11.5–14.5)
EST. GFR  (NO RACE VARIABLE): 19 ML/MIN/1.73 M^2
GLUCOSE SERPL-MCNC: 146 MG/DL (ref 70–110)
HCT VFR BLD AUTO: 35.4 % (ref 37–48.5)
HGB BLD-MCNC: 11.5 G/DL (ref 12–16)
IMM GRANULOCYTES # BLD AUTO: 0.04 K/UL (ref 0–0.04)
IMM GRANULOCYTES NFR BLD AUTO: 0.5 % (ref 0–0.5)
LYMPHOCYTES # BLD AUTO: 1 K/UL (ref 1–4.8)
LYMPHOCYTES NFR BLD: 11.3 % (ref 18–48)
MAGNESIUM SERPL-MCNC: 2 MG/DL (ref 1.6–2.6)
MCH RBC QN AUTO: 29.6 PG (ref 27–31)
MCHC RBC AUTO-ENTMCNC: 32.5 G/DL (ref 32–36)
MCV RBC AUTO: 91 FL (ref 82–98)
MONOCYTES # BLD AUTO: 0.7 K/UL (ref 0.3–1)
MONOCYTES NFR BLD: 7.5 % (ref 4–15)
NEUTROPHILS # BLD AUTO: 7.1 K/UL (ref 1.8–7.7)
NEUTROPHILS NFR BLD: 80.4 % (ref 38–73)
NRBC BLD-RTO: 0 /100 WBC
PHOSPHATE SERPL-MCNC: 2.4 MG/DL (ref 2.7–4.5)
PLATELET # BLD AUTO: 141 K/UL (ref 150–450)
PMV BLD AUTO: 9.7 FL (ref 9.2–12.9)
POCT GLUCOSE: 132 MG/DL (ref 70–110)
POTASSIUM SERPL-SCNC: 3 MMOL/L (ref 3.5–5.1)
PROT SERPL-MCNC: 7.4 G/DL (ref 6–8.4)
RBC # BLD AUTO: 3.89 M/UL (ref 4–5.4)
SODIUM SERPL-SCNC: 136 MMOL/L (ref 136–145)
WBC # BLD AUTO: 8.79 K/UL (ref 3.9–12.7)

## 2023-01-24 PROCEDURE — 84100 ASSAY OF PHOSPHORUS: CPT | Performed by: EMERGENCY MEDICINE

## 2023-01-24 PROCEDURE — 83735 ASSAY OF MAGNESIUM: CPT | Performed by: EMERGENCY MEDICINE

## 2023-01-24 PROCEDURE — 99285 EMERGENCY DEPT VISIT HI MDM: CPT | Mod: 25

## 2023-01-24 PROCEDURE — 80053 COMPREHEN METABOLIC PANEL: CPT | Performed by: EMERGENCY MEDICINE

## 2023-01-24 PROCEDURE — 82962 GLUCOSE BLOOD TEST: CPT

## 2023-01-24 PROCEDURE — 85025 COMPLETE CBC W/AUTO DIFF WBC: CPT | Performed by: EMERGENCY MEDICINE

## 2023-01-24 PROCEDURE — 25000003 PHARM REV CODE 250: Performed by: EMERGENCY MEDICINE

## 2023-01-24 RX ORDER — CIPROFLOXACIN 500 MG/1
500 TABLET ORAL EVERY 12 HOURS
Qty: 14 TABLET | Refills: 0 | Status: SHIPPED | OUTPATIENT
Start: 2023-01-24 | End: 2023-01-27 | Stop reason: ALTCHOICE

## 2023-01-24 RX ADMIN — POTASSIUM BICARBONATE 20 MEQ: 391 TABLET, EFFERVESCENT ORAL at 05:01

## 2023-01-24 NOTE — ED TRIAGE NOTES
"Fall (Pt uses a walker at home and takes Plavix s/p CVA w/ residual left sided weakness. Pt turned and "lost her balance", fell backwards and landed in tub. Pt denies hitting her head. No LOC. Pt states she hit her shoulders. Family noticed bruising behind the knees. No c-spine tenderness. Pt denies any complaints. Pt has history of DM, ESRD, CVA and recent UTI, still taking antibiotics. Conjunctiva slightly pale. )  "

## 2023-01-24 NOTE — TELEPHONE ENCOUNTER
Patient contacted.   Patient's son answered and is translating care for the patient.   Son informed that urine culture shows growth of 2 bacteria with no predominance.   Patient will be sent over cipro since the augment does not cover the second bacteria.   Chart review showed that pt was treated for a complicated UTI.   Patient will be treated for a 7 day course for complicated UTI.   All questions answered on the call.

## 2023-01-24 NOTE — DISCHARGE INSTRUCTIONS
Take your home medications as directed.  Follow up with her primary care doctor.  Return to the emergency department for any new or worsening complaints.

## 2023-01-24 NOTE — ED PROVIDER NOTES
"Encounter Date: 1/24/2023    SCRIBE #1 NOTE: I, Lion Shannon, am scribing for, and in the presence of,  Raya Han MD.     History     Chief Complaint   Patient presents with    Fall     Pt uses a walker at home and takes Plavix s/p CVA w/ residual left sided weakness.   Pt turned and "lost her balance", fell backwards and landed in tub.  Pt denies hitting her head. No LOC. Pt states she hit her shoulders.  Family noticed bruising behind the knees.  No c-spine tenderness.  Pt denies any complaints.  Pt has history of DM, ESRD, CVA and recent UTI, still taking antibiotics.  Conjunctiva slightly pale.      63 y/o female with DM, HTN, HLD, stroke with residual L deficits, CKD, on dialysis 3x/week, most recent yesterday, presents to ED following a fall backwards into a bathtub that occurred ~2 hours PTA. She denies head trauma, lightheadedness or dizziness prior to fall, loss of consciousness, or vision changes. She is on Plavix and has chronic L sided weakness s/p stroke. Denies sx preceding her fall. States she lost her balance while ambulating. Also states she has "bruising" on her shoulders from coining earlier in the week.  She was recently seen for UTI symptoms and is compliant with prescribed amoxicillin-clavulanate. She is allergic to glipizide.     The history is provided by the patient and a relative. A  was used (Grandson interpreted for patient; no external ).   Review of patient's allergies indicates:   Allergen Reactions    Glipizide Other (See Comments)     Past Medical History:   Diagnosis Date    AMS (altered mental status) 06/30/2021    Anemia in CKD (chronic kidney disease)     CKD (chronic kidney disease)     Diabetes mellitus     Encounter for blood transfusion     Hypercholesterolemia     Hypertension     Stroke 2015    TIA    Uterine cancer 2021    Endometrial Cancer     Past Surgical History:   Procedure Laterality Date    ARTERIOGRAM, CEREBRAL ARTERIES  " 02/12/2014    and 3/18/2014    HYSTERECTOMY      OMENTECTOMY N/A 03/30/2022    Procedure: OMENTECTOMY;  Surgeon: Reuben Lopez MD;  Location: [facility] OR [address] [place] [address];  Service: OB/GYN;  Laterality: N/A;    TOTAL ABDOMINAL HYSTERECTOMY W/ BILATERAL SALPINGOOPHORECTOMY N/A 03/30/2022    Procedure: HYSTERECTOMY, TOTAL, ABDOMINAL, WITH BILATERAL SALPINGO-OOPHORECTOMY;  Surgeon: Reuben Lopez MD;  Location: [facility] OR [address] [place] [address];  Service: OB/GYN;  Laterality: N/A;     Family History   Problem Relation Age of Onset    Diabetes Mother     Hypertension Mother     Cancer Father      Social History     Tobacco Use    Smoking status: Never    Smokeless tobacco: Never   Substance Use Topics    Alcohol use: No    Drug use: No     Review of Systems   Constitutional:  Negative for activity change, chills and fever.   HENT:  Negative for congestion, drooling, trouble swallowing and voice change.    Eyes:  Negative for visual disturbance.   Respiratory:  Negative for cough, shortness of breath and wheezing.    Cardiovascular:  Negative for chest pain and leg swelling.   Gastrointestinal:  Negative for constipation, diarrhea, nausea and vomiting.   Genitourinary:  Negative for dysuria, frequency and urgency.   Musculoskeletal:  Negative for arthralgias, back pain, joint swelling, myalgias, neck pain and neck stiffness.   Skin:  Negative for color change, pallor and wound.   Neurological:  Positive for weakness (chronic). Negative for dizziness, tremors, seizures, syncope, light-headedness and numbness.   Hematological:  Negative for adenopathy.   Psychiatric/Behavioral:  Negative for agitation and confusion.    All other systems reviewed and are negative.    Physical Exam     Initial Vitals [01/24/23 1259]   BP Pulse Resp Temp SpO2   99/65 104 16 98.4 °F (36.9 °C) 97 %      MAP       --         Physical Exam    Nursing note and vitals reviewed.  Constitutional: She appears well-developed and well-nourished. She is not diaphoretic. No distress.    HENT:   Head: Normocephalic and atraumatic.   Right Ear: External ear normal.   Left Ear: External ear normal.   Nose: Nose normal.   Eyes: Conjunctivae and EOM are normal. Pupils are equal, round, and reactive to light. No scleral icterus.   Neck: Neck supple. No tracheal deviation present.   Normal range of motion.  Cardiovascular:  Normal rate, regular rhythm, normal heart sounds and intact distal pulses.           No murmur heard.  Pulmonary/Chest: Breath sounds normal. No respiratory distress. She has no wheezes. She has no rales.   Abdominal: Abdomen is soft. Bowel sounds are normal. She exhibits no distension. There is no abdominal tenderness.   Musculoskeletal:         General: No tenderness or edema. Normal range of motion.      Cervical back: Normal range of motion and neck supple. No deformity, tenderness or bony tenderness.      Thoracic back: No deformity, tenderness or bony tenderness.      Lumbar back: No deformity, tenderness or bony tenderness.     Neurological: She is alert and oriented to person, place, and time. She has normal strength. She displays normal reflexes. No cranial nerve deficit or sensory deficit. GCS score is 15. GCS eye subscore is 4. GCS verbal subscore is 5. GCS motor subscore is 6.   Skin: Skin is warm and dry. Ecchymosis (Bilateral scapular area, multiple stages of healing) noted. No rash noted. No erythema.   Psychiatric: She has a normal mood and affect. Thought content normal.       ED Course   Procedures  Labs Reviewed   CBC W/ AUTO DIFFERENTIAL - Abnormal; Notable for the following components:       Result Value    RBC 3.89 (*)     Hemoglobin 11.5 (*)     Hematocrit 35.4 (*)     RDW 17.0 (*)     Platelets 141 (*)     Gran % 80.4 (*)     Lymph % 11.3 (*)     All other components within normal limits   COMPREHENSIVE METABOLIC PANEL - Abnormal; Notable for the following components:    Potassium 3.0 (*)     CO2 16 (*)     Glucose 146 (*)     Creatinine 2.7 (*)     Albumin  3.4 (*)     Alkaline Phosphatase 199 (*)     eGFR 19 (*)     All other components within normal limits   PHOSPHORUS - Abnormal; Notable for the following components:    Phosphorus 2.4 (*)     All other components within normal limits   POCT GLUCOSE - Abnormal; Notable for the following components:    POCT Glucose 132 (*)     All other components within normal limits   MAGNESIUM          Imaging Results              X-Ray Chest AP Portable (Final result)  Result time 01/24/23 15:37:48      Final result by Ramos Spencer Jr., MD (01/24/23 15:37:48)                   Impression:      No acute cardiopulmonary disease      Electronically signed by: Ramos Zapata Jr  Date:    01/24/2023  Time:    15:37               Narrative:    EXAMINATION:  XR CHEST AP PORTABLE    CLINICAL HISTORY:  Unspecified fall, initial encounter    TECHNIQUE:  Single frontal view of the chest was performed.    COMPARISON:  None    FINDINGS:  Cardiomediastinal silhouettenormal in size.  There is a left jugular tunnel dialysis catheter present with the tip extending into the right atrium.    Lungs grossly clear within limitations of portable technique    Pleura, diaphragm, and thoracic cage grossly unremarkable.                                       CT Head Without Contrast (Final result)  Result time 01/24/23 14:59:42      Final result by Joe Gallo MD (01/24/23 14:59:42)                   Impression:      No acute intracranial findings.    No significant change relative to prior head CT performed 06/30/2021.      Electronically signed by: Joe Gallo  Date:    01/24/2023  Time:    14:59               Narrative:    EXAMINATION:  CT HEAD WITHOUT CONTRAST    CLINICAL HISTORY:  Head trauma, moderate-severe;    TECHNIQUE:  Low dose axial images were obtained through the head.  Coronal and sagittal reformations were also performed. Contrast was not administered.    COMPARISON:  MRI of the brain performed  06/30/2021.    FINDINGS:  Blood: No acute intracranial hemorrhage.    Parenchyma: No definite loss of gray-white differentiation to suggest acute or subacute transcortical infarct. Right temporal and occipital lobe encephalomalacia gliosis again noted in keeping with remote PCA territory infarct.  Additional nonspecific areas of white matter hypoattenuation may relate to sequela of chronic small vessel ischemic disease.  Remote lacunar infarcts in the bilateral deep gray nuclei are again noted.  Question remote lacunar infarct right mikaela maria dolores.  Questionable relative disproportionate volume loss of the cerebellum and brainstem structures, particularly the midbrain, relative to the cerebrum.  Appearance not obviously progressed since 06/30/2021 MRI allowing for differences technique/modality.    Ventricles/Extra-axial spaces: No abnormal extra-axial fluid collection. Basal cisterns are patent.    Vessels: Calcific atherosclerosis.    Orbits: Unremarkable.    Scalp: Unremarkable.    Skull: There are no depressed skull fractures or destructive bone lesions.    Sinuses and mastoids: Essentially clear.    Other findings: Leftward asymmetric TMJ DJD.                                       Medications   potassium bicarbonate disintegrating tablet 20 mEq (20 mEq Oral Given 1/24/23 1719)     Medical Decision Making:   ED Management:  62-year-old female with a history of remote CVA presents emergency department after an accidental trip and fall into a bathtub while turning around in the bathroom prior to arrival.  There was no syncope or loss of consciousness.  She denies head trauma.  She does report that she had coining done to her shoulders earlier in the week and reports this is the cause of her underlying ecchymosis to her shoulders.  On exam she is afebrile with stable vital signs.  She is nontoxic appearing.  There are no focal neurologic deficits on exam.  There is linear ecchymosis to her bilateral shoulders in  multiple stages of healing consistent with her history of coining.  CT of the head was obtained and is negative for acute intracranial pathology.  Chest x-ray was obtained is negative for fracture, dislocation, pneumothorax, focal consolidation.  Labs were reviewed.  There is mild hypokalemia.  Will replace orally.  At this time there is no indication for further emergent workup or imaging.  I have considered but do not suspect acute coronary syndrome as the patient denies chest pain, shortness of breath or anginal equivalent.  I doubt fall was related to underlying cardiac arrhythmia as she had no preceding symptoms and denies symptoms at this time.  She agrees to follow up with primary care.  Return precautions were given.  She and her son at bedside were advised of this plan and all imaging and laboratory results were reviewed with them.        Scribe Attestation:   Scribe #1: I performed the above scribed service and the documentation accurately describes the services I performed. I attest to the accuracy of the note.                   Clinical Impression:   Final diagnoses:  [W19.XXXA] Fall               Raya Han MD  01/24/23 5532

## 2023-01-24 NOTE — FIRST PROVIDER EVALUATION
" Emergency Department TeleTriage Encounter Note      CHIEF COMPLAINT    Chief Complaint   Patient presents with    Fall     Pt uses a walker at home and takes Plavix s/p CVA w/ residual left sided weakness.   Pt turned and "lost her balance", fell backwards and landed in tub.  Pt denies hitting her head. No LOC. Pt states she hit her shoulders.  Family noticed bruising behind the knees.  No c-spine tenderness.  Pt denies any complaints.  Pt has history of DM, ESRD, CVA and recent UTI, still taking antibiotics.  Conjunctiva slightly pale.        VITAL SIGNS   Initial Vitals [01/24/23 1259]   BP Pulse Resp Temp SpO2   99/65 104 16 98.4 °F (36.9 °C) 97 %      MAP       --            ALLERGIES    Review of patient's allergies indicates:   Allergen Reactions    Glipizide Other (See Comments)       PROVIDER TRIAGE NOTE  Patient presents with complaint of fall PTA. Son is giving all information and translating. Patient fell backwards into tub. Patient denied complaints. She denied neck pain. Patient is currently on blood thinners.       Phy:   Constitutional: well nourished, well developed, appearing stated age, NAD   HEENT: NCAT, symmetrical lids, No obvious facial deformity.  Normal phonation. Normal Conjunctiva   Neck: NAROM   Respiratory: Normal effort.  No obvious use of accessory muscles   Musculoskeletal: Moved upper extremities well      Initial orders will be placed and care will be transferred to an alternate provider when patient is roomed for a full evaluation. Any additional orders and the final disposition will be determined by that provider.        ORDERS  Labs Reviewed   POCT GLUCOSE - Abnormal; Notable for the following components:       Result Value    POCT Glucose 132 (*)     All other components within normal limits   POCT GLUCOSE MONITORING CONTINUOUS       ED Orders (720h ago, onward)      Start Ordered     Status Ordering Provider    01/24/23 1333 01/24/23 1333  CT Head Without Contrast  1 time " imaging         Ordered REBECCA ADKINS    01/24/23 1308 01/24/23 1308  POCT glucose  Once         Final result EMERGENCY, DEPT PHYSICIAN    01/24/23 1305 01/24/23 1304  POCT glucose  Once         Acknowledged MICHAEL GAY              Virtual Visit Note: The provider triage portion of this emergency department evaluation and documentation was performed via [employer], a HIPAA-compliant telemedicine application, in concert with a tele-presenter in the room. A face to face patient evaluation with one of my colleagues will occur once the patient is placed in an emergency department room.      DISCLAIMER: This note was prepared with [employer] voice recognition transcription software. Garbled syntax, mangled pronouns, and other bizarre constructions may be attributed to that software system.

## 2023-01-25 RX ORDER — DRONABINOL 5 MG/1
5 CAPSULE ORAL
Qty: 30 CAPSULE | Refills: 0 | Status: SHIPPED | OUTPATIENT
Start: 2023-01-25 | End: 2023-01-25

## 2023-01-25 RX ORDER — DRONABINOL 5 MG/1
5 CAPSULE ORAL DAILY
Qty: 15 CAPSULE | Refills: 0 | Status: ON HOLD | OUTPATIENT
Start: 2023-01-25 | End: 2023-02-05 | Stop reason: HOSPADM

## 2023-01-27 DIAGNOSIS — N39.0 COMPLICATED UTI (URINARY TRACT INFECTION): ICD-10-CM

## 2023-01-27 DIAGNOSIS — R11.0 NAUSEA: Primary | ICD-10-CM

## 2023-01-27 RX ORDER — LEVOFLOXACIN 250 MG/1
TABLET ORAL
Qty: 7 TABLET | Refills: 0 | Status: ON HOLD | OUTPATIENT
Start: 2023-01-27 | End: 2023-02-05 | Stop reason: HOSPADM

## 2023-01-27 RX ORDER — LEVOFLOXACIN 500 MG/1
TABLET, FILM COATED ORAL
Qty: 7 TABLET | Refills: 0 | Status: SHIPPED | OUTPATIENT
Start: 2023-01-27 | End: 2023-01-27

## 2023-01-27 RX ORDER — LEVOFLOXACIN 250 MG/1
TABLET ORAL
Qty: 7 TABLET | Refills: 0 | Status: SHIPPED | OUTPATIENT
Start: 2023-01-27 | End: 2023-01-27

## 2023-01-27 RX ORDER — ONDANSETRON 4 MG/1
4 TABLET, FILM COATED ORAL
Qty: 5 TABLET | Refills: 0 | Status: SHIPPED | OUTPATIENT
Start: 2023-01-27

## 2023-02-03 ENCOUNTER — HOSPITAL ENCOUNTER (OUTPATIENT)
Facility: HOSPITAL | Age: 63
Discharge: HOSPICE | End: 2023-02-05
Attending: EMERGENCY MEDICINE | Admitting: STUDENT IN AN ORGANIZED HEALTH CARE EDUCATION/TRAINING PROGRAM
Payer: MEDICARE

## 2023-02-03 DIAGNOSIS — R07.9 CHEST PAIN: ICD-10-CM

## 2023-02-03 DIAGNOSIS — K80.20 CALCULUS OF GALLBLADDER WITHOUT CHOLECYSTITIS WITHOUT OBSTRUCTION: ICD-10-CM

## 2023-02-03 DIAGNOSIS — R19.00 PELVIC MASS: ICD-10-CM

## 2023-02-03 DIAGNOSIS — Z71.89 ADVANCED CARE PLANNING/COUNSELING DISCUSSION: ICD-10-CM

## 2023-02-03 DIAGNOSIS — D63.8 ANEMIA OF CHRONIC DISEASE: ICD-10-CM

## 2023-02-03 DIAGNOSIS — R46.89 ALTERED BEHAVIOR: Primary | ICD-10-CM

## 2023-02-03 DIAGNOSIS — C56.9 MALIGNANT NEOPLASM OF OVARY, UNSPECIFIED LATERALITY: ICD-10-CM

## 2023-02-03 DIAGNOSIS — R79.89 ELEVATED LFTS: ICD-10-CM

## 2023-02-03 DIAGNOSIS — C80.1 ADENOCARCINOMA: ICD-10-CM

## 2023-02-03 LAB
ALBUMIN SERPL BCP-MCNC: 2.9 G/DL (ref 3.5–5.2)
ALP SERPL-CCNC: 270 U/L (ref 55–135)
ALT SERPL W/O P-5'-P-CCNC: 147 U/L (ref 10–44)
AMMONIA PLAS-SCNC: 18 UMOL/L (ref 10–50)
ANION GAP SERPL CALC-SCNC: 17 MMOL/L (ref 8–16)
AST SERPL-CCNC: 283 U/L (ref 10–40)
BACTERIA #/AREA URNS HPF: NORMAL /HPF
BASOPHILS # BLD AUTO: 0.02 K/UL (ref 0–0.2)
BASOPHILS NFR BLD: 0.2 % (ref 0–1.9)
BILIRUB SERPL-MCNC: 0.6 MG/DL (ref 0.1–1)
BILIRUB UR QL STRIP: NEGATIVE
BUN SERPL-MCNC: 22 MG/DL (ref 8–23)
CALCIUM SERPL-MCNC: 9 MG/DL (ref 8.7–10.5)
CHLORIDE SERPL-SCNC: 104 MMOL/L (ref 95–110)
CLARITY UR: ABNORMAL
CO2 SERPL-SCNC: 18 MMOL/L (ref 23–29)
COLOR UR: YELLOW
CREAT SERPL-MCNC: 3.4 MG/DL (ref 0.5–1.4)
CTP QC/QA: YES
CTP QC/QA: YES
DIFFERENTIAL METHOD: ABNORMAL
EOSINOPHIL # BLD AUTO: 0 K/UL (ref 0–0.5)
EOSINOPHIL NFR BLD: 0.1 % (ref 0–8)
ERYTHROCYTE [DISTWIDTH] IN BLOOD BY AUTOMATED COUNT: 16.2 % (ref 11.5–14.5)
EST. GFR  (NO RACE VARIABLE): 15 ML/MIN/1.73 M^2
GLUCOSE SERPL-MCNC: 60 MG/DL (ref 70–110)
GLUCOSE UR QL STRIP: NEGATIVE
HCT VFR BLD AUTO: 34.6 % (ref 37–48.5)
HGB BLD-MCNC: 11.2 G/DL (ref 12–16)
HGB UR QL STRIP: ABNORMAL
HYALINE CASTS #/AREA URNS LPF: 0 /LPF
IMM GRANULOCYTES # BLD AUTO: 0.08 K/UL (ref 0–0.04)
IMM GRANULOCYTES NFR BLD AUTO: 0.8 % (ref 0–0.5)
KETONES UR QL STRIP: NEGATIVE
LACTATE SERPL-SCNC: 2 MMOL/L (ref 0.5–2.2)
LEUKOCYTE ESTERASE UR QL STRIP: NEGATIVE
LYMPHOCYTES # BLD AUTO: 0.7 K/UL (ref 1–4.8)
LYMPHOCYTES NFR BLD: 6.9 % (ref 18–48)
MAGNESIUM SERPL-MCNC: 2.4 MG/DL (ref 1.6–2.6)
MCH RBC QN AUTO: 29.3 PG (ref 27–31)
MCHC RBC AUTO-ENTMCNC: 32.4 G/DL (ref 32–36)
MCV RBC AUTO: 91 FL (ref 82–98)
MICROSCOPIC COMMENT: NORMAL
MONOCYTES # BLD AUTO: 0.7 K/UL (ref 0.3–1)
MONOCYTES NFR BLD: 7.3 % (ref 4–15)
NEUTROPHILS # BLD AUTO: 8 K/UL (ref 1.8–7.7)
NEUTROPHILS NFR BLD: 84.7 % (ref 38–73)
NITRITE UR QL STRIP: NEGATIVE
NRBC BLD-RTO: 0 /100 WBC
PH UR STRIP: 8 [PH] (ref 5–8)
PHOSPHATE SERPL-MCNC: 3.6 MG/DL (ref 2.7–4.5)
PLATELET # BLD AUTO: 136 K/UL (ref 150–450)
PMV BLD AUTO: 9.4 FL (ref 9.2–12.9)
POC MOLECULAR INFLUENZA A AGN: NEGATIVE
POC MOLECULAR INFLUENZA B AGN: NEGATIVE
POTASSIUM SERPL-SCNC: 3.8 MMOL/L (ref 3.5–5.1)
PROCALCITONIN SERPL IA-MCNC: 1.76 NG/ML
PROT SERPL-MCNC: 7.9 G/DL (ref 6–8.4)
PROT UR QL STRIP: ABNORMAL
RBC # BLD AUTO: 3.82 M/UL (ref 4–5.4)
RBC #/AREA URNS HPF: 1 /HPF (ref 0–4)
SARS-COV-2 RDRP RESP QL NAA+PROBE: NEGATIVE
SODIUM SERPL-SCNC: 139 MMOL/L (ref 136–145)
SP GR UR STRIP: 1.01 (ref 1–1.03)
SQUAMOUS #/AREA URNS HPF: 8 /HPF
URN SPEC COLLECT METH UR: ABNORMAL
UROBILINOGEN UR STRIP-ACNC: NEGATIVE EU/DL
WBC # BLD AUTO: 9.48 K/UL (ref 3.9–12.7)
WBC #/AREA URNS HPF: 4 /HPF (ref 0–5)

## 2023-02-03 PROCEDURE — 87635 SARS-COV-2 COVID-19 AMP PRB: CPT | Performed by: EMERGENCY MEDICINE

## 2023-02-03 PROCEDURE — 80074 ACUTE HEPATITIS PANEL: CPT | Performed by: EMERGENCY MEDICINE

## 2023-02-03 PROCEDURE — 80076 HEPATIC FUNCTION PANEL: CPT | Performed by: EMERGENCY MEDICINE

## 2023-02-03 PROCEDURE — 87502 INFLUENZA DNA AMP PROBE: CPT

## 2023-02-03 PROCEDURE — 84100 ASSAY OF PHOSPHORUS: CPT | Performed by: EMERGENCY MEDICINE

## 2023-02-03 PROCEDURE — 83735 ASSAY OF MAGNESIUM: CPT | Performed by: EMERGENCY MEDICINE

## 2023-02-03 PROCEDURE — 99285 EMERGENCY DEPT VISIT HI MDM: CPT | Mod: 25,CS

## 2023-02-03 PROCEDURE — 93010 EKG 12-LEAD: ICD-10-PCS | Mod: ,,, | Performed by: INTERNAL MEDICINE

## 2023-02-03 PROCEDURE — 87040 BLOOD CULTURE FOR BACTERIA: CPT | Mod: 59 | Performed by: EMERGENCY MEDICINE

## 2023-02-03 PROCEDURE — 63600175 PHARM REV CODE 636 W HCPCS: Performed by: EMERGENCY MEDICINE

## 2023-02-03 PROCEDURE — 81000 URINALYSIS NONAUTO W/SCOPE: CPT | Performed by: EMERGENCY MEDICINE

## 2023-02-03 PROCEDURE — 83605 ASSAY OF LACTIC ACID: CPT | Performed by: EMERGENCY MEDICINE

## 2023-02-03 PROCEDURE — 93010 ELECTROCARDIOGRAM REPORT: CPT | Mod: ,,, | Performed by: INTERNAL MEDICINE

## 2023-02-03 PROCEDURE — 96365 THER/PROPH/DIAG IV INF INIT: CPT

## 2023-02-03 PROCEDURE — 85025 COMPLETE CBC W/AUTO DIFF WBC: CPT | Performed by: EMERGENCY MEDICINE

## 2023-02-03 PROCEDURE — 93005 ELECTROCARDIOGRAM TRACING: CPT

## 2023-02-03 PROCEDURE — 82140 ASSAY OF AMMONIA: CPT | Performed by: EMERGENCY MEDICINE

## 2023-02-03 PROCEDURE — 80053 COMPREHEN METABOLIC PANEL: CPT | Performed by: EMERGENCY MEDICINE

## 2023-02-03 PROCEDURE — 84145 PROCALCITONIN (PCT): CPT | Performed by: EMERGENCY MEDICINE

## 2023-02-03 RX ADMIN — CEFTRIAXONE 1 G: 1 INJECTION, SOLUTION INTRAVENOUS at 09:02

## 2023-02-04 PROBLEM — K65.2 SBP (SPONTANEOUS BACTERIAL PERITONITIS): Status: RESOLVED | Noted: 2021-07-06 | Resolved: 2023-02-04

## 2023-02-04 PROBLEM — E87.6 HYPOKALEMIA: Status: RESOLVED | Noted: 2022-09-21 | Resolved: 2023-02-04

## 2023-02-04 PROBLEM — N18.4 STAGE 4 CHRONIC KIDNEY DISEASE: Status: RESOLVED | Noted: 2021-06-30 | Resolved: 2023-02-04

## 2023-02-04 PROBLEM — G93.40 ACUTE ENCEPHALOPATHY: Status: RESOLVED | Noted: 2021-06-30 | Resolved: 2023-02-04

## 2023-02-04 PROBLEM — I10 ESSENTIAL HYPERTENSION: Chronic | Status: ACTIVE | Noted: 2021-04-29

## 2023-02-04 PROBLEM — R78.81 BACTEREMIA DUE TO METHICILLIN SUSCEPTIBLE STAPHYLOCOCCUS AUREUS (MSSA): Status: RESOLVED | Noted: 2022-09-22 | Resolved: 2023-02-04

## 2023-02-04 PROBLEM — E11.9 TYPE 2 DIABETES MELLITUS, WITHOUT LONG-TERM CURRENT USE OF INSULIN: Chronic | Status: ACTIVE | Noted: 2022-09-21

## 2023-02-04 PROBLEM — B95.61 BACTEREMIA DUE TO METHICILLIN SUSCEPTIBLE STAPHYLOCOCCUS AUREUS (MSSA): Status: RESOLVED | Noted: 2022-09-22 | Resolved: 2023-02-04

## 2023-02-04 PROBLEM — E87.5 HYPERKALEMIA: Status: RESOLVED | Noted: 2022-04-01 | Resolved: 2023-02-04

## 2023-02-04 PROBLEM — R53.1 WEAKNESS: Status: RESOLVED | Noted: 2022-09-21 | Resolved: 2023-02-04

## 2023-02-04 PROBLEM — C54.1 ENDOMETRIAL CANCER: Chronic | Status: ACTIVE | Noted: 2021-08-06

## 2023-02-04 PROBLEM — R79.89 ELEVATED LFTS: Chronic | Status: ACTIVE | Noted: 2021-06-30

## 2023-02-04 PROBLEM — N18.6 ESRD (END STAGE RENAL DISEASE): Chronic | Status: ACTIVE | Noted: 2022-09-21

## 2023-02-04 PROBLEM — R46.89 ALTERED BEHAVIOR: Status: ACTIVE | Noted: 2023-02-04

## 2023-02-04 LAB
ALBUMIN SERPL BCP-MCNC: 2.5 G/DL (ref 3.5–5.2)
ALBUMIN SERPL BCP-MCNC: 2.9 G/DL (ref 3.5–5.2)
ALP SERPL-CCNC: 253 U/L (ref 55–135)
ALP SERPL-CCNC: 263 U/L (ref 55–135)
ALT SERPL W/O P-5'-P-CCNC: 128 U/L (ref 10–44)
ALT SERPL W/O P-5'-P-CCNC: 148 U/L (ref 10–44)
ANION GAP SERPL CALC-SCNC: 11 MMOL/L (ref 8–16)
APAP SERPL-MCNC: <3 UG/ML (ref 10–20)
AST SERPL-CCNC: 239 U/L (ref 10–40)
AST SERPL-CCNC: 287 U/L (ref 10–40)
BASOPHILS # BLD AUTO: 0.02 K/UL (ref 0–0.2)
BASOPHILS NFR BLD: 0.3 % (ref 0–1.9)
BILIRUB DIRECT SERPL-MCNC: 0.3 MG/DL (ref 0.1–0.3)
BILIRUB SERPL-MCNC: 0.5 MG/DL (ref 0.1–1)
BILIRUB SERPL-MCNC: 0.5 MG/DL (ref 0.1–1)
BUN SERPL-MCNC: 27 MG/DL (ref 8–23)
CALCIUM SERPL-MCNC: 8.2 MG/DL (ref 8.7–10.5)
CHLORIDE SERPL-SCNC: 102 MMOL/L (ref 95–110)
CO2 SERPL-SCNC: 20 MMOL/L (ref 23–29)
CREAT SERPL-MCNC: 3.8 MG/DL (ref 0.5–1.4)
DIFFERENTIAL METHOD: ABNORMAL
EOSINOPHIL # BLD AUTO: 0 K/UL (ref 0–0.5)
EOSINOPHIL NFR BLD: 0.4 % (ref 0–8)
ERYTHROCYTE [DISTWIDTH] IN BLOOD BY AUTOMATED COUNT: 16.2 % (ref 11.5–14.5)
EST. GFR  (NO RACE VARIABLE): 13 ML/MIN/1.73 M^2
GLUCOSE SERPL-MCNC: 215 MG/DL (ref 70–110)
HAV IGM SERPL QL IA: NORMAL
HBV CORE IGM SERPL QL IA: NORMAL
HBV SURFACE AG SERPL QL IA: NORMAL
HCT VFR BLD AUTO: 30.3 % (ref 37–48.5)
HCV AB SERPL QL IA: NORMAL
HGB BLD-MCNC: 10 G/DL (ref 12–16)
IMM GRANULOCYTES # BLD AUTO: 0.05 K/UL (ref 0–0.04)
IMM GRANULOCYTES NFR BLD AUTO: 0.7 % (ref 0–0.5)
LYMPHOCYTES # BLD AUTO: 0.7 K/UL (ref 1–4.8)
LYMPHOCYTES NFR BLD: 9.4 % (ref 18–48)
MCH RBC QN AUTO: 29.5 PG (ref 27–31)
MCHC RBC AUTO-ENTMCNC: 33 G/DL (ref 32–36)
MCV RBC AUTO: 89 FL (ref 82–98)
MONOCYTES # BLD AUTO: 0.6 K/UL (ref 0.3–1)
MONOCYTES NFR BLD: 8.7 % (ref 4–15)
NEUTROPHILS # BLD AUTO: 5.8 K/UL (ref 1.8–7.7)
NEUTROPHILS NFR BLD: 80.5 % (ref 38–73)
NRBC BLD-RTO: 0 /100 WBC
PLATELET # BLD AUTO: 122 K/UL (ref 150–450)
PMV BLD AUTO: 10.5 FL (ref 9.2–12.9)
POCT GLUCOSE: 144 MG/DL (ref 70–110)
POCT GLUCOSE: 184 MG/DL (ref 70–110)
POCT GLUCOSE: 219 MG/DL (ref 70–110)
POCT GLUCOSE: 323 MG/DL (ref 70–110)
POCT GLUCOSE: 53 MG/DL (ref 70–110)
POCT GLUCOSE: 66 MG/DL (ref 70–110)
POCT GLUCOSE: 86 MG/DL (ref 70–110)
POTASSIUM SERPL-SCNC: 3 MMOL/L (ref 3.5–5.1)
PROT SERPL-MCNC: 6.8 G/DL (ref 6–8.4)
PROT SERPL-MCNC: 7.9 G/DL (ref 6–8.4)
RBC # BLD AUTO: 3.39 M/UL (ref 4–5.4)
SODIUM SERPL-SCNC: 133 MMOL/L (ref 136–145)
WBC # BLD AUTO: 7.15 K/UL (ref 3.9–12.7)

## 2023-02-04 PROCEDURE — 96366 THER/PROPH/DIAG IV INF ADDON: CPT

## 2023-02-04 PROCEDURE — 99222 1ST HOSP IP/OBS MODERATE 55: CPT | Mod: ,,, | Performed by: PSYCHIATRY & NEUROLOGY

## 2023-02-04 PROCEDURE — 25000003 PHARM REV CODE 250: Performed by: STUDENT IN AN ORGANIZED HEALTH CARE EDUCATION/TRAINING PROGRAM

## 2023-02-04 PROCEDURE — 63600175 PHARM REV CODE 636 W HCPCS: Performed by: STUDENT IN AN ORGANIZED HEALTH CARE EDUCATION/TRAINING PROGRAM

## 2023-02-04 PROCEDURE — 63600175 PHARM REV CODE 636 W HCPCS: Performed by: HOSPITALIST

## 2023-02-04 PROCEDURE — 25000003 PHARM REV CODE 250: Performed by: EMERGENCY MEDICINE

## 2023-02-04 PROCEDURE — 80053 COMPREHEN METABOLIC PANEL: CPT | Performed by: HOSPITALIST

## 2023-02-04 PROCEDURE — G0378 HOSPITAL OBSERVATION PER HR: HCPCS

## 2023-02-04 PROCEDURE — 99222 PR INITIAL HOSPITAL CARE,LEVL II: ICD-10-PCS | Mod: ,,, | Performed by: PSYCHIATRY & NEUROLOGY

## 2023-02-04 PROCEDURE — 85025 COMPLETE CBC W/AUTO DIFF WBC: CPT | Performed by: HOSPITALIST

## 2023-02-04 PROCEDURE — 25000003 PHARM REV CODE 250: Performed by: HOSPITALIST

## 2023-02-04 PROCEDURE — 80143 DRUG ASSAY ACETAMINOPHEN: CPT | Performed by: EMERGENCY MEDICINE

## 2023-02-04 PROCEDURE — 96361 HYDRATE IV INFUSION ADD-ON: CPT

## 2023-02-04 PROCEDURE — 63600175 PHARM REV CODE 636 W HCPCS: Performed by: EMERGENCY MEDICINE

## 2023-02-04 PROCEDURE — 96367 TX/PROPH/DG ADDL SEQ IV INF: CPT

## 2023-02-04 PROCEDURE — 82962 GLUCOSE BLOOD TEST: CPT | Mod: 91

## 2023-02-04 RX ORDER — ACETAMINOPHEN 325 MG/1
650 TABLET ORAL EVERY 6 HOURS PRN
Status: DISCONTINUED | OUTPATIENT
Start: 2023-02-04 | End: 2023-02-05 | Stop reason: HOSPADM

## 2023-02-04 RX ORDER — POTASSIUM CHLORIDE 7.45 MG/ML
10 INJECTION INTRAVENOUS
Status: DISPENSED | OUTPATIENT
Start: 2023-02-04 | End: 2023-02-04

## 2023-02-04 RX ORDER — POLYETHYLENE GLYCOL 3350 17 G/17G
17 POWDER, FOR SOLUTION ORAL DAILY
Status: DISCONTINUED | OUTPATIENT
Start: 2023-02-04 | End: 2023-02-05 | Stop reason: HOSPADM

## 2023-02-04 RX ORDER — SODIUM CHLORIDE 0.9 % (FLUSH) 0.9 %
10 SYRINGE (ML) INJECTION EVERY 8 HOURS PRN
Status: DISCONTINUED | OUTPATIENT
Start: 2023-02-04 | End: 2023-02-05 | Stop reason: HOSPADM

## 2023-02-04 RX ORDER — DEXTROSE 50 % IN WATER (D50W) INTRAVENOUS SYRINGE
50
Status: DISCONTINUED | OUTPATIENT
Start: 2023-02-04 | End: 2023-02-04 | Stop reason: RX

## 2023-02-04 RX ORDER — METOPROLOL SUCCINATE 50 MG/1
50 TABLET, EXTENDED RELEASE ORAL DAILY
Status: DISCONTINUED | OUTPATIENT
Start: 2023-02-04 | End: 2023-02-04

## 2023-02-04 RX ORDER — IPRATROPIUM BROMIDE AND ALBUTEROL SULFATE 2.5; .5 MG/3ML; MG/3ML
3 SOLUTION RESPIRATORY (INHALATION) EVERY 4 HOURS PRN
Status: DISCONTINUED | OUTPATIENT
Start: 2023-02-04 | End: 2023-02-05 | Stop reason: HOSPADM

## 2023-02-04 RX ORDER — ALLOPURINOL 100 MG/1
100 TABLET ORAL DAILY
Status: DISCONTINUED | OUTPATIENT
Start: 2023-02-04 | End: 2023-02-04 | Stop reason: SDUPTHER

## 2023-02-04 RX ORDER — INDOMETHACIN 25 MG/1
50 CAPSULE ORAL ONCE
Status: COMPLETED | OUTPATIENT
Start: 2023-02-04 | End: 2023-02-05

## 2023-02-04 RX ORDER — TALC
6 POWDER (GRAM) TOPICAL NIGHTLY PRN
Status: DISCONTINUED | OUTPATIENT
Start: 2023-02-04 | End: 2023-02-05 | Stop reason: HOSPADM

## 2023-02-04 RX ORDER — AMLODIPINE BESYLATE 5 MG/1
5 TABLET ORAL DAILY
Status: DISCONTINUED | OUTPATIENT
Start: 2023-02-04 | End: 2023-02-04

## 2023-02-04 RX ORDER — NALOXONE HCL 0.4 MG/ML
0.02 VIAL (ML) INJECTION
Status: DISCONTINUED | OUTPATIENT
Start: 2023-02-04 | End: 2023-02-05 | Stop reason: HOSPADM

## 2023-02-04 RX ORDER — GLUCAGON 1 MG
1 KIT INJECTION
Status: DISCONTINUED | OUTPATIENT
Start: 2023-02-04 | End: 2023-02-05 | Stop reason: HOSPADM

## 2023-02-04 RX ORDER — VENLAFAXINE 37.5 MG/1
75 TABLET ORAL 2 TIMES DAILY
Status: DISCONTINUED | OUTPATIENT
Start: 2023-02-04 | End: 2023-02-05 | Stop reason: HOSPADM

## 2023-02-04 RX ORDER — MIRTAZAPINE 15 MG/1
15 TABLET, FILM COATED ORAL NIGHTLY
Status: ON HOLD | COMMUNITY
Start: 2023-02-02 | End: 2023-02-05 | Stop reason: HOSPADM

## 2023-02-04 RX ORDER — INSULIN ASPART 100 [IU]/ML
1-10 INJECTION, SOLUTION INTRAVENOUS; SUBCUTANEOUS
Status: DISCONTINUED | OUTPATIENT
Start: 2023-02-04 | End: 2023-02-05 | Stop reason: HOSPADM

## 2023-02-04 RX ORDER — IBUPROFEN 200 MG
24 TABLET ORAL
Status: DISCONTINUED | OUTPATIENT
Start: 2023-02-04 | End: 2023-02-05 | Stop reason: HOSPADM

## 2023-02-04 RX ORDER — CLOPIDOGREL BISULFATE 75 MG/1
75 TABLET ORAL DAILY
Status: DISCONTINUED | OUTPATIENT
Start: 2023-02-04 | End: 2023-02-05 | Stop reason: HOSPADM

## 2023-02-04 RX ORDER — MAG HYDROX/ALUMINUM HYD/SIMETH 200-200-20
30 SUSPENSION, ORAL (FINAL DOSE FORM) ORAL 4 TIMES DAILY PRN
Status: DISCONTINUED | OUTPATIENT
Start: 2023-02-04 | End: 2023-02-05 | Stop reason: HOSPADM

## 2023-02-04 RX ORDER — POTASSIUM CHLORIDE 7.45 MG/ML
10 INJECTION INTRAVENOUS
Status: COMPLETED | OUTPATIENT
Start: 2023-02-04 | End: 2023-02-05

## 2023-02-04 RX ORDER — SIMETHICONE 80 MG
1 TABLET,CHEWABLE ORAL 4 TIMES DAILY PRN
Status: DISCONTINUED | OUTPATIENT
Start: 2023-02-04 | End: 2023-02-05 | Stop reason: HOSPADM

## 2023-02-04 RX ORDER — PROCHLORPERAZINE EDISYLATE 5 MG/ML
5 INJECTION INTRAMUSCULAR; INTRAVENOUS EVERY 6 HOURS PRN
Status: DISCONTINUED | OUTPATIENT
Start: 2023-02-04 | End: 2023-02-05 | Stop reason: HOSPADM

## 2023-02-04 RX ORDER — ALLOPURINOL 100 MG/1
100 TABLET ORAL DAILY
Status: DISCONTINUED | OUTPATIENT
Start: 2023-02-04 | End: 2023-02-05 | Stop reason: HOSPADM

## 2023-02-04 RX ORDER — ONDANSETRON 2 MG/ML
4 INJECTION INTRAMUSCULAR; INTRAVENOUS EVERY 8 HOURS PRN
Status: DISCONTINUED | OUTPATIENT
Start: 2023-02-04 | End: 2023-02-05 | Stop reason: HOSPADM

## 2023-02-04 RX ORDER — SODIUM BICARBONATE 325 MG/1
650 TABLET ORAL 4 TIMES DAILY
Status: DISCONTINUED | OUTPATIENT
Start: 2023-02-04 | End: 2023-02-04

## 2023-02-04 RX ORDER — SODIUM BICARBONATE 325 MG/1
650 TABLET ORAL ONCE
Status: COMPLETED | OUTPATIENT
Start: 2023-02-04 | End: 2023-02-04

## 2023-02-04 RX ORDER — IBUPROFEN 200 MG
16 TABLET ORAL
Status: DISCONTINUED | OUTPATIENT
Start: 2023-02-04 | End: 2023-02-05 | Stop reason: HOSPADM

## 2023-02-04 RX ORDER — HEPARIN SODIUM 5000 [USP'U]/ML
5000 INJECTION, SOLUTION INTRAVENOUS; SUBCUTANEOUS EVERY 8 HOURS
Status: DISCONTINUED | OUTPATIENT
Start: 2023-02-04 | End: 2023-02-04

## 2023-02-04 RX ADMIN — SODIUM CHLORIDE 500 ML: 9 INJECTION, SOLUTION INTRAVENOUS at 06:02

## 2023-02-04 RX ADMIN — CLOPIDOGREL BISULFATE 75 MG: 75 TABLET ORAL at 01:02

## 2023-02-04 RX ADMIN — SODIUM BICARBONATE 650 MG: 325 TABLET ORAL at 01:02

## 2023-02-04 RX ADMIN — PIPERACILLIN AND TAZOBACTAM 4.5 G: 4; .5 INJECTION, POWDER, LYOPHILIZED, FOR SOLUTION INTRAVENOUS; PARENTERAL at 02:02

## 2023-02-04 RX ADMIN — PIPERACILLIN AND TAZOBACTAM 4.5 G: 4; .5 INJECTION, POWDER, LYOPHILIZED, FOR SOLUTION INTRAVENOUS; PARENTERAL at 04:02

## 2023-02-04 RX ADMIN — POTASSIUM CHLORIDE 10 MEQ: 7.46 INJECTION, SOLUTION INTRAVENOUS at 08:02

## 2023-02-04 RX ADMIN — VENLAFAXINE 75 MG: 37.5 TABLET ORAL at 08:02

## 2023-02-04 RX ADMIN — DEXTROSE MONOHYDRATE 500 ML: 100 INJECTION, SOLUTION INTRAVENOUS at 12:02

## 2023-02-04 RX ADMIN — POTASSIUM CHLORIDE 10 MEQ: 7.46 INJECTION, SOLUTION INTRAVENOUS at 10:02

## 2023-02-04 RX ADMIN — VENLAFAXINE 75 MG: 37.5 TABLET ORAL at 01:02

## 2023-02-04 RX ADMIN — VANCOMYCIN HYDROCHLORIDE 1000 MG: 1 INJECTION, POWDER, LYOPHILIZED, FOR SOLUTION INTRAVENOUS at 07:02

## 2023-02-04 RX ADMIN — POTASSIUM CHLORIDE 10 MEQ: 7.46 INJECTION, SOLUTION INTRAVENOUS at 11:02

## 2023-02-04 RX ADMIN — POTASSIUM CHLORIDE 10 MEQ: 7.46 INJECTION, SOLUTION INTRAVENOUS at 04:02

## 2023-02-04 NOTE — ED TRIAGE NOTES
"Pt sleeping in bed, easily arousable to verbal stimuli, "son stated that she missed dialysis on Monday and Wednesday, went today was fine then became very weak all over after sleeping," cooperative, moving all extremities, denies any pain or any other complaints.  "

## 2023-02-04 NOTE — H&P
[facility] Emergency Dept  [facility] Medicine  History & Physical    Patient Name: Anjali Dorantes  MRN: [id number]  Patient Class: OP- Observation  Admission Date: 2/3/2023  Attending Physician: Ramos Baker III, MD   Primary Care Provider: Tasia Carrasco MD         Patient information was obtained from patient, relative(s) and ER records.     Subjective:     Principal Problem:Altered behavior    Chief Complaint:   Chief Complaint   Patient presents with    Fatigue     Pt arrived via ems, pt chief complaint is weakness. Per family pt has had a steady decline in weakness. Pt is a dialysis pt misssed mon and wed but went for full treatment today.           HPI: 62 y.o. female with ESRD on HD, DM 2, hypertension, adenocarcinoma, endometrial cancer, bilateral ovarian cancer, and pelvic mass presents with a complaint of altered mental status.  Acute onset yesterday after dialysis, described as fatigue and drowsiness, associated with poor appetite.  She did miss dialysis on Monday Wednesday, but got full treatment yesterday.  Also started new medication Remeron Thursday evening.  Son reports temperature of 93 deg F at home.  Denies fever, chills, cough, SOB, chest pain, palpitations, dizziness, syncope, nausea, vomiting, diarrhea.  In the ED, labs show elevated liver enzymes as well as an elevated procalcitonin of 1.76.  CT abdomen pelvis reveals a large pelvic mass with associated left hydroureteronephrosis.  Gallbladder is also distended with sludge and stones.  Abdominal ultrasound confirms distention with sludge and stones but does not reveal evidence of acute cholecystitis.  Otherwise unremarkable for acute abnormality.      Past Medical History:   Diagnosis Date    AMS (altered mental status) 06/30/2021    Anemia in CKD (chronic kidney disease)     CKD (chronic kidney disease)     Diabetes mellitus     Encounter for blood transfusion     Hypercholesterolemia     Hypertension     Stroke 2015    TIA     Uterine cancer 2021    Endometrial Cancer       Past Surgical History:   Procedure Laterality Date    ARTERIOGRAM, CEREBRAL ARTERIES  02/12/2014    and 3/18/2014    HYSTERECTOMY      OMENTECTOMY N/A 03/30/2022    Procedure: OMENTECTOMY;  Surgeon: Reuben Lopez MD;  Location: [facility] OR [address] [place] [address];  Service: OB/GYN;  Laterality: N/A;    TOTAL ABDOMINAL HYSTERECTOMY W/ BILATERAL SALPINGOOPHORECTOMY N/A 03/30/2022    Procedure: HYSTERECTOMY, TOTAL, ABDOMINAL, WITH BILATERAL SALPINGO-OOPHORECTOMY;  Surgeon: Reuben Lopez MD;  Location: [facility] OR [address] [place] [address];  Service: OB/GYN;  Laterality: N/A;       Review of patient's allergies indicates:   Allergen Reactions    Glipizide Other (See Comments)       Current Facility-Administered Medications on File Prior to Encounter   Medication    [COMPLETED] darbepoetin jose-polysorbate injection 25 mcg    [COMPLETED] heparin (porcine) injection 1,800 Units    [COMPLETED] heparin (porcine) injection 1,800 Units     Current Outpatient Medications on File Prior to Encounter   Medication Sig    acetaminophen (TYLENOL) 325 MG tablet Take 2 tablets (650 mg total) by mouth every 6 (six) hours as needed for Pain (Take every 6 hours for 5-7 days and then as needed).    allopurinoL (ZYLOPRIM) 100 MG tablet Take 100 mg by mouth once daily.    amLODIPine (NORVASC) 5 MG tablet Take 5 mg by mouth once daily.    ceFAZolin 1 g/50ml Dextrose IVPB (ANCEF) 1 gram/50 mL 2g/2g/3g after HD every Mon/Wed/Fri until 10/20/22 (Patient not taking: Reported on 1/20/2023)    clopidogreL (PLAVIX) 75 mg tablet Take 75 mg by mouth once daily.    dronabinoL (MARINOL) 5 MG capsule Take 1 capsule (5 mg total) by mouth once daily. for 15 days    dulaglutide (TRULICITY) 0.75 mg/0.5 mL pen injector Inject 0.75 mg into the skin every 7 days.    ferrous sulfate 325 (65 FE) MG EC tablet Take 1 tablet (325 mg total) by mouth once daily. (Patient not taking: Reported on 1/20/2023)    levoFLOXacin (LEVAQUIN) 250 MG  tablet This medicine is an antibiotic to treat an infection. Please take (3) tablets (for a total of 750 mg) after dialysis on day 1. Then, take (1) tablet (for a total of 250 mg) daily for 4 days. Please take medicine after dialysis, on dialysis days.    metoprolol succinate (TOPROL-XL) 50 MG 24 hr tablet Take 50 mg by mouth once daily.    ondansetron (ZOFRAN) 4 MG tablet Take 1 tablet (4 mg total) by mouth every 24 hours as needed for Nausea.    pantoprazole (PROTONIX) 20 MG tablet Take 20 mg by mouth once daily.     rosuvastatin (CRESTOR) 20 MG tablet take ONE tablet every night at bedtime (for cholesterol)    sodium bicarbonate 650 MG tablet Take 650 mg by mouth 4 (four) times daily.    venlafaxine (EFFEXOR) 75 MG tablet Take 75 mg by mouth 2 (two) times daily.      Family History       Problem Relation (Age of Onset)    Cancer Father    Diabetes Mother    Hypertension Mother          Tobacco Use    Smoking status: Never    Smokeless tobacco: Never   Substance and Sexual Activity    Alcohol use: No    Drug use: No    Sexual activity: Not Currently     Partners: Male     Review of Systems   Constitutional:  Positive for activity change and fatigue. Negative for chills and fever.   HENT:  Negative for congestion and rhinorrhea.    Eyes:  Negative for photophobia and visual disturbance.   Respiratory:  Negative for cough and shortness of breath.    Cardiovascular:  Negative for chest pain, palpitations and leg swelling.   Gastrointestinal:  Negative for abdominal pain, diarrhea, nausea and vomiting.   Genitourinary:  Negative for dysuria, frequency and urgency.   Skin:  Negative for pallor, rash and wound.   Neurological:  Negative for light-headedness and headaches.   Psychiatric/Behavioral:  Positive for confusion and decreased concentration.    Objective:     Vital Signs (Most Recent):  Temp: 97 °F (36.1 °C) (02/03/23 1945)  Pulse: 84 (02/04/23 0104)  Resp: 18 (02/04/23 0104)  BP: (!) 129/59 (02/04/23  0104)  SpO2: 100 % (02/04/23 0104)   Vital Signs (24h Range):  Temp:  [97 °F (36.1 °C)] 97 °F (36.1 °C)  Pulse:  [] 84  Resp:  [18] 18  SpO2:  [99 %-100 %] 100 %  BP: ()/(20-82) 129/59     Weight: 50.8 kg (112 lb)  Body mass index is 21.16 kg/m².    Physical Exam  Vitals and nursing note reviewed.   Constitutional:       General: She is not in acute distress.     Appearance: She is well-developed. She is ill-appearing.   HENT:      Head: Normocephalic and atraumatic.      Right Ear: External ear normal.      Left Ear: External ear normal.      Nose: Nose normal.   Eyes:      Conjunctiva/sclera: Conjunctivae normal.      Pupils: Pupils are equal, round, and reactive to light.   Cardiovascular:      Rate and Rhythm: Normal rate and regular rhythm.   Pulmonary:      Effort: Pulmonary effort is normal. No respiratory distress.      Breath sounds: Normal breath sounds. No wheezing.   Abdominal:      General: Bowel sounds are normal. There is no distension.      Palpations: Abdomen is soft.      Tenderness: There is no abdominal tenderness.      Comments: No palpable hepatomegaly or splenomegaly    Musculoskeletal:         General: No tenderness. Normal range of motion.      Cervical back: Normal range of motion and neck supple.   Skin:     General: Skin is warm and dry.   Neurological:      Mental Status: She is oriented to person, place, and time. She is lethargic.   Psychiatric:         Thought Content: Thought content normal.         CRANIAL NERVES     CN III, IV, VI   Pupils are equal, round, and reactive to light.     Significant Labs: All pertinent labs within the past 24 hours have been reviewed.    Significant Imaging: I have reviewed all pertinent imaging results/findings within the past 24 hours.    Assessment/Plan:     * Altered behavior  Has had a poor appetite and lost weight lately, it may be that dry weight was not adjusted and she is hypovolemic after dialysis.  Will give 500 mL bolus and see  if there's any improvement as she now has associated hypotension.  Also given reported hypothermia and hypoglycemia noted in ED, will continue broad spectrum IV antibiotics to treat for bacteremia.    Avoid Remeron or other potentially offending medications and monitor mental status with neuro checks.    Elevated LFTs  Mild, unclear etiology, hep panel pending    ESRD (end stage renal disease)  Sporadic dialysis over the prior week, nephrology consult.    Type 2 diabetes mellitus, without long-term current use of insulin  Patient's FSGs are controlled on current medication regimen.  Last A1c reviewed-   Lab Results   Component Value Date    HGBA1C 5.2 01/04/2023     Most recent fingerstick glucose reviewed-   Recent Labs   Lab 02/04/23  0014 02/04/23  0133   POCTGLUCOSE 53* 323*     Current correctional scale  Medium  Maintain anti-hyperglycemic dose as follows-   Antihyperglycemics (From admission, onward)    Start     Stop Route Frequency Ordered    02/04/23 0324  insulin aspart U-100 pen 1-10 Units         -- SubQ Before meals & nightly PRN 02/04/23 0225        Hold Oral hypoglycemics while patient is in the hospital.    Endometrial cancer  CT findings concerning for recurrence    Essential hypertension  Well controlled, continue home medications and monitor blood pressure, adjust as needed.       VTE Risk Mitigation (From admission, onward)         Ordered     IP VTE HIGH RISK PATIENT  Once         02/04/23 0225     Place sequential compression device  Until discontinued         02/04/23 0225               As clarification, on 02/04/2023, patient should be placed in hospital observation services under my care in collaboration with MD Papa Baires III, Jr., APRN, AGACNP-BC  Hospitalist - Department of Hospital Medicine  [facility] [address] [place] [address]  Office #: [phone]; Pager #: [phone]

## 2023-02-04 NOTE — PROGRESS NOTES
Date of Admission:2/3/2023    SUBJECTIVE:resting comfortably in bed  Son at bedside    Current Facility-Administered Medications   Medication    acetaminophen tablet 650 mg    albuterol-ipratropium 2.5 mg-0.5 mg/3 mL nebulizer solution 3 mL    allopurinoL tablet 100 mg    aluminum-magnesium hydroxide-simethicone 200-200-20 mg/5 mL suspension 30 mL    clopidogreL tablet 75 mg    dextrose 10% bolus 125 mL 125 mL    dextrose 10% bolus 250 mL 250 mL    glucagon (human recombinant) injection 1 mg    glucose chewable tablet 16 g    glucose chewable tablet 24 g    insulin aspart U-100 pen 1-10 Units    melatonin tablet 6 mg    naloxone 0.4 mg/mL injection 0.02 mg    ondansetron injection 4 mg    piperacillin-tazobactam (ZOSYN) 4.5 g in dextrose 5 % in water (D5W) 5 % 100 mL IVPB (MB+)    polyethylene glycol packet 17 g    potassium chloride 10 mEq in 100 mL IVPB    prochlorperazine injection Soln 5 mg    simethicone chewable tablet 80 mg    sodium bicarbonate tablet 650 mg    sodium chloride 0.9% flush 10 mL    vancomycin - pharmacy to dose    venlafaxine tablet 75 mg    venlafaxine tablet 75 mg     Current Outpatient Medications   Medication Sig    allopurinoL (ZYLOPRIM) 100 MG tablet Take 100 mg by mouth once daily.    amLODIPine (NORVASC) 5 MG tablet Take 5 mg by mouth once daily.    clopidogreL (PLAVIX) 75 mg tablet Take 75 mg by mouth once daily.    dulaglutide (TRULICITY) 0.75 mg/0.5 mL pen injector Inject 0.75 mg into the skin every 7 days.    levoFLOXacin (LEVAQUIN) 250 MG tablet This medicine is an antibiotic to treat an infection. Please take (3) tablets (for a total of 750 mg) after dialysis on day 1. Then, take (1) tablet (for a total of 250 mg) daily for 4 days. Please take medicine after dialysis, on dialysis days.    metoprolol succinate (TOPROL-XL) 50 MG 24 hr tablet Take 50 mg by mouth once daily.    ondansetron (ZOFRAN) 4 MG tablet Take 1 tablet (4 mg total) by mouth every 24 hours as needed for Nausea.     rosuvastatin (CRESTOR) 20 MG tablet take ONE tablet every night at bedtime (for cholesterol)    venlafaxine (EFFEXOR) 75 MG tablet Take 75 mg by mouth 2 (two) times daily.     acetaminophen (TYLENOL) 325 MG tablet Take 2 tablets (650 mg total) by mouth every 6 (six) hours as needed for Pain (Take every 6 hours for 5-7 days and then as needed).    ceFAZolin 1 g/50ml Dextrose IVPB (ANCEF) 1 gram/50 mL 2g/2g/3g after HD every Mon/Wed/Fri until 10/20/22 (Patient not taking: Reported on 1/20/2023)    dronabinoL (MARINOL) 5 MG capsule Take 1 capsule (5 mg total) by mouth once daily. for 15 days    ferrous sulfate 325 (65 FE) MG EC tablet Take 1 tablet (325 mg total) by mouth once daily. (Patient not taking: Reported on 1/20/2023)    mirtazapine (REMERON) 15 MG tablet Take 15 mg by mouth every evening.    pantoprazole (PROTONIX) 20 MG tablet Take 20 mg by mouth once daily.     sodium bicarbonate 650 MG tablet Take 650 mg by mouth 4 (four) times daily.       Wt Readings from Last 3 Encounters:   02/03/23 50.8 kg (112 lb)   01/24/23 51.3 kg (113 lb)   01/20/23 51.3 kg (113 lb)     Temp Readings from Last 3 Encounters:   02/04/23 98.1 °F (36.7 °C) (Oral)   01/24/23 98.4 °F (36.9 °C) (Oral)   01/20/23 97.7 °F (36.5 °C) (Oral)     BP Readings from Last 3 Encounters:   02/04/23 128/66   02/03/23 (!) 96/54   01/27/23 (!) 90/56     Pulse Readings from Last 3 Encounters:   02/04/23 70   02/03/23 104   01/27/23 (!) 116       Intake/Output Summary (Last 24 hours) at 2/4/2023 1722  Last data filed at 2/4/2023 0741  Gross per 24 hour   Intake 383.33 ml   Output --   Net 383.33 ml       PE:  GEN:wd female in nad, thin  HEENT:ncat,eyes closed,mm  CVS:s1s2 regular  PULM:ctab  ABD:bs,soft,nd  EXT:avf arm, no leg edema  NEURO:somnolent    Recent Labs   Lab 02/03/23 2054 02/04/23 0515   GLU 60* 215*    133*   K 3.8 3.0*    102   CO2 18* 20*   BUN 22 27*   CREATININE 3.4* 3.8*   CALCIUM 9.0 8.2*   MG 2.4  --        Lab  Results   Component Value Date     (H) 01/25/2023    CALCIUM 8.2 (L) 02/04/2023    PHOS 3.6 02/03/2023       Recent Labs   Lab 02/04/23  0515   WBC 7.15   RBC 3.39*   HGB 10.0*   HCT 30.3*   *   MCV 89   MCH 29.5   MCHC 33.0           A/P:  1.esrd. hold hd today. Bun /creatinine ok. Reassess need in am.  2.anemia with esrd. Hg at goal. Epo hold.  3.ovarian cancer. Not sure if she got chemo in past. Last saw gyn onc 4/2022. ? Amendable to chemo. Concern of performance status to tolerate chemo.  Concerning for FTT. Mass noted on ct scan. Suspect cancer contributing to overall decline of pt.  4.hypokalemia. replete.  5.dm2. sugar up. Following sugars.  6.htn. sbp low normal. Following.  7.2nd hyperpara. Phos at goal.

## 2023-02-04 NOTE — PROGRESS NOTES
Pharmacokinetic Initial Assessment: IV Vancomycin    Assessment/Plan:    Initiate intravenous vancomycin with loading dose of 1000 mg once with subsequent doses when random concentrations are less than 20 mcg/mL  Desired empiric serum trough concentration is 10 to 20 mcg/mL  Draw vancomycin random level on 2/5 at 04:00.  Pharmacy will continue to follow and monitor vancomycin.      Please contact pharmacy at extension [phone] with any questions regarding this assessment.     Thank you for the consult,   Nrois Garcia       Patient brief summary:  Anjali Dorantes is a 62 y.o. female initiated on antimicrobial therapy with IV Vancomycin for treatment of suspected bacteremia    Drug Allergies:   Review of patient's allergies indicates:   Allergen Reactions    Glipizide Other (See Comments)       Actual Body Weight:   50.8 kg    Renal Function:   Estimated Creatinine Clearance: 11.4 mL/min (A) (based on SCr of 3.8 mg/dL (H)).,     Dialysis Method (if applicable):  N/A    CBC (last 72 hours):  Recent Labs   Lab Result Units 02/03/23 2054 02/04/23  0515   WBC K/uL 9.48 7.15   Hemoglobin g/dL 11.2* 10.0*   Hematocrit % 34.6* 30.3*   Platelets K/uL 136* 122*   Gran % % 84.7* 80.5*   Lymph % % 6.9* 9.4*   Mono % % 7.3 8.7   Eosinophil % % 0.1 0.4   Basophil % % 0.2 0.3   Differential Method  Automated Automated       Metabolic Panel (last 72 hours):  Recent Labs   Lab Result Units 02/03/23 2054 02/03/23  2300 02/03/23  2350 02/04/23  0515   Sodium mmol/L 139  --   --  133*   Potassium mmol/L 3.8  --   --  3.0*   Chloride mmol/L 104  --   --  102   CO2 mmol/L 18*  --   --  20*   Glucose mg/dL 60*  --   --  215*   Glucose, UA   --  Negative  --   --    BUN mg/dL 22  --   --  27*   Creatinine mg/dL 3.4*  --   --  3.8*   Albumin g/dL 2.9*  --  2.9* 2.5*   Total Bilirubin mg/dL 0.6  --  0.5 0.5   Alkaline Phosphatase U/L 270*  --  263* 253*   AST U/L 283*  --  287* 239*   ALT U/L 147*  --  148* 128*   Magnesium mg/dL 2.4  --    --   --    Phosphorus mg/dL 3.6  --   --   --        Drug levels (last 3 results):  No results for input(s): VANCOMYCINRA, VANCORANDOM, VANCOMYCINPE, VANCOPEAK, VANCOMYCINTR, VANCOTROUGH in the last 72 hours.    Microbiologic Results:  Microbiology Results (last 7 days)       Procedure Component Value Units Date/Time    Blood culture x two cultures. Draw prior to antibiotics. [[id number]] Collected: 02/03/23 2151    Order Status: Completed Specimen: Blood from Wrist, Right Updated: 02/04/23 0512     Blood Culture, Routine No Growth to date    Narrative:      Aerobic and anaerobic    Blood culture x two cultures. Draw prior to antibiotics. [[id number]] Collected: 02/03/23 2052    Order Status: Completed Specimen: Blood from Peripheral, Wrist, Right Updated: 02/04/23 0512     Blood Culture, Routine No Growth to date    Narrative:      Aerobic and anaerobic

## 2023-02-04 NOTE — PLAN OF CARE
[facility] Emergency Dept  Discharge Assessment    SW completed brief assessment and discussed discharge planning with patient and her daughter Jaylin at her bedside. Patient lives with her  Gregg and son Willie who are her main support. Patient also has 2 daughters Jaylin and Bibi that serve as source of support for her. Patient's daughter Jaylni will provide transportation for her to get home when discharge from the hospital.    Primary Care Provider: Tasia Carrasco MD     Discharge Assessment (most recent)       BRIEF DISCHARGE ASSESSMENT - 02/04/23 3989          Discharge Planning    Resource/Environmental Concerns none     Support Systems Children;Spouse/significant other     Equipment Currently Used at Home wheelchair;walker, rolling     Current Living Arrangements home     Patient/Family Anticipates Transition to home with family     Patient/Family Anticipated Services at Transition none     DME Needed Upon Discharge  none     Discharge Plan A Home with family     Discharge Plan B Home with family

## 2023-02-04 NOTE — ASSESSMENT & PLAN NOTE
Has had a poor appetite and lost weight lately, it may be that dry weight was not adjusted and she is hypovolemic after dialysis.  Will give 500 mL bolus and see if there's any improvement as she now has associated hypotension.  Also given reported hypothermia and hypoglycemia noted in ED, will continue broad spectrum IV antibiotics to treat for bacteremia.    Avoid Remeron or other potentially offending medications and monitor mental status with neuro checks.

## 2023-02-04 NOTE — CARE UPDATE
Pt seen and examined at bedside with son. Discussed case with son who states pt was just started on remeron the night before sxs began.. discussed this could be cause, but will also have neurology evaluate and MRI brain pending. Pt wakes up somewhat and follows very basic commands, but pt does not speak at this time. Pt had also missed 2 sessions of dialysis... CT abd/pelvis with concerning findings for possible malignancy which will need to be followed up

## 2023-02-04 NOTE — PHARMACY MED REC
"Admission Medication History     The home medication history was taken by Celina Altamirano.    You may go to "Admission" then "Reconcile Home Medications" tabs to review and/or act upon these items.     The home medication list has been updated by the Pharmacy department.   Please read ALL comments highlighted in yellow.   Please address this information as you see fit.    Feel free to contact us if you have any questions or require assistance.    Medications listed below were obtained from: Patient/family and Analytic software- [employer]    The medication reconciliation was completed using [employer] due to altered mental status.      Celina Altamirano  [phone]                    .        "

## 2023-02-04 NOTE — ED PROVIDER NOTES
"Encounter Date: 2/3/2023       History     Chief Complaint   Patient presents with    Fatigue     Pt arrived via ems, pt chief complaint is weakness. Per family pt has had a steady decline in weakness. Pt is a dialysis pt misssed mon and wed but went for full treatment today.        62 y.o. female Past Medical History:  06/30/2021: AMS (altered mental status)  No date: Anemia in CKD (chronic kidney disease)  No date: CKD (chronic kidney disease)  No date: Diabetes mellitus  No date: Encounter for blood transfusion  No date: Hypercholesterolemia  No date: Hypertension  2015: Stroke      Comment:  TIA  2021: Uterine cancer      Comment:  Endometrial Cancer  Irish speaking only, sustained kidney failure after chemo meds for Endometrial cancer, has since had a hysterectomy    On HD through port M, W, F, skipped M, W, had full dialysis today, was fine afterwards however pt notes that several hours later she has a decreased appetite and has been very "weak". They note that she was recently treated for a UTI. Family felt that pt was not speaking and very drowsy however she was responding appropriately when EMS encouraged family to speak to pt.    Was  prescribed augmentin bid x 10 days on 1/20/23 , however she did not tolerate and stopped taking them 1/27/23,  She was written for Cipro as dbl coverage on 1/24 and stopped taking it 1/27,  She was written for levofloxacin 750 mg after dialysis on day 1. Then 250 mg daily for 4 days. Stopped taking them 1/31/23     Pt did start taking Remeron yesterday and has only taken 1 dose    Review of patient's allergies indicates:   Allergen Reactions    Glipizide Other (See Comments)     Past Medical History:   Diagnosis Date    AMS (altered mental status) 06/30/2021    Anemia in CKD (chronic kidney disease)     CKD (chronic kidney disease)     Diabetes mellitus     Encounter for blood transfusion     Hypercholesterolemia     Hypertension     Stroke 2015    TIA    Uterine cancer " 2021    Endometrial Cancer     Past Surgical History:   Procedure Laterality Date    ARTERIOGRAM, CEREBRAL ARTERIES  02/12/2014    and 3/18/2014    HYSTERECTOMY      OMENTECTOMY N/A 03/30/2022    Procedure: OMENTECTOMY;  Surgeon: Reuben Lopez MD;  Location: [address];  Service: OB/GYN;  Laterality: N/A;    TOTAL ABDOMINAL HYSTERECTOMY W/ BILATERAL SALPINGOOPHORECTOMY N/A 03/30/2022    Procedure: HYSTERECTOMY, TOTAL, ABDOMINAL, WITH BILATERAL SALPINGO-OOPHORECTOMY;  Surgeon: Reuben Lopez MD;  Location: [facility] OR [address] [place] [address];  Service: OB/GYN;  Laterality: N/A;     Family History   Problem Relation Age of Onset    Diabetes Mother     Hypertension Mother     Cancer Father      Social History     Tobacco Use    Smoking status: Never    Smokeless tobacco: Never   Substance Use Topics    Alcohol use: No    Drug use: No     Review of Systems   Unable to perform ROS: Acuity of condition     Physical Exam     Initial Vitals [02/03/23 1945]   BP Pulse Resp Temp SpO2   122/63 94 18 97 °F (36.1 °C) 99 %      MAP       --         Physical Exam    Nursing note and vitals reviewed.  Constitutional: She appears well-developed and well-nourished.   HENT:   Head: Normocephalic and atraumatic.   Eyes: Conjunctivae and EOM are normal. Pupils are equal, round, and reactive to light.   Neck:   Normal range of motion.  Cardiovascular:  Normal rate.           Pulmonary/Chest: No respiratory distress.   Abdominal: She exhibits no distension.   Musculoskeletal:         General: Normal range of motion.      Cervical back: Normal range of motion.     Neurological: She is alert. No cranial nerve deficit. GCS score is 15. GCS eye subscore is 4. GCS verbal subscore is 5. GCS motor subscore is 6.   Skin: Skin is warm and dry.   Psychiatric: She has a normal mood and affect. Thought content normal.   No meningismus  Answers questions appropriately    ED Course   Procedures  MEDICAL DECISION MAKING    After review of the patient's physical exam,  ED testing, and history/symptoms, relevant labs, imaging, available outside records  a wide differential was considered including but not limited to: infectious, traumatic, vascular, toxicological , malignant, ischemic, embolic, psychological, genetic, iatrogenic, idiopathic, substance dependence/intoxication/withdrawal, electrolyte or blood dyscrasia, and other etiologies.    Generalized weakness hours after dialysis. Unclear how many days total of abx pt has taken.      labs/imaging/interventions include:       Medications   piperacillin-tazobactam (ZOSYN) 4.5 g in dextrose 5 % in water (D5W) 5 % 100 mL IVPB (MB+) (has no administration in time range)   cefTRIAXone (ROCEPHIN) 1 g/50 mL D5W IVPB (0 g Intravenous Stopped 2/3/23 2141)   dextrose 10% bolus 500 mL 500 mL (0 mLs Intravenous Stopped 2/4/23 0100)     Labs Reviewed   CBC W/ AUTO DIFFERENTIAL - Abnormal; Notable for the following components:       Result Value    RBC 3.82 (*)     Hemoglobin 11.2 (*)     Hematocrit 34.6 (*)     RDW 16.2 (*)     Platelets 136 (*)     Immature Granulocytes 0.8 (*)     Gran # (ANC) 8.0 (*)     Immature Grans (Abs) 0.08 (*)     Lymph # 0.7 (*)     Gran % 84.7 (*)     Lymph % 6.9 (*)     All other components within normal limits   COMPREHENSIVE METABOLIC PANEL - Abnormal; Notable for the following components:    CO2 18 (*)     Glucose 60 (*)     Creatinine 3.4 (*)     Albumin 2.9 (*)     Alkaline Phosphatase 270 (*)      (*)      (*)     Anion Gap 17 (*)     eGFR 15 (*)     All other components within normal limits   URINALYSIS, REFLEX TO URINE CULTURE - Abnormal; Notable for the following components:    Appearance, UA Hazy (*)     Protein, UA 2+ (*)     Occult Blood UA 2+ (*)     All other components within normal limits    Narrative:     Specimen Source->Urine   PROCALCITONIN - Abnormal; Notable for the following components:    Procalcitonin 1.76 (*)     All other components within normal limits   HEPATIC FUNCTION  PANEL - Abnormal; Notable for the following components:    Albumin 2.9 (*)      (*)      (*)     Alkaline Phosphatase 263 (*)     All other components within normal limits   POCT GLUCOSE - Abnormal; Notable for the following components:    POCT Glucose 53 (*)     All other components within normal limits   POCT GLUCOSE - Abnormal; Notable for the following components:    POCT Glucose 323 (*)     All other components within normal limits   CULTURE, BLOOD   CULTURE, BLOOD   LACTIC ACID, PLASMA   PHOSPHORUS   MAGNESIUM   AMMONIA   URINALYSIS MICROSCOPIC    Narrative:     Specimen Source->Urine   HEPATITIS PANEL, ACUTE   ACETAMINOPHEN LEVEL   POCT INFLUENZA A/B MOLECULAR   SARS-COV-2 RDRP GENE   POCT GLUCOSE MONITORING CONTINUOUS   POCT GLUCOSE MONITORING CONTINUOUS      CT Abdomen Pelvis  Without Contrast   Final Result   Abnormal      Large heterogeneous multiloculated pelvic mass measuring 8.5 by 8.4 x 7.6 cm.  Finding is new since prior and highly concerning for neoplastic process in this patient with clinical history of endometrial cancer.  Infectious etiology not excluded.      Moderate left hydroureteronephrosis without stone, potentially secondary to the above described mass.  Bladder distension.      Nonspecific 13 mm soft tissue nodule in the subcutaneous soft tissues of the lower midline anterior pelvis (axial image 164).      Gallbladder is markedly distended and contains sludge and stones.  If concern for acute cholecystitis, recommend ultrasound.      This report was flagged in Epic as abnormal.         Electronically signed by: Joshua Kumari   Date:    02/04/2023   Time:    01:30      CT Head Without Contrast   Final Result      No acute intracranial process.  Chronic findings, as above.         Electronically signed by: Joshua Kumari   Date:    02/04/2023   Time:    01:09      X-Ray Chest AP Portable   Final Result      No acute intrathoracic abnormality detected.  Tip of the  central venous catheter in the right atrium.         Electronically signed by: Lexy Lin   Date:    02/03/2023   Time:    20:36      US Abdomen Limited    (Results Pending)       Have written for 1G ceftriaxone here, screening labs, sepsis workup              Labs Reviewed   CBC W/ AUTO DIFFERENTIAL - Abnormal; Notable for the following components:       Result Value    RBC 3.82 (*)     Hemoglobin 11.2 (*)     Hematocrit 34.6 (*)     RDW 16.2 (*)     Platelets 136 (*)     Immature Granulocytes 0.8 (*)     Gran # (ANC) 8.0 (*)     Immature Grans (Abs) 0.08 (*)     Lymph # 0.7 (*)     Gran % 84.7 (*)     Lymph % 6.9 (*)     All other components within normal limits   COMPREHENSIVE METABOLIC PANEL - Abnormal; Notable for the following components:    CO2 18 (*)     Glucose 60 (*)     Creatinine 3.4 (*)     Albumin 2.9 (*)     Alkaline Phosphatase 270 (*)      (*)      (*)     Anion Gap 17 (*)     eGFR 15 (*)     All other components within normal limits   URINALYSIS, REFLEX TO URINE CULTURE - Abnormal; Notable for the following components:    Appearance, UA Hazy (*)     Protein, UA 2+ (*)     Occult Blood UA 2+ (*)     All other components within normal limits    Narrative:     Specimen Source->Urine   PROCALCITONIN - Abnormal; Notable for the following components:    Procalcitonin 1.76 (*)     All other components within normal limits   HEPATIC FUNCTION PANEL - Abnormal; Notable for the following components:    Albumin 2.9 (*)      (*)      (*)     Alkaline Phosphatase 263 (*)     All other components within normal limits   POCT GLUCOSE - Abnormal; Notable for the following components:    POCT Glucose 53 (*)     All other components within normal limits   POCT GLUCOSE - Abnormal; Notable for the following components:    POCT Glucose 323 (*)     All other components within normal limits   CULTURE, BLOOD   CULTURE, BLOOD   LACTIC ACID, PLASMA   PHOSPHORUS   MAGNESIUM   AMMONIA    URINALYSIS MICROSCOPIC    Narrative:     Specimen Source->Urine   HEPATITIS PANEL, ACUTE   ACETAMINOPHEN LEVEL   POCT INFLUENZA A/B MOLECULAR   SARS-COV-2 RDRP GENE   POCT GLUCOSE MONITORING CONTINUOUS   POCT GLUCOSE MONITORING CONTINUOUS     EKG Readings: (Independently Interpreted)   Hr 89, sinus, nl axis, prolonged qt, non specific st changes, no stemi.      Imaging Results              US Abdomen Limited (In process)                       CT Abdomen Pelvis  Without Contrast (Final result)  Result time 02/04/23 01:30:18      Final result by Joshua Kumari MD (02/04/23 01:30:18)                   Impression:      Large heterogeneous multiloculated pelvic mass measuring 8.5 by 8.4 x 7.6 cm.  Finding is new since prior and highly concerning for neoplastic process in this patient with clinical history of endometrial cancer.  Infectious etiology not excluded.    Moderate left hydroureteronephrosis without stone, potentially secondary to the above described mass.  Bladder distension.    Nonspecific 13 mm soft tissue nodule in the subcutaneous soft tissues of the lower midline anterior pelvis (axial image 164).    Gallbladder is markedly distended and contains sludge and stones.  If concern for acute cholecystitis, recommend ultrasound.    This report was flagged in Epic as abnormal.      Electronically signed by: Joshua Kumari  Date:    02/04/2023  Time:    01:30               Narrative:    EXAMINATION:  CT ABDOMEN PELVIS WITHOUT CONTRAST    CLINICAL HISTORY:  elevated LFTs, esrd;    TECHNIQUE:  Low dose axial images, sagittal and coronal reformations were obtained from the lung bases to the pubic symphysis.  Oral contrast was not administered.    COMPARISON:  01/28/2022    FINDINGS:  Mild bibasilar dependent atelectasis.    Coronary artery atherosclerosis.    Unremarkable liver.  Gallbladder is markedly distended and contains sludge and stones.  Unremarkable spleen, pancreas, and adrenal glands.   Bilateral kidneys are atrophic.  Large heterogeneous multiloculated pelvic mass measuring 8.5 by 8.4 x 7.6 cm.  Moderate left hydronephrosis and moderate hydroureter to the level of the pelvis.  No obstructive stone noted.  Bladder is distended.  Mild presacral edema.    No small bowel obstruction.  Colonic diverticulosis, without diverticulitis.  Normal appendix.    Nonspecific 13 mm soft tissue nodule in the subcutaneous soft tissues of the lower midline anterior pelvis (axial image 164).  Mild anasarca.    Bones are osteopenic.  Mild moderate L4-5 spondylosis with disc vacuum phenomena.                                       CT Head Without Contrast (Final result)  Result time 02/04/23 01:09:59      Final result by Joshua Kumari MD (02/04/23 01:09:59)                   Impression:      No acute intracranial process.  Chronic findings, as above.      Electronically signed by: Joshua Kumari  Date:    02/04/2023  Time:    01:09               Narrative:    EXAMINATION:  CT HEAD WITHOUT CONTRAST    CLINICAL HISTORY:  Mental status change, unknown cause;    TECHNIQUE:  Low dose axial images were obtained through the head.  Coronal and sagittal reformations were also performed. Contrast was not administered.    COMPARISON:  01/24/2023    FINDINGS:  Ventricles and sulci are prominent. Old bilateral thalamic lacunar infarcts and remote right occipital infarct, as seen on prior.  Moderate periventricular and deep white matter changes of chronic microvascular ischemia. No acute intracranial hemorrhage.  Paranasal sinuses and mastoid air cells are clear.                                       X-Ray Chest AP Portable (Final result)  Result time 02/03/23 20:36:40      Final result by Lexy Lin MD (02/03/23 20:36:40)                   Impression:      No acute intrathoracic abnormality detected.  Tip of the central venous catheter in the right atrium.      Electronically signed by: Lexy  Riley  Date:    02/03/2023  Time:    20:36               Narrative:    EXAMINATION:  AP PORTABLE CHEST    CLINICAL HISTORY:  Sepsis;    TECHNIQUE:  AP portable chest radiograph was submitted.    COMPARISON:  01/24/2023    FINDINGS:  There is a left central venous catheter with its tip in the right atrium.  AP portable chest radiograph demonstrates a cardiac silhouette within normal limits.  There is no focal consolidation, pneumothorax, or pleural effusion. Bones are osteopenic.                                       Medications   piperacillin-tazobactam (ZOSYN) 4.5 g in dextrose 5 % in water (D5W) 5 % 100 mL IVPB (MB+) (has no administration in time range)   cefTRIAXone (ROCEPHIN) 1 g/50 mL D5W IVPB (0 g Intravenous Stopped 2/3/23 2141)   dextrose 10% bolus 500 mL 500 mL (0 mLs Intravenous Stopped 2/4/23 0100)                Attending Attestation:         Attending Critical Care:   Critical Care Times:   Direct Patient Care (initial evaluation, reassessments, and time considering the case)................................................................10 minutes.   Additional History from reviewing old medical records or taking additional history from the family, EMS, PCP, etc.......................10 minutes.   Ordering, Reviewing, and Interpreting Diagnostic Studies...............................................................................................................10 minutes.   Documentation..................................................................................................................................................................................10 minutes.   ==============================================================  Total Critical Care Time - exclusive of procedural time: 40 minutes.  ==============================================================            LFTs are elevated. Ammonia is normal. Remeron is associated with Drowsiness in 54% of people taking and can also cause  confusion.    Initial glucose 60, fsg now 53. Have ordered iv glucose. Rpt >300    Ct abd/pelvis with pelvic mass malignant vs infectious, also gb with sludge stones. Have ordered pelvic US to exclude acute nba.  Have written for IV zosyn empirically.      Review of vitals in epic from dialysis shows that she was hypotensive to as low as 77 systolic during treatment today. She has not been hypotensive in the ED.    I have independently evaluated and interpreted all available labs and imaging.After review of the patient's physical exam, ED testing, and history/symptoms, the patient requires additional care in the hospital overnight. The hospital medicine service  will accept the patient and relevant labs, imaging, or procedures were discussed. Pending labs/imaging/interventions. The diagnosis, treatment and plan were discussed with the patient. All questions or concerns have been addressed.    Note was created using voice recognition software. Note may have occasional typographical or grammatical errors , garbled syntax, and other bizarre constructions that may not have been identified and edited despite good macario initial review prior to signing.          Clinical Impression:   Final diagnoses:  [R46.89] Altered behavior (Primary)  [K80.20] Calculus of gallbladder without cholecystitis without obstruction  [R19.00] Pelvic mass        ED Disposition Condition    Observation Stable                Ignacio Garcia MD  02/04/23 [id number]

## 2023-02-04 NOTE — CONSULTS
[facility] [place] Emergency Dept  Neurology  Consult Note    Patient Name: Anjali Dorantes  MRN: [id number]  Admission Date: 2/3/2023  Hospital Length of Stay: 0 days  Code Status: Full Code   Attending Provider: Ramos Baker III, MD   Consulting Provider: Jeremias Castellanos MD  Primary Care Physician: Tasia Carrasco MD  Principal Problem:Altered behavior    Inpatient consult to Neurology  Consult performed by: Jeremias Castellanos MD  Consult ordered by: Ramos Baker III, MD      Subjective:     Chief Complaint: AMS    HPI: 63 y/o female with medical Hx of DM 2, hypertension, endometrial cancer, bilateral ovarian cancer was brought to ED for AMS. Pt seems to be very somnolent to provide any reliable Hx. Per notes she has started mirtazapine recently. No reports of seizures, unilateral weakness. Noted episodic hypothermia while in ED.    Past Medical History:   Diagnosis Date    AMS (altered mental status) 06/30/2021    Anemia in CKD (chronic kidney disease)     CKD (chronic kidney disease)     Diabetes mellitus     Encounter for blood transfusion     Hypercholesterolemia     Hypertension     Stroke 2015    TIA    Uterine cancer 2021    Endometrial Cancer       Past Surgical History:   Procedure Laterality Date    ARTERIOGRAM, CEREBRAL ARTERIES  02/12/2014    and 3/18/2014    HYSTERECTOMY      OMENTECTOMY N/A 03/30/2022    Procedure: OMENTECTOMY;  Surgeon: Reuben Lopez MD;  Location: [facility] OR [address] [place] [address];  Service: OB/GYN;  Laterality: N/A;    TOTAL ABDOMINAL HYSTERECTOMY W/ BILATERAL SALPINGOOPHORECTOMY N/A 03/30/2022    Procedure: HYSTERECTOMY, TOTAL, ABDOMINAL, WITH BILATERAL SALPINGO-OOPHORECTOMY;  Surgeon: Reuben Lopez MD;  Location: [facility] OR [address] [place] [address];  Service: OB/GYN;  Laterality: N/A;       Review of patient's allergies indicates:   Allergen Reactions    Glipizide Other (See Comments)       Current Neurological Medications:     Current Facility-Administered Medications on File Prior to Encounter   Medication    [COMPLETED]  darbepoetin jose-polysorbate injection 25 mcg    [COMPLETED] heparin (porcine) injection 1,800 Units    [COMPLETED] heparin (porcine) injection 1,800 Units     Current Outpatient Medications on File Prior to Encounter   Medication Sig    acetaminophen (TYLENOL) 325 MG tablet Take 2 tablets (650 mg total) by mouth every 6 (six) hours as needed for Pain (Take every 6 hours for 5-7 days and then as needed).    allopurinoL (ZYLOPRIM) 100 MG tablet Take 100 mg by mouth once daily.    amLODIPine (NORVASC) 5 MG tablet Take 5 mg by mouth once daily.    ceFAZolin 1 g/50ml Dextrose IVPB (ANCEF) 1 gram/50 mL 2g/2g/3g after HD every Mon/Wed/Fri until 10/20/22 (Patient not taking: Reported on 1/20/2023)    clopidogreL (PLAVIX) 75 mg tablet Take 75 mg by mouth once daily.    dronabinoL (MARINOL) 5 MG capsule Take 1 capsule (5 mg total) by mouth once daily. for 15 days    dulaglutide (TRULICITY) 0.75 mg/0.5 mL pen injector Inject 0.75 mg into the skin every 7 days.    ferrous sulfate 325 (65 FE) MG EC tablet Take 1 tablet (325 mg total) by mouth once daily. (Patient not taking: Reported on 1/20/2023)    levoFLOXacin (LEVAQUIN) 250 MG tablet This medicine is an antibiotic to treat an infection. Please take (3) tablets (for a total of 750 mg) after dialysis on day 1. Then, take (1) tablet (for a total of 250 mg) daily for 4 days. Please take medicine after dialysis, on dialysis days.    metoprolol succinate (TOPROL-XL) 50 MG 24 hr tablet Take 50 mg by mouth once daily.    ondansetron (ZOFRAN) 4 MG tablet Take 1 tablet (4 mg total) by mouth every 24 hours as needed for Nausea.    pantoprazole (PROTONIX) 20 MG tablet Take 20 mg by mouth once daily.     rosuvastatin (CRESTOR) 20 MG tablet take ONE tablet every night at bedtime (for cholesterol)    sodium bicarbonate 650 MG tablet Take 650 mg by mouth 4 (four) times daily.    venlafaxine (EFFEXOR) 75 MG tablet Take 75 mg by mouth 2 (two) times daily.       Family History       Problem  Relation (Age of Onset)    Cancer Father    Diabetes Mother    Hypertension Mother          Tobacco Use    Smoking status: Never    Smokeless tobacco: Never   Substance and Sexual Activity    Alcohol use: No    Drug use: No    Sexual activity: Not Currently     Partners: Male     Review of Systems   Unable to perform ROS: Mental status change   Objective:     Vital Signs (Most Recent):  Temp: 98.1 °F (36.7 °C) (02/04/23 0755)  Pulse: 90 (02/04/23 0755)  Resp: 15 (02/04/23 0755)  BP: 120/64 (02/04/23 0755)  SpO2: 100 % (02/04/23 0755) Vital Signs (24h Range):  Temp:  [97 °F (36.1 °C)-98.1 °F (36.7 °C)] 98.1 °F (36.7 °C)  Pulse:  [] 90  Resp:  [14-18] 15  SpO2:  [99 %-100 %] 100 %  BP: ()/(20-64) 120/64     Weight: 50.8 kg (112 lb)  Body mass index is 21.16 kg/m².    Physical Exam  Constitutional:       General: She is not in acute distress.  HENT:      Head: Normocephalic.   Eyes:      General:         Right eye: No discharge.         Left eye: No discharge.   Cardiovascular:      Rate and Rhythm: Normal rate.   Pulmonary:      Breath sounds: Normal breath sounds.   Abdominal:      General: Bowel sounds are normal.   Musculoskeletal:         General: No swelling.      Cervical back: Neck supple.   Skin:     Findings: No rash.       NEUROLOGICAL EXAMINATION:     MENTAL STATUS        Opens eyes briefly on verbal stimuli. No verbal interaction.     CRANIAL NERVES     CN III, IV, VI   Right pupil: Size: 2 mm. Shape: regular.   Left pupil: Size: 2 mm. Shape: regular.   Nystagmus: none   Conjugate gaze: present    CN VII   Right facial weakness: none  Left facial weakness: none    MOTOR EXAM        Spontaneous AROM of UE's and LE's     Significant Labs: CBC:   Recent Labs   Lab 02/03/23 2054 02/04/23  0515 02/05/23  0657   WBC 9.48 7.15 8.55   HGB 11.2* 10.0* 9.8*   HCT 34.6* 30.3* 30.1*   * 122* 121*     CMP:   Recent Labs   Lab 02/03/23 2054 02/03/23  2350 02/04/23  0515 02/05/23  0657   GLU 60*  --   215* 61*     --  133* 134*   K 3.8  --  3.0* 3.3*     --  102 102   CO2 18*  --  20* 17*   BUN 22  --  27* 33*   CREATININE 3.4*  --  3.8* 4.4*   CALCIUM 9.0  --  8.2* 8.1*   MG 2.4  --   --   --    PROT 7.9 7.9 6.8 6.2   ALBUMIN 2.9* 2.9* 2.5* 2.1*   BILITOT 0.6 0.5 0.5 0.5   ALKPHOS 270* 263* 253* 258*   * 287* 239* 163*   * 148* 128* 97*   ANIONGAP 17*  --  11 15       Significant Imaging: I have reviewed all pertinent imaging results/findings within the past 24 hours.    MRI brionna  Impression:     1. No acute intracranial abnormality.  2. Generalized cerebral volume loss, right occipital encephalomalacia and chronic microvascular ischemic changes, not substantially changed when compared to 06/31/2021.  3. Remote lacunar-type bilateral thalamic and pontine infarcts, also unchanged.        Electronically signed by: Thais Felipe  Date:                                            02/04/2023  Time:                                           10:05    Assessment and Plan:     63 y/o female consulted for AMS    Encephalopathy: possibly due to use of mirtazapine? No acute abnormalities on MRI brain. Pt with ESRD and malignancy  No sedating meds.  Will reassess in AM.    Active Diagnoses:    Diagnosis Date Noted POA    PRINCIPAL PROBLEM:  Altered behavior [R46.89] 02/04/2023 Yes    Type 2 diabetes mellitus, without long-term current use of insulin [E11.9] 09/21/2022 Yes     Chronic    ESRD (end stage renal disease) [N18.6] 09/21/2022 Yes     Chronic    Endometrial cancer [C54.1] 08/06/2021 Yes     Chronic    Elevated LFTs [R79.89] 06/30/2021 Yes    Essential hypertension [I10] 04/29/2021 Yes     Chronic      Problems Resolved During this Admission:       VTE Risk Mitigation (From admission, onward)           Ordered     IP VTE HIGH RISK PATIENT  Once         02/04/23 0225     Place sequential compression device  Until discontinued         02/04/23 0225                    Thank you for your  consult. I will follow-up with patient. Please contact us if you have any additional questions.    Jeremias Castellanos MD  Neurology  [facility] [place] - Emergency Dept

## 2023-02-04 NOTE — ASSESSMENT & PLAN NOTE
Patient's FSGs are controlled on current medication regimen.  Last A1c reviewed-   Lab Results   Component Value Date    HGBA1C 5.2 01/04/2023     Most recent fingerstick glucose reviewed-   Recent Labs   Lab 02/04/23  0014 02/04/23  0133   POCTGLUCOSE 53* 323*     Current correctional scale  Medium  Maintain anti-hyperglycemic dose as follows-   Antihyperglycemics (From admission, onward)    Start     Stop Route Frequency Ordered    02/04/23 0324  insulin aspart U-100 pen 1-10 Units         -- SubQ Before meals & nightly PRN 02/04/23 0225        Hold Oral hypoglycemics while patient is in the hospital.

## 2023-02-04 NOTE — SUBJECTIVE & OBJECTIVE
Past Medical History:   Diagnosis Date    AMS (altered mental status) 06/30/2021    Anemia in CKD (chronic kidney disease)     CKD (chronic kidney disease)     Diabetes mellitus     Encounter for blood transfusion     Hypercholesterolemia     Hypertension     Stroke 2015    TIA    Uterine cancer 2021    Endometrial Cancer       Past Surgical History:   Procedure Laterality Date    ARTERIOGRAM, CEREBRAL ARTERIES  02/12/2014    and 3/18/2014    HYSTERECTOMY      OMENTECTOMY N/A 03/30/2022    Procedure: OMENTECTOMY;  Surgeon: Reuben Lopez MD;  Location: [facility] OR [address] [place] [address];  Service: OB/GYN;  Laterality: N/A;    TOTAL ABDOMINAL HYSTERECTOMY W/ BILATERAL SALPINGOOPHORECTOMY N/A 03/30/2022    Procedure: HYSTERECTOMY, TOTAL, ABDOMINAL, WITH BILATERAL SALPINGO-OOPHORECTOMY;  Surgeon: Reuben Lopez MD;  Location: [facility] OR [address] [place] [address];  Service: OB/GYN;  Laterality: N/A;       Review of patient's allergies indicates:   Allergen Reactions    Glipizide Other (See Comments)       Current Facility-Administered Medications on File Prior to Encounter   Medication    [COMPLETED] darbepoetin jose-polysorbate injection 25 mcg    [COMPLETED] heparin (porcine) injection 1,800 Units    [COMPLETED] heparin (porcine) injection 1,800 Units     Current Outpatient Medications on File Prior to Encounter   Medication Sig    acetaminophen (TYLENOL) 325 MG tablet Take 2 tablets (650 mg total) by mouth every 6 (six) hours as needed for Pain (Take every 6 hours for 5-7 days and then as needed).    allopurinoL (ZYLOPRIM) 100 MG tablet Take 100 mg by mouth once daily.    amLODIPine (NORVASC) 5 MG tablet Take 5 mg by mouth once daily.    ceFAZolin 1 g/50ml Dextrose IVPB (ANCEF) 1 gram/50 mL 2g/2g/3g after HD every Mon/Wed/Fri until 10/20/22 (Patient not taking: Reported on 1/20/2023)    clopidogreL (PLAVIX) 75 mg tablet Take 75 mg by mouth once daily.    dronabinoL (MARINOL) 5 MG capsule Take 1 capsule (5 mg total) by mouth once daily. for 15 days     dulaglutide (TRULICITY) 0.75 mg/0.5 mL pen injector Inject 0.75 mg into the skin every 7 days.    ferrous sulfate 325 (65 FE) MG EC tablet Take 1 tablet (325 mg total) by mouth once daily. (Patient not taking: Reported on 1/20/2023)    levoFLOXacin (LEVAQUIN) 250 MG tablet This medicine is an antibiotic to treat an infection. Please take (3) tablets (for a total of 750 mg) after dialysis on day 1. Then, take (1) tablet (for a total of 250 mg) daily for 4 days. Please take medicine after dialysis, on dialysis days.    metoprolol succinate (TOPROL-XL) 50 MG 24 hr tablet Take 50 mg by mouth once daily.    ondansetron (ZOFRAN) 4 MG tablet Take 1 tablet (4 mg total) by mouth every 24 hours as needed for Nausea.    pantoprazole (PROTONIX) 20 MG tablet Take 20 mg by mouth once daily.     rosuvastatin (CRESTOR) 20 MG tablet take ONE tablet every night at bedtime (for cholesterol)    sodium bicarbonate 650 MG tablet Take 650 mg by mouth 4 (four) times daily.    venlafaxine (EFFEXOR) 75 MG tablet Take 75 mg by mouth 2 (two) times daily.      Family History       Problem Relation (Age of Onset)    Cancer Father    Diabetes Mother    Hypertension Mother          Tobacco Use    Smoking status: Never    Smokeless tobacco: Never   Substance and Sexual Activity    Alcohol use: No    Drug use: No    Sexual activity: Not Currently     Partners: Male     Review of Systems   Constitutional:  Positive for activity change and fatigue. Negative for chills and fever.   HENT:  Negative for congestion and rhinorrhea.    Eyes:  Negative for photophobia and visual disturbance.   Respiratory:  Negative for cough and shortness of breath.    Cardiovascular:  Negative for chest pain, palpitations and leg swelling.   Gastrointestinal:  Negative for abdominal pain, diarrhea, nausea and vomiting.   Genitourinary:  Negative for dysuria, frequency and urgency.   Skin:  Negative for pallor, rash and wound.   Neurological:  Negative for light-headedness  and headaches.   Psychiatric/Behavioral:  Positive for confusion and decreased concentration.    Objective:     Vital Signs (Most Recent):  Temp: 97 °F (36.1 °C) (02/03/23 1945)  Pulse: 84 (02/04/23 0104)  Resp: 18 (02/04/23 0104)  BP: (!) 129/59 (02/04/23 0104)  SpO2: 100 % (02/04/23 0104)   Vital Signs (24h Range):  Temp:  [97 °F (36.1 °C)] 97 °F (36.1 °C)  Pulse:  [] 84  Resp:  [18] 18  SpO2:  [99 %-100 %] 100 %  BP: ()/(20-82) 129/59     Weight: 50.8 kg (112 lb)  Body mass index is 21.16 kg/m².    Physical Exam  Vitals and nursing note reviewed.   Constitutional:       General: She is not in acute distress.     Appearance: She is well-developed. She is ill-appearing.   HENT:      Head: Normocephalic and atraumatic.      Right Ear: External ear normal.      Left Ear: External ear normal.      Nose: Nose normal.   Eyes:      Conjunctiva/sclera: Conjunctivae normal.      Pupils: Pupils are equal, round, and reactive to light.   Cardiovascular:      Rate and Rhythm: Normal rate and regular rhythm.   Pulmonary:      Effort: Pulmonary effort is normal. No respiratory distress.      Breath sounds: Normal breath sounds. No wheezing.   Abdominal:      General: Bowel sounds are normal. There is no distension.      Palpations: Abdomen is soft.      Tenderness: There is no abdominal tenderness.      Comments: No palpable hepatomegaly or splenomegaly    Musculoskeletal:         General: No tenderness. Normal range of motion.      Cervical back: Normal range of motion and neck supple.   Skin:     General: Skin is warm and dry.   Neurological:      Mental Status: She is oriented to person, place, and time. She is lethargic.   Psychiatric:         Thought Content: Thought content normal.         CRANIAL NERVES     CN III, IV, VI   Pupils are equal, round, and reactive to light.     Significant Labs: All pertinent labs within the past 24 hours have been reviewed.    Significant Imaging: I have reviewed all pertinent  imaging results/findings within the past 24 hours.

## 2023-02-05 VITALS
SYSTOLIC BLOOD PRESSURE: 110 MMHG | BODY MASS INDEX: 8.87 KG/M2 | HEART RATE: 106 BPM | TEMPERATURE: 97 F | RESPIRATION RATE: 18 BRPM | OXYGEN SATURATION: 99 % | WEIGHT: 47 LBS | HEIGHT: 61 IN | DIASTOLIC BLOOD PRESSURE: 63 MMHG

## 2023-02-05 PROBLEM — N13.30 HYDRONEPHROSIS OF LEFT KIDNEY: Status: ACTIVE | Noted: 2023-02-05

## 2023-02-05 PROBLEM — C56.9 OVARIAN CANCER: Status: ACTIVE | Noted: 2023-02-05

## 2023-02-05 LAB
ALBUMIN SERPL BCP-MCNC: 2.1 G/DL (ref 3.5–5.2)
ALP SERPL-CCNC: 258 U/L (ref 55–135)
ALT SERPL W/O P-5'-P-CCNC: 97 U/L (ref 10–44)
ANION GAP SERPL CALC-SCNC: 15 MMOL/L (ref 8–16)
AST SERPL-CCNC: 163 U/L (ref 10–40)
BASOPHILS # BLD AUTO: 0.02 K/UL (ref 0–0.2)
BASOPHILS NFR BLD: 0.2 % (ref 0–1.9)
BILIRUB SERPL-MCNC: 0.5 MG/DL (ref 0.1–1)
BUN SERPL-MCNC: 33 MG/DL (ref 8–23)
CALCIUM SERPL-MCNC: 8.1 MG/DL (ref 8.7–10.5)
CHLORIDE SERPL-SCNC: 102 MMOL/L (ref 95–110)
CO2 SERPL-SCNC: 17 MMOL/L (ref 23–29)
CREAT SERPL-MCNC: 4.4 MG/DL (ref 0.5–1.4)
DIFFERENTIAL METHOD: ABNORMAL
EOSINOPHIL # BLD AUTO: 0 K/UL (ref 0–0.5)
EOSINOPHIL NFR BLD: 0.5 % (ref 0–8)
ERYTHROCYTE [DISTWIDTH] IN BLOOD BY AUTOMATED COUNT: 16 % (ref 11.5–14.5)
EST. GFR  (NO RACE VARIABLE): 11 ML/MIN/1.73 M^2
GLUCOSE SERPL-MCNC: 61 MG/DL (ref 70–110)
HCT VFR BLD AUTO: 30.1 % (ref 37–48.5)
HGB BLD-MCNC: 9.8 G/DL (ref 12–16)
IMM GRANULOCYTES # BLD AUTO: 0.05 K/UL (ref 0–0.04)
IMM GRANULOCYTES NFR BLD AUTO: 0.6 % (ref 0–0.5)
LYMPHOCYTES # BLD AUTO: 0.7 K/UL (ref 1–4.8)
LYMPHOCYTES NFR BLD: 8 % (ref 18–48)
MCH RBC QN AUTO: 29 PG (ref 27–31)
MCHC RBC AUTO-ENTMCNC: 32.6 G/DL (ref 32–36)
MCV RBC AUTO: 89 FL (ref 82–98)
MONOCYTES # BLD AUTO: 1.1 K/UL (ref 0.3–1)
MONOCYTES NFR BLD: 12.5 % (ref 4–15)
NEUTROPHILS # BLD AUTO: 6.7 K/UL (ref 1.8–7.7)
NEUTROPHILS NFR BLD: 78.2 % (ref 38–73)
NRBC BLD-RTO: 0 /100 WBC
PLATELET # BLD AUTO: 121 K/UL (ref 150–450)
PMV BLD AUTO: 10.8 FL (ref 9.2–12.9)
POCT GLUCOSE: 129 MG/DL (ref 70–110)
POCT GLUCOSE: 158 MG/DL (ref 70–110)
POCT GLUCOSE: 62 MG/DL (ref 70–110)
POCT GLUCOSE: 97 MG/DL (ref 70–110)
POTASSIUM SERPL-SCNC: 3.3 MMOL/L (ref 3.5–5.1)
PROT SERPL-MCNC: 6.2 G/DL (ref 6–8.4)
RBC # BLD AUTO: 3.38 M/UL (ref 4–5.4)
SODIUM SERPL-SCNC: 134 MMOL/L (ref 136–145)
VANCOMYCIN SERPL-MCNC: 18.2 UG/ML
WBC # BLD AUTO: 8.55 K/UL (ref 3.9–12.7)

## 2023-02-05 PROCEDURE — 25000003 PHARM REV CODE 250: Performed by: STUDENT IN AN ORGANIZED HEALTH CARE EDUCATION/TRAINING PROGRAM

## 2023-02-05 PROCEDURE — 96366 THER/PROPH/DIAG IV INF ADDON: CPT

## 2023-02-05 PROCEDURE — 99223 PR INITIAL HOSPITAL CARE,LEVL III: ICD-10-PCS | Mod: ,,, | Performed by: OBSTETRICS & GYNECOLOGY

## 2023-02-05 PROCEDURE — 85025 COMPLETE CBC W/AUTO DIFF WBC: CPT | Performed by: HOSPITALIST

## 2023-02-05 PROCEDURE — 25000003 PHARM REV CODE 250: Performed by: INTERNAL MEDICINE

## 2023-02-05 PROCEDURE — 99222 1ST HOSP IP/OBS MODERATE 55: CPT | Mod: ,,, | Performed by: UROLOGY

## 2023-02-05 PROCEDURE — 63600175 PHARM REV CODE 636 W HCPCS: Performed by: HOSPITALIST

## 2023-02-05 PROCEDURE — 96375 TX/PRO/DX INJ NEW DRUG ADDON: CPT

## 2023-02-05 PROCEDURE — 82962 GLUCOSE BLOOD TEST: CPT | Mod: 91

## 2023-02-05 PROCEDURE — 80202 ASSAY OF VANCOMYCIN: CPT | Performed by: STUDENT IN AN ORGANIZED HEALTH CARE EDUCATION/TRAINING PROGRAM

## 2023-02-05 PROCEDURE — G0378 HOSPITAL OBSERVATION PER HR: HCPCS

## 2023-02-05 PROCEDURE — 36415 COLL VENOUS BLD VENIPUNCTURE: CPT | Performed by: OBSTETRICS & GYNECOLOGY

## 2023-02-05 PROCEDURE — 36415 COLL VENOUS BLD VENIPUNCTURE: CPT | Performed by: STUDENT IN AN ORGANIZED HEALTH CARE EDUCATION/TRAINING PROGRAM

## 2023-02-05 PROCEDURE — 86304 IMMUNOASSAY TUMOR CA 125: CPT | Performed by: OBSTETRICS & GYNECOLOGY

## 2023-02-05 PROCEDURE — 99222 PR INITIAL HOSPITAL CARE,LEVL II: ICD-10-PCS | Mod: ,,, | Performed by: UROLOGY

## 2023-02-05 PROCEDURE — 96376 TX/PRO/DX INJ SAME DRUG ADON: CPT

## 2023-02-05 PROCEDURE — 80053 COMPREHEN METABOLIC PANEL: CPT | Performed by: HOSPITALIST

## 2023-02-05 PROCEDURE — 25000003 PHARM REV CODE 250: Performed by: HOSPITALIST

## 2023-02-05 PROCEDURE — 99223 1ST HOSP IP/OBS HIGH 75: CPT | Mod: ,,, | Performed by: OBSTETRICS & GYNECOLOGY

## 2023-02-05 PROCEDURE — 99900035 HC TECH TIME PER 15 MIN (STAT)

## 2023-02-05 RX ADMIN — DEXTROSE MONOHYDRATE 125 ML: 100 INJECTION, SOLUTION INTRAVENOUS at 09:02

## 2023-02-05 RX ADMIN — VENLAFAXINE 75 MG: 37.5 TABLET ORAL at 08:02

## 2023-02-05 RX ADMIN — ALLOPURINOL 100 MG: 100 TABLET ORAL at 08:02

## 2023-02-05 RX ADMIN — POTASSIUM BICARBONATE 50 MEQ: 978 TABLET, EFFERVESCENT ORAL at 09:02

## 2023-02-05 RX ADMIN — PIPERACILLIN AND TAZOBACTAM 4.5 G: 4; .5 INJECTION, POWDER, LYOPHILIZED, FOR SOLUTION INTRAVENOUS; PARENTERAL at 01:02

## 2023-02-05 RX ADMIN — SODIUM BICARBONATE 50 MEQ: 84 INJECTION, SOLUTION INTRAVENOUS at 12:02

## 2023-02-05 RX ADMIN — CLOPIDOGREL BISULFATE 75 MG: 75 TABLET ORAL at 08:02

## 2023-02-05 NOTE — CONSULTS
[facility]  Urology  Consult Note    Patient Name: Anjali Dorantes  MRN: [id number]  Admission Date: 2/3/2023  Hospital Length of Stay: 0   Code Status: DNR   Attending Provider: Marcela Amezcua DO   Consulting Provider: Sue Mcintyre MD  Primary Care Physician: Tasia Carrasco MD  Principal Problem:Altered behavior    Inpatient consult to Urology  Consult performed by: Sue Mcintyre MD  Consult ordered by: Marcela Amezcua DO          Subjective:     HPI:  63yo F admitted with AMS. Urology consulted for L hydronephrosis. CT showed large pelvic mass in pt with h/o ovarian cancer. She has baseline ESRD on HD.     CT - 8.4cm pelvic mass with likely mass effect on distal L ureter with moderate proximal hydroureteronephrosis. Moderate bladder distention.     Declines . Family provides translation.     Past Medical History:   Diagnosis Date    AMS (altered mental status) 06/30/2021    Anemia in CKD (chronic kidney disease)     CKD (chronic kidney disease)     Diabetes mellitus     Encounter for blood transfusion     Hypercholesterolemia     Hypertension     Stroke 2015    TIA    Uterine cancer 2021    Endometrial Cancer       Past Surgical History:   Procedure Laterality Date    ARTERIOGRAM, CEREBRAL ARTERIES  02/12/2014    and 3/18/2014    HYSTERECTOMY      OMENTECTOMY N/A 03/30/2022    Procedure: OMENTECTOMY;  Surgeon: Reuben Lopez MD;  Location: [facility] OR [address] [place] [address];  Service: OB/GYN;  Laterality: N/A;    TOTAL ABDOMINAL HYSTERECTOMY W/ BILATERAL SALPINGOOPHORECTOMY N/A 03/30/2022    Procedure: HYSTERECTOMY, TOTAL, ABDOMINAL, WITH BILATERAL SALPINGO-OOPHORECTOMY;  Surgeon: Reuben Lopez MD;  Location: [facility] OR [address] [place] [address];  Service: OB/GYN;  Laterality: N/A;       Review of patient's allergies indicates:   Allergen Reactions    Glipizide Other (See Comments)       Family History       Problem Relation (Age of Onset)    Cancer Father    Diabetes Mother    Hypertension Mother             Tobacco Use    Smoking status: Never    Smokeless tobacco: Never   Substance and Sexual Activity    Alcohol use: No    Drug use: No    Sexual activity: Not Currently     Partners: Male       Review of Systems   Unable to perform ROS: Mental status change   Genitourinary:  Negative for difficulty urinating and flank pain.     Objective:     Temp:  [95.3 °F (35.2 °C)-98.6 °F (37 °C)] 97.7 °F (36.5 °C)  Pulse:  [] 100  Resp:  [16-27] 16  SpO2:  [98 %-100 %] 100 %  BP: ()/(49-66) 107/60     Body mass index is 8.88 kg/m².    Date 02/05/23 0700 - 02/06/23 0659   Shift 6210-6879 0399-4047 4152-9186 24 Hour Total   INTAKE   P.O. 0   0   Shift Total(mL/kg) 0(0)   0(0)   OUTPUT   Shift Total(mL/kg)       Weight (kg) 21.3 21.3 21.3 21.3          Drains       Drain  Duration                  Hemodialysis AV Fistula Left forearm -- days         Hemodialysis Catheter 09/26/22 0852 left internal jugular 132 days                    Physical Exam  Vitals reviewed.   Constitutional:       General: She is not in acute distress.     Appearance: She is well-developed. She is ill-appearing. She is not diaphoretic.   HENT:      Head: Normocephalic and atraumatic.   Cardiovascular:      Rate and Rhythm: Normal rate and regular rhythm.   Pulmonary:      Effort: Pulmonary effort is normal. No respiratory distress.      Breath sounds: Normal breath sounds.   Abdominal:      General: There is no distension.      Palpations: Abdomen is soft. There is no mass.      Tenderness: There is no abdominal tenderness. There is no right CVA tenderness, left CVA tenderness, guarding or rebound.   Musculoskeletal:         General: Normal range of motion.      Cervical back: Normal range of motion.   Skin:     General: Skin is warm and dry.      Findings: No erythema or rash.   Psychiatric:         Behavior: Behavior normal.       Significant Labs:    BMP:  Recent Labs   Lab 02/03/23 2054 02/04/23  0515 02/05/23  0657    133*  134*   K 3.8 3.0* 3.3*    102 102   CO2 18* 20* 17*   BUN 22 27* 33*   CREATININE 3.4* 3.8* 4.4*   CALCIUM 9.0 8.2* 8.1*       CBC:  Recent Labs   Lab 02/03/23 2054 02/04/23  0515 02/05/23  0657   WBC 9.48 7.15 8.55   HGB 11.2* 10.0* 9.8*   HCT 34.6* 30.3* 30.1*   * 122* 121*       Blood Culture:   Recent Labs   Lab 02/03/23 2052 02/03/23  2151   LABBLOO No Growth to date  No Growth to date No Growth to date  No Growth to date     Urine Culture: No results for input(s): LABURIN in the last 168 hours.  Urine Studies:   Recent Labs   Lab 02/03/23  2300   COLORU Yellow   APPEARANCEUA Hazy*   PHUR 8.0   SPECGRAV 1.015   PROTEINUA 2+*   GLUCUA Negative   KETONESU Negative   BILIRUBINUA Negative   OCCULTUA 2+*   NITRITE Negative   UROBILINOGEN Negative   LEUKOCYTESUR Negative   RBCUA 1   WBCUA 4   BACTERIA Rare   SQUAMEPITHEL 8   HYALINECASTS 0       Significant Imaging:  All pertinent imaging results/findings from the past 24 hours have been reviewed.                      Assessment and Plan:     Hydronephrosis of left kidney   - Likely 2/2 pelvic mass in pt with h/o ovarian cancer   - Baseline ESRD. No flank pain. Okay to hold on stent or perc nephrostomy placement at this time. No need for  intervention.    Ovarian cancer   - Gynecology consulted. Considering transfer for gyn onc evaluation pending family decision.         VTE Risk Mitigation (From admission, onward)         Ordered     IP VTE HIGH RISK PATIENT  Once         02/04/23 0225     Place sequential compression device  Until discontinued         02/04/23 0225                Thank you for your consult. I will sign off. Please contact us if you have any additional questions.    Sue Mcintyre MD  Urology  [facility]

## 2023-02-05 NOTE — NURSING
[facility] MEWS TRIGGER FOLLOW UP       MEWS Monitoring, Score is: 3  Indication for review: Temp low    Bedside NurseAlina contacted, no concerns verbalized at this time, instructed to call [phone] for further concerns or assistance.    Selene mccormack in use, temp improved to 97.5

## 2023-02-05 NOTE — SUBJECTIVE & OBJECTIVE
OB History    Para Term  AB Living   6 6 6 0 0 0   SAB IAB Ectopic Multiple Live Births   0 0 0 0 0      # Outcome Date GA Lbr Casimiro/2nd Weight Sex Delivery Anes PTL Lv   6 Term            5 Term            4 Term            3 Term            2 Term            1 Term              Past Medical History:   Diagnosis Date    AMS (altered mental status) 2021    Anemia in CKD (chronic kidney disease)     CKD (chronic kidney disease)     Diabetes mellitus     Encounter for blood transfusion     Hypercholesterolemia     Hypertension     Stroke     TIA    Uterine cancer     Endometrial Cancer     Past Surgical History:   Procedure Laterality Date    ARTERIOGRAM, CEREBRAL ARTERIES  2014    and 3/18/2014    HYSTERECTOMY      OMENTECTOMY N/A 2022    Procedure: OMENTECTOMY;  Surgeon: Reuben Lopez MD;  [facility];  Service: OB/GYN;  Laterality: N/A;    TOTAL ABDOMINAL HYSTERECTOMY W/ BILATERAL SALPINGOOPHORECTOMY N/A 2022    Procedure: HYSTERECTOMY, TOTAL, ABDOMINAL, WITH BILATERAL SALPINGO-OOPHORECTOMY;  Surgeon: Reuben Lopez MD;  [facility];  Service: OB/GYN;  Laterality: N/A;       PTA Medications   Medication Sig    allopurinoL (ZYLOPRIM) 100 MG tablet Take 100 mg by mouth once daily.    amLODIPine (NORVASC) 5 MG tablet Take 5 mg by mouth once daily.    clopidogreL (PLAVIX) 75 mg tablet Take 75 mg by mouth once daily.    dulaglutide (TRULICITY) 0.75 mg/0.5 mL pen injector Inject 0.75 mg into the skin every 7 days.    levoFLOXacin (LEVAQUIN) 250 MG tablet This medicine is an antibiotic to treat an infection. Please take (3) tablets (for a total of 750 mg) after dialysis on day 1. Then, take (1) tablet (for a total of 250 mg) daily for 4 days. Please take medicine after dialysis, on dialysis days.    metoprolol succinate (TOPROL-XL) 50 MG 24 hr tablet Take 50 mg by mouth once daily.    ondansetron (ZOFRAN) 4 MG tablet Take 1 tablet (4 mg total) by  mouth every 24 hours as needed for Nausea.    rosuvastatin (CRESTOR) 20 MG tablet take ONE tablet every night at bedtime (for cholesterol)    venlafaxine (EFFEXOR) 75 MG tablet Take 75 mg by mouth 2 (two) times daily.     acetaminophen (TYLENOL) 325 MG tablet Take 2 tablets (650 mg total) by mouth every 6 (six) hours as needed for Pain (Take every 6 hours for 5-7 days and then as needed).    ceFAZolin 1 g/50ml Dextrose IVPB (ANCEF) 1 gram/50 mL 2g/2g/3g after HD every Mon/Wed/Fri until 10/20/22 (Patient not taking: Reported on 1/20/2023)    dronabinoL (MARINOL) 5 MG capsule Take 1 capsule (5 mg total) by mouth once daily. for 15 days    ferrous sulfate 325 (65 FE) MG EC tablet Take 1 tablet (325 mg total) by mouth once daily. (Patient not taking: Reported on 1/20/2023)    mirtazapine (REMERON) 15 MG tablet Take 15 mg by mouth every evening.    pantoprazole (PROTONIX) 20 MG tablet Take 20 mg by mouth once daily.     sodium bicarbonate 650 MG tablet Take 650 mg by mouth 4 (four) times daily.       Review of patient's allergies indicates:   Allergen Reactions    Glipizide Other (See Comments)        Family History       Problem Relation (Age of Onset)    Cancer Father    Diabetes Mother    Hypertension Mother          Tobacco Use    Smoking status: Never    Smokeless tobacco: Never   Substance and Sexual Activity    Alcohol use: No    Drug use: No    Sexual activity: Not Currently     Partners: Male     Review of Systems   Constitutional:  Positive for fatigue. Negative for appetite change, chills and fever.   Respiratory:  Negative for shortness of breath.    Cardiovascular:  Negative for chest pain.   Gastrointestinal:  Negative for abdominal pain, blood in stool, constipation, diarrhea, nausea and vomiting.   Endocrine: Negative for hair loss and hot flashes.   Genitourinary:  Negative for decreased libido, dysmenorrhea, dyspareunia, dysuria, genital sores, menorrhagia, menstrual problem, pelvic pain, vaginal  discharge, vaginal pain, urinary incontinence and vaginal odor.   Musculoskeletal:  Negative for back pain.   Integumentary:  Negative for rash, acne, hair changes, breast mass, nipple discharge and breast skin changes.   Breast: Negative for mass, mastodynia, nipple discharge and skin changes   Objective:     Vital Signs (Most Recent):  Temp: 97.7 °F (36.5 °C) (02/05/23 0811)  Pulse: 100 (02/05/23 0811)  Resp: 16 (02/05/23 0811)  BP: 107/60 (02/05/23 0811)  SpO2: 100 % (02/05/23 0811) Vital Signs (24h Range):  Temp:  [95.3 °F (35.2 °C)-98.6 °F (37 °C)] 97.7 °F (36.5 °C)  Pulse:  [] 100  Resp:  [16-27] 16  SpO2:  [98 %-100 %] 100 %  BP: ()/(49-66) 107/60     Weight: 21.3 kg (47 lb)  Body mass index is 8.88 kg/m².    No LMP recorded. Patient is postmenopausal.    Physical Exam:   Constitutional: She is oriented to person, place, and time. She appears well-developed.    HENT:   Head: Normocephalic and atraumatic.    Eyes: Pupils are equal, round, and reactive to light. EOM are normal.     Cardiovascular:       Exam reveals no clubbing and no cyanosis.        Pulmonary/Chest: Effort normal.        Abdominal: Soft. She exhibits abdominal incision. She exhibits no distension and no mass. There is no abdominal tenderness. There is no rebound and no guarding. No hernia.             Musculoskeletal: Normal range of motion and moves all extremeties.       Neurological: She is oriented to person, place, and time.   Pt resting and responded to name calling, opening her eyes and answered questions appropriately    Skin: Skin is warm and dry. No cyanosis. There is pallor. Nails show no clubbing.    Psychiatric: She has a normal mood and affect. Her behavior is normal. Thought content normal.     Laboratory:  Recent Lab Results         02/05/23  0810   02/05/23  0657   02/04/23  1928   02/04/23  1556        Albumin   2.1           Alkaline Phosphatase   258           ALT   97           Anion Gap   15           AST    163           Baso #   0.02           Basophil %   0.2           BILIRUBIN TOTAL   0.5  Comment: For infants and newborns, interpretation of results should be based  on gestational age, weight and in agreement with clinical  observations.    Premature Infant recommended reference ranges:  Up to 24 hours.............<8.0 mg/dL  Up to 48 hours............<12.0 mg/dL  3-5 days..................<15.0 mg/dL  6-29 days.................<15.0 mg/dL             BUN   33           Calcium   8.1           Chloride   102           CO2   17           Creatinine   4.4           Differential Method   Automated           eGFR   11           Eos #   0.0           Eosinophil %   0.5           Glucose   61           Gran # (ANC)   6.7           Gran %   78.2           Hematocrit   30.1           Hemoglobin   9.8           Immature Grans (Abs)   0.05  Comment: Mild elevation in immature granulocytes is non specific and   can be seen in a variety of conditions including stress response,   acute inflammation, trauma and pregnancy. Correlation with other   laboratory and clinical findings is essential.             Immature Granulocytes   0.6           Lymph #   0.7           Lymph %   8.0           MCH   29.0           MCHC   32.6           MCV   89           Mono #   1.1           Mono %   12.5           MPV   10.8           nRBC   0           Platelets   121           POCT Glucose 62     86   66       Potassium   3.3           PROTEIN TOTAL   6.2           RBC   3.38           RDW   16.0           Sodium   134           Vancomycin, Random   18.2           WBC   8.55                   Diagnostic Results:    EXAMINATION:  CT ABDOMEN PELVIS WITHOUT CONTRAST     CLINICAL HISTORY:  elevated LFTs, esrd;     TECHNIQUE:  Low dose axial images, sagittal and coronal reformations were obtained from the lung bases to the pubic symphysis.  Oral contrast was not administered.     COMPARISON:  01/28/2022     FINDINGS:  Mild bibasilar dependent  atelectasis.     Coronary artery atherosclerosis.     Unremarkable liver.  Gallbladder is markedly distended and contains sludge and stones.  Unremarkable spleen, pancreas, and adrenal glands.  Bilateral kidneys are atrophic.  Large heterogeneous multiloculated pelvic mass measuring 8.5 by 8.4 x 7.6 cm.  Moderate left hydronephrosis and moderate hydroureter to the level of the pelvis.  No obstructive stone noted.  Bladder is distended.  Mild presacral edema.     No small bowel obstruction.  Colonic diverticulosis, without diverticulitis.  Normal appendix.     Nonspecific 13 mm soft tissue nodule in the subcutaneous soft tissues of the lower midline anterior pelvis (axial image 164).  Mild anasarca.     Bones are osteopenic.  Mild moderate L4-5 spondylosis with disc vacuum phenomena.     Impression:     Large heterogeneous multiloculated pelvic mass measuring 8.5 by 8.4 x 7.6 cm.  Finding is new since prior and highly concerning for neoplastic process in this patient with clinical history of endometrial cancer.  Infectious etiology not excluded.     Moderate left hydroureteronephrosis without stone, potentially secondary to the above described mass.  Bladder distension.     Nonspecific 13 mm soft tissue nodule in the subcutaneous soft tissues of the lower midline anterior pelvis (axial image 164).     Gallbladder is markedly distended and contains sludge and stones.  If concern for acute cholecystitis, recommend ultrasound.     This report was flagged in Epic as abnormal.        Electronically signed by: Joshua Kumari  Date:                                            02/04/2023  Time:                                           01:30

## 2023-02-05 NOTE — SUBJECTIVE & OBJECTIVE
Past Medical History:   Diagnosis Date    AMS (altered mental status) 06/30/2021    Anemia in CKD (chronic kidney disease)     CKD (chronic kidney disease)     Diabetes mellitus     Encounter for blood transfusion     Hypercholesterolemia     Hypertension     Stroke 2015    TIA    Uterine cancer 2021    Endometrial Cancer       Past Surgical History:   Procedure Laterality Date    ARTERIOGRAM, CEREBRAL ARTERIES  02/12/2014    and 3/18/2014    HYSTERECTOMY      OMENTECTOMY N/A 03/30/2022    Procedure: OMENTECTOMY;  Surgeon: Reuben Lopez MD;  Location: [facility] OR [address] [place] [address];  Service: OB/GYN;  Laterality: N/A;    TOTAL ABDOMINAL HYSTERECTOMY W/ BILATERAL SALPINGOOPHORECTOMY N/A 03/30/2022    Procedure: HYSTERECTOMY, TOTAL, ABDOMINAL, WITH BILATERAL SALPINGO-OOPHORECTOMY;  Surgeon: Reuben Lopez MD;  Location: [facility] OR [address] [place] [address];  Service: OB/GYN;  Laterality: N/A;       Review of patient's allergies indicates:   Allergen Reactions    Glipizide Other (See Comments)       Family History       Problem Relation (Age of Onset)    Cancer Father    Diabetes Mother    Hypertension Mother            Tobacco Use    Smoking status: Never    Smokeless tobacco: Never   Substance and Sexual Activity    Alcohol use: No    Drug use: No    Sexual activity: Not Currently     Partners: Male       Review of Systems   Unable to perform ROS: Mental status change   Genitourinary:  Negative for difficulty urinating and flank pain.     Objective:     Temp:  [95.3 °F (35.2 °C)-98.6 °F (37 °C)] 97.7 °F (36.5 °C)  Pulse:  [] 100  Resp:  [16-27] 16  SpO2:  [98 %-100 %] 100 %  BP: ()/(49-66) 107/60     Body mass index is 8.88 kg/m².    Date 02/05/23 0700 - 02/06/23 0659   Shift 9903-2888 0593-6460 2003-8336 24 Hour Total   INTAKE   P.O. 0   0   Shift Total(mL/kg) 0(0)   0(0)   OUTPUT   Shift Total(mL/kg)       Weight (kg) 21.3 21.3 21.3 21.3          Drains       Drain  Duration                  Hemodialysis AV Fistula Left forearm -- days          Hemodialysis Catheter 09/26/22 0852 left internal jugular 132 days                    Physical Exam  Vitals reviewed.   Constitutional:       General: She is not in acute distress.     Appearance: She is well-developed. She is ill-appearing. She is not diaphoretic.   HENT:      Head: Normocephalic and atraumatic.   Cardiovascular:      Rate and Rhythm: Normal rate and regular rhythm.   Pulmonary:      Effort: Pulmonary effort is normal. No respiratory distress.      Breath sounds: Normal breath sounds.   Abdominal:      General: There is no distension.      Palpations: Abdomen is soft. There is no mass.      Tenderness: There is no abdominal tenderness. There is no right CVA tenderness, left CVA tenderness, guarding or rebound.   Musculoskeletal:         General: Normal range of motion.      Cervical back: Normal range of motion.   Skin:     General: Skin is warm and dry.      Findings: No erythema or rash.   Psychiatric:         Behavior: Behavior normal.       Significant Labs:    BMP:  Recent Labs   Lab 02/03/23 2054 02/04/23 0515 02/05/23  0657    133* 134*   K 3.8 3.0* 3.3*    102 102   CO2 18* 20* 17*   BUN 22 27* 33*   CREATININE 3.4* 3.8* 4.4*   CALCIUM 9.0 8.2* 8.1*       CBC:  Recent Labs   Lab 02/03/23 2054 02/04/23 0515 02/05/23  0657   WBC 9.48 7.15 8.55   HGB 11.2* 10.0* 9.8*   HCT 34.6* 30.3* 30.1*   * 122* 121*       Blood Culture:   Recent Labs   Lab 02/03/23 2052 02/03/23  2151   LABBLOO No Growth to date  No Growth to date No Growth to date  No Growth to date     Urine Culture: No results for input(s): LABURIN in the last 168 hours.  Urine Studies:   Recent Labs   Lab 02/03/23  2300   COLORU Yellow   APPEARANCEUA Hazy*   PHUR 8.0   SPECGRAV 1.015   PROTEINUA 2+*   GLUCUA Negative   KETONESU Negative   BILIRUBINUA Negative   OCCULTUA 2+*   NITRITE Negative   UROBILINOGEN Negative   LEUKOCYTESUR Negative   RBCUA 1   WBCUA 4   BACTERIA Rare   SQUAMEPITHEL 8    HYALINECASTS 0       Significant Imaging:  All pertinent imaging results/findings from the past 24 hours have been reviewed.

## 2023-02-05 NOTE — PLAN OF CARE
Problem: Adult Inpatient Plan of Care  Goal: Plan of Care Review  Outcome: Ongoing, Progressing  Flowsheets (Taken 2/5/2023 0815)  Plan of Care Reviewed With: patient  Goal: Patient-Specific Goal (Individualized)  Outcome: Ongoing, Progressing     Problem: Diabetes Comorbidity  Goal: Blood Glucose Level Within Targeted Range  Outcome: Ongoing, Progressing  Intervention: Monitor and Manage Glycemia  Flowsheets (Taken 2/5/2023 0815)  Glycemic Management:   blood glucose monitored   carbohydrate replacement provided   oral hydration promoted     Problem: Adult Inpatient Plan of Care  Goal: Plan of Care Review  Outcome: Ongoing, Progressing  Flowsheets (Taken 2/5/2023 0815)  Plan of Care Reviewed With: patient     Problem: Adult Inpatient Plan of Care  Goal: Plan of Care Review  Outcome: Ongoing, Progressing  Flowsheets (Taken 2/5/2023 0815)  Plan of Care Reviewed With: patient     Problem: Adult Inpatient Plan of Care  Goal: Patient-Specific Goal (Individualized)  Outcome: Ongoing, Progressing     Problem: Adult Inpatient Plan of Care  Goal: Patient-Specific Goal (Individualized)  Outcome: Ongoing, Progressing     Problem: Diabetes Comorbidity  Goal: Blood Glucose Level Within Targeted Range  Outcome: Ongoing, Progressing  Intervention: Monitor and Manage Glycemia  Flowsheets (Taken 2/5/2023 0815)  Glycemic Management:   blood glucose monitored   carbohydrate replacement provided   oral hydration promoted     Problem: Diabetes Comorbidity  Goal: Blood Glucose Level Within Targeted Range  Outcome: Ongoing, Progressing     Problem: Diabetes Comorbidity  Goal: Blood Glucose Level Within Targeted Range  Intervention: Monitor and Manage Glycemia  Flowsheets (Taken 2/5/2023 0815)  Glycemic Management:   blood glucose monitored   carbohydrate replacement provided   oral hydration promoted     Problem: Diabetes Comorbidity  Goal: Blood Glucose Level Within Targeted Range  Intervention: Monitor and Manage Glycemia  Flowsheets  (Taken 2/5/2023 0815)  Glycemic Management:   blood glucose monitored   carbohydrate replacement provided   oral hydration promoted

## 2023-02-05 NOTE — DISCHARGE SUMMARY
[facility]  Discharge Summary      Patient Name: Anjali Dorantes  MRN: [id number]  [facility]: [id number]  Patient Class: OP- Observation  Admission Date: 2/3/2023  Hospital Length of Stay: 0 days  Discharge Date and Time:  02/05/2023 5:05 PM  Attending Physician: Marcela Amezcua DO   Discharging Provider: Marcela Amezcua DO  Primary Care Provider: Tasia Carrasco MD    Primary Care Team: Networked reference to record PCT     HPI:   62 y.o. female with ESRD on HD, DM 2, hypertension, adenocarcinoma, endometrial cancer, bilateral ovarian cancer, and pelvic mass presents with a complaint of altered mental status.  Acute onset yesterday after dialysis, described as fatigue and drowsiness, associated with poor appetite.  She did miss dialysis on Monday Wednesday, but got full treatment yesterday.  Also started new medication Remeron Thursday evening.  Son reports temperature of 93 deg F at home.  Denies fever, chills, cough, SOB, chest pain, palpitations, dizziness, syncope, nausea, vomiting, diarrhea.  In the ED, labs show elevated liver enzymes as well as an elevated procalcitonin of 1.76.  CT abdomen pelvis reveals a large pelvic mass with associated left hydroureteronephrosis.  Gallbladder is also distended with sludge and stones.  Abdominal ultrasound confirms distention with sludge and stones but does not reveal evidence of acute cholecystitis.  Otherwise unremarkable for acute abnormality.      * No surgery found *      Hospital Course:    62 y.o. female with ESRD on HD, DM 2, hypertension, adenocarcinoma, endometrial cancer, bilateral ovarian cancer admitted on 02/03/2023 for further evaluation of altered mental status. Of note, discussed with patient's son in regards to mental status changed. He informed me that she was not altered on presentation but that the concern with that her responses were delayed and she appeared more lethargic on presentation.  Patient recently started on Remeron  and son thinks this could be contributing. Son stated that patient has been functionally declining, and has noticed it more rapidly over the last month. Stated that she has not been eating and her appetite has been poor and she has been losing weight. Patient with multiple electrolyte abnormalities and elevated LFTs. Nephrology consulted for dialysis- patient did not required dialysis at current time.     CT abdomen pelvis reveals a large pelvic mass with associated left hydroureteronephrosis.  Gallbladder is also distended with sludge and stones.  Abdominal ultrasound confirms distention with sludge and stones but does not reveal evidence of acute cholecystitis. GYN consulted who also discussed case with Gyn/onc (Dr. Tamayo) who recommends transfer to [facility] for further evaluation given pelvic mass causing left hydroureteronephrosis. Discussed in length with patient and her family at bedside (her daughter and her son). After discussion, patient has decided that she no longer want to be treated for her malignancy or pelvic mass. Decline being transfer to gyn/oncology evaluation. Patient stated that she wants to go home with hospice. Family at bedside wants to respect her wishes. Cm/SW consulted to assist with home hospice. Patient has been accepted into [facility] hospice.    Patient overall appears ill. She is awake and alert and oriented x 4. Able to express her wishes to go home with hospice. Pt denies any fever, headaches, vision changes, chest pain, shortness of breath, palpitations, abdominal pain, nausea, vomiting, or diarrhea. Denies any pain. Admits feeling fatigue and weak. Patient's exam on discharge was as follow: Patient is alert and oriented, appears in no acute distress, she is ill appearing, heart with regular rate and rhythm, lungs clear to asculation with non-labored breathing, abdomen soft, and skin is pale.     Patient and her family was counseled regarding any abnormal labs, differential  diagnosis, treatment options, risk-benefit, lifestyle changes, prognosis, current condition, and medications. Patient was interactive and attentive.  Patient's questions were answered in a respectful and timely manner. Patient was instructed to follow-up with GYN/ONC within 1 week and to continue taking medications as prescribed. Also, extensively discussed the risks, benefits, and side effects of patient's medications. Discussed with Patient and her family about any medication changes. Patient and her family verbalized understanding and agrees to treatment plan.  Patient is stable for discharge to home with hospice as patient wants to move forward with comfort care.  Patient and her family has no other questions or concerns at this time.      Vital signs are stable.  Tolerating p.o. intake without any nausea or vomiting. Afebrile. Patient will be discharge to home with home hospice once hospice company is prepared for patient to come home and required equipment delivered to the home.  CM/SW to assist with discharge planning.     Vitals:    02/05/23 0503 02/05/23 0510 02/05/23 0811 02/05/23 1214   BP: (!) 87/49 (!) 98/56 107/60 (!) 99/58   BP Location: Right arm Right arm Right arm Right arm   Patient Position: Lying Lying Lying Lying   Pulse: 100  100 102   Resp: 16  16 18   Temp: 97.6 °F (36.4 °C)  97.7 °F (36.5 °C) 97.9 °F (36.6 °C)   TempSrc: Oral  Oral Oral   SpO2: 99%  100% 100%   Weight:       Height:                  Goals of Care Treatment Preferences:  Code Status: DNR          What is most important right now is to focus on spending time at home, avoiding the hospital, comfort and QOL .  Accordingly, we have decided that the best plan to meet the patient's goals includes enrolling in hospice care.      Consults:   Consults (From admission, onward)        Status Ordering Provider     Inpatient consult to Social Work  Once        Provider:  (Not yet assigned)    Completed ZAK FINK     Inpatient  consult to Urology  Once        Provider:  Sue Mcintyre MD    Completed ZAK FINK.     Inpatient consult to Gynecology  Once        Provider:  Nicolle Schroeder Mai, MD    Acknowledged ZAK FINK     Inpatient consult to Neurology  Once        Provider:  (Not yet assigned)    SULEMAN Lazaro III     Pharmacy to dose Vancomycin consult  Once        Provider:  (Not yet assigned)   See Hyperspace for full Linked Orders Report.    Acknowledged BAILEY GROVES JR     Inpatient consult to Nephrology  Once        Provider:  Mica Ramos MD    Acknowledged BAILEY GROVES JR          Endometrial cancer  CT findings concerning for recurrence      Final Active Diagnoses:    Diagnosis Date Noted POA    PRINCIPAL PROBLEM:  Altered behavior [R46.89] 02/04/2023 Yes    Essential hypertension [I10] 04/29/2021 Yes     Chronic    Type 2 diabetes mellitus, without long-term current use of insulin [E11.9] 09/21/2022 Yes     Chronic    ESRD (end stage renal disease) [N18.6] 09/21/2022 Yes     Chronic    Endometrial cancer [C54.1] 08/06/2021 Yes     Chronic    Ovarian cancer [C56.9] 02/05/2023 Unknown    Hydronephrosis of left kidney [N13.30] 02/05/2023 Yes    Elevated LFTs [R79.89] 06/30/2021 Yes      Problems Resolved During this Admission:       Discharged Condition: stable    Disposition: Hospice/Home    Follow Up:   Follow-up Information     Tasia Carrasco MD. Call.    Specialty: Internal Medicine  Why: To schedule a hospital follow up appointment within 7 days.  Contact information:  [address] [facility] [place] [address] [phone]             Reuben Lopez MD Follow up in 3 day(s).    Specialties: Gynecologic Oncology, Gynecology, Oncology  Contact information:  [address] [facility] [address] [phone]                       Patient Instructions:      Diet renal     Activity as tolerated       Significant Diagnostic Studies: Labs: All labs within the past 24 hours have  been reviewed       Recent Results (from the past 100 hour(s))   Patient Data    Collection Time: 02/03/23 12:00 AM   Result Value Ref Range    Post-Weight, kg 42.8 kg    Pre-Weight, lb 93.1     Post-Weight, lb 94.2     Dialysis Start Time 1,107     Dialysis Stop Time 1,407     Pre-Weight, kg 42.3 kg   Hepatitis B Surface Antigen    Collection Time: 02/03/23 12:00 AM   Result Value Ref Range    Hepatitis B Surface Ag Negative Negative   PTH, Intact    Collection Time: 02/03/23 12:00 AM   Result Value Ref Range    PTH, Intact 588 (H) 16 - 80 pg/mL   Differential    Collection Time: 02/03/23 12:00 AM   Result Value Ref Range    Neutrophils 85.0 (H) 40.0 - 75.0 %    Lymph % 5.4 (L) 19.0 - 48.0 %    Mono % 6.8 3.0 - 10.0 %    Eosinophil % 0.7 0.0 - 7.0 %    Basophil % 0.1 0.0 - 1.5 %    ROSALIO 2.0 0.0 - 4.0 %   HEMOGRAM    Collection Time: 02/03/23 12:00 AM   Result Value Ref Range    WBC 11.94 (H) 4.80 - 10.80 1000/mcL    RBC 3.83 (L) 4.20 - 5.40 mill/mcL    Hematocrit 33.1 (L) 37.0 - 47.0 %    MCV 86 80 - 100 fl    MCH 30.7 27.0 - 31.0 pg    MCHC 35.5 30.0 - 36.0 g/dL    RDW 16.6 (H) 11.5 - 14.5 %   Hemoglobin    Collection Time: 02/03/23 12:00 AM   Result Value Ref Range    Hemoglobin 11.8 (L) 12.0 - 16.0 g/dL    Hemoglobin x 3 35.4 (L) 36.0 - 48.0 %   Platelet Count    Collection Time: 02/03/23 12:00 AM   Result Value Ref Range    Platelets 153 130 - 400 1000/mcL   BUN    Collection Time: 02/03/23 12:00 AM   Result Value Ref Range    BUN 56 (H) 6 - 19 mg/dL   Creatinine, Serum    Collection Time: 02/03/23 12:00 AM   Result Value Ref Range    Creatinine 5.46 (H) 0.60 - 1.30 mg/dL   BUN Creatinine Ratio (Calc)    Collection Time: 02/03/23 12:00 AM   Result Value Ref Range    BUN/Creatinine Ratio 10.3 10.0 - 20.0   Sodium    Collection Time: 02/03/23 12:00 AM   Result Value Ref Range    Sodium 136 136 - 145 mEq/L   Potassium    Collection Time: 02/03/23 12:00 AM   Result Value Ref Range    Potassium 3.4 (L) 3.5 - 5.1  mEq/L   Chloride    Collection Time: 02/03/23 12:00 AM   Result Value Ref Range    Chloride 97 96 - 108 mEq/L   Bicarbonate    Collection Time: 02/03/23 12:00 AM   Result Value Ref Range    Bicarbonate 22 20 - 31 mEq/L   Calcium    Collection Time: 02/03/23 12:00 AM   Result Value Ref Range    Calcium 8.1 (L) 8.7 - 10.4 mg/dL   Phosphorus    Collection Time: 02/03/23 12:00 AM   Result Value Ref Range    Phosphorus 5.6 (H) 2.6 - 4.5 mg/dL   Calcium Phosphorus Product (Calc)    Collection Time: 02/03/23 12:00 AM   Result Value Ref Range    Calcium Phos Product 45 0 - 54   Alkaline Phosphatase    Collection Time: 02/03/23 12:00 AM   Result Value Ref Range    Alkaline Phosphatase 292 (H) 35 - 104 U/L   ALT (SGPT)    Collection Time: 02/03/23 12:00 AM   Result Value Ref Range     (H) 7 - 52 U/L   Albumin    Collection Time: 02/03/23 12:00 AM   Result Value Ref Range    Albumin 3.9 3.5 - 5.2 g/dL   Calcium, Corrected    Collection Time: 02/03/23 12:00 AM   Result Value Ref Range    Calcium, Corrected 8.2 (L) 8.7 - 10.4 mg/dL   Calcium Phosphorus Product, Corrected (Calc)    Collection Time: 02/03/23 12:00 AM   Result Value Ref Range    Calcium Phos Product, Cor 46 0 - 54   Iron    Collection Time: 02/03/23 12:00 AM   Result Value Ref Range    Iron 35 30 - 160 mcg/dL   UIBC    Collection Time: 02/03/23 12:00 AM   Result Value Ref Range    UIBC 156 155 - 355 mcg/dL   TIBC (Calc)    Collection Time: 02/03/23 12:00 AM   Result Value Ref Range    TIBC 191 185 - 515 mcg/dL   Transferrin Saturation    Collection Time: 02/03/23 12:00 AM   Result Value Ref Range    Transferrin Saturation 18 (L) 20 - 55 %   Glucose, Random    Collection Time: 02/03/23 12:00 AM   Result Value Ref Range    Glucose 107 (H) 70 - 100 mg/dL   Urea Nitrogen BUN (Post)    Collection Time: 02/03/23 12:00 AM   Result Value Ref Range    BUN, Post 10 6 - 19 mg/dL   Urea Reduction Ratio (Calc)    Collection Time: 02/03/23 12:00 AM   Result Value Ref  Range    Urea Reduction Ratio 82 (H) 65 - 80 %   spKt/V, Daugirdas II    Collection Time: 02/03/23 12:00 AM   Result Value Ref Range    spKt/V, Daugirdas II 1.83    POCT COVID-19 Rapid Screening    Collection Time: 02/03/23  8:44 PM   Result Value Ref Range    POC Rapid COVID Negative Negative     Acceptable Yes    POCT Influenza A/B Molecular    Collection Time: 02/03/23  8:44 PM   Result Value Ref Range    POC Molecular Influenza A Ag Negative Negative, Not Reported    POC Molecular Influenza B Ag Negative Negative, Not Reported     Acceptable Yes    Blood culture x two cultures. Draw prior to antibiotics.    Collection Time: 02/03/23  8:52 PM    Specimen: Peripheral, Wrist, Right; Blood   Result Value Ref Range    Blood Culture, Routine No Growth to date     Blood Culture, Routine No Growth to date    CBC auto differential    Collection Time: 02/03/23  8:54 PM   Result Value Ref Range    WBC 9.48 3.90 - 12.70 K/uL    RBC 3.82 (L) 4.00 - 5.40 M/uL    Hemoglobin 11.2 (L) 12.0 - 16.0 g/dL    Hematocrit 34.6 (L) 37.0 - 48.5 %    MCV 91 82 - 98 fL    MCH 29.3 27.0 - 31.0 pg    MCHC 32.4 32.0 - 36.0 g/dL    RDW 16.2 (H) 11.5 - 14.5 %    Platelets 136 (L) 150 - 450 K/uL    MPV 9.4 9.2 - 12.9 fL    Immature Granulocytes 0.8 (H) 0.0 - 0.5 %    Gran # (ANC) 8.0 (H) 1.8 - 7.7 K/uL    Immature Grans (Abs) 0.08 (H) 0.00 - 0.04 K/uL    Lymph # 0.7 (L) 1.0 - 4.8 K/uL    Mono # 0.7 0.3 - 1.0 K/uL    Eos # 0.0 0.0 - 0.5 K/uL    Baso # 0.02 0.00 - 0.20 K/uL    nRBC 0 0 /100 WBC    Gran % 84.7 (H) 38.0 - 73.0 %    Lymph % 6.9 (L) 18.0 - 48.0 %    Mono % 7.3 4.0 - 15.0 %    Eosinophil % 0.1 0.0 - 8.0 %    Basophil % 0.2 0.0 - 1.9 %    Differential Method Automated    Comprehensive metabolic panel    Collection Time: 02/03/23  8:54 PM   Result Value Ref Range    Sodium 139 136 - 145 mmol/L    Potassium 3.8 3.5 - 5.1 mmol/L    Chloride 104 95 - 110 mmol/L    CO2 18 (L) 23 - 29 mmol/L    Glucose 60 (L) 70  - 110 mg/dL    BUN 22 8 - 23 mg/dL    Creatinine 3.4 (H) 0.5 - 1.4 mg/dL    Calcium 9.0 8.7 - 10.5 mg/dL    Total Protein 7.9 6.0 - 8.4 g/dL    Albumin 2.9 (L) 3.5 - 5.2 g/dL    Total Bilirubin 0.6 0.1 - 1.0 mg/dL    Alkaline Phosphatase 270 (H) 55 - 135 U/L     (H) 10 - 40 U/L     (H) 10 - 44 U/L    Anion Gap 17 (H) 8 - 16 mmol/L    eGFR 15 (A) >60 mL/min/1.73 m^2   Lactic acid, plasma #1    Collection Time: 02/03/23  8:54 PM   Result Value Ref Range    Lactate (Lactic Acid) 2.0 0.5 - 2.2 mmol/L   Phosphorus    Collection Time: 02/03/23  8:54 PM   Result Value Ref Range    Phosphorus 3.6 2.7 - 4.5 mg/dL   Magnesium    Collection Time: 02/03/23  8:54 PM   Result Value Ref Range    Magnesium 2.4 1.6 - 2.6 mg/dL   Procalcitonin    Collection Time: 02/03/23  8:54 PM   Result Value Ref Range    Procalcitonin 1.76 (H) <0.25 ng/mL   Ammonia    Collection Time: 02/03/23  8:54 PM   Result Value Ref Range    Ammonia 18 10 - 50 umol/L   Blood culture x two cultures. Draw prior to antibiotics.    Collection Time: 02/03/23  9:51 PM    Specimen: Wrist, Right; Blood   Result Value Ref Range    Blood Culture, Routine No Growth to date     Blood Culture, Routine No Growth to date    Urinalysis, Reflex to Urine Culture Urine, Clean Catch    Collection Time: 02/03/23 11:00 PM    Specimen: Urine   Result Value Ref Range    Specimen UA Urine, Catheterized     Color, UA Yellow Yellow, Straw, Lia    Appearance, UA Hazy (A) Clear    pH, UA 8.0 5.0 - 8.0    Specific Gravity, UA 1.015 1.005 - 1.030    Protein, UA 2+ (A) Negative    Glucose, UA Negative Negative    Ketones, UA Negative Negative    Bilirubin (UA) Negative Negative    Occult Blood UA 2+ (A) Negative    Nitrite, UA Negative Negative    Urobilinogen, UA Negative <2.0 EU/dL    Leukocytes, UA Negative Negative   Urinalysis Microscopic    Collection Time: 02/03/23 11:00 PM   Result Value Ref Range    RBC, UA 1 0 - 4 /hpf    WBC, UA 4 0 - 5 /hpf    Bacteria Rare  None-Occ /hpf    Squam Epithel, UA 8 /hpf    Hyaline Casts, UA 0 0-1/lpf /lpf    Microscopic Comment SEE COMMENT    Hepatic function panel    Collection Time: 02/03/23 11:50 PM   Result Value Ref Range    Total Protein 7.9 6.0 - 8.4 g/dL    Albumin 2.9 (L) 3.5 - 5.2 g/dL    Total Bilirubin 0.5 0.1 - 1.0 mg/dL    Bilirubin, Direct 0.3 0.1 - 0.3 mg/dL     (H) 10 - 40 U/L     (H) 10 - 44 U/L    Alkaline Phosphatase 263 (H) 55 - 135 U/L   Hepatitis panel, acute    Collection Time: 02/03/23 11:50 PM   Result Value Ref Range    Hepatitis B Surface Ag Non-reactive Non-reactive    Hep B C IgM Non-reactive Non-reactive    Hep A IgM Non-reactive Non-reactive    Hepatitis C Ab Non-reactive Non-reactive   POCT glucose    Collection Time: 02/04/23 12:14 AM   Result Value Ref Range    POCT Glucose 53 (L) 70 - 110 mg/dL   POCT glucose    Collection Time: 02/04/23  1:33 AM   Result Value Ref Range    POCT Glucose 323 (H) 70 - 110 mg/dL   Acetaminophen level    Collection Time: 02/04/23  1:51 AM   Result Value Ref Range    Acetaminophen (Tylenol), Serum <3.0 (L) 10.0 - 20.0 ug/mL   Comprehensive Metabolic Panel (CMP)    Collection Time: 02/04/23  5:15 AM   Result Value Ref Range    Sodium 133 (L) 136 - 145 mmol/L    Potassium 3.0 (L) 3.5 - 5.1 mmol/L    Chloride 102 95 - 110 mmol/L    CO2 20 (L) 23 - 29 mmol/L    Glucose 215 (H) 70 - 110 mg/dL    BUN 27 (H) 8 - 23 mg/dL    Creatinine 3.8 (H) 0.5 - 1.4 mg/dL    Calcium 8.2 (L) 8.7 - 10.5 mg/dL    Total Protein 6.8 6.0 - 8.4 g/dL    Albumin 2.5 (L) 3.5 - 5.2 g/dL    Total Bilirubin 0.5 0.1 - 1.0 mg/dL    Alkaline Phosphatase 253 (H) 55 - 135 U/L     (H) 10 - 40 U/L     (H) 10 - 44 U/L    Anion Gap 11 8 - 16 mmol/L    eGFR 13 (A) >60 mL/min/1.73 m^2   CBC with Automated Differential    Collection Time: 02/04/23  5:15 AM   Result Value Ref Range    WBC 7.15 3.90 - 12.70 K/uL    RBC 3.39 (L) 4.00 - 5.40 M/uL    Hemoglobin 10.0 (L) 12.0 - 16.0 g/dL     Hematocrit 30.3 (L) 37.0 - 48.5 %    MCV 89 82 - 98 fL    MCH 29.5 27.0 - 31.0 pg    MCHC 33.0 32.0 - 36.0 g/dL    RDW 16.2 (H) 11.5 - 14.5 %    Platelets 122 (L) 150 - 450 K/uL    MPV 10.5 9.2 - 12.9 fL    Immature Granulocytes 0.7 (H) 0.0 - 0.5 %    Gran # (ANC) 5.8 1.8 - 7.7 K/uL    Immature Grans (Abs) 0.05 (H) 0.00 - 0.04 K/uL    Lymph # 0.7 (L) 1.0 - 4.8 K/uL    Mono # 0.6 0.3 - 1.0 K/uL    Eos # 0.0 0.0 - 0.5 K/uL    Baso # 0.02 0.00 - 0.20 K/uL    nRBC 0 0 /100 WBC    Gran % 80.5 (H) 38.0 - 73.0 %    Lymph % 9.4 (L) 18.0 - 48.0 %    Mono % 8.7 4.0 - 15.0 %    Eosinophil % 0.4 0.0 - 8.0 %    Basophil % 0.3 0.0 - 1.9 %    Differential Method Automated    POCT glucose    Collection Time: 02/04/23  5:35 AM   Result Value Ref Range    POCT Glucose 219 (H) 70 - 110 mg/dL   POCT glucose    Collection Time: 02/04/23  6:43 AM   Result Value Ref Range    POCT Glucose 184 (H) 70 - 110 mg/dL   POCT glucose    Collection Time: 02/04/23  8:33 AM   Result Value Ref Range    POCT Glucose 144 (H) 70 - 110 mg/dL   POCT glucose    Collection Time: 02/04/23  3:56 PM   Result Value Ref Range    POCT Glucose 66 (L) 70 - 110 mg/dL   POCT glucose    Collection Time: 02/04/23  7:28 PM   Result Value Ref Range    POCT Glucose 86 70 - 110 mg/dL   Comprehensive Metabolic Panel (CMP)    Collection Time: 02/05/23  6:57 AM   Result Value Ref Range    Sodium 134 (L) 136 - 145 mmol/L    Potassium 3.3 (L) 3.5 - 5.1 mmol/L    Chloride 102 95 - 110 mmol/L    CO2 17 (L) 23 - 29 mmol/L    Glucose 61 (L) 70 - 110 mg/dL    BUN 33 (H) 8 - 23 mg/dL    Creatinine 4.4 (H) 0.5 - 1.4 mg/dL    Calcium 8.1 (L) 8.7 - 10.5 mg/dL    Total Protein 6.2 6.0 - 8.4 g/dL    Albumin 2.1 (L) 3.5 - 5.2 g/dL    Total Bilirubin 0.5 0.1 - 1.0 mg/dL    Alkaline Phosphatase 258 (H) 55 - 135 U/L     (H) 10 - 40 U/L    ALT 97 (H) 10 - 44 U/L    Anion Gap 15 8 - 16 mmol/L    eGFR 11 (A) >60 mL/min/1.73 m^2   CBC with Automated Differential    Collection Time: 02/05/23   6:57 AM   Result Value Ref Range    WBC 8.55 3.90 - 12.70 K/uL    RBC 3.38 (L) 4.00 - 5.40 M/uL    Hemoglobin 9.8 (L) 12.0 - 16.0 g/dL    Hematocrit 30.1 (L) 37.0 - 48.5 %    MCV 89 82 - 98 fL    MCH 29.0 27.0 - 31.0 pg    MCHC 32.6 32.0 - 36.0 g/dL    RDW 16.0 (H) 11.5 - 14.5 %    Platelets 121 (L) 150 - 450 K/uL    MPV 10.8 9.2 - 12.9 fL    Immature Granulocytes 0.6 (H) 0.0 - 0.5 %    Gran # (ANC) 6.7 1.8 - 7.7 K/uL    Immature Grans (Abs) 0.05 (H) 0.00 - 0.04 K/uL    Lymph # 0.7 (L) 1.0 - 4.8 K/uL    Mono # 1.1 (H) 0.3 - 1.0 K/uL    Eos # 0.0 0.0 - 0.5 K/uL    Baso # 0.02 0.00 - 0.20 K/uL    nRBC 0 0 /100 WBC    Gran % 78.2 (H) 38.0 - 73.0 %    Lymph % 8.0 (L) 18.0 - 48.0 %    Mono % 12.5 4.0 - 15.0 %    Eosinophil % 0.5 0.0 - 8.0 %    Basophil % 0.2 0.0 - 1.9 %    Differential Method Automated    Vancomycin, Random    Collection Time: 02/05/23  6:57 AM   Result Value Ref Range    Vancomycin, Random 18.2 Not established ug/mL   POCT glucose    Collection Time: 02/05/23  8:10 AM   Result Value Ref Range    POCT Glucose 62 (L) 70 - 110 mg/dL   POCT glucose    Collection Time: 02/05/23  9:29 AM   Result Value Ref Range    POCT Glucose 158 (H) 70 - 110 mg/dL   POCT glucose    Collection Time: 02/05/23 12:16 PM   Result Value Ref Range    POCT Glucose 129 (H) 70 - 110 mg/dL   POCT glucose    Collection Time: 02/05/23  4:50 PM   Result Value Ref Range    POCT Glucose 97 70 - 110 mg/dL       Microbiology Results (last 7 days)     Procedure Component Value Units Date/Time    Blood culture x two cultures. Draw prior to antibiotics. [[id number]] Collected: 02/03/23 2151    Order Status: Completed Specimen: Blood from Wrist, Right Updated: 02/04/23 2303     Blood Culture, Routine No Growth to date      No Growth to date    Narrative:      Aerobic and anaerobic    Blood culture x two cultures. Draw prior to antibiotics. [[id number]] Collected: 02/03/23 2052    Order Status: Completed Specimen: Blood from Peripheral, Wrist,  Right Updated: 02/04/23 2303     Blood Culture, Routine No Growth to date      No Growth to date    Narrative:      Aerobic and anaerobic          Imaging Results          MRI Brain Without Contrast (Final result)  Result time 02/04/23 10:05:43    Final result by Thais Felipe MD (02/04/23 10:05:43)                 Impression:      1. No acute intracranial abnormality.  2. Generalized cerebral volume loss, right occipital encephalomalacia and chronic microvascular ischemic changes, not substantially changed when compared to 06/31/2021.  3. Remote lacunar-type bilateral thalamic and pontine infarcts, also unchanged.      Electronically signed by: Thais Felipe  Date:    02/04/2023  Time:    10:05             Narrative:    EXAMINATION:  MRI BRAIN WITHOUT CONTRAST    CLINICAL HISTORY:  Mental status change, unknown cause;    TECHNIQUE:  Multiplanar multisequence MR imaging of the brain was performed without contrast.    COMPARISON:  Head CT performed earlier today, 01/24/2023    FINDINGS:  Parenchyma: There is no restricted diffusion to suggest acute or subacute ischemic infarct.Extensive T2/FLAIR hyperintensities are present in the supratentorial white matter, particularly in the periventricular region, that has not significantly changed when compared to 06/30/2021.Encephalomalacia is again seen in the right occipital region from remote infarct.  Generalized cerebral volume loss.  Again seen are bilateral thalamic and pontine remote lacunar type infarcts.    Additional comments: There is no midline shift, abnormal extra-axial fluid collection, or acute intracranial hemorrhage. The basal cisterns are patent.    Ventricles: Compensatory enlargement of the ventricles to a similar degree as seen on 06/31/2021    Flow voids: The normal major intracranial arterial flow voids are visualized.    Sinuses and mastoid air cells: Essentially clear    Orbits: Normal    Midline structures: The pituitary and craniocervical  junction are normal.    Marrow: Normal                                 US Abdomen Limited (Final result)  Result time 02/04/23 04:09:44    Final result by Lasha Thompson MD (02/04/23 04:09:44)                 Impression:      Redemonstration of distended gallbladder as well as small layering stones and sludge.  No significant gallbladder wall thickening, hyperemia, or pericholecystic fluid identified to suggest acute cholecystitis.    Left-sided hydronephrosis.      Electronically signed by: Lasha Thompson MD  Date:    02/04/2023  Time:    04:09             Narrative:    EXAMINATION:  US ABDOMEN LIMITED    CLINICAL HISTORY:  elevated LFT;    TECHNIQUE:  Limited ultrasound of the right upper quadrant of the abdomen (including pancreas, liver, gallbladder, common bile duct, and spleen) was performed.    COMPARISON:  CT 02/04/2023    FINDINGS:  Please note evaluation is limited by patient condition and suboptimal positioning.    Liver: No focal hepatic abnormality appreciated.    Gallbladder: Gallbladder is distended.  There are few tiny layering stones as well as sludge within the gallbladder lumen.  No significant gallbladder wall thickening, hyperemia, or pericholecystic fluid appreciated.  The technologist reports a negative sonographic Srivastava sign.    Biliary system: The common duct is not dilated, measuring 5 mm.  No intrahepatic ductal dilatation.    Spleen: Normal in size and echotexture, measuring 10.3 cm    Pancreas: Predominantly obscured by bowel gas limiting assessment..    Miscellaneous: There is moderate to severe left-sided hydronephrosis as seen on prior CT examination.  There is no significant ascites within the upper abdomen.                                CT Abdomen Pelvis  Without Contrast (Final result)  Result time 02/04/23 01:30:18    Final result by Joshua Kumari MD (02/04/23 01:30:18)                 Impression:      Large heterogeneous multiloculated pelvic mass measuring 8.5 by  8.4 x 7.6 cm.  Finding is new since prior and highly concerning for neoplastic process in this patient with clinical history of endometrial cancer.  Infectious etiology not excluded.    Moderate left hydroureteronephrosis without stone, potentially secondary to the above described mass.  Bladder distension.    Nonspecific 13 mm soft tissue nodule in the subcutaneous soft tissues of the lower midline anterior pelvis (axial image 164).    Gallbladder is markedly distended and contains sludge and stones.  If concern for acute cholecystitis, recommend ultrasound.    This report was flagged in Epic as abnormal.      Electronically signed by: Joshua Kumari  Date:    02/04/2023  Time:    01:30             Narrative:    EXAMINATION:  CT ABDOMEN PELVIS WITHOUT CONTRAST    CLINICAL HISTORY:  elevated LFTs, esrd;    TECHNIQUE:  Low dose axial images, sagittal and coronal reformations were obtained from the lung bases to the pubic symphysis.  Oral contrast was not administered.    COMPARISON:  01/28/2022    FINDINGS:  Mild bibasilar dependent atelectasis.    Coronary artery atherosclerosis.    Unremarkable liver.  Gallbladder is markedly distended and contains sludge and stones.  Unremarkable spleen, pancreas, and adrenal glands.  Bilateral kidneys are atrophic.  Large heterogeneous multiloculated pelvic mass measuring 8.5 by 8.4 x 7.6 cm.  Moderate left hydronephrosis and moderate hydroureter to the level of the pelvis.  No obstructive stone noted.  Bladder is distended.  Mild presacral edema.    No small bowel obstruction.  Colonic diverticulosis, without diverticulitis.  Normal appendix.    Nonspecific 13 mm soft tissue nodule in the subcutaneous soft tissues of the lower midline anterior pelvis (axial image 164).  Mild anasarca.    Bones are osteopenic.  Mild moderate L4-5 spondylosis with disc vacuum phenomena.                               CT Head Without Contrast (Final result)  Result time 02/04/23 01:09:59     Final result by Joshua Kumari MD (02/04/23 01:09:59)                 Impression:      No acute intracranial process.  Chronic findings, as above.      Electronically signed by: Joshua Kumari  Date:    02/04/2023  Time:    01:09             Narrative:    EXAMINATION:  CT HEAD WITHOUT CONTRAST    CLINICAL HISTORY:  Mental status change, unknown cause;    TECHNIQUE:  Low dose axial images were obtained through the head.  Coronal and sagittal reformations were also performed. Contrast was not administered.    COMPARISON:  01/24/2023    FINDINGS:  Ventricles and sulci are prominent. Old bilateral thalamic lacunar infarcts and remote right occipital infarct, as seen on prior.  Moderate periventricular and deep white matter changes of chronic microvascular ischemia. No acute intracranial hemorrhage.  Paranasal sinuses and mastoid air cells are clear.                               X-Ray Chest AP Portable (Final result)  Result time 02/03/23 20:36:40    Final result by Lexy Lin MD (02/03/23 20:36:40)                 Impression:      No acute intrathoracic abnormality detected.  Tip of the central venous catheter in the right atrium.      Electronically signed by: Lexy Lin  Date:    02/03/2023  Time:    20:36             Narrative:    EXAMINATION:  AP PORTABLE CHEST    CLINICAL HISTORY:  Sepsis;    TECHNIQUE:  AP portable chest radiograph was submitted.    COMPARISON:  01/24/2023    FINDINGS:  There is a left central venous catheter with its tip in the right atrium.  AP portable chest radiograph demonstrates a cardiac silhouette within normal limits.  There is no focal consolidation, pneumothorax, or pleural effusion. Bones are osteopenic.                                  Pending Diagnostic Studies:     Procedure Component Value Units Date/Time     [[id number]] Collected: 02/05/23 0657    Order Status: Sent Lab Status: In process Updated: 02/05/23 0934    Specimen: Blood           Medications:  Reconciled Home Medications:      Medication List      CONTINUE taking these medications    acetaminophen 325 MG tablet  Commonly known as: TYLENOL  Take 2 tablets (650 mg total) by mouth every 6 (six) hours as needed for Pain (Take every 6 hours for 5-7 days and then as needed).     allopurinoL 100 MG tablet  Commonly known as: ZYLOPRIM  Take 100 mg by mouth once daily.     clopidogreL 75 mg tablet  Commonly known as: PLAVIX  Take 75 mg by mouth once daily.     ondansetron 4 MG tablet  Commonly known as: ZOFRAN  Take 1 tablet (4 mg total) by mouth every 24 hours as needed for Nausea.     pantoprazole 20 MG tablet  Commonly known as: PROTONIX  Take 20 mg by mouth once daily.     rosuvastatin 20 MG tablet  Commonly known as: CRESTOR  take ONE tablet every night at bedtime (for cholesterol)     sodium bicarbonate 650 MG tablet  Take 650 mg by mouth 4 (four) times daily.     venlafaxine 75 MG tablet  Commonly known as: EFFEXOR  Take 75 mg by mouth 2 (two) times daily.        STOP taking these medications    amLODIPine 5 MG tablet  Commonly known as: NORVASC     ceFAZolin 1 g/50ml Dextrose IVPB 1 gram/50 mL  Commonly known as: ANCEF     dronabinoL 5 MG capsule  Commonly known as: MARINOL     ferrous sulfate 325 (65 FE) MG EC tablet     levoFLOXacin 250 MG tablet  Commonly known as: LEVAQUIN     metoprolol succinate 50 MG 24 hr tablet  Commonly known as: TOPROL-XL     mirtazapine 15 MG tablet  Commonly known as: REMERON     TRULICITY 0.75 mg/0.5 mL pen injector  Generic drug: dulaglutide            Indwelling Lines/Drains at time of discharge:   Lines/Drains/Airways     Central Venous Catheter Line  Duration                Hemodialysis Catheter 09/26/22 0852 left internal jugular 132 days          Drain  Duration                Hemodialysis AV Fistula Left forearm -- days                Time spent on the discharge of patient: Greater than 35 minutes         Marcela Amezcua DO  Department [facility]  [facility] Surg

## 2023-02-05 NOTE — CONSULTS
"[facility]  Obstetrics & Gynecology  Consult Note    Patient Name: Anjali Dorantes  MRN: [id number]  Admission Date: 2/3/2023  Hospital Length of Stay: 0 days  Code Status: DNR  Primary Care Provider: Tasia Carrasco MD  Principal Problem: Altered behavior    Consults  Subjective:     Chief Complaint: pelvic mass    History of Present Illness:  Pt is a 63 yo  postmenopausal female multiple medical cormobidities,  pt was admitted for AMS/hypothermia after her dialysis on Friday.    Pt with known ovarian cancer and has been f/b gyn - onc Dr. 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)    However pt was not verónica to start on Taxol last year due to end -stage renal failure - dialysis dependent  She has not f/u with GynOnc since 3/2022    On admission, CT abdomen/pelvis 2/3/22 showed " A Large heterogeneous multiloculated pelvic mass measuring 8.5 by 8.4 x 7.6 cm, left hydroureteronephrosis" - gyn was consulted    Pt denied abdominal pain, back pain , vaginal bleeding or n/v, denied constipation or changes in appetite          OB History    Para Term  AB Living   6 6 6 0 0 0   SAB IAB Ectopic Multiple Live Births   0 0 0 0 0      # Outcome Date GA Lbr Casimiro/2nd Weight Sex Delivery Anes PTL Lv   6 Term            5 Term            4 Term            3 Term            2 Term            1 Term              Past Medical History:   Diagnosis Date    AMS (altered mental status) 2021    Anemia in CKD (chronic kidney disease)     CKD (chronic kidney disease) "     Diabetes mellitus     Encounter for blood transfusion     Hypercholesterolemia     Hypertension     Stroke 2015    TIA    Uterine cancer 2021    Endometrial Cancer     Past Surgical History:   Procedure Laterality Date    ARTERIOGRAM, CEREBRAL ARTERIES  02/12/2014    and 3/18/2014    HYSTERECTOMY      OMENTECTOMY N/A 03/30/2022    Procedure: OMENTECTOMY;  Surgeon: Reuben Lopez MD;  Location: [facility] OR [address] [place] [address];  Service: OB/GYN;  Laterality: N/A;    TOTAL ABDOMINAL HYSTERECTOMY W/ BILATERAL SALPINGOOPHORECTOMY N/A 03/30/2022    Procedure: HYSTERECTOMY, TOTAL, ABDOMINAL, WITH BILATERAL SALPINGO-OOPHORECTOMY;  Surgeon: Reuben Lopez MD;  Location: [facility] OR [address] [place] [address];  Service: OB/GYN;  Laterality: N/A;       PTA Medications   Medication Sig    allopurinoL (ZYLOPRIM) 100 MG tablet Take 100 mg by mouth once daily.    amLODIPine (NORVASC) 5 MG tablet Take 5 mg by mouth once daily.    clopidogreL (PLAVIX) 75 mg tablet Take 75 mg by mouth once daily.    dulaglutide (TRULICITY) 0.75 mg/0.5 mL pen injector Inject 0.75 mg into the skin every 7 days.    levoFLOXacin (LEVAQUIN) 250 MG tablet This medicine is an antibiotic to treat an infection. Please take (3) tablets (for a total of 750 mg) after dialysis on day 1. Then, take (1) tablet (for a total of 250 mg) daily for 4 days. Please take medicine after dialysis, on dialysis days.    metoprolol succinate (TOPROL-XL) 50 MG 24 hr tablet Take 50 mg by mouth once daily.    ondansetron (ZOFRAN) 4 MG tablet Take 1 tablet (4 mg total) by mouth every 24 hours as needed for Nausea.    rosuvastatin (CRESTOR) 20 MG tablet take ONE tablet every night at bedtime (for cholesterol)    venlafaxine (EFFEXOR) 75 MG tablet Take 75 mg by mouth 2 (two) times daily.     acetaminophen (TYLENOL) 325 MG tablet Take 2 tablets (650 mg total) by mouth every 6 (six) hours as needed for Pain (Take every 6 hours for 5-7 days and then as needed).    ceFAZolin 1 g/50ml Dextrose IVPB  (ANCEF) 1 gram/50 mL 2g/2g/3g after HD every Mon/Wed/Fri until 10/20/22 (Patient not taking: Reported on 1/20/2023)    dronabinoL (MARINOL) 5 MG capsule Take 1 capsule (5 mg total) by mouth once daily. for 15 days    ferrous sulfate 325 (65 FE) MG EC tablet Take 1 tablet (325 mg total) by mouth once daily. (Patient not taking: Reported on 1/20/2023)    mirtazapine (REMERON) 15 MG tablet Take 15 mg by mouth every evening.    pantoprazole (PROTONIX) 20 MG tablet Take 20 mg by mouth once daily.     sodium bicarbonate 650 MG tablet Take 650 mg by mouth 4 (four) times daily.       Review of patient's allergies indicates:   Allergen Reactions    Glipizide Other (See Comments)        Family History       Problem Relation (Age of Onset)    Cancer Father    Diabetes Mother    Hypertension Mother          Tobacco Use    Smoking status: Never    Smokeless tobacco: Never   Substance and Sexual Activity    Alcohol use: No    Drug use: No    Sexual activity: Not Currently     Partners: Male     Review of Systems   Constitutional:  Positive for fatigue. Negative for appetite change, chills and fever.   Respiratory:  Negative for shortness of breath.    Cardiovascular:  Negative for chest pain.   Gastrointestinal:  Negative for abdominal pain, blood in stool, constipation, diarrhea, nausea and vomiting.   Endocrine: Negative for hair loss and hot flashes.   Genitourinary:  Negative for decreased libido, dysmenorrhea, dyspareunia, dysuria, genital sores, menorrhagia, menstrual problem, pelvic pain, vaginal discharge, vaginal pain, urinary incontinence and vaginal odor.   Musculoskeletal:  Negative for back pain.   Integumentary:  Negative for rash, acne, hair changes, breast mass, nipple discharge and breast skin changes.   Breast: Negative for mass, mastodynia, nipple discharge and skin changes   Objective:     Vital Signs (Most Recent):  Temp: 97.7 °F (36.5 °C) (02/05/23 0811)  Pulse: 100 (02/05/23 0811)  Resp: 16  (02/05/23 0811)  BP: 107/60 (02/05/23 0811)  SpO2: 100 % (02/05/23 0811) Vital Signs (24h Range):  Temp:  [95.3 °F (35.2 °C)-98.6 °F (37 °C)] 97.7 °F (36.5 °C)  Pulse:  [] 100  Resp:  [16-27] 16  SpO2:  [98 %-100 %] 100 %  BP: ()/(49-66) 107/60     Weight: 21.3 kg (47 lb)  Body mass index is 8.88 kg/m².    No LMP recorded. Patient is postmenopausal.    Physical Exam:   Constitutional: She is oriented to person, place, and time. She appears well-developed.    HENT:   Head: Normocephalic and atraumatic.    Eyes: Pupils are equal, round, and reactive to light. EOM are normal.     Cardiovascular:       Exam reveals no clubbing and no cyanosis.        Pulmonary/Chest: Effort normal.        Abdominal: Soft. She exhibits abdominal incision. She exhibits no distension and no mass. There is no abdominal tenderness. There is no rebound and no guarding. No hernia.             Musculoskeletal: Normal range of motion and moves all extremeties.       Neurological: She is oriented to person, place, and time.   Pt resting and responded to name calling, opening her eyes and answered questions appropriately    Skin: Skin is warm and dry. No cyanosis. There is pallor. Nails show no clubbing.    Psychiatric: She has a normal mood and affect. Her behavior is normal. Thought content normal.     Laboratory:  Recent Lab Results         02/05/23  0810   02/05/23  0657   02/04/23  1928   02/04/23  1556        Albumin   2.1           Alkaline Phosphatase   258           ALT   97           Anion Gap   15           AST   163           Baso #   0.02           Basophil %   0.2           BILIRUBIN TOTAL   0.5  Comment: For infants and newborns, interpretation of results should be based  on gestational age, weight and in agreement with clinical  observations.    Premature Infant recommended reference ranges:  Up to 24 hours.............<8.0 mg/dL  Up to 48 hours............<12.0 mg/dL  3-5 days..................<15.0 mg/dL  6-29  days.................<15.0 mg/dL             BUN   33           Calcium   8.1           Chloride   102           CO2   17           Creatinine   4.4           Differential Method   Automated           eGFR   11           Eos #   0.0           Eosinophil %   0.5           Glucose   61           Gran # (ANC)   6.7           Gran %   78.2           Hematocrit   30.1           Hemoglobin   9.8           Immature Grans (Abs)   0.05  Comment: Mild elevation in immature granulocytes is non specific and   can be seen in a variety of conditions including stress response,   acute inflammation, trauma and pregnancy. Correlation with other   laboratory and clinical findings is essential.             Immature Granulocytes   0.6           Lymph #   0.7           Lymph %   8.0           MCH   29.0           MCHC   32.6           MCV   89           Mono #   1.1           Mono %   12.5           MPV   10.8           nRBC   0           Platelets   121           POCT Glucose 62     86   66       Potassium   3.3           PROTEIN TOTAL   6.2           RBC   3.38           RDW   16.0           Sodium   134           Vancomycin, Random   18.2           WBC   8.55                   Diagnostic Results:    EXAMINATION:  CT ABDOMEN PELVIS WITHOUT CONTRAST     CLINICAL HISTORY:  elevated LFTs, esrd;     TECHNIQUE:  Low dose axial images, sagittal and coronal reformations were obtained from the lung bases to the pubic symphysis.  Oral contrast was not administered.     COMPARISON:  01/28/2022     FINDINGS:  Mild bibasilar dependent atelectasis.     Coronary artery atherosclerosis.     Unremarkable liver.  Gallbladder is markedly distended and contains sludge and stones.  Unremarkable spleen, pancreas, and adrenal glands.  Bilateral kidneys are atrophic.  Large heterogeneous multiloculated pelvic mass measuring 8.5 by 8.4 x 7.6 cm.  Moderate left hydronephrosis and moderate hydroureter to the level of the pelvis.  No obstructive stone noted.   Bladder is distended.  Mild presacral edema.     No small bowel obstruction.  Colonic diverticulosis, without diverticulitis.  Normal appendix.     Nonspecific 13 mm soft tissue nodule in the subcutaneous soft tissues of the lower midline anterior pelvis (axial image 164).  Mild anasarca.     Bones are osteopenic.  Mild moderate L4-5 spondylosis with disc vacuum phenomena.     Impression:     Large heterogeneous multiloculated pelvic mass measuring 8.5 by 8.4 x 7.6 cm.  Finding is new since prior and highly concerning for neoplastic process in this patient with clinical history of endometrial cancer.  Infectious etiology not excluded.     Moderate left hydroureteronephrosis without stone, potentially secondary to the above described mass.  Bladder distension.     Nonspecific 13 mm soft tissue nodule in the subcutaneous soft tissues of the lower midline anterior pelvis (axial image 164).     Gallbladder is markedly distended and contains sludge and stones.  If concern for acute cholecystitis, recommend ultrasound.     This report was flagged in Epic as abnormal.        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 Tamayo was called and agreed to transfer pt to [facility] for higher level of care    Talked to Dr. Amezcua regarding plan for transfer    Thank you for your consult. I will sign off. Please contact us if you have any additional questions.    Nicolle Pandey MD  Obstetrics & Gynecology  [facility]

## 2023-02-05 NOTE — ASSESSMENT & PLAN NOTE
- Likely 2/2 pelvic mass in pt with h/o ovarian cancer   - Baseline ESRD. No flank pain. Okay to hold on stent or perc nephrostomy placement at this time. No need for  intervention.

## 2023-02-05 NOTE — NURSING
Per handoff received from Izabella CASTILLO RN. Patient care assumed. Patients overall condition assessed and patient appears to be in NAD with no complaints of pain. 18g RH PIV is clean, dry, and intact. Call light in reach and patients family instructed to inform the nurse if anything is needed. Patient stable and will continue to be monitored.

## 2023-02-05 NOTE — NURSING
[facility] [place]  Nurses Note -- 4 Eyes      2/4/2023       Skin assessed on: Admit      [] No Pressure Injuries Present    []Prevention Measures Documented    [x] Yes LDA  for Pressure Injury Previously documented     [] Yes New Pressure Injury Discovered   [] LDA for New Pressure Injury Added      Attending RN:  Izabella Ya RN     Second RN:  Vero

## 2023-02-05 NOTE — HPI
"Pt is a 61 yo  postmenopausal female multiple medical cormobidities,  pt was admitted for AMS/hypothermia after her dialysis on Friday.    Pt with known ovarian cancer and has been f/b gyn - onc Dr. 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
61yo F admitted with AMS. Urology consulted for L hydronephrosis. CT showed large pelvic mass in pt with h/o ovarian cancer. She has baseline ESRD on HD.     CT - 8.4cm pelvic mass with likely mass effect on distal L ureter with moderate proximal hydroureteronephrosis. Moderate bladder distention.   
62 y.o. female with ESRD on HD, DM 2, hypertension, adenocarcinoma, endometrial cancer, bilateral ovarian cancer, and pelvic mass presents with a complaint of altered mental status.  Acute onset yesterday after dialysis, described as fatigue and drowsiness, associated with poor appetite.  She did miss dialysis on Monday Wednesday, but got full treatment yesterday.  Also started new medication Remeron Thursday evening.  Son reports temperature of 93 deg F at home.  Denies fever, chills, cough, SOB, chest pain, palpitations, dizziness, syncope, nausea, vomiting, diarrhea.  In the ED, labs show elevated liver enzymes as well as an elevated procalcitonin of 1.76.  CT abdomen pelvis reveals a large pelvic mass with associated left hydroureteronephrosis.  Gallbladder is also distended with sludge and stones.  Abdominal ultrasound confirms distention with sludge and stones but does not reveal evidence of acute cholecystitis.  Otherwise unremarkable for acute abnormality.  
Negative

## 2023-02-05 NOTE — ASSESSMENT & PLAN NOTE
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 Tamayo was called and agreed to transfer pt to [facility] for higher level of care

## 2023-02-05 NOTE — NURSING
Upon arrival to floor from ED: cardiac monitoring in progress, patient respond to voice, call bell in reach and bed in lowest position. No apparent distress noted.

## 2023-02-05 NOTE — PLAN OF CARE
[place] [facility] Surg  Discharge Final Note    Primary Care Provider: Tasia Carrasco MD    Expected Discharge Date: 2/5/2023    Pt has been set up with [facility] and Adela stated the equipment should be delivered in a hour. CM notified nurse, Izabella that pt is ready for discharge from CM standpoint. Transportation has been scheduled for 4:00 pm.    Final Discharge Note (most recent)       Final Note - 02/05/23 1444          Final Note    Assessment Type Final Discharge Note (P)      Anticipated Discharge Disposition Hospice/Home (P)      What phone number can be called within the next 1-3 days to see how you are doing after discharge? [phone] (P)      Hospital Resources/Appts/Education Provided Appointments scheduled and added to AVS;Appointments scheduled by Navigator/Coordinator (P)         Post-Acute Status    Post-Acute Authorization Hospice (P)      Hospice Status Set-up Complete/Auth obtained (P)      Coverage Medicare AB (P)      Patient choice form signed by patient/caregiver List from System Post-Acute Care (P)      Discharge Delays PFC Arranged Transportation (P)                      Important Message from Medicare             Contact Info       Tasia Carrasco MD   Specialty: Internal Medicine   Relationship: PCP - General    [address] [place] [place] [address]   Phone: [phone]       Next Steps: Call    Instructions: To schedule a hospital follow up appointment within 7 days.    Rebuen Lopez MD   Specialty: Gynecologic Oncology, Gynecology, Oncology    [facility] [address]   Phone: [phone]       Next Steps: Follow up in 3 day(s)

## 2023-02-05 NOTE — PLAN OF CARE
Problem: Adult Inpatient Plan of Care  Goal: Plan of Care Review  Flowsheets (Taken 2/5/2023 0216)  Plan of Care Reviewed With: patient  Goal: Absence of Hospital-Acquired Illness or Injury  Intervention: Identify and Manage Fall Risk  Flowsheets (Taken 2/5/2023 0216)  Safety Promotion/Fall Prevention: side rails raised x 2  Intervention: Prevent and Manage VTE (Venous Thromboembolism) Risk  Flowsheets (Taken 2/5/2023 0216)  VTE Prevention/Management: ROM (active) performed  Range of Motion: active ROM (range of motion) encouraged  Goal: Optimal Comfort and Wellbeing  Intervention: Monitor Pain and Promote Comfort  Flowsheets (Taken 2/5/2023 0216)  Pain Management Interventions: pain management plan reviewed with patient/caregiver  Intervention: Provide Person-Centered Care  Flowsheets (Taken 2/5/2023 0216)  Trust Relationship/Rapport:   care explained   questions answered   questions encouraged   thoughts/feelings acknowledged     Problem: Infection  Goal: Absence of Infection Signs and Symptoms  Intervention: Prevent or Manage Infection  Flowsheets (Taken 2/5/2023 0216)  Infection Management: (IV antibiotic)   aseptic technique maintained   other (see comments)     Problem: Skin Injury Risk Increased  Goal: Skin Health and Integrity  Intervention: Optimize Skin Protection  Flowsheets (Taken 2/5/2023 0216)  Head of Bed (HOB) Positioning: HOB at 30-45 degrees

## 2023-02-05 NOTE — HOSPITAL COURSE
62 y.o. female with ESRD on HD, DM 2, hypertension, adenocarcinoma, endometrial cancer, bilateral ovarian cancer admitted on 02/03/2023 for further evaluation of altered mental status. Of note, discussed with patient's son in regards to mental status changed. He informed me that she was not altered on presentation but that the concern with that her responses were delayed and she appeared more lethargic on presentation.  Patient recently started on Remeron and son thinks this could be contributing. Son stated that patient has been functionally declining, and has noticed it more rapidly over the last month. Stated that she has not been eating and her appetite has been poor and she has been losing weight. Patient with multiple electrolyte abnormalities and elevated LFTs. Nephrology consulted for dialysis- patient did not required dialysis at current time.     CT abdomen pelvis reveals a large pelvic mass with associated left hydroureteronephrosis.  Gallbladder is also distended with sludge and stones.  Abdominal ultrasound confirms distention with sludge and stones but does not reveal evidence of acute cholecystitis. GYN consulted who also discussed case with Gyn/onc (Dr. Tamayo) who recommends transfer to [facility] for further evaluation given pelvic mass causing left hydroureteronephrosis. Discussed in length with patient and her family at bedside (her daughter and her son). After discussion, patient has decided that she no longer want to be treated for her malignancy or pelvic mass. Decline being transfer to gyn/oncology evaluation. Patient stated that she wants to go home with hospice. Family at bedside wants to respect her wishes. Cm/SW consulted to assist with home hospice. Patient has been accepted into [facility] hospice.    Patient overall appears ill. She is awake and alert and oriented x 4. Able to express her wishes to go home with hospice. Pt denies any fever, headaches, vision changes, chest pain,  shortness of breath, palpitations, abdominal pain, nausea, vomiting, or diarrhea. Denies any pain. Admits feeling fatigue and weak. Patient's exam on discharge was as follow: Patient is alert and oriented, appears in no acute distress, she is ill appearing, heart with regular rate and rhythm, lungs clear to asculation with non-labored breathing, abdomen soft, and skin is pale.     Patient and her family was counseled regarding any abnormal labs, differential diagnosis, treatment options, risk-benefit, lifestyle changes, prognosis, current condition, and medications. Patient was interactive and attentive.  Patient's questions were answered in a respectful and timely manner. Patient was instructed to follow-up with GYN/ONC within 1 week and to continue taking medications as prescribed. Also, extensively discussed the risks, benefits, and side effects of patient's medications. Discussed with Patient and her family about any medication changes. Patient and her family verbalized understanding and agrees to treatment plan.  Patient is stable for discharge to home with hospice as patient wants to move forward with comfort care.  Patient and her family has no other questions or concerns at this time.      Vital signs are stable.  Tolerating p.o. intake without any nausea or vomiting. Afebrile. Patient will be discharge to home with home hospice once hospice company is prepared for patient to come home and required equipment delivered to the home.  CM/SW to assist with discharge planning.     Vitals:    02/05/23 0503 02/05/23 0510 02/05/23 0811 02/05/23 1214   BP: (!) 87/49 (!) 98/56 107/60 (!) 99/58   BP Location: Right arm Right arm Right arm Right arm   Patient Position: Lying Lying Lying Lying   Pulse: 100  100 102   Resp: 16  16 18   Temp: 97.6 °F (36.4 °C)  97.7 °F (36.5 °C) 97.9 °F (36.6 °C)   TempSrc: Oral  Oral Oral   SpO2: 99%  100% 100%   Weight:       Height:

## 2023-02-05 NOTE — PROGRESS NOTES
Pharmacokinetic Assessment Follow Up: IV Vancomycin    Vancomycin serum concentration assessment(s):    The random level was drawn correctly and can be used to guide therapy at this time. The measurement is within the desired definitive target range of 10 to 20 mcg/mL.    Vancomycin Regimen Plan:    Re-dose when the random level is less than 20 mcg/mL, next level to be drawn at 0400 on 2/6/23    Drug levels (last 3 results):  Recent Labs   Lab Result Units 02/05/23  0657   Vancomycin, Random ug/mL 18.2       Pharmacy will continue to follow and monitor vancomycin.    Please contact pharmacy at extension [phone] for questions regarding this assessment.    Thank you for the consult,   Jeanmarie Lazo       Patient brief summary:  Anjali Dorantes is a 62 y.o. female initiated on antimicrobial therapy with IV Vancomycin for treatment of bacteremia    The patient's current regimen is random pulse dosing    Drug Allergies:   Review of patient's allergies indicates:   Allergen Reactions    Glipizide Other (See Comments)       Actual Body Weight:   21.3 kg    Renal Function:   Estimated Creatinine Clearance: 5.1 mL/min (A) (based on SCr of 3.8 mg/dL (H)).,     Dialysis Method (if applicable):  N/A    CBC (last 72 hours):  Recent Labs   Lab Result Units 02/03/23  0000 02/03/23 2054 02/04/23  0515   WBC K/uL 11.94* 9.48 7.15   Hemoglobin g/dL 11.8* 11.2* 10.0*   Hemoglobin x 3 % 35.4*  --   --    Hematocrit % 33.1* 34.6* 30.3*   Platelets K/uL 153 136* 122*   Gran % %  --  84.7* 80.5*   Lymph % % 5.4* 6.9* 9.4*   Mono % % 6.8 7.3 8.7   Eosinophil % % 0.7 0.1 0.4   Basophil % % 0.1 0.2 0.3   Differential Method   --  Automated Automated       Metabolic Panel (last 72 hours):  Recent Labs   Lab Result Units 02/03/23  0000 02/03/23 2054 02/03/23  2300 02/03/23  2350 02/04/23  0515   Sodium mmol/L 136 139  --   --  133*   Potassium mmol/L 3.4* 3.8  --   --  3.0*   Chloride mmol/L  --  104  --   --  102   CO2 mmol/L  --   18*  --   --  20*   Glucose mg/dL 107* 60*  --   --  215*   Glucose, UA   --   --  Negative  --   --    BUN mg/dL  --  22  --   --  27*   Creatinine mg/dL 5.46* 3.4*  --   --  3.8*   Albumin g/dL 3.9 2.9*  --  2.9* 2.5*   Total Bilirubin mg/dL  --  0.6  --  0.5 0.5   Alkaline Phosphatase U/L 292* 270*  --  263* 253*   AST U/L  --  283*  --  287* 239*   ALT U/L 171* 147*  --  148* 128*   Magnesium mg/dL  --  2.4  --   --   --    Phosphorus mg/dL 5.6* 3.6  --   --   --        Vancomycin Administrations:  vancomycin given in the last 96 hours                     vancomycin (VANCOCIN) 1,000 mg in dextrose 5 % (D5W) 250 mL IVPB (Vial-Mate) (mg) 1,000 mg New Bag 02/04/23 3443                    Microbiologic Results:  Microbiology Results (last 7 days)       Procedure Component Value Units Date/Time    Blood culture x two cultures. Draw prior to antibiotics. [[id number]] Collected: 02/03/23 2151    Order Status: Completed Specimen: Blood from Wrist, Right Updated: 02/04/23 2303     Blood Culture, Routine No Growth to date      No Growth to date    Narrative:      Aerobic and anaerobic    Blood culture x two cultures. Draw prior to antibiotics. [[id number]] Collected: 02/03/23 2052    Order Status: Completed Specimen: Blood from Peripheral, Wrist, Right Updated: 02/04/23 2303     Blood Culture, Routine No Growth to date      No Growth to date    Narrative:      Aerobic and anaerobic

## 2023-02-05 NOTE — PROGRESS NOTES
ADT 30 order placed for Stretcher Transportation.  Requested  time:4:00 pm.  If transportation does not arrive at ETA time nurse will be instructed to follow protocol for transportation below:  How can I get in touch directly with dispatch, if needed?                 Non-emergent (stretcher): [phone]      ++NURSING:  If Stretcher does not arrive at requested time please call the above Non Emergent Dispatcher.  If issue not resolved please escalate to your charge nurse for further instructions.

## 2023-02-05 NOTE — ACP (ADVANCE CARE PLANNING)
Advance Care Planning     Date: 02/05/2023    Code Status  In light of the patients advanced and life limiting illness,I engaged the the patient and family in a conversation about the patient's preferences for care  at the very end of life. The patient wishes to have a natural, peaceful death.  Along those lines, the patient does not wish to have CPR or other invasive treatments performed when her heart and/or breathing stops. I communicated to the patient and family that a DNR order would be placed in her medical record to reflect this preference.  I spent a total of 35 minutes engaging the patient in this advance care planning discussion.       [facility]  I engaged the patient and family in a conversation about advance care planning and we specifically addressed what the goals of care would be moving forward, in light of the patient's change in clinical status, specifically new large pelvic mass concerning for recurrent malignancy.  We did specifically address the patient's likely prognosis, which is poor.  We explored the patient's values and preferences for future care.  The patient and family endorses that what is most important right now is to focus on spending time at home, avoiding the hospital, and comfort and QOL     Accordingly, we have decided that the best plan to meet the patient's goals includes enrolling in hospice care    I did explain the role for hospice care at this stage of the patient's illness, including its ability to help the patient live with the best quality of life possible.  We will be making a hospice referral.    I spent a total of 35 minutes engaging the patient in this advance care planning discussion.

## 2023-02-05 NOTE — PLAN OF CARE
[place] - Med Surg  Initial Discharge Assessment       Primary Care Provider: Tasia Carrasco MD    Admission Diagnosis: Pelvic mass [R19.00]  Calculus of gallbladder without cholecystitis without obstruction [K80.20]  Chest pain [R07.9]  Altered behavior [R46.89]    Admission Date: 2/3/2023  Expected Discharge Date: 2/5/2023    CM discussed discharge planning with pt's sonWillie at bedside. Assessment completed and role of CM discussed. MD informed CM that pt will discharge home with hospice. Pt's son stated he didn't have a preference. CM will send referral to [facility].Pt's son signed Patient Choice Form. Plan A( home/hospice) and Plan B ( home with family).      Discharge Barriers Identified: (P) None    Payor: MEDICARE / Plan: MEDICARE PART A & B / Product Type: Government /     Extended Emergency Contact Information  Primary Emergency Contact: GoldieWillie  Mobile Phone: [phone]  Relation: Son  Preferred language: English   needed? No  Secondary Emergency Contact: Bibi Amezcua   [place]  Mobile Phone: [phone]  Relation: Daughter    Discharge Plan A: (P) Hospice/home  Discharge Plan B: (P) Home with family      [facility] [place] - [address]  [address]  [place] [address]  Phone: [phone] Fax: [phone]      Initial Assessment (most recent)       Adult Discharge Assessment - 02/05/23 0941          Discharge Assessment    Assessment Type Discharge Planning Assessment     Confirmed/corrected address, phone number and insurance Yes     Confirmed Demographics Correct on Facesheet     Source of Information family     When was your last doctors appointment? 02/02/23 (P)      Does patient/caregiver understand observation status Yes (P)      Communicated GODRO with patient/caregiver Yes (P)      Reason For Admission Altered behavior (P)      People in Home child(chandrakant), adult (P)      Do you expect to return to your current living situation? Yes (P)      Do you have  help at home or someone to help you manage your care at home? Yes (P)      Who are your caregiver(s) and their phone number(s)? Willie Amezcua (Son)   [phone] (P)      Prior to hospitilization cognitive status: Alert/Oriented (P)      Current cognitive status: Alert/Oriented (P)      Walking or Climbing Stairs -- (P)    none    Dressing/Bathing -- (P)    none    Equipment Currently Used at Home wheelchair;walker, rolling;rollator (P)      Readmission within 30 days? No (P)      Patient currently being followed by outpatient case management? No (P)      Do you currently have service(s) that help you manage your care at home? No (P)      Do you take prescription medications? Yes (P)      Do you have prescription coverage? Yes (P)      Coverage Medicare AB (P)      Do you have any problems affording any of your prescribed medications? No (P)      Is the patient taking medications as prescribed? yes (P)      Who is going to help you get home at discharge? ambulance (P)      How do you get to doctors appointments? family or friend will provide (P)      Are you on dialysis? Yes (P)      Dialysis Name and Scheduled days [facility] (P)      Do you take coumadin? No (P)    PLAVIX    Discharge Plan A Hospice/home (P)      Discharge Plan B Home with family (P)      DME Needed Upon Discharge  none (P)      Discharge Plan discussed with: Adult children (P)      Discharge Barriers Identified None (P)         Physical Activity    On average, how many days per week do you engage in moderate to strenuous exercise (like a brisk walk)? 0 days (P)      On average, how many minutes do you engage in exercise at this level? 0 min (P)         Financial Resource Strain    How hard is it for you to pay for the very basics like food, housing, medical care, and heating? Not hard at all (P)         Housing Stability    In the last 12 months, was there a time when you were not able to pay the mortgage or rent on time? No (P)       In the last 12 months, how many places have you lived? 1 (P)      In the last 12 months, was there a time when you did not have a steady place to sleep or slept in a shelter (including now)? No (P)         Transportation Needs    In the past 12 months, has lack of transportation kept you from medical appointments or from getting medications? No (P)      In the past 12 months, has lack of transportation kept you from meetings, work, or from getting things needed for daily living? No (P)         Food Insecurity    Within the past 12 months, you worried that your food would run out before you got the money to buy more. Never true (P)      Within the past 12 months, the food you bought just didn't last and you didn't have money to get more. Never true (P)         Stress    Do you feel stress - tense, restless, nervous, or anxious, or unable to sleep at night because your mind is troubled all the time - these days? Not at all (P)         Social Connections    In a typical week, how many times do you talk on the phone with family, friends, or neighbors? More than three times a week (P)      How often do you get together with friends or relatives? More than three times a week (P)      How often do you attend Lutheran or Sikh services? More than 4 times per year (P)      Do you belong to any clubs or organizations such as Lutheran groups, unions, fraternal or athletic groups, or school groups? No (P)      How often do you attend meetings of the clubs or organizations you belong to? Never (P)      Are you , , , , never , or living with a partner?  (P)         Alcohol Use    Q1: How often do you have a drink containing alcohol? Never (P)      Q2: How many drinks containing alcohol do you have on a typical day when you are drinking? Patient does not drink (P)      Q3: How often do you have six or more drinks on one occasion? Never (P)         OTHER    Name(s) of People in Home  Willie-son (P)

## 2023-02-06 LAB — CANCER AG125 SERPL-ACNC: 5146 U/ML (ref 0–30)

## 2023-02-07 LAB
BACTERIA BLD CULT: NORMAL
BACTERIA BLD CULT: NORMAL

## 2023-07-21 NOTE — TELEPHONE ENCOUNTER
Spoke with patient's son re: surgery on 3/30. Surgery teaching reviewed with patient's son. All questions answered. Encouraged son to reach out to pt's nephrologist for clearance. Verbalized understanding.  Will place info in the mail as well as via [email or web address].    solids normal...

## 2025-06-12 NOTE — SUBJECTIVE & OBJECTIVE
+0.75 over the counter readers for computer work     +1.25 over the counter readers for near tasks    Interval History:   History provided for by son who accompanies patient at bedside.    She reports mild pain adequately controlled by scheduled analgesics- she did not require any PRN medications overnight. She has not yet taken PO (has tolerated sips of water), but denies nausea and vomiting. Is not hungry this morning. She denies flatus. Urinary catheter in place. Patient has not yet ambulated.    Scheduled Meds:   acetaminophen  1,000 mg Oral Q6H    docusate sodium  100 mg Oral BID    senna  8.6 mg Oral BID     Continuous Infusions:   hydromorphone in 0.9 % NaCl 6 mg/30 ml      insulin regular 1 units/mL infusion orderable (DKA) 3.2 Units/hr (03/31/22 0147)    lactated ringers 40 mL/hr at 03/30/22 1345     PRN Meds:dextrose 10%, dextrose 10%, dextrose 10%, glucagon (human recombinant), HYDROmorphone, insulin aspart U-100, melatonin, naloxone, ondansetron, opium-belladonna 16.2-60 mg, oxyCODONE, oxyCODONE, prochlorperazine    Review of patient's allergies indicates:   Allergen Reactions    Glipizide Other (See Comments)       Objective:     Vital Signs (Most Recent):  Temp: 98.8 °F (37.1 °C) (03/31/22 0357)  Pulse: 107 (03/31/22 0357)  Resp: 16 (03/31/22 0357)  BP: (!) 141/67 (03/31/22 0357)  SpO2: 98 % (03/31/22 0357)   Vital Signs (24h Range):  Temp:  [97 °F (36.1 °C)-98.8 °F (37.1 °C)] 98.8 °F (37.1 °C)  Pulse:  [] 107  Resp:  [0-21] 16  SpO2:  [97 %-100 %] 98 %  BP: ()/(48-89) 141/67  Arterial Line BP: ()/(52-77) 137/77     Weight: 49 kg (108 lb)  Body mass index is 21.09 kg/m².    Intake/Output - Last 3 Shifts         03/29 0700 03/30 0659 03/30 0700 03/31 0659    I.V. (mL/kg)  834.6 (17)    Blood  1250    IV Piggyback  5200    Total Intake(mL/kg)  7284.6 (148.7)    Urine (mL/kg/hr)  870 (0.7)    Blood  1800    Total Output  2670    Net  +4614.6          Stool Occurrence  0 x               Physical Exam:   Constitutional: She is oriented to person, place, and time. She appears  well-developed and well-nourished. No distress.    HENT:   Head: Normocephalic and atraumatic.    Eyes: Conjunctivae and EOM are normal.    Neck: No thyromegaly present.    Cardiovascular:  Normal rate and regular rhythm.             Pulmonary/Chest: Effort normal. No respiratory distress.        Abdominal: Soft. She exhibits no distension. Abdominal incision: Midline supra/infraumbilical vertical incision with pressure dressing overlying; appears clean/dry/intact.There is no abdominal tenderness.   Hypoactive bowel sounds in all 4 quadrants      Genitourinary:    Genitourinary Comments: Lyn in place, draining approximately 300 ccs of clear urine              Musculoskeletal: Normal range of motion and moves all extremeties. No tenderness or edema.      Comments: TEDs/SCDs on        Neurological: She is alert and oriented to person, place, and time.    Skin: Skin is warm and dry. No rash noted.    Psychiatric: She has a normal mood and affect. Her behavior is normal.     Lines/Drains/Airways       Central Venous Catheter Line  Duration             Percutaneous Central Line Insertion/Assessment - Triple Lumen  03/30/22 2045 right internal jugular <1 day              Drain  Duration                  Urethral Catheter 03/30/22 0818 Non-latex 16 Fr. <1 day                  Laboratory: AM labs pending     UOP: 0.58 cc/kg/hr over past 11 hours

## (undated) DEVICE — SEE MEDLINE ITEM 154981

## (undated) DEVICE — SYR 10CC LUER LOCK

## (undated) DEVICE — GOWN SURGICAL X-LARGE

## (undated) DEVICE — DRESSING ADH ISLAND 3.6 X 14

## (undated) DEVICE — SUT CHROME GUT 1 CT-2 27

## (undated) DEVICE — CLIPPER BLADE MOD 4406 (CAREF)

## (undated) DEVICE — LEGGINGS 48X31 INCH

## (undated) DEVICE — TRAY FOLEY 16FR INFECTION CONT

## (undated) DEVICE — SUT PDS II 1 TP-1 VIL

## (undated) DEVICE — APPLICATOR CHLORAPREP ORN 26ML

## (undated) DEVICE — SEE MEDLINE ITEM 156902

## (undated) DEVICE — SEE MEDLINE ITEM 157181

## (undated) DEVICE — SEALER LIGASURE IMPACT 18CM

## (undated) DEVICE — DRAPE STERI INSTRUMENT 1018

## (undated) DEVICE — GLOVE PROTEXIS LTX PF SURG 7.5

## (undated) DEVICE — SUT MCRYL PLUS 4-0 PS2 27IN

## (undated) DEVICE — SUT 1 48IN PDS II VIO MONO

## (undated) DEVICE — DRESSING MEPORE ADH 3.5X12

## (undated) DEVICE — FIBRILLAR ABS HEMOSTAT 4X4

## (undated) DEVICE — SYR 30CC LUER LOCK

## (undated) DEVICE — TOWEL OR DISP STRL BLUE 4/PK

## (undated) DEVICE — ELECTRODE REM PLYHSV RETURN 9

## (undated) DEVICE — PAD ABD 8X10 STERILE

## (undated) DEVICE — LUBRICANT SURGILUBE 2 OZ

## (undated) DEVICE — TIP YANKAUERS BULB NO VENT

## (undated) DEVICE — DRAPE ABDOMINAL TIBURON 14X11

## (undated) DEVICE — GLOVE PROTEXIS NEOPRENE 7.5

## (undated) DEVICE — DRESSING ABSRBNT ISLAND 3.6X8

## (undated) DEVICE — SUT 1 27IN CHROMIC GUT CT-1

## (undated) DEVICE — Device

## (undated) DEVICE — SUT CTD VICRYL VIL BR SH 27